# Patient Record
Sex: FEMALE | Race: WHITE | NOT HISPANIC OR LATINO | Employment: OTHER | URBAN - NONMETROPOLITAN AREA
[De-identification: names, ages, dates, MRNs, and addresses within clinical notes are randomized per-mention and may not be internally consistent; named-entity substitution may affect disease eponyms.]

---

## 2017-01-04 ENCOUNTER — MYC MEDICAL ADVICE (OUTPATIENT)
Dept: FAMILY MEDICINE | Facility: CLINIC | Age: 67
End: 2017-01-04

## 2017-01-04 NOTE — TELEPHONE ENCOUNTER
Per 12-20-16 OV-      Patient Instructions    1. Acute sinusitis with symptoms > 10 days  Will treat with - cefUROXime (CEFTIN) 250 MG tablet; Take 1 tablet (250 mg) by mouth 2 times daily  Dispense: 20 tablet; Refill: 0  You have tolerated this in the past    If not better after the antibiotic    Call and would get a CT scan of sinus and refer to ENT     2. Multiple allergies  I would recommend that you see an allergies for further testing on your multiple antibiotic allergies as this limits what can be used  - ALLERGY/ASTHMA ADULT REFERRAL           Forwarded to Constance for review/ reply.  EPHRAIM Chaidez RN

## 2017-01-11 DIAGNOSIS — E78.5 HYPERLIPIDEMIA LDL GOAL <100: ICD-10-CM

## 2017-01-11 DIAGNOSIS — E11.9 TYPE 2 DIABETES MELLITUS WITHOUT COMPLICATION, UNSPECIFIED LONG TERM INSULIN USE STATUS: ICD-10-CM

## 2017-01-11 DIAGNOSIS — K58.0 IRRITABLE BOWEL SYNDROME WITH DIARRHEA: Primary | ICD-10-CM

## 2017-01-11 RX ORDER — LISINOPRIL 5 MG/1
5 TABLET ORAL DAILY
Qty: 90 TABLET | Refills: 0 | Status: SHIPPED | OUTPATIENT
Start: 2017-01-11 | End: 2017-03-07

## 2017-01-11 RX ORDER — DICYCLOMINE HYDROCHLORIDE 10 MG/1
10 CAPSULE ORAL
Qty: 240 CAPSULE | Refills: 3 | Status: SHIPPED | OUTPATIENT
Start: 2017-01-11 | End: 2017-10-08

## 2017-01-11 RX ORDER — METFORMIN HCL 500 MG
500 TABLET, EXTENDED RELEASE 24 HR ORAL
Qty: 90 TABLET | Refills: 0 | Status: SHIPPED | OUTPATIENT
Start: 2017-01-11 | End: 2017-03-07

## 2017-01-11 RX ORDER — ATORVASTATIN CALCIUM 80 MG/1
80 TABLET, FILM COATED ORAL DAILY
Qty: 30 TABLET | Refills: 0 | Status: SHIPPED | OUTPATIENT
Start: 2017-01-11 | End: 2017-02-16

## 2017-01-11 NOTE — TELEPHONE ENCOUNTER
Needs fasting lipids.  Left message for patient to schedule lab only appointment before next refill.

## 2017-01-11 NOTE — TELEPHONE ENCOUNTER
Patient is going out of town and needs this today -     Glucophage         Last Written Prescription Date: 10/22/16  Last Fill Quantity: 90, # refills: 0  Last Office Visit with [facility], [facility] or [facility] prescribing provider:  12/20/16        BP Readings from Last 3 Encounters:   12/20/16 112/66   12/02/16 123/60   11/15/16 131/66     MICROL       38   12/10/2015  No results found for this basename: microalbumin  CREATININE   Date Value Ref Range Status   10/28/2016 0.84 0.52 - 1.04 mg/dL Final   ]  GFR ESTIMATE   Date Value Ref Range Status   10/28/2016 68 >60 mL/min/1.7m2 Final     Comment:     Non  GFR Calc   02/02/2016 67 >60 mL/min/1.7m2 Final     Comment:     Non  GFR Calc   12/31/2015 77 >60 mL/min/1.7m2 Final     Comment:     Non  GFR Calc     GFR ESTIMATE IF BLACK   Date Value Ref Range Status   10/28/2016 82 >60 mL/min/1.7m2 Final     Comment:      GFR Calc   02/02/2016 81 >60 mL/min/1.7m2 Final     Comment:      GFR Calc   12/31/2015 >90   GFR Calc   >60 mL/min/1.7m2 Final     CHOL      154   12/27/2013  HDL       42   12/27/2013  LDL       90   12/10/2015  LDL       88   12/27/2013  TRIG      120   12/27/2013  CHOLHDLRATIO      4.0   12/27/2013  AST       34   2/2/2016  ALT       52   2/2/2016  A1C      6.8   10/28/2016  A1C      7.0   8/12/2016  A1C      7.5   4/22/2016  A1C      7.0   12/10/2015  A1C      6.6   1/16/2015  POTASSIUM   Date Value Ref Range Status   10/28/2016 4.4 3.4 - 5.3 mmol/L Final     Lipitor     Last Written Prescription Date: 01/18/16  Last Fill Quantity: 90, # refills: 3  Last Office Visit with [facility], [facility] or [facility] prescribing provider: 12/20/16       CHOL      154   12/27/2013  HDL       42   12/27/2013  LDL       90   12/10/2015  LDL       88   12/27/2013  TRIG      120   12/27/2013  CHOLHDLRATIO      4.0   12/27/2013      Lisinopril      Last Written Prescription Date: 01/18/16  Last  Fill Quantity: 90, # refills: 3  Last Office Visit with [facility], [facility] or [facility] prescribing provider: 12/20/16       POTASSIUM   Date Value Ref Range Status   10/28/2016 4.4 3.4 - 5.3 mmol/L Final     CREATININE   Date Value Ref Range Status   10/28/2016 0.84 0.52 - 1.04 mg/dL Final     BP Readings from Last 3 Encounters:   12/20/16 112/66   12/02/16 123/60   11/15/16 131/66

## 2017-01-11 NOTE — TELEPHONE ENCOUNTER
Patient is going out of town tomorrow and needs this. She is out.     Stella [place]

## 2017-02-06 ENCOUNTER — MYC MEDICAL ADVICE (OUTPATIENT)
Dept: FAMILY MEDICINE | Facility: CLINIC | Age: 67
End: 2017-02-06

## 2017-02-07 ENCOUNTER — MYC MEDICAL ADVICE (OUTPATIENT)
Dept: EDUCATION SERVICES | Facility: CLINIC | Age: 67
End: 2017-02-07

## 2017-02-07 DIAGNOSIS — E11.9 TYPE 2 DIABETES MELLITUS WITHOUT COMPLICATION, WITHOUT LONG-TERM CURRENT USE OF INSULIN (H): Primary | ICD-10-CM

## 2017-02-08 ENCOUNTER — TELEPHONE (OUTPATIENT)
Dept: GASTROENTEROLOGY | Facility: OUTPATIENT CENTER | Age: 67
End: 2017-02-08

## 2017-02-13 ENCOUNTER — HOSPITAL ENCOUNTER (OUTPATIENT)
Dept: CT IMAGING | Facility: CLINIC | Age: 67
Discharge: HOME | End: 2017-02-13
Attending: OTOLARYNGOLOGY | Admitting: OTOLARYNGOLOGY
Payer: MEDICARE

## 2017-02-13 ENCOUNTER — OFFICE VISIT (OUTPATIENT)
Dept: OTOLARYNGOLOGY | Facility: CLINIC | Age: 67
End: 2017-02-13
Payer: COMMERCIAL

## 2017-02-13 VITALS
BODY MASS INDEX: 31.55 KG/M2 | WEIGHT: 167 LBS | DIASTOLIC BLOOD PRESSURE: 43 MMHG | TEMPERATURE: 98.2 F | SYSTOLIC BLOOD PRESSURE: 105 MMHG | HEART RATE: 51 BPM

## 2017-02-13 DIAGNOSIS — J32.0 CHRONIC MAXILLARY SINUSITIS: ICD-10-CM

## 2017-02-13 DIAGNOSIS — K21.9 LPRD (LARYNGOPHARYNGEAL REFLUX DISEASE): ICD-10-CM

## 2017-02-13 DIAGNOSIS — H92.02 OTALGIA, LEFT: ICD-10-CM

## 2017-02-13 DIAGNOSIS — E11.9 TYPE 2 DIABETES MELLITUS WITHOUT COMPLICATION, WITHOUT LONG-TERM CURRENT USE OF INSULIN (H): ICD-10-CM

## 2017-02-13 DIAGNOSIS — M31.6 TEMPORAL ARTERITIS (H): ICD-10-CM

## 2017-02-13 DIAGNOSIS — J32.0 CHRONIC MAXILLARY SINUSITIS: Primary | ICD-10-CM

## 2017-02-13 LAB
ERYTHROCYTE [SEDIMENTATION RATE] IN BLOOD BY WESTERGREN METHOD: 9 MM/H (ref 0–30)
HBA1C MFR BLD: 7 % (ref 4.3–6)

## 2017-02-13 PROCEDURE — 83036 HEMOGLOBIN GLYCOSYLATED A1C: CPT | Performed by: FAMILY MEDICINE

## 2017-02-13 PROCEDURE — 70486 CT MAXILLOFACIAL W/O DYE: CPT

## 2017-02-13 PROCEDURE — 31575 DIAGNOSTIC LARYNGOSCOPY: CPT | Performed by: OTOLARYNGOLOGY

## 2017-02-13 PROCEDURE — 36415 COLL VENOUS BLD VENIPUNCTURE: CPT | Performed by: FAMILY MEDICINE

## 2017-02-13 PROCEDURE — 85652 RBC SED RATE AUTOMATED: CPT | Performed by: FAMILY MEDICINE

## 2017-02-13 PROCEDURE — 99214 OFFICE O/P EST MOD 30 MIN: CPT | Mod: 25 | Performed by: OTOLARYNGOLOGY

## 2017-02-13 ASSESSMENT — PAIN SCALES - GENERAL: PAINLEVEL: NO PAIN (0)

## 2017-02-13 NOTE — NURSING NOTE
"Initial /43 (BP Location: Right arm, Patient Position: Chair, Cuff Size: Adult Regular)  Pulse 51  Temp 98.2  F (36.8  C) (Oral)  Wt 75.8 kg (167 lb)  BMI 31.55 kg/m2 Estimated body mass index is 31.55 kg/(m^2) as calculated from the following:    Height as of 11/11/16: 1.549 m (5' 1\").    Weight as of this encounter: 75.8 kg (167 lb). .    Nora Benoit LPN    "
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
## 2017-02-13 NOTE — MR AVS SNAPSHOT
After Visit Summary   2/13/2017    Ml Evans    MRN: [id number]           Patient Information     Date Of Birth          1950        Visit Information        Provider Department      2/13/2017 11:15 AM Yumiko Bhat MD [facility]        Today's Diagnoses     Chronic maxillary sinusitis    -  1    LPRD (laryngopharyngeal reflux disease)          Care Instructions    Per physician's instructions          Follow-ups after your visit        Follow-up notes from your care team     Return in about 4 weeks (around 3/13/2017).      Future tests that were ordered for you today     Open Future Orders        Priority Expected Expires Ordered    CT Maxillofacial w/o Contrast Routine  2/13/2018 2/13/2017            Who to contact     If you have questions or need follow up information about today's clinic visit or your schedule please contact [facility] directly at [phone].  Normal or non-critical lab and imaging results will be communicated to you by MyChart, letter or phone within 4 business days after the clinic has received the results. If you do not hear from us within 7 days, please contact the clinic through [facility] or phone. If you have a critical or abnormal lab result, we will notify you by phone as soon as possible.  Submit refill requests through [employer] or call your pharmacy and they will forward the refill request to us. Please allow 3 business days for your refill to be completed.          Additional Information About Your Visit        MyChart Information     [employer] gives you secure access to your electronic health record. If you see a primary care provider, you can also send messages to your care team and make appointments. If you have questions, please call your primary care clinic.  If you do not have a primary care provider, please call [phone] and they will assist you.        Care EveryWhere ID     This is your Care EveryWhere ID. This  could be used by other organizations to access your [place] medical records  [id number]        Your Vitals Were     Pulse Temperature BMI (Body Mass Index)             51 98.2  F (36.8  C) (Oral) 31.55 kg/m2          Blood Pressure from Last 3 Encounters:   02/13/17 105/43   12/20/16 112/66   12/02/16 123/60    Weight from Last 3 Encounters:   02/13/17 75.8 kg (167 lb)   12/20/16 76.7 kg (169 lb)   12/02/16 75.8 kg (167 lb)              We Performed the Following     LARYNGOSCOPY FLEX FIBEROPTIC, DIAGNOSTIC        Primary Care Provider Office Phone # Fax #    Ledy Benedict [phone]       [facility] [address] [place] [id number]        Thank you!     Thank you for choosing [facility]  for your care. Our goal is always to provide you with excellent care. Hearing back from our patients is one way we can continue to improve our services. Please take a few minutes to complete the written survey that you may receive in the mail after your visit with us. Thank you!             Your Updated Medication List - Protect others around you: Learn how to safely use, store and throw away your medicines at www.disposemymeds.org.          This list is accurate as of: 2/13/17  4:31 PM.  Always use your most recent med list.                   Brand Name Dispense Instructions for use    amphetamine-dextroamphetamine 20 MG per 24 hr capsule    ADDERALL XR     Take 20 mg by mouth daily       aspirin 81 MG tablet      1 TABLET DAILY       atorvastatin 80 MG tablet    LIPITOR    30 tablet    Take 1 tablet (80 mg) by mouth daily       blood glucose monitoring lancets     100 each    Use to test blood sugars 2 times daily or as directed.       blood glucose monitoring meter device kit     1 kit    Use to test blood sugars 2 times daily or as directed.       blood glucose monitoring test strip    FREESTYLE LITE    100 each    Use to test blood sugars 1 times daily or as  directed.       calcium carbonate 500 MG tablet    OS-SAMIA 500 mg [place]. Ca     Reported on 2/13/2017       clindamycin 1 % solution    CLEOCIN T    60 mL    Apply topically 2 times daily       dicyclomine 10 MG capsule    BENTYL    240 capsule    Take 1 capsule (10 mg) by mouth 4 times daily (before meals and nightly)       doxepin 75 MG capsule    SINEquan     Take 75 mg by mouth At Bedtime       fluticasone 50 MCG/ACT spray    FLONASE    16 g    Spray 1-2 sprays into both nostrils daily       folic acid 400 MCG tablet    FOLVITE     Take 400 mcg by mouth daily Reported on 2/13/2017       HAIR/SKIN/NAILS Tabs      1 TABLET DAILY on hold       LANsoprazole 30 MG CR capsule    PREVACID    90 capsule    Take 1 capsule (30 mg) by mouth daily Take 30-60 minutes before a meal.       LEXAPRO 20 MG tablet   Generic drug:  escitalopram     180 Tab    40 mg = two tablets by mouth daily per psychiatry       lisinopril 5 MG tablet    PRINIVIL/ZESTRIL    90 tablet    Take 1 tablet (5 mg) by mouth daily       metFORMIN 500 MG 24 hr tablet    GLUCOPHAGE-XR    90 tablet    Take 1 tablet (500 mg) by mouth daily (with dinner)       metoprolol 25 MG 24 hr tablet    TOPROL-XL    90 tablet    Take 1 tablet (25 mg) by mouth daily       nitroglycerin 0.4 MG sublingual tablet    NITROSTAT    25 tablet    Place 1 tablet (0.4 mg) under the tongue every 5 minutes as needed for chest pain       nystatin 846552 UNIT/GM Powd    MYCOSTATIN    30 g    Apply topically 3 times daily as needed       omega-3 fatty acids 1200 MG capsule      Take 1 capsule by mouth daily Reported on 2/13/2017       psyllium 0.52 G capsule      Take 4 capsules by mouth At Bedtime       sennosides 8.6 MG tablet    SENOKOT     Take 1 tablet by mouth twice a week       tretinoin 0.025 % cream    RETIN-A    45 g    Spread a pea size amount into affected area topically at bedtime.  Use sunscreen SPF>20.       vitamin B complex with vitamin C Tabs tablet    STRESS TAB      Reported on 2/13/2017       vitamin E 400 UNIT capsule      Take 400 Units by mouth daily Reported on 2/13/2017       XANAX PO      Take 0.5-1 mg by mouth 1/2 tab in am, 1 tab in pm, then 1 tab midday prn and 1/2 tab throughout the day if needed

## 2017-02-16 DIAGNOSIS — E78.5 HYPERLIPIDEMIA LDL GOAL <100: ICD-10-CM

## 2017-02-17 NOTE — TELEPHONE ENCOUNTER
atorvastatin (LIPITOR) 80 MG tablet     Last Written Prescription Date: 1/11/17  Last Fill Quantity: 30, # refills: 0  Last Office Visit with G, P or [facility] prescribing provider: 12/20/16       Lab Results   Component Value Date    CHOL 154 12/27/2013     Lab Results   Component Value Date    HDL 42 12/27/2013     Lab Results   Component Value Date    LDL 90 12/10/2015    LDL 88 12/27/2013     Lab Results   Component Value Date    TRIG 120 12/27/2013     Lab Results   Component Value Date    CHOLHDLRATIO 4.0 12/27/2013

## 2017-02-20 RX ORDER — ATORVASTATIN CALCIUM 80 MG/1
80 TABLET, FILM COATED ORAL DAILY
Qty: 30 TABLET | Refills: 0 | Status: SHIPPED | OUTPATIENT
Start: 2017-02-20 | End: 2017-03-16

## 2017-02-21 ENCOUNTER — TELEPHONE (OUTPATIENT)
Dept: FAMILY MEDICINE | Facility: CLINIC | Age: 67
End: 2017-02-21

## 2017-02-21 ENCOUNTER — MYC MEDICAL ADVICE (OUTPATIENT)
Dept: FAMILY MEDICINE | Facility: CLINIC | Age: 67
End: 2017-02-21

## 2017-02-21 NOTE — TELEPHONE ENCOUNTER
RN discussed with DANIELLA Nolasco,  who advises pt does not need abx for dental cleaning/ procedure based on current prophylaxis guidelines.   Pt informed/ advised as noted per Constance.  EPHRAIM Chaidez RN

## 2017-02-21 NOTE — TELEPHONE ENCOUNTER
Reason for Call:  Medication or medication refill:    Do you use a [facility]?  Name of the pharmacy and phone number for the current request:  [facility] - [phone]    Name of the medication requested: Clindamycin    Other request: She is a heart pt.  She has a dental appt tomorrow and needs a Rx for Clindamycin.  Please advise.    Can we leave a detailed message on this number? YES    Phone number patient can be reached at: Home number on file [phone] (home)    Best Time: any    Call taken on 2/21/2017 at 4:25 PM by aJnice Rhodes

## 2017-02-21 NOTE — TELEPHONE ENCOUNTER
Hx stents 2011.  Having routine dental cleaning tomorrow, appt 12 N.  [facility] dentist is recommending abx.  Pt requesting clindamycin due to multiple abx allergies.   Please advise.  EPHRAIM Chaidez RN

## 2017-03-02 DIAGNOSIS — E78.5 HYPERLIPIDEMIA LDL GOAL <100: Primary | ICD-10-CM

## 2017-03-02 DIAGNOSIS — E11.9 TYPE 2 DIABETES MELLITUS WITHOUT COMPLICATION, WITHOUT LONG-TERM CURRENT USE OF INSULIN (H): ICD-10-CM

## 2017-03-03 DIAGNOSIS — E11.9 TYPE 2 DIABETES MELLITUS WITHOUT COMPLICATION, WITHOUT LONG-TERM CURRENT USE OF INSULIN (H): ICD-10-CM

## 2017-03-03 DIAGNOSIS — E78.5 HYPERLIPIDEMIA LDL GOAL <100: ICD-10-CM

## 2017-03-03 LAB
ALBUMIN SERPL-MCNC: 3.9 G/DL (ref 3.4–5)
ALP SERPL-CCNC: 142 U/L (ref 40–150)
ALT SERPL W P-5'-P-CCNC: 51 U/L (ref 0–50)
ANION GAP SERPL CALCULATED.3IONS-SCNC: 10 MMOL/L (ref 3–14)
AST SERPL W P-5'-P-CCNC: 49 U/L (ref 0–45)
BILIRUB DIRECT SERPL-MCNC: <0.1 MG/DL (ref 0–0.2)
BILIRUB SERPL-MCNC: 0.4 MG/DL (ref 0.2–1.3)
BUN SERPL-MCNC: 15 MG/DL (ref 7–30)
CALCIUM SERPL-MCNC: 8.6 MG/DL (ref 8.5–10.1)
CHLORIDE SERPL-SCNC: 103 MMOL/L (ref 94–109)
CHOLEST SERPL-MCNC: 142 MG/DL
CO2 SERPL-SCNC: 25 MMOL/L (ref 20–32)
CREAT SERPL-MCNC: 0.79 MG/DL (ref 0.52–1.04)
ERYTHROCYTE [DISTWIDTH] IN BLOOD BY AUTOMATED COUNT: 13.5 % (ref 10–15)
GFR SERPL CREATININE-BSD FRML MDRD: 73 ML/MIN/1.7M2
GLUCOSE SERPL-MCNC: 152 MG/DL (ref 70–99)
HCT VFR BLD AUTO: 41.9 % (ref 35–47)
HDLC SERPL-MCNC: 50 MG/DL
HGB BLD-MCNC: 13.8 G/DL (ref 11.7–15.7)
LDLC SERPL CALC-MCNC: 58 MG/DL
MCH RBC QN AUTO: 29.9 PG (ref 26.5–33)
MCHC RBC AUTO-ENTMCNC: 32.9 G/DL (ref 31.5–36.5)
MCV RBC AUTO: 91 FL (ref 78–100)
NONHDLC SERPL-MCNC: 92 MG/DL
PLATELET # BLD AUTO: 215 10E9/L (ref 150–450)
POTASSIUM SERPL-SCNC: 4 MMOL/L (ref 3.4–5.3)
PROT SERPL-MCNC: 7.1 G/DL (ref 6.8–8.8)
RBC # BLD AUTO: 4.61 10E12/L (ref 3.8–5.2)
SODIUM SERPL-SCNC: 138 MMOL/L (ref 133–144)
TRIGL SERPL-MCNC: 168 MG/DL
TSH SERPL DL<=0.005 MIU/L-ACNC: 1.9 MU/L (ref 0.4–4)
WBC # BLD AUTO: 8.8 10E9/L (ref 4–11)

## 2017-03-03 PROCEDURE — 80061 LIPID PANEL: CPT | Performed by: FAMILY MEDICINE

## 2017-03-03 PROCEDURE — 80076 HEPATIC FUNCTION PANEL: CPT | Performed by: FAMILY MEDICINE

## 2017-03-03 PROCEDURE — 85027 COMPLETE CBC AUTOMATED: CPT | Performed by: FAMILY MEDICINE

## 2017-03-03 PROCEDURE — 84443 ASSAY THYROID STIM HORMONE: CPT | Performed by: FAMILY MEDICINE

## 2017-03-03 PROCEDURE — 36415 COLL VENOUS BLD VENIPUNCTURE: CPT | Performed by: FAMILY MEDICINE

## 2017-03-03 PROCEDURE — 80048 BASIC METABOLIC PNL TOTAL CA: CPT | Performed by: FAMILY MEDICINE

## 2017-03-07 ENCOUNTER — OFFICE VISIT (OUTPATIENT)
Dept: FAMILY MEDICINE | Facility: CLINIC | Age: 67
End: 2017-03-07
Payer: COMMERCIAL

## 2017-03-07 VITALS
DIASTOLIC BLOOD PRESSURE: 60 MMHG | WEIGHT: 167 LBS | BODY MASS INDEX: 31.53 KG/M2 | HEIGHT: 61 IN | TEMPERATURE: 98.7 F | HEART RATE: 52 BPM | SYSTOLIC BLOOD PRESSURE: 110 MMHG

## 2017-03-07 DIAGNOSIS — I10 BENIGN ESSENTIAL HYPERTENSION: ICD-10-CM

## 2017-03-07 DIAGNOSIS — K21.9 GASTROESOPHAGEAL REFLUX DISEASE, ESOPHAGITIS PRESENCE NOT SPECIFIED: ICD-10-CM

## 2017-03-07 DIAGNOSIS — J31.0 CHRONIC RHINITIS: ICD-10-CM

## 2017-03-07 DIAGNOSIS — K58.0 IRRITABLE BOWEL SYNDROME WITH DIARRHEA: ICD-10-CM

## 2017-03-07 DIAGNOSIS — E78.5 HYPERLIPIDEMIA LDL GOAL <100: ICD-10-CM

## 2017-03-07 DIAGNOSIS — Z00.01 ENCOUNTER FOR WELL ADULT EXAM WITH ABNORMAL FINDINGS: Primary | ICD-10-CM

## 2017-03-07 DIAGNOSIS — E11.9 TYPE 2 DIABETES MELLITUS WITHOUT COMPLICATION, WITHOUT LONG-TERM CURRENT USE OF INSULIN (H): ICD-10-CM

## 2017-03-07 DIAGNOSIS — F33.0 MAJOR DEPRESSIVE DISORDER, RECURRENT EPISODE, MILD (H): ICD-10-CM

## 2017-03-07 DIAGNOSIS — I25.10 CORONARY ARTERY DISEASE INVOLVING NATIVE HEART WITHOUT ANGINA PECTORIS, UNSPECIFIED VESSEL OR LESION TYPE: ICD-10-CM

## 2017-03-07 LAB
CREAT UR-MCNC: 277 MG/DL
MICROALBUMIN UR-MCNC: 32 MG/L
MICROALBUMIN/CREAT UR: 11.59 MG/G CR (ref 0–25)

## 2017-03-07 PROCEDURE — 99397 PER PM REEVAL EST PAT 65+ YR: CPT | Performed by: FAMILY MEDICINE

## 2017-03-07 PROCEDURE — 99207 C FOOT EXAM  NO CHARGE: CPT | Mod: 25 | Performed by: FAMILY MEDICINE

## 2017-03-07 PROCEDURE — 82043 UR ALBUMIN QUANTITATIVE: CPT | Performed by: FAMILY MEDICINE

## 2017-03-07 RX ORDER — METOPROLOL SUCCINATE 25 MG/1
25 TABLET, EXTENDED RELEASE ORAL DAILY
Qty: 90 TABLET | Refills: 3 | Status: SHIPPED | OUTPATIENT
Start: 2017-03-07 | End: 2018-04-04

## 2017-03-07 RX ORDER — LANSOPRAZOLE 30 MG/1
30 CAPSULE, DELAYED RELEASE ORAL DAILY
Qty: 90 CAPSULE | Refills: 3 | Status: SHIPPED | OUTPATIENT
Start: 2017-03-07 | End: 2018-03-25

## 2017-03-07 RX ORDER — LISINOPRIL 5 MG/1
5 TABLET ORAL DAILY
Qty: 90 TABLET | Refills: 3 | Status: SHIPPED | OUTPATIENT
Start: 2017-03-07 | End: 2018-03-25

## 2017-03-07 RX ORDER — NITROGLYCERIN 0.4 MG/1
0.4 TABLET SUBLINGUAL EVERY 5 MIN PRN
Qty: 25 TABLET | Refills: 6 | Status: SHIPPED | OUTPATIENT
Start: 2017-03-07 | End: 2019-01-23

## 2017-03-07 RX ORDER — METFORMIN HCL 500 MG
500 TABLET, EXTENDED RELEASE 24 HR ORAL
Qty: 90 TABLET | Refills: 3 | Status: SHIPPED | OUTPATIENT
Start: 2017-03-07 | End: 2018-03-25

## 2017-03-07 ASSESSMENT — ANXIETY QUESTIONNAIRES
2. NOT BEING ABLE TO STOP OR CONTROL WORRYING: SEVERAL DAYS
6. BECOMING EASILY ANNOYED OR IRRITABLE: MORE THAN HALF THE DAYS
1. FEELING NERVOUS, ANXIOUS, OR ON EDGE: MORE THAN HALF THE DAYS
3. WORRYING TOO MUCH ABOUT DIFFERENT THINGS: SEVERAL DAYS
7. FEELING AFRAID AS IF SOMETHING AWFUL MIGHT HAPPEN: MORE THAN HALF THE DAYS
5. BEING SO RESTLESS THAT IT IS HARD TO SIT STILL: SEVERAL DAYS
GAD7 TOTAL SCORE: 11

## 2017-03-07 ASSESSMENT — PATIENT HEALTH QUESTIONNAIRE - PHQ9: 5. POOR APPETITE OR OVEREATING: MORE THAN HALF THE DAYS

## 2017-03-07 NOTE — NURSING NOTE
"Chief Complaint   Patient presents with     Physical       Initial /60 (BP Location: Right arm, Patient Position: Chair, Cuff Size: Adult Large)  Pulse 52  Temp 98.7  F (37.1  C) (Tympanic)  Ht 5' 0.5\" (1.537 m)  Wt 167 lb (75.8 kg)  BMI 32.08 kg/m2 Estimated body mass index is 32.08 kg/(m^2) as calculated from the following:    Height as of this encounter: 5' 0.5\" (1.537 m).    Weight as of this encounter: 167 lb (75.8 kg).  Medication Reconciliation: complete      "

## 2017-03-07 NOTE — MR AVS SNAPSHOT
After Visit Summary   3/7/2017    Ml Evans    MRN: [id number]           Patient Information     Date Of Birth          1950        Visit Information        Provider Department      3/7/2017 2:20 PM Ledy Benedict MD [facility]        Today's Diagnoses     Major depressive disorder, recurrent episode, mild (H)    -  1    Type 2 diabetes mellitus without complication, without long-term current use of insulin (H)        Gastroesophageal reflux disease, esophagitis presence not specified        Hyperlipidemia LDL goal <100        Type 2 diabetes mellitus without complication, unspecified long term insulin use status (H)        Chronic rhinitis        Coronary artery disease involving native heart without angina pectoris, unspecified vessel or lesion type        Benign essential hypertension        Irritable bowel syndrome with diarrhea          Care Instructions      Preventive Health Recommendations  Female Ages 65 +    Yearly exam:     See your health care provider every year in order to  o Review health changes.   o Discuss preventive care.    o Review your medicines if your doctor has prescribed any.      You no longer need a yearly Pap test unless you've had an abnormal Pap test in the past 10 years. If you have vaginal symptoms, such as bleeding or discharge, be sure to talk with your provider about a Pap test.      Every 1 to 2 years, have a mammogram.  If you are over 69, talk with your health care provider about whether or not you want to continue having screening mammograms.      Every 10 years, have a colonoscopy. Or, have a yearly FIT test (stool test). These exams will check for colon cancer.       Have a cholesterol test every 5 years, or more often if your doctor advises it.       Have a diabetes test (fasting glucose) every three years. If you are at risk for diabetes, you should have this test more often.       At age 65, have a bone density scan  (DEXA) to check for osteoporosis (brittle bone disease).    Shots:    Get a flu shot each year.    Get a tetanus shot every 10 years.    Talk to your doctor about your pneumonia vaccines. There are now two you should receive - Pneumovax (PPSV 23) and Prevnar (PCV 13).    Talk to your doctor about the shingles vaccine.    Talk to your doctor about the hepatitis B vaccine.    Nutrition:     Eat at least 5 servings of fruits and vegetables each day.      Eat whole-grain bread, whole-wheat pasta and brown rice instead of white grains and rice.      Talk to your provider about Calcium and Vitamin D.     Lifestyle    Exercise at least 150 minutes a week (30 minutes a day, 5 days a week). This will help you control your weight and prevent disease.      Limit alcohol to one drink per day.      No smoking.       Wear sunscreen to prevent skin cancer.       See your dentist twice a year for an exam and cleaning.      See your eye doctor every 1 to 2 years to screen for conditions such as glaucoma, macular degeneration, cataracts, etc     Medications refilled     Set up allergy and nutrition     See me back in 1 month         Follow-ups after your visit        Additional Services     ALLERGY/ASTHMA ADULT REFERRAL       Your provider has referred you to: [facility]: [facility] [phone] [email or web address]    Please be aware that coverage of these services is subject to the terms and limitations of your health insurance plan.  Call member services at your health plan with any benefit or coverage questions.      Please bring the following with you to your appointment:    (1) Any X-Rays, CTs or MRIs which have been performed.  Contact the facility where they were done to arrange for  prior to your scheduled appointment.    (2) List of current medications  (3) This referral request   (4) Any documents/labs given to you for this referral            NUTRITION REFERRAL       Your  provider has referred you to: [facility]: [facility] [phone]   [email or web address]    Please be aware that coverage of these services is subject to the terms and limitations of your health insurance plan.  Call member services at your health plan with any benefit or coverage questions.      Please bring the following with you to your appointment:    (1) This referral request  (2) Any documents given to you regarding this referral  (3) Any specific questions you have about diet and/or food choices                  Future tests that were ordered for you today     Open Future Orders        Priority Expected Expires Ordered    ** Albumin Random Urine Quant FUTURE 1yr Routine 2/5/2018 3/7/2018 3/7/2017            Who to contact     If you have questions or need follow up information about today's clinic visit or your schedule please contact [facility] directly at [phone].  Normal or non-critical lab and imaging results will be communicated to you by MyChart, letter or phone within 4 business days after the clinic has received the results. If you do not hear from us within 7 days, please contact the clinic through [employer] or phone. If you have a critical or abnormal lab result, we will notify you by phone as soon as possible.  Submit refill requests through shoply or call your pharmacy and they will forward the refill request to us. Please allow 3 business days for your refill to be completed.          Additional Information About Your Visit        [employer] Information     shoply gives you secure access to your electronic health record. If you see a primary care provider, you can also send messages to your care team and make appointments. If you have questions, please call your primary care clinic.  If you do not have a primary care provider, please call [phone] and they will assist you.        Care EveryWhere ID     This is your Care EveryWhere ID.  "This could be used by other organizations to access your [facility] medical records  [id number]        Your Vitals Were     Pulse Temperature Height BMI (Body Mass Index)          52 98.7  F (37.1  C) (Tympanic) 5' 0.5\" (1.537 m) 32.08 kg/m2         Blood Pressure from Last 3 Encounters:   03/07/17 110/60   02/13/17 105/43   12/20/16 112/66    Weight from Last 3 Encounters:   03/07/17 167 lb (75.8 kg)   02/13/17 167 lb (75.8 kg)   12/20/16 169 lb (76.7 kg)              We Performed the Following     ALLERGY/ASTHMA ADULT REFERRAL     DEPRESSION ACTION PLAN (DAP)     FOOT EXAM     NUTRITION REFERRAL          Where to get your medicines      These medications were sent to [id number] [address]  [address] [address]    Hours:  Closed 10-16-08 business to [facility] Phone:  [phone]     LANsoprazole 30 MG CR capsule    lisinopril 5 MG tablet    metFORMIN 500 MG 24 hr tablet    metoprolol 25 MG 24 hr tablet    nitroglycerin 0.4 MG sublingual tablet          Primary Care Provider Office Phone # Fax #    Ledy Benedict [phone]       [facility] [address] [facility] [address]        Thank you!     Thank you for choosing [facility]  for your care. Our goal is always to provide you with excellent care. Hearing back from our patients is one way we can continue to improve our services. Please take a few minutes to complete the written survey that you may receive in the mail after your visit with us. Thank you!             Your Updated Medication List - Protect others around you: Learn how to safely use, store and throw away your medicines at www.disposemymeds.org.          This list is accurate as of: 3/7/17  3:25 PM.  Always use your most recent med list.                   Brand Name Dispense Instructions for use    amphetamine-dextroamphetamine 20 MG per 24 hr capsule    ADDERALL XR     Take 20 " mg by mouth daily       aspirin 81 MG tablet      1 TABLET DAILY       atorvastatin 80 MG tablet    LIPITOR    30 tablet    Take 1 tablet (80 mg) by mouth daily       blood glucose monitoring lancets     100 each    Use to test blood sugars 2 times daily or as directed.       blood glucose monitoring meter device kit     1 kit    Use to test blood sugars 2 times daily or as directed.       blood glucose monitoring test strip    FREESTYLE LITE    100 each    Use to test blood sugars 1 times daily or as directed.       clindamycin 1 % solution    CLEOCIN T    60 mL    Apply topically 2 times daily       dicyclomine 10 MG capsule    BENTYL    240 capsule    Take 1 capsule (10 mg) by mouth 4 times daily (before meals and nightly)       doxepin 75 MG capsule    SINEquan     Take 75 mg by mouth At Bedtime       LANsoprazole 30 MG CR capsule    PREVACID    90 capsule    Take 1 capsule (30 mg) by mouth daily Take 30-60 minutes before a meal.       LEXAPRO 20 MG tablet   Generic drug:  escitalopram     180 Tab    40 mg = two tablets by mouth daily per psychiatry       lisinopril 5 MG tablet    PRINIVIL/ZESTRIL    90 tablet    Take 1 tablet (5 mg) by mouth daily       metFORMIN 500 MG 24 hr tablet    GLUCOPHAGE-XR    90 tablet    Take 1 tablet (500 mg) by mouth daily (with dinner)       metoprolol 25 MG 24 hr tablet    TOPROL-XL    90 tablet    Take 1 tablet (25 mg) by mouth daily       nitroglycerin 0.4 MG sublingual tablet    NITROSTAT    25 tablet    Place 1 tablet (0.4 mg) under the tongue every 5 minutes as needed for chest pain       psyllium 0.52 G capsule      Take 4 capsules by mouth At Bedtime       sennosides 8.6 MG tablet    SENOKOT     Take 1 tablet by mouth twice a week       tretinoin 0.025 % cream    RETIN-A    45 g    Spread a pea size amount into affected area topically at bedtime.  Use sunscreen SPF>20.       XANAX PO      Take 0.5-1 mg by mouth 1/2 tab in am, 1 tab in pm, then 1 tab midday prn and 1/2 tab  throughout the day if needed

## 2017-03-07 NOTE — LETTER
My Depression Action Plan  Name: Ml Evans   Date of Birth 1950  Date: 3/7/2017    My doctor: Ledy Benedict   My clinic: [address] [id number] [phone]          GREEN    ZONE   Good Control    What it looks like:     Things are going generally well. You have normal up s and down s. You may even feel depressed from time to time, but bad moods usually last less than a day.   What you need to do:  1. Continue to care for yourself (see self care plan)  2. Check your depression survival kit and update it as needed  3. Follow your physician s recommendations including any medication.  4. Do not stop taking medication unless you consult with your physician first.           YELLOW         ZONE Getting Worse    What it looks like:     Depression is starting to interfere with your life.     It may be hard to get out of bed; you may be starting to isolate yourself from others.    Symptoms of depression are starting to last most all day and this has happened for several days.     You may have suicidal thoughts but they are not constant.   What you need to do:     1. Call your care team, your response to treatment will improve if you keep your care team informed of your progress. Yellow periods are signs an adjustment may need to be made.     2. Continue your self-care, even if you have to fake it!    3. Talk to someone in your support network    4. Open up your depression survival kit           RED    ZONE Medical Alert - Get Help    What it looks like:     Depression is seriously interfering with your life.     You may experience these or other symptoms: You can t get out of bed most days, can t work or engage in other necessary activities, you have trouble taking care of basic hygiene, or basic responsibilities, thoughts of suicide or death that will not go away, self-injurious behavior.     What you need to do:  1. Call your  care team and request a same-day appointment. If they are not available (weekends or after hours) call your local crisis line, emergency room or 911.      Electronically signed by: Kate English, March 7, 2017    Depression Self Care Plan / Survival Kit    Self-Care for Depression  Here s the deal. Your body and mind are really not as separate as most people think.  What you do and think affects how you feel and how you feel influences what you do and think. This means if you do things that people who feel good do, it will help you feel better.  Sometimes this is all it takes.  There is also a place for medication and therapy depending on how severe your depression is, so be sure to consult with your medical provider and/ or Behavioral Health Consultant if your symptoms are worsening or not improving.     In order to better manage my stress, I will:    Exercise  Get some form of exercise, every day. This will help reduce pain and release endorphins, the  feel good  chemicals in your brain. This is almost as good as taking antidepressants!  This is not the same as joining a gym and then never going! (they count on that by the way ) It can be as simple as just going for a walk or doing some gardening, anything that will get you moving.      Hygiene   Maintain good hygiene (Get out of bed in the morning, Make your bed, Brush your teeth, Take a shower, and Get dressed like you were going to work, even if you are unemployed).  If your clothes don't fit try to get ones that do.    Diet  I will strive to eat foods that are good for me, drink plenty of water, and avoid excessive sugar, caffeine, alcohol, and other mood-altering substances.  Some foods that are helpful in depression are: complex carbohydrates, B vitamins, flaxseed, fish or fish oil, fresh fruits and vegetables.    Psychotherapy  I agree to participate in Individual Therapy (if recommended).    Medication  If prescribed medications, I agree to take them.   Missing doses can result in serious side effects.  I understand that drinking alcohol, or other illicit drug use, may cause potential side effects.  I will not stop my medication abruptly without first discussing it with my provider.    Staying Connected With Others  I will stay in touch with my friends, family members, and my primary care provider/team.    Use your imagination  Be creative.  We all have a creative side; it doesn t matter if it s oil painting, sand castles, or mud pies! This will also kick up the endorphins.    Witness Beauty  (AKA stop and smell the roses) Take a look outside, even in mid-winter. Notice colors, textures. Watch the squirrels and birds.     Service to others  Be of service to others.  There is always someone else in need.  By helping others we can  get out of ourselves  and remember the really important things.  This also provides opportunities for practicing all the other parts of the program.    Humor  Laugh and be silly!  Adjust your TV habits for less news and crime-drama and more comedy.    Control your stress  Try breathing deep, massage therapy, biofeedback, and meditation. Find time to relax each day.     My support system    Clinic Contact:  Phone number:    Contact 1:  Phone number:    Contact 2:  Phone number:    [facility]/:  Phone number:    Therapist:  Phone number:    Local crisis center:    Phone number:    Other community support:  Phone number:
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
## 2017-03-08 ASSESSMENT — PATIENT HEALTH QUESTIONNAIRE - PHQ9: SUM OF ALL RESPONSES TO PHQ QUESTIONS 1-9: 15

## 2017-03-08 ASSESSMENT — ANXIETY QUESTIONNAIRES: GAD7 TOTAL SCORE: 11

## 2017-03-09 PROBLEM — J31.0 CHRONIC RHINITIS: Status: ACTIVE | Noted: 2017-03-09

## 2017-03-09 PROBLEM — I10 BENIGN ESSENTIAL HYPERTENSION: Status: ACTIVE | Noted: 2017-03-09

## 2017-03-14 ENCOUNTER — OFFICE VISIT (OUTPATIENT)
Dept: ALLERGY | Facility: CLINIC | Age: 67
End: 2017-03-14
Payer: COMMERCIAL

## 2017-03-14 VITALS
WEIGHT: 167.6 LBS | OXYGEN SATURATION: 95 % | HEART RATE: 54 BPM | HEIGHT: 61 IN | SYSTOLIC BLOOD PRESSURE: 107 MMHG | BODY MASS INDEX: 31.64 KG/M2 | DIASTOLIC BLOOD PRESSURE: 68 MMHG | TEMPERATURE: 99 F

## 2017-03-14 DIAGNOSIS — J31.0 CHRONIC RHINITIS: ICD-10-CM

## 2017-03-14 DIAGNOSIS — T50.905A MEDICATION REACTION, INITIAL ENCOUNTER: Primary | ICD-10-CM

## 2017-03-14 PROCEDURE — 99204 OFFICE O/P NEW MOD 45 MIN: CPT | Performed by: ALLERGY & IMMUNOLOGY

## 2017-03-14 ASSESSMENT — ENCOUNTER SYMPTOMS
ROS GI COMMENTS: HEARTBURN AND REFLUX
WHEEZING: 1
HEADACHES: 0
DIZZINESS: 1
ADENOPATHY: 0
FEVER: 0
ARTHRALGIAS: 0
SORE THROAT: 1
SHORTNESS OF BREATH: 1
EYE DISCHARGE: 0
EYE ITCHING: 0
RHINORRHEA: 1
COUGH: 0
EYE REDNESS: 0
NERVOUS/ANXIOUS: 1
APPETITE CHANGE: 0
DIARRHEA: 0
CHEST TIGHTNESS: 0
STRIDOR: 0
SINUS PRESSURE: 1
UNEXPECTED WEIGHT CHANGE: 0
NAUSEA: 0
MYALGIAS: 0
JOINT SWELLING: 0
VOMITING: 0
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
## 2017-03-14 NOTE — Clinical Note
Dear Dr. Benedict  The patient was seen in Allergy Clinic for consultation.  Please see consult note for more details. Thank you for the referral!!!

## 2017-03-14 NOTE — NURSING NOTE
"Chief Complaint   Patient presents with     Allergy Consult     ref-Ledy Benedict. allergy to antibiotics. questions on enviromental allergies        Initial /68 (BP Location: Left arm, Patient Position: Chair, Cuff Size: Adult Regular)  Pulse 54  Temp 99  F (37.2  C) (Tympanic)  Ht 5' 0.5\" (1.537 m)  Wt 167 lb 9.6 oz (76 kg)  SpO2 95%  BMI 32.19 kg/m2 Estimated body mass index is 32.19 kg/(m^2) as calculated from the following:    Height as of this encounter: 5' 0.5\" (1.537 m).    Weight as of this encounter: 167 lb 9.6 oz (76 kg).  Medication Reconciliation: complete    "

## 2017-03-14 NOTE — MR AVS SNAPSHOT
After Visit Summary   3/14/2017    Ml Evans    MRN: [id number]           Patient Information     Date Of Birth          1950        Visit Information        Provider Department      3/14/2017 1:20 PM Amador Whitaker MD [facility]        Today's Diagnoses     Medication reaction, initial encounter    -  1    Chronic rhinitis           Follow-ups after your visit        Follow-up notes from your care team     Return if symptoms worsen or fail to improve, for rhinitis follow up; pcn testing.      Your next 10 appointments already scheduled     Mar 28, 2017 11:00 AM CDT   Consult HOD with Genesis Avila RD   [facility] ([facility])    [address] [address] [phone]            Mar 31, 2017  3:20 PM CDT   SHORT with Ledy Benedict MD   [facility]    [address] [place] [address] [phone]              Who to contact     If you have questions or need follow up information about today's clinic visit or your schedule please contact [facility] directly at [phone].  Normal or non-critical lab and imaging results will be communicated to you by MyChart, letter or phone within 4 business days after the clinic has received the results. If you do not hear from us within 7 days, please contact the clinic through MyChart or phone. If you have a critical or abnormal lab result, we will notify you by phone as soon as possible.  Submit refill requests through [employer] or call your pharmacy and they will forward the refill request to us. Please allow 3 business days for your refill to be completed.          Additional Information About Your Visit        MyChart Information     [employer] gives you secure access to your electronic health record. If you see a primary care provider, you can also send messages to your care team and make  "appointments. If you have questions, please call your primary care clinic.  If you do not have a primary care provider, please call [phone] and they will assist you.        Care EveryWhere ID     This is your Care EveryWhere ID. This could be used by other organizations to access your [facility] medical records  [id number]        Your Vitals Were     Pulse Temperature Height Pulse Oximetry BMI (Body Mass Index)       54 99  F (37.2  C) (Tympanic) 5' 0.5\" (1.537 m) 95% 32.19 kg/m2        Blood Pressure from Last 3 Encounters:   03/14/17 107/68   03/07/17 110/60   02/13/17 105/43    Weight from Last 3 Encounters:   03/14/17 167 lb 9.6 oz (76 kg)   03/07/17 167 lb (75.8 kg)   02/13/17 167 lb (75.8 kg)              Today, you had the following     No orders found for display       Primary Care Provider Office Phone # Fax #    Ledy Benedict [phone]       [facility] [address] [place] [id number]        Thank you!     Thank you for choosing [facility]  for your care. Our goal is always to provide you with excellent care. Hearing back from our patients is one way we can continue to improve our services. Please take a few minutes to complete the written survey that you may receive in the mail after your visit with us. Thank you!             Your Updated Medication List - Protect others around you: Learn how to safely use, store and throw away your medicines at www.disposemymeds.org.          This list is accurate as of: 3/14/17 11:59 PM.  Always use your most recent med list.                   Brand Name Dispense Instructions for use    amphetamine-dextroamphetamine 20 MG per 24 hr capsule    ADDERALL XR     Take 20 mg by mouth daily       aspirin 81 MG tablet      1 TABLET DAILY       atorvastatin 80 MG tablet    LIPITOR    30 tablet    Take 1 tablet (80 mg) by mouth daily       blood glucose monitoring lancets     100 each    Use to test blood sugars 2 " times daily or as directed.       blood glucose monitoring meter device kit     1 kit    Use to test blood sugars 2 times daily or as directed.       blood glucose monitoring test strip    FREESTYLE LITE    100 each    Use to test blood sugars 1 times daily or as directed.       clindamycin 1 % solution    CLEOCIN T    60 mL    Apply topically 2 times daily       dicyclomine 10 MG capsule    BENTYL    240 capsule    Take 1 capsule (10 mg) by mouth 4 times daily (before meals and nightly)       doxepin 75 MG capsule    SINEquan     Take 75 mg by mouth At Bedtime       LANsoprazole 30 MG CR capsule    PREVACID    90 capsule    Take 1 capsule (30 mg) by mouth daily Take 30-60 minutes before a meal.       LEXAPRO 20 MG tablet   Generic drug:  escitalopram     180 Tab    40 mg = two tablets by mouth daily per psychiatry       lisinopril 5 MG tablet    PRINIVIL/ZESTRIL    90 tablet    Take 1 tablet (5 mg) by mouth daily       metFORMIN 500 MG 24 hr tablet    GLUCOPHAGE-XR    90 tablet    Take 1 tablet (500 mg) by mouth daily (with dinner)       metoprolol 25 MG 24 hr tablet    TOPROL-XL    90 tablet    Take 1 tablet (25 mg) by mouth daily       nitroglycerin 0.4 MG sublingual tablet    NITROSTAT    25 tablet    Place 1 tablet (0.4 mg) under the tongue every 5 minutes as needed for chest pain       psyllium 0.52 G capsule      Take 4 capsules by mouth At Bedtime       sennosides 8.6 MG tablet    SENOKOT     Take 1 tablet by mouth twice a week       tretinoin 0.025 % cream    RETIN-A    45 g    Spread a pea size amount into affected area topically at bedtime.  Use sunscreen SPF>20.       XANAX PO      Take 0.5-1 mg by mouth 1/2 tab in am, 1 tab in pm, then 1 tab midday prn and 1/2 tab throughout the day if needed

## 2017-03-16 DIAGNOSIS — E78.5 HYPERLIPIDEMIA LDL GOAL <100: ICD-10-CM

## 2017-03-16 RX ORDER — ATORVASTATIN CALCIUM 80 MG/1
80 TABLET, FILM COATED ORAL DAILY
Qty: 90 TABLET | Refills: 3 | Status: SHIPPED | OUTPATIENT
Start: 2017-03-16 | End: 2018-01-03

## 2017-03-16 NOTE — TELEPHONE ENCOUNTER
Atorvastatin 80 mg     Last Written Prescription Date: 2/20/17  Last Fill Quantity: 30, # refills: 0  Last Office Visit with G, P or [facility] prescribing provider: 3/7/17  Next 5 appointments (look out 90 days)     Mar 31, 2017  3:20 PM CDT   SHORT with Ledy Benedict MD   [facility]    [address] [place] [id number]   [phone]                   Lab Results   Component Value Date    CHOL 142 03/03/2017     Lab Results   Component Value Date    HDL 50 03/03/2017     Lab Results   Component Value Date    LDL 58 03/03/2017    LDL 90 12/10/2015     Lab Results   Component Value Date    TRIG 168 03/03/2017     Lab Results   Component Value Date    CHOLHDLRATIO 4.0 12/27/2013

## 2017-03-28 ENCOUNTER — MYC MEDICAL ADVICE (OUTPATIENT)
Dept: FAMILY MEDICINE | Facility: CLINIC | Age: 67
End: 2017-03-28

## 2017-04-21 ENCOUNTER — HOSPITAL ENCOUNTER (OUTPATIENT)
Dept: NUTRITION | Facility: CLINIC | Age: 67
Discharge: HOME | End: 2017-04-21
Attending: FAMILY MEDICINE | Admitting: FAMILY MEDICINE
Payer: MEDICARE

## 2017-04-21 VITALS — BODY MASS INDEX: 32.23 KG/M2 | WEIGHT: 167.8 LBS

## 2017-04-21 PROCEDURE — 97802 MEDICAL NUTRITION INDIV IN: CPT | Performed by: DIETITIAN, REGISTERED

## 2017-04-21 NOTE — PROGRESS NOTES
"[place] [facility]  Medical Nutrition Therapy    Visit Type: Initial Assessment    Ml Evans referred by Dr. Benedict for MNT related to Type 2 Diabetes.    Patient accompanied by , Roland.    Nutrition Assessment:  Anthropometrics  Wt Readings from Last 5 Encounters:   04/21/17 76.1 kg (167 lb 12.8 oz)   03/14/17 76 kg (167 lb 9.6 oz)   03/07/17 75.8 kg (167 lb)   02/13/17 75.8 kg (167 lb)   12/20/16 76.7 kg (169 lb)     Nutrition History  Patient has a hx of diabetes, heart disease, IBS, HTN, and GERD. She has questions how to form eating patterns that takes all of these things into account. Patient reports that she is trying to monitor her blood sugars. Her most recent Hgb A1c was 7.0 (Feb 2017), which increased since Oct 2016. Patient does not count carbohydrates, but \"watches sugar intake\". Patient shares that she does not monitor her salt intake as close as she used to, but she doesn't add any salt to her food. She takes medications for her GERD and IBS, which seems to help. She hasn't been experiencing any s/s re: to IBS. In the past, she reports that she'd have constipation associated with IBS. Patient also reports that she tries to eat probiotics regularly (see food recall). Her  does the food shopping and the food prep is shared. Both patient and her  are retired and don't really follow a typical schedule. Patient does skip meals at times. She states that sometimes she eats <1000 kcal/day.     Physical Activity  Limited. Patient is very sedentary. She reports that she'd like to be more active.      Nutrition Prescription  Dosing Weight: 53 kg  Energy:  1325 - 1590 kcals/day       Protein:  42 - 53 g/day          Fluid:  1 mL/kcal       Carbohydrate:   2 - 3 carb choices at meals   0 - 1 carb choices at snacks            Food Record  Breakfast: yogurt; coffee with cream to drink   Lunch: almonds, string cheese, oranges   Snack (occasional): raisin bread with butter "   Dinner: Lean Cuisine or cereal with milk, raspberries, and blueberries    Drinks diet cola and kefir throughout the day.       Nutrition Diagnosis:  Altered nutrition-related lab value (Hgb A1c) r/t hx of diabetes, inconsistent carbohydrate intake aeb Hgb A1c 7.0.      Nutrition Intervention:  -Discussed the importance of consistent carbohydrate intake throughout the day. Recommend that patient do not skip meals and try to eat at regular times during the day, as well as to include snacks.   -Educated patient on foods that contain carbohydrates. Encouraged her to make sure she is eating some at each meal/snack. Did not teach carb counting, as not appropriate today. Encouraged patient to follow the plate method of eating at meals. Provided a list of healthier food options in each food group.    -Encouraged patient to track her food intake and GI symptoms. Discussed that IBS s/s are different for everyone and that if patient tracks her foods she might be able to identify which foods she cannot tolerate.   -Encouraged patient to incorporate some activity into her daily regimen.     Nutrition Goals:  1. Eat at regular times throughout the day. Maintain a consistent carbohydrate intake.   2. Track foods and GI symptoms     Nutrition Follow Up / Monitoring:  Hgb A1c; Consistent carbohydrate intake; Meal/snack/beverage composition; Eating patterns; Readiness to change; Food- and nutrition-related knowledge; IBS symptoms; Physical activity     Nutrition Recommendations:  Patient to follow-up with RD in 6 - 8 weeks.  Patient has RD contact information to call/email if needed.    Genesis Avila RDN, LD  Clinical Dietitian  [phone]    Start time 1350  End time 1450

## 2017-04-25 ENCOUNTER — TELEPHONE (OUTPATIENT)
Dept: FAMILY MEDICINE | Facility: CLINIC | Age: 67
End: 2017-04-25

## 2017-04-25 DIAGNOSIS — E11.9 TYPE 2 DIABETES MELLITUS WITHOUT COMPLICATION, WITHOUT LONG-TERM CURRENT USE OF INSULIN (H): ICD-10-CM

## 2017-04-25 DIAGNOSIS — K58.0 IRRITABLE BOWEL SYNDROME WITH DIARRHEA: Primary | ICD-10-CM

## 2017-04-25 NOTE — TELEPHONE ENCOUNTER
----- Message from Genesis Avila RD sent at 4/21/2017  1:30 PM CDT -----  Regarding: Nutrition Referral  Hi Dr. Benedict,     Can you place a new nutrition referral for Ml that includes Type 2 Diabetes in addition to the IBS with diarrhea? We will be discussing both IBS and her Type 2 Diabetes. She'll need both diagnoses on the referral for insurance purposes. Please let me know if you have any questions.    Thanks,   Genesis Avila RDN, LD  Clinical Dietitian  [phone]

## 2017-04-28 ENCOUNTER — CARE COORDINATION (OUTPATIENT)
Dept: GASTROENTEROLOGY | Facility: CLINIC | Age: 67
End: 2017-04-28

## 2017-04-28 NOTE — PROGRESS NOTES
Contact patient regarding MyChart message. Left voicemail      Erin Freire RN Plumhoff, Laura, RN Hi Laura,   I'm sorry to respond so late.   After being pretty much symptom free for many months, the last 2 nights I've had bad intestinal pain, difficulty getting any movement going, then explosive diarrhea. Both nights I looked before flushing and found some bizarre looking stuff in there! (nothing that resembled anything I've eaten). No blood this time though. Fished some out both nights and put them in old prescription bottles filled with rubbing alcohol. I took some nola pictures of the stuff too that I could send to you.   Have never done THAT before - yuk! Hopefully it's nothing worrisome but yes, I think now I should go ahead with the colonoscopy. Can you possibly give me a call first, before I reschedule? We're here at home most of the time - [phone] (best one to call) and my CP is [phone]. Today would be great but whenever is most convenient for you.   Thanks so much Irina!   Ml loyd

## 2017-04-28 NOTE — PROGRESS NOTES
"Patient called to follow up on [employer] message. Reports she had a good night last night but the previous 2 nights she was up a couple times with \"explosive diarrhea\". Wondering what GI clinic would recommend.    States she has not had blood in stool for ~6 months, has started a exercise plan,and is working with a dietitian. Overall patient feels her symptoms have improved.    Discussed with patient that  recommends she move forward with colonoscopy as previously discussed. Provided patient with the number for Endoscopy scheduling.    Patient verbalized understanding of plan and will contact clinic with any additional questions.   "

## 2017-07-30 ENCOUNTER — OFFICE VISIT (OUTPATIENT)
Dept: URGENT CARE | Facility: URGENT CARE | Age: 67
End: 2017-07-30
Payer: COMMERCIAL

## 2017-07-30 VITALS
OXYGEN SATURATION: 97 % | DIASTOLIC BLOOD PRESSURE: 64 MMHG | RESPIRATION RATE: 20 BRPM | TEMPERATURE: 98.9 F | HEART RATE: 61 BPM | SYSTOLIC BLOOD PRESSURE: 98 MMHG

## 2017-07-30 DIAGNOSIS — L03.211 CELLULITIS OF FACE: Primary | ICD-10-CM

## 2017-07-30 PROCEDURE — 99213 OFFICE O/P EST LOW 20 MIN: CPT | Performed by: NURSE PRACTITIONER

## 2017-07-30 RX ORDER — CLINDAMYCIN HCL 150 MG
300 CAPSULE ORAL 3 TIMES DAILY
Qty: 42 CAPSULE | Refills: 0 | Status: SHIPPED | OUTPATIENT
Start: 2017-07-30 | End: 2017-09-05

## 2017-07-30 NOTE — MR AVS SNAPSHOT
After Visit Summary   7/30/2017    Ml Evans    MRN: [id number]           Patient Information     Date Of Birth          1950        Visit Information        Provider Department      7/30/2017 2:20 PM Maryana Urrutia APRN CNP [facility]        Today's Diagnoses     Cellulitis of face    -  1      Care Instructions    Use medications as directed    Culture was not sent today.  Warm heat to area. Elevate area.    Tylenol/Ibuprofen OTC as discussed for pain as long as no contraindications or allergies.     Return to clinic or follow up with PCP for recheck in 1 weeks.  To ER if any worsening symptoms at any time, you note the redness expanding outside the margins, red streaking, or fevers.     Patient voiced understanding of instructions given.     Facial Cellulitis  Cellulitis is an infection of the deep layers of skin. A break in the skin, such as a cut or scratch, can let bacteria under the skin. It may also occur from an infected oil gland (pimple) or hair follicle. If the bacteria get to deep layers of the skin, it can be serious. If not treated, cellulitis can get into the bloodstream and lymph nodes. The infection can then spread throughout the body. This causes serious illness.  Cellulitis causes the affected skin to become red, swollen, warm, and sore. The reddened areas have a visible border. You may have a fever, chills, and pain.  Cellulitis is treated with antibiotics taken for 7 to 10 days. Symptoms should get better 1 to 2 days after treatment is started. Make sure to take all the antibiotics for the full number of days until they are gone. Keep taking the medication even if your symptoms go away.  Home care  Follow these tips:    Take all of the antibiotic medicine exactly as directed until it is gone. Don t miss any doses, especially during the first 7 days. Don t stop taking it when your symptoms get better.    Use a cool compress (face  cloth soaked in cool water) on your face to help reduce swelling and pain.    You may use acetaminophen or ibuprofen to reduce pain. Don t use these if you have chronic liver or kidney disease, or ever had a stomach ulcer or gastrointestinal bleeding. Talk with your healthcare provider first.  Follow-up care  Follow up with your healthcare provider, or as advised. If your infection does not go away on the first antibiotic, your healthcare provider will prescribe a different one.  When to seek medical advice  Call your healthcare provider right away if any of these occur:    Fever higher of 100.4  F (38.0  C) or higher after 2 days on antibiotics    Red areas that spread    Swelling or pain that gets worse    Fluid leaking from the skin (pus)    An eyelid that swells shut or leaks fluid (pus)    Headache or neck pain that gets worse    Unusual drowsiness or confusion    Convulsions (seizure)    Change in eyesight     Date Last Reviewed: 9/1/2016 2000-2017 [employer]. [address] [place] [address]. All rights reserved. This information is not intended as a substitute for professional medical care. Always follow your healthcare professional's instructions.                Follow-ups after your visit        Who to contact     If you have questions or need follow up information about today's clinic visit or your schedule please contact [facility] directly at [phone].  Normal or non-critical lab and imaging results will be communicated to you by MyChart, letter or phone within 4 business days after the clinic has received the results. If you do not hear from us within 7 days, please contact the clinic through MyChart or phone. If you have a critical or abnormal lab result, we will notify you by phone as soon as possible.  Submit refill requests through [employer] or call your pharmacy and they will forward the refill request to us. Please allow 3 business  days for your refill to be completed.          Additional Information About Your Visit        MyChart Information     [employer] gives you secure access to your electronic health record. If you see a primary care provider, you can also send messages to your care team and make appointments. If you have questions, please call your primary care clinic.  If you do not have a primary care provider, please call [phone] and they will assist you.        Care EveryWhere ID     This is your Care EveryWhere ID. This could be used by other organizations to access your [facility] medical records  [id number]        Your Vitals Were     Pulse Temperature Respirations Pulse Oximetry          61 98.9  F (37.2  C) (Tympanic) 20 97%         Blood Pressure from Last 3 Encounters:   07/30/17 98/64   03/14/17 107/68   03/07/17 110/60    Weight from Last 3 Encounters:   04/21/17 167 lb 12.8 oz (76.1 kg)   03/14/17 167 lb 9.6 oz (76 kg)   03/07/17 167 lb (75.8 kg)              Today, you had the following     No orders found for display         Today's Medication Changes          These changes are accurate as of: 7/30/17  2:47 PM.  If you have any questions, ask your nurse or doctor.               Start taking these medicines.        Dose/Directions    clindamycin 150 MG capsule   Commonly known as:  CLEOCIN   Used for:  Cellulitis of face   Started by:  Maryana Urrutia APRN CNP        Dose:  300 mg   Take 2 capsules (300 mg) by mouth 3 times daily for 7 days   Quantity:  42 capsule   Refills:  0            Where to get your medicines      These medications were sent to [id number] - [facility] [address] [facility]  [address] [facility] [address]    Hours:  Closed 10-16-08 business to [facility] Phone:  [phone]     clindamycin 150 MG capsule                Primary Care Provider Office Phone # Fax #    Ledy Benedict -[phone]       [facility] [id number] [facility]  [facility] [address]        Equal Access to Services     [facility] ELLEN : Hadii andreina france mariaelena Sofarheen, waaxda luqadaha, qaybta kaalmada ajrrett, leeann hayes. So [facility] [phone].    ATENCIÓN: Si habla español, tiene a gregory disposición servicios gratuitos de asistencia lingüística. Heenaame al [phone].    We comply with applicable federal civil rights laws and [place] laws. We do not discriminate on the basis of race, color, national origin, age, disability sex, sexual orientation or gender identity.            Thank you!     Thank you for choosing [facility]  for your care. Our goal is always to provide you with excellent care. Hearing back from our patients is one way we can continue to improve our services. Please take a few minutes to complete the written survey that you may receive in the mail after your visit with us. Thank you!             Your Updated Medication List - Protect others around you: Learn how to safely use, store and throw away your medicines at www.disposemymeds.org.          This list is accurate as of: 7/30/17  2:47 PM.  Always use your most recent med list.                   Brand Name Dispense Instructions for use Diagnosis    amphetamine-dextroamphetamine 20 MG per 24 hr capsule    ADDERALL XR     Take 20 mg by mouth daily        aspirin 81 MG tablet      1 TABLET DAILY        atorvastatin 80 MG tablet    LIPITOR    90 tablet    Take 1 tablet (80 mg) by mouth daily    Hyperlipidemia LDL goal <100       blood glucose monitoring lancets     100 each    Use to test blood sugars 2 times daily or as directed.    Diabetes mellitus, type 2 (H)       blood glucose monitoring meter device kit     1 kit    Use to test blood sugars 2 times daily or as directed.    Diabetes mellitus, type 2 (H)       blood glucose monitoring test strip    FREESTYLE LITE    100 each    Use to test blood sugars 1 times daily or as directed.    Type 2 diabetes  mellitus without complication, without long-term current use of insulin (H)       clindamycin 1 % solution    CLEOCIN T    60 mL    Apply topically 2 times daily    Folliculitis       clindamycin 150 MG capsule    CLEOCIN    42 capsule    Take 2 capsules (300 mg) by mouth 3 times daily for 7 days    Cellulitis of face       dicyclomine 10 MG capsule    BENTYL    240 capsule    Take 1 capsule (10 mg) by mouth 4 times daily (before meals and nightly)    Irritable bowel syndrome with diarrhea       doxepin 75 MG capsule    SINEquan     Take 75 mg by mouth At Bedtime        LANsoprazole 30 MG CR capsule    PREVACID    90 capsule    Take 1 capsule (30 mg) by mouth daily Take 30-60 minutes before a meal.    Gastroesophageal reflux disease, esophagitis presence not specified       LEXAPRO 20 MG tablet   Generic drug:  escitalopram     180 Tab    40 mg = two tablets by mouth daily per psychiatry        lisinopril 5 MG tablet    PRINIVIL/ZESTRIL    90 tablet    Take 1 tablet (5 mg) by mouth daily    Type 2 diabetes mellitus without complication, without long-term current use of insulin (H), Benign essential hypertension       metFORMIN 500 MG 24 hr tablet    GLUCOPHAGE-XR    90 tablet    Take 1 tablet (500 mg) by mouth daily (with dinner)    Gastroesophageal reflux disease, esophagitis presence not specified       metoprolol 25 MG 24 hr tablet    TOPROL-XL    90 tablet    Take 1 tablet (25 mg) by mouth daily    Benign essential hypertension       nitroGLYcerin 0.4 MG sublingual tablet    NITROSTAT    25 tablet    Place 1 tablet (0.4 mg) under the tongue every 5 minutes as needed for chest pain    Coronary artery disease involving native heart without angina pectoris, unspecified vessel or lesion type       PROZAC PO      Take 20 mg by mouth        psyllium 0.52 G capsule      Take 4 capsules by mouth At Bedtime        sennosides 8.6 MG tablet    SENOKOT     Take 1 tablet by mouth twice a week        tretinoin 0.025 % cream     RETIN-A    45 g    Spread a pea size amount into affected area topically at bedtime.  Use sunscreen SPF>20.    Acne vulgaris       XANAX PO      Take 0.5-1 mg by mouth 1/2 tab in am, 1 tab in pm, then 1 tab midday prn and 1/2 tab throughout the day if needed

## 2017-07-30 NOTE — PATIENT INSTRUCTIONS
Use medications as directed    Culture was not sent today.  Warm heat to area. Elevate area.    Tylenol/Ibuprofen OTC as discussed for pain as long as no contraindications or allergies.     Return to clinic or follow up with PCP for recheck in 1 weeks.  To ER if any worsening symptoms at any time, you note the redness expanding outside the margins, red streaking, or fevers.     Patient voiced understanding of instructions given.     Facial Cellulitis  Cellulitis is an infection of the deep layers of skin. A break in the skin, such as a cut or scratch, can let bacteria under the skin. It may also occur from an infected oil gland (pimple) or hair follicle. If the bacteria get to deep layers of the skin, it can be serious. If not treated, cellulitis can get into the bloodstream and lymph nodes. The infection can then spread throughout the body. This causes serious illness.  Cellulitis causes the affected skin to become red, swollen, warm, and sore. The reddened areas have a visible border. You may have a fever, chills, and pain.  Cellulitis is treated with antibiotics taken for 7 to 10 days. Symptoms should get better 1 to 2 days after treatment is started. Make sure to take all the antibiotics for the full number of days until they are gone. Keep taking the medication even if your symptoms go away.  Home care  Follow these tips:    Take all of the antibiotic medicine exactly as directed until it is gone. Don t miss any doses, especially during the first 7 days. Don t stop taking it when your symptoms get better.    Use a cool compress (face cloth soaked in cool water) on your face to help reduce swelling and pain.    You may use acetaminophen or ibuprofen to reduce pain. Don t use these if you have chronic liver or kidney disease, or ever had a stomach ulcer or gastrointestinal bleeding. Talk with your healthcare provider first.  Follow-up care  Follow up with your healthcare provider, or as advised. If your infection  does not go away on the first antibiotic, your healthcare provider will prescribe a different one.  When to seek medical advice  Call your healthcare provider right away if any of these occur:    Fever higher of 100.4  F (38.0  C) or higher after 2 days on antibiotics    Red areas that spread    Swelling or pain that gets worse    Fluid leaking from the skin (pus)    An eyelid that swells shut or leaks fluid (pus)    Headache or neck pain that gets worse    Unusual drowsiness or confusion    Convulsions (seizure)    Change in eyesight     Date Last Reviewed: 9/1/2016 2000-2017 [employer]. [address] [place] [address]. All rights reserved. This information is not intended as a substitute for professional medical care. Always follow your healthcare professional's instructions.

## 2017-08-08 DIAGNOSIS — L70.0 ACNE VULGARIS: ICD-10-CM

## 2017-08-08 DIAGNOSIS — L73.9 FOLLICULITIS: ICD-10-CM

## 2017-08-08 NOTE — TELEPHONE ENCOUNTER
Clindamycin  Cream  Last Written Prescription Date: 6/22/2016  Last Fill Quantity: 60 ml,  # refills: 11   Last Office Visit with [facility] or [facility] prescribing provider: 3/7/2017    Tretinoin Cream      Last Written Prescription Date: 6/22/2016  Last Fill Quantity: 45 g,  # refills: 11   Last Office Visit with [facility] [place] or [facility] prescribing provider: 3/7/2017

## 2017-08-09 RX ORDER — TRETINOIN 0.25 MG/G
CREAM TOPICAL
Qty: 45 G | Refills: 1 | Status: SHIPPED | OUTPATIENT
Start: 2017-08-09 | End: 2019-02-20

## 2017-08-09 RX ORDER — CLINDAMYCIN PHOSPHATE 11.9 MG/ML
SOLUTION TOPICAL 2 TIMES DAILY
Qty: 60 ML | Refills: 1 | Status: SHIPPED | OUTPATIENT
Start: 2017-08-09 | End: 2018-07-30

## 2017-08-11 ENCOUNTER — TELEPHONE (OUTPATIENT)
Dept: FAMILY MEDICINE | Facility: CLINIC | Age: 67
End: 2017-08-11

## 2017-08-11 NOTE — TELEPHONE ENCOUNTER
PA paperwork for  Tretinbaldemar placed on [facility] desk to complete    Coco ROGERS

## 2017-09-05 ENCOUNTER — OFFICE VISIT (OUTPATIENT)
Dept: URGENT CARE | Facility: URGENT CARE | Age: 67
End: 2017-09-05
Payer: COMMERCIAL

## 2017-09-05 VITALS
WEIGHT: 168 LBS | BODY MASS INDEX: 32.27 KG/M2 | HEART RATE: 48 BPM | TEMPERATURE: 97.1 F | SYSTOLIC BLOOD PRESSURE: 93 MMHG | DIASTOLIC BLOOD PRESSURE: 61 MMHG

## 2017-09-05 DIAGNOSIS — L03.211 CELLULITIS OF FACE: ICD-10-CM

## 2017-09-05 PROCEDURE — 99213 OFFICE O/P EST LOW 20 MIN: CPT | Performed by: NURSE PRACTITIONER

## 2017-09-05 RX ORDER — CLINDAMYCIN HCL 150 MG
300 CAPSULE ORAL 3 TIMES DAILY
Qty: 42 CAPSULE | Refills: 0 | Status: SHIPPED | OUTPATIENT
Start: 2017-09-05 | End: 2017-09-18

## 2017-09-05 NOTE — MR AVS SNAPSHOT
After Visit Summary   9/5/2017    Ml Evans    MRN: [id number]           Patient Information     Date Of Birth          1950        Visit Information        Provider Department      9/5/2017 7:15 PM Cecilia Weiner APRN [facility]        Today's Diagnoses     Cellulitis of face          Care Instructions    Clindamycin sent to the pharmacy    Follow up if symptoms do not improve or worsen.    Facial Cellulitis  Cellulitis is an infection of the deep layers of skin. A break in the skin, such as a cut or scratch, can let bacteria under the skin. It may also occur from an infected oil gland (pimple) or hair follicle. If the bacteria get to deep layers of the skin, it can be serious. If not treated, cellulitis can get into the bloodstream and lymph nodes. The infection can then spread throughout the body. This causes serious illness.  Cellulitis causes the affected skin to become red, swollen, warm, and sore. The reddened areas have a visible border. You may have a fever, chills, and pain.  Cellulitis is treated with antibiotics taken for 7 to 10 days. Symptoms should get better 1 to 2 days after treatment is started. Make sure to take all the antibiotics for the full number of days until they are gone. Keep taking the medication even if your symptoms go away.  Home care  Follow these tips:    Take all of the antibiotic medicine exactly as directed until it is gone. Don t miss any doses, especially during the first 7 days. Don t stop taking it when your symptoms get better.    Use a cool compress (face cloth soaked in cool water) on your face to help reduce swelling and pain.    You may use acetaminophen or ibuprofen to reduce pain. Don t use these if you have chronic liver or kidney disease, or ever had a stomach ulcer or gastrointestinal bleeding. Talk with your healthcare provider first.  Follow-up care  Follow up with your healthcare provider,  or as advised. If your infection does not go away on the first antibiotic, your healthcare provider will prescribe a different one.  When to seek medical advice  Call your healthcare provider right away if any of these occur:    Fever higher of 100.4  F (38.0  C) or higher after 2 days on antibiotics    Red areas that spread    Swelling or pain that gets worse    Fluid leaking from the skin (pus)    An eyelid that swells shut or leaks fluid (pus)    Headache or neck pain that gets worse    Unusual drowsiness or confusion    Convulsions (seizure)    Change in eyesight     Date Last Reviewed: 9/1/2016 2000-2017 [employer]. [address] [place] [address]. All rights reserved. This information is not intended as a substitute for professional medical care. Always follow your healthcare professional's instructions.                Follow-ups after your visit        Who to contact     If you have questions or need follow up information about today's clinic visit or your schedule please contact [facility] directly at [phone].  Normal or non-critical lab and imaging results will be communicated to you by Collaajhart, letter or phone within 4 business days after the clinic has received the results. If you do not hear from us within 7 days, please contact the clinic through Collaajhart or phone. If you have a critical or abnormal lab result, we will notify you by phone as soon as possible.  Submit refill requests through Appsfire or call your pharmacy and they will forward the refill request to us. Please allow 3 business days for your refill to be completed.          Additional Information About Your Visit        Appsfire Information     [employer] gives you secure access to your electronic health record. If you see a primary care provider, you can also send messages to your care team and make appointments. If you have questions, please call your primary care clinic.  If  you do not have a primary care provider, please call [phone] and they will assist you.        Care EveryWhere ID     This is your Care EveryWhere ID. This could be used by other organizations to access your [facility] medical records  [id number]        Your Vitals Were     Pulse Temperature BMI (Body Mass Index)             48 97.1  F (36.2  C) (Tympanic) 32.27 kg/m2          Blood Pressure from Last 3 Encounters:   09/05/17 93/61   07/30/17 98/64   03/14/17 107/68    Weight from Last 3 Encounters:   09/05/17 168 lb (76.2 kg)   04/21/17 167 lb 12.8 oz (76.1 kg)   03/14/17 167 lb 9.6 oz (76 kg)              Today, you had the following     No orders found for display         Today's Medication Changes          These changes are accurate as of: 9/5/17  7:47 PM.  If you have any questions, ask your nurse or doctor.               Start taking these medicines.        Dose/Directions    clindamycin 150 MG capsule   Commonly known as:  CLEOCIN   Used for:  Cellulitis of face   Started by:  Cecilia Weiner APRN CNP        Dose:  300 mg   Take 2 capsules (300 mg) by mouth 3 times daily   Quantity:  42 capsule   Refills:  0            Where to get your medicines      These medications were sent to [facility][id number] [address] [address]    Hours:  Closed 10-16-08 business to [facility] Phone:  [phone]     clindamycin 150 MG capsule                Primary Care Provider Office Phone # Fax #    Ledy Benedict [phone] [address] [facility] [address]        Equal Access to Services     [facility]: Jeff Morgan, mralo meyer, leeann serra. So [facility] [phone].    ATENCIÓN: Si habla español, tiene a gregory disposición servicios gratuitos de asistencia lingüística. Llame al [phone].    We comply with applicable federal civil rights  laws and [place] laws. We do not discriminate on the basis of race, color, national origin, age, disability sex, sexual orientation or gender identity.            Thank you!     Thank you for choosing [facility]  for your care. Our goal is always to provide you with excellent care. Hearing back from our patients is one way we can continue to improve our services. Please take a few minutes to complete the written survey that you may receive in the mail after your visit with us. Thank you!             Your Updated Medication List - Protect others around you: Learn how to safely use, store and throw away your medicines at www.disposemymeds.org.          This list is accurate as of: 9/5/17  7:47 PM.  Always use your most recent med list.                   Brand Name Dispense Instructions for use Diagnosis    amphetamine-dextroamphetamine 20 MG per 24 hr capsule    ADDERALL XR     Take 20 mg by mouth daily        aspirin 81 MG tablet      1 TABLET DAILY        atorvastatin 80 MG tablet    LIPITOR    90 tablet    Take 1 tablet (80 mg) by mouth daily    Hyperlipidemia LDL goal <100       blood glucose monitoring lancets     100 each    Use to test blood sugars 2 times daily or as directed.    Diabetes mellitus, type 2 (H)       blood glucose monitoring meter device kit     1 kit    Use to test blood sugars 2 times daily or as directed.    Diabetes mellitus, type 2 (H)       blood glucose monitoring test strip    FREESTYLE LITE    100 each    Use to test blood sugars 1 times daily or as directed.    Type 2 diabetes mellitus without complication, without long-term current use of insulin (H)       clindamycin 1 % solution    CLEOCIN T    60 mL    Apply topically 2 times daily    Folliculitis       clindamycin 150 MG capsule    CLEOCIN    42 capsule    Take 2 capsules (300 mg) by mouth 3 times daily    Cellulitis of face       dicyclomine 10 MG capsule    BENTYL    240 capsule    Take 1  capsule (10 mg) by mouth 4 times daily (before meals and nightly)    Irritable bowel syndrome with diarrhea       doxepin 75 MG capsule    SINEquan     Take 75 mg by mouth At Bedtime        LANsoprazole 30 MG CR capsule    PREVACID    90 capsule    Take 1 capsule (30 mg) by mouth daily Take 30-60 minutes before a meal.    Gastroesophageal reflux disease, esophagitis presence not specified       LEXAPRO 20 MG tablet   Generic drug:  escitalopram     180 Tab    40 mg = two tablets by mouth daily per psychiatry        lisinopril 5 MG tablet    PRINIVIL/ZESTRIL    90 tablet    Take 1 tablet (5 mg) by mouth daily    Type 2 diabetes mellitus without complication, without long-term current use of insulin (H), Benign essential hypertension       metFORMIN 500 MG 24 hr tablet    GLUCOPHAGE-XR    90 tablet    Take 1 tablet (500 mg) by mouth daily (with dinner)    Gastroesophageal reflux disease, esophagitis presence not specified       metoprolol 25 MG 24 hr tablet    TOPROL-XL    90 tablet    Take 1 tablet (25 mg) by mouth daily    Benign essential hypertension       nitroGLYcerin 0.4 MG sublingual tablet    NITROSTAT    25 tablet    Place 1 tablet (0.4 mg) under the tongue every 5 minutes as needed for chest pain    Coronary artery disease involving native heart without angina pectoris, unspecified vessel or lesion type       PROZAC PO      Take 20 mg by mouth        psyllium 0.52 G capsule      Take 4 capsules by mouth At Bedtime        sennosides 8.6 MG tablet    SENOKOT     Take 1 tablet by mouth twice a week        tretinoin 0.025 % cream    RETIN-A    45 g    Spread a pea size amount into affected area topically at bedtime.  Use sunscreen SPF>20.    Acne vulgaris       XANAX PO      Take 0.5-1 mg by mouth 1/2 tab in am, 1 tab in pm, then 1 tab midday prn and 1/2 tab throughout the day if needed

## 2017-09-06 NOTE — PROGRESS NOTES
SUBJECTIVE:   Ml Evans is a 67 year old female who presents to clinic today for the following health issues:    Rash  Onset: a week ago     Description:   Location: face   Character: draining, red, prickly, stinging   Itching (Pruritis): no     Progression of Symptoms:  worsening    Accompanying Signs & Symptoms:  Fever: no   Body aches or joint pain: no   Sore throat symptoms: no   Recent cold symptoms: no     History:   Previous similar rash: YES-Seen 7/30 for cellulitis and treated and improved    Precipitating factors:   Exposure to similar rash: no   New exposures: pets- cat   Recent travel: YES  Very tired and headaches       Alleviating factors:  None    Therapies Tried and outcome: healing pads and healing spots. Warm wash cloth     Problem list and histories reviewed & adjusted, as indicated.  Additional history: as documented    Patient Active Problem List   Diagnosis     Attention deficit disorder     Nonspecific elevation of levels of transaminase or lactic acid dehydrogenase (LDH)     Mild major depression (H)     Esophageal reflux     Allergic state     Anal fissure     Menopausal syndrome (hot flashes)     HYPERLIPIDEMIA LDL GOAL <100     GERD (gastroesophageal reflux disease)     HPV (human papilloma virus) anogenital infection     CAD (coronary artery disease)     Health Care Home     Diverticulosis     Advanced directives, counseling/discussion     Colon polyp     ACS (acute coronary syndrome) (H)     Chronic rhinitis     Benign essential hypertension     Irritable bowel syndrome with diarrhea     Type 2 diabetes mellitus without complication, without long-term current use of insulin (H)     Past Surgical History:   Procedure Laterality Date     ABDOMEN SURGERY  1981,1991,1993     APPENDECTOMY  1993     BIOPSY  2003    nose, during surgery     COLONOSCOPY  2005     COLONOSCOPY N/A 5/14/2015    Procedure: COMBINED COLONOSCOPY, SINGLE OR MULTIPLE BIOPSY/POLYPECTOMY BY BIOPSY;  Surgeon:  Ponce Christine MD;  Location: [facility]     ECTOPIC PREGNANCY SURGERY       ENDOSCOPIC RELEASE CARPAL TUNNEL  2013    Procedure: ENDOSCOPIC RELEASE CARPAL TUNNEL;  Right endoscopic carpal tunnel release;  Surgeon: Jonah Dela Cruz MD;  Location: [facility]     ENT SURGERY      nose- suspected basal cell- was benign     ESOPHAGOSCOPY, GASTROSCOPY, DUODENOSCOPY (EGD), COMBINED  2014    Procedure: COMBINED ESOPHAGOSCOPY, GASTROSCOPY, DUODENOSCOPY (EGD), BIOPSY SINGLE OR MULTIPLE;  Surgeon: Anibal Sebsatian MD;  Location: [facility]     GENITOURINARY SURGERY  ,      HYSTERECTOMY, ELIECER      total hysterectomy. Unsure if ELIECER or vaginal     SURGICAL HISTORY OF -       appy     SURGICAL HISTORY OF -       T&A     VASCULAR SURGERY      angioplasty- 2 stents implanted       Social History   Substance Use Topics     Smoking status: Former Smoker     Packs/day: 1.00     Years: 30.00     Types: Cigarettes     Quit date: 3/15/2011     Smokeless tobacco: Never Used      Comment: occasional/daily     Alcohol use No     Family History   Problem Relation Age of Onset     CANCER Mother      uterine     Lipids Mother      Neurologic Disorder Mother      polymyalgia     Hypertension Mother      Other Cancer Mother      endometrial     Depression/Anxiety Mother      Other - See Comments Mother      Heart Arrythmia      Cardiovascular Father      CHF     Depression Father      C.A.D. Father      bypass x2 starting at age 55-  in his 70's     DIABETES Father      type 2     Coronary Artery Disease Father       from - age 75     Depression/Anxiety Father      CEREBROVASCULAR DISEASE Father      from angioplasty      C.A.D. Maternal Grandfather      Coronary Artery Disease Maternal Grandfather       from- age 61     C.A.D. Paternal Grandfather      DIABETES Brother      Depression Son      Psychotic Disorder Son      adhd     DIABETES Brother      type 1     Depression/Anxiety Brother       Depression/Anxiety Maternal Grandmother      Depression/Anxiety Son      Asthma Son      childhood asthma-out grown now as an adult     Coronary Artery Disease Brother      stent-MI     Melanoma No family hx of          Current Outpatient Prescriptions   Medication Sig Dispense Refill     clindamycin (CLEOCIN) 150 MG capsule Take 2 capsules (300 mg) by mouth 3 times daily 42 capsule 0     clindamycin (CLEOCIN T) 1 % solution Apply topically 2 times daily 60 mL 1     FLUoxetine HCl (PROZAC PO) Take 20 mg by mouth       atorvastatin (LIPITOR) 80 MG tablet Take 1 tablet (80 mg) by mouth daily 90 tablet 3     LANsoprazole (PREVACID) 30 MG CR capsule Take 1 capsule (30 mg) by mouth daily Take 30-60 minutes before a meal. 90 capsule 3     lisinopril (PRINIVIL/ZESTRIL) 5 MG tablet Take 1 tablet (5 mg) by mouth daily 90 tablet 3     metFORMIN (GLUCOPHAGE-XR) 500 MG 24 hr tablet Take 1 tablet (500 mg) by mouth daily (with dinner) 90 tablet 3     metoprolol (TOPROL-XL) 25 MG 24 hr tablet Take 1 tablet (25 mg) by mouth daily 90 tablet 3     dicyclomine (BENTYL) 10 MG capsule Take 1 capsule (10 mg) by mouth 4 times daily (before meals and nightly) 240 capsule 3     amphetamine-dextroamphetamine (ADDERALL XR) 20 MG per capsule Take 20 mg by mouth daily        ALPRAZolam (XANAX PO) Take 0.5-1 mg by mouth 1/2 tab in am, 1 tab in pm, then 1 tab midday prn and 1/2 tab throughout the day if needed       psyllium 0.52 G capsule Take 4 capsules by mouth At Bedtime       sennosides (SENOKOT) 8.6 MG tablet Take 1 tablet by mouth twice a week        DoxePIN (SINEQUAN) 75 MG capsule Take 75 mg by mouth At Bedtime        LEXAPRO 20 MG PO TABS 40 mg = two tablets by mouth daily per psychiatry 180 Tab 3     ASPIRIN 81 MG OR TABS 1 TABLET DAILY       tretinoin (RETIN-A) 0.025 % cream Spread a pea size amount into affected area topically at bedtime.  Use sunscreen SPF>20. (Patient not taking: Reported on 9/5/2017) 45 g 1     nitroglycerin  (NITROSTAT) 0.4 MG sublingual tablet Place 1 tablet (0.4 mg) under the tongue every 5 minutes as needed for chest pain (Patient not taking: Reported on 3/14/2017) 25 tablet 6     blood glucose monitoring (FREESTYLE LITE) test strip Use to test blood sugars 1 times daily or as directed. (Patient not taking: Reported on 9/5/2017) 100 each 3     Blood Glucose Monitoring Suppl (FREESTYLE FREEDOM LITE) W/DEVICE KIT Use to test blood sugars 2 times daily or as directed. (Patient not taking: Reported on 9/5/2017) 1 kit 0     FREESTYLE LANCETS MISC Use to test blood sugars 2 times daily or as directed. (Patient not taking: Reported on 9/5/2017) 100 each prn     Allergies   Allergen Reactions     Hydrocodone Anaphylaxis     Flexeril [Cyclobenzaprine] Rash     Cipro [Ciprofloxacin] GI Disturbance     Abd pain , proteinuria , microscopic hematuria  Possibly related to cipro     Codeine Camsylate Nausea     Penicillins Difficulty breathing     Amoxicillin Itching and Rash     Sulfa Drugs Itching and Rash     Tetracycline Itching and Rash     Labs reviewed in EPIC      Reviewed and updated as needed this visit by clinical staffTobacco  Allergies  Meds  Med Hx  Surg Hx  Fam Hx  Soc Hx      Reviewed and updated as needed this visit by Provider         ROS:  Constitutional, HEENT, cardiovascular, pulmonary, gi and gu systems are negative, except as otherwise noted.      OBJECTIVE:   BP 93/61 (BP Location: Right arm, Cuff Size: Adult Regular)  Pulse (!) 48  Temp 97.1  F (36.2  C) (Tympanic)  Wt 168 lb (76.2 kg)  BMI 32.27 kg/m2  Body mass index is 32.27 kg/(m^2).  GENERAL: healthy, alert and no distress  NECK: no adenopathy  SKIN: erythematous sores scattered around face, some with murguia crusts present  PSYCH: mentation appears normal, affect normal/bright    Diagnostic Test Results:  none     ASSESSMENT/PLAN:     1. Cellulitis of face  Clindamycin has helped in the past for symptoms.  Consider dermatology referral if  symptoms return again.  Symptomatic care and follow up discussed.  - clindamycin (CLEOCIN) 150 MG capsule; Take 2 capsules (300 mg) by mouth 3 times daily  Dispense: 42 capsule; Refill: 0    Home care instructions were reviewed with the patient. The risks, benefits and treatment options of prescribed medications or other treatments have been discussed with the patient. The patient verbalized their understanding and should call or follow up if no improvement or if they develop further problems.      Patient Instructions   Clindamycin sent to the pharmacy    Follow up if symptoms do not improve or worsen.    Facial Cellulitis  Cellulitis is an infection of the deep layers of skin. A break in the skin, such as a cut or scratch, can let bacteria under the skin. It may also occur from an infected oil gland (pimple) or hair follicle. If the bacteria get to deep layers of the skin, it can be serious. If not treated, cellulitis can get into the bloodstream and lymph nodes. The infection can then spread throughout the body. This causes serious illness.  Cellulitis causes the affected skin to become red, swollen, warm, and sore. The reddened areas have a visible border. You may have a fever, chills, and pain.  Cellulitis is treated with antibiotics taken for 7 to 10 days. Symptoms should get better 1 to 2 days after treatment is started. Make sure to take all the antibiotics for the full number of days until they are gone. Keep taking the medication even if your symptoms go away.  Home care  Follow these tips:    Take all of the antibiotic medicine exactly as directed until it is gone. Don t miss any doses, especially during the first 7 days. Don t stop taking it when your symptoms get better.    Use a cool compress (face cloth soaked in cool water) on your face to help reduce swelling and pain.    You may use acetaminophen or ibuprofen to reduce pain. Don t use these if you have chronic liver or kidney disease, or ever had a  stomach ulcer or gastrointestinal bleeding. Talk with your healthcare provider first.  Follow-up care  Follow up with your healthcare provider, or as advised. If your infection does not go away on the first antibiotic, your healthcare provider will prescribe a different one.  When to seek medical advice  Call your healthcare provider right away if any of these occur:    Fever higher of 100.4  F (38.0  C) or higher after 2 days on antibiotics    Red areas that spread    Swelling or pain that gets worse    Fluid leaking from the skin (pus)    An eyelid that swells shut or leaks fluid (pus)    Headache or neck pain that gets worse    Unusual drowsiness or confusion    Convulsions (seizure)    Change in eyesight     Date Last Reviewed: 9/1/2016 2000-2017 [employer]. [address] [place] [address], [place] [address]. All rights reserved. This information is not intended as a substitute for professional medical care. Always follow your healthcare professional's instructions.            [facility]

## 2017-09-06 NOTE — NURSING NOTE
"Chief Complaint   Patient presents with     Derm Problem       Initial BP 93/61 (BP Location: Right arm, Cuff Size: Adult Regular)  Pulse (!) 41  Temp 97.1  F (36.2  C) (Tympanic)  Wt 168 lb (76.2 kg)  BMI 32.27 kg/m2 Estimated body mass index is 32.27 kg/(m^2) as calculated from the following:    Height as of 3/14/17: 5' 0.5\" (1.537 m).    Weight as of this encounter: 168 lb (76.2 kg).  Medication Reconciliation: complete    Health Maintenance that is potentially due pending provider review:  NONE    n/a    Is there anyone who you would like to be able to receive your results? Not Applicable  If yes have patient fill out MIGUEL  Milton Pitts M.A.          "

## 2017-09-06 NOTE — PATIENT INSTRUCTIONS
Clindamycin sent to the pharmacy    Follow up if symptoms do not improve or worsen.    Facial Cellulitis  Cellulitis is an infection of the deep layers of skin. A break in the skin, such as a cut or scratch, can let bacteria under the skin. It may also occur from an infected oil gland (pimple) or hair follicle. If the bacteria get to deep layers of the skin, it can be serious. If not treated, cellulitis can get into the bloodstream and lymph nodes. The infection can then spread throughout the body. This causes serious illness.  Cellulitis causes the affected skin to become red, swollen, warm, and sore. The reddened areas have a visible border. You may have a fever, chills, and pain.  Cellulitis is treated with antibiotics taken for 7 to 10 days. Symptoms should get better 1 to 2 days after treatment is started. Make sure to take all the antibiotics for the full number of days until they are gone. Keep taking the medication even if your symptoms go away.  Home care  Follow these tips:    Take all of the antibiotic medicine exactly as directed until it is gone. Don t miss any doses, especially during the first 7 days. Don t stop taking it when your symptoms get better.    Use a cool compress (face cloth soaked in cool water) on your face to help reduce swelling and pain.    You may use acetaminophen or ibuprofen to reduce pain. Don t use these if you have chronic liver or kidney disease, or ever had a stomach ulcer or gastrointestinal bleeding. Talk with your healthcare provider first.  Follow-up care  Follow up with your healthcare provider, or as advised. If your infection does not go away on the first antibiotic, your healthcare provider will prescribe a different one.  When to seek medical advice  Call your healthcare provider right away if any of these occur:    Fever higher of 100.4  F (38.0  C) or higher after 2 days on antibiotics    Red areas that spread    Swelling or pain that gets worse    Fluid leaking  from the skin (pus)    An eyelid that swells shut or leaks fluid (pus)    Headache or neck pain that gets worse    Unusual drowsiness or confusion    Convulsions (seizure)    Change in eyesight     Date Last Reviewed: 9/1/2016 2000-2017 [employer]. [address] [place] [id number]. All rights reserved. This information is not intended as a substitute for professional medical care. Always follow your healthcare professional's instructions.

## 2017-09-11 ENCOUNTER — TELEPHONE (OUTPATIENT)
Dept: FAMILY MEDICINE | Facility: CLINIC | Age: 67
End: 2017-09-11

## 2017-09-11 NOTE — TELEPHONE ENCOUNTER
Reason for call:  Patient reporting a symptom    Symptom or request: Ml says she was seen 7/30 (Maryana Urrutia) for Cellulitis and was treated and it went away. She says she was seen again in  last week 9/5 (Sana Weiner) and the cellulitis was back and worse than before. She was given Clindamycin and says this is her last day of medication and it's not gone. She says she has it on her face, chest and also on her right leg (she says she has been scratching the leg). She has to go out of town from Wednesday to Friday.  She would use [facility] if medication is refilled.     Pt also thinks she needs to have her A1C checked. States she has been eating a lot of fruit this summer.       Phone Number patient can be reached at:  Home number on file [phone] (home)    Best Time:  anytime    Can we leave a detailed message on this number:  YES    Call taken on 9/11/2017 at 3:15 PM by Charis Le

## 2017-09-11 NOTE — TELEPHONE ENCOUNTER
If her symptoms are worsening then she should be seen before she leaves.  Thanks.    FIDEL Emanuel CNP

## 2017-09-18 ENCOUNTER — OFFICE VISIT (OUTPATIENT)
Dept: FAMILY MEDICINE | Facility: CLINIC | Age: 67
End: 2017-09-18
Payer: COMMERCIAL

## 2017-09-18 VITALS
SYSTOLIC BLOOD PRESSURE: 102 MMHG | BODY MASS INDEX: 32.54 KG/M2 | WEIGHT: 169.4 LBS | TEMPERATURE: 97.6 F | DIASTOLIC BLOOD PRESSURE: 60 MMHG | HEART RATE: 56 BPM

## 2017-09-18 DIAGNOSIS — E11.9 TYPE 2 DIABETES MELLITUS WITHOUT COMPLICATION, WITHOUT LONG-TERM CURRENT USE OF INSULIN (H): Primary | ICD-10-CM

## 2017-09-18 DIAGNOSIS — L73.9 FOLLICULITIS: ICD-10-CM

## 2017-09-18 DIAGNOSIS — F32.0 MILD MAJOR DEPRESSION (H): ICD-10-CM

## 2017-09-18 DIAGNOSIS — R53.83 FATIGUE, UNSPECIFIED TYPE: ICD-10-CM

## 2017-09-18 DIAGNOSIS — I10 BENIGN ESSENTIAL HYPERTENSION: ICD-10-CM

## 2017-09-18 LAB
ALBUMIN SERPL-MCNC: 3.7 G/DL (ref 3.4–5)
ALP SERPL-CCNC: 135 U/L (ref 40–150)
ALT SERPL W P-5'-P-CCNC: 32 U/L (ref 0–50)
ANION GAP SERPL CALCULATED.3IONS-SCNC: 7 MMOL/L (ref 3–14)
AST SERPL W P-5'-P-CCNC: 31 U/L (ref 0–45)
BASOPHILS # BLD AUTO: 0 10E9/L (ref 0–0.2)
BASOPHILS NFR BLD AUTO: 0.5 %
BILIRUB DIRECT SERPL-MCNC: <0.1 MG/DL (ref 0–0.2)
BILIRUB SERPL-MCNC: 0.3 MG/DL (ref 0.2–1.3)
BUN SERPL-MCNC: 18 MG/DL (ref 7–30)
CALCIUM SERPL-MCNC: 9 MG/DL (ref 8.5–10.1)
CHLORIDE SERPL-SCNC: 106 MMOL/L (ref 94–109)
CO2 SERPL-SCNC: 26 MMOL/L (ref 20–32)
CREAT SERPL-MCNC: 0.76 MG/DL (ref 0.52–1.04)
DIFFERENTIAL METHOD BLD: NORMAL
EOSINOPHIL # BLD AUTO: 0.3 10E9/L (ref 0–0.7)
EOSINOPHIL NFR BLD AUTO: 3.8 %
ERYTHROCYTE [DISTWIDTH] IN BLOOD BY AUTOMATED COUNT: 14.2 % (ref 10–15)
GFR SERPL CREATININE-BSD FRML MDRD: 76 ML/MIN/1.7M2
GLUCOSE SERPL-MCNC: 160 MG/DL (ref 70–99)
HBA1C MFR BLD: 6.6 % (ref 4.3–6)
HCT VFR BLD AUTO: 39 % (ref 35–47)
HGB BLD-MCNC: 12.6 G/DL (ref 11.7–15.7)
LYMPHOCYTES # BLD AUTO: 3.1 10E9/L (ref 0.8–5.3)
LYMPHOCYTES NFR BLD AUTO: 37.7 %
MCH RBC QN AUTO: 29 PG (ref 26.5–33)
MCHC RBC AUTO-ENTMCNC: 32.3 G/DL (ref 31.5–36.5)
MCV RBC AUTO: 90 FL (ref 78–100)
MONOCYTES # BLD AUTO: 1.1 10E9/L (ref 0–1.3)
MONOCYTES NFR BLD AUTO: 12.9 %
NEUTROPHILS # BLD AUTO: 3.8 10E9/L (ref 1.6–8.3)
NEUTROPHILS NFR BLD AUTO: 45.1 %
PLATELET # BLD AUTO: 208 10E9/L (ref 150–450)
POTASSIUM SERPL-SCNC: 4.1 MMOL/L (ref 3.4–5.3)
PROT SERPL-MCNC: 6.8 G/DL (ref 6.8–8.8)
RBC # BLD AUTO: 4.34 10E12/L (ref 3.8–5.2)
SODIUM SERPL-SCNC: 139 MMOL/L (ref 133–144)
TSH SERPL DL<=0.005 MIU/L-ACNC: 2.13 MU/L (ref 0.4–4)
WBC # BLD AUTO: 8.3 10E9/L (ref 4–11)

## 2017-09-18 PROCEDURE — 85025 COMPLETE CBC W/AUTO DIFF WBC: CPT | Performed by: FAMILY MEDICINE

## 2017-09-18 PROCEDURE — 80048 BASIC METABOLIC PNL TOTAL CA: CPT | Performed by: FAMILY MEDICINE

## 2017-09-18 PROCEDURE — 99214 OFFICE O/P EST MOD 30 MIN: CPT | Performed by: FAMILY MEDICINE

## 2017-09-18 PROCEDURE — 80076 HEPATIC FUNCTION PANEL: CPT | Performed by: FAMILY MEDICINE

## 2017-09-18 PROCEDURE — 84443 ASSAY THYROID STIM HORMONE: CPT | Performed by: FAMILY MEDICINE

## 2017-09-18 PROCEDURE — 36415 COLL VENOUS BLD VENIPUNCTURE: CPT | Performed by: FAMILY MEDICINE

## 2017-09-18 PROCEDURE — 83036 HEMOGLOBIN GLYCOSYLATED A1C: CPT | Performed by: FAMILY MEDICINE

## 2017-09-18 RX ORDER — CEPHALEXIN 500 MG/1
500 CAPSULE ORAL 2 TIMES DAILY
Qty: 60 CAPSULE | Refills: 0 | Status: SHIPPED | OUTPATIENT
Start: 2017-09-18 | End: 2018-03-09

## 2017-09-18 ASSESSMENT — ANXIETY QUESTIONNAIRES
6. BECOMING EASILY ANNOYED OR IRRITABLE: MORE THAN HALF THE DAYS
7. FEELING AFRAID AS IF SOMETHING AWFUL MIGHT HAPPEN: NOT AT ALL
2. NOT BEING ABLE TO STOP OR CONTROL WORRYING: SEVERAL DAYS
3. WORRYING TOO MUCH ABOUT DIFFERENT THINGS: SEVERAL DAYS
GAD7 TOTAL SCORE: 6
5. BEING SO RESTLESS THAT IT IS HARD TO SIT STILL: NOT AT ALL
1. FEELING NERVOUS, ANXIOUS, OR ON EDGE: SEVERAL DAYS

## 2017-09-18 ASSESSMENT — PATIENT HEALTH QUESTIONNAIRE - PHQ9
SUM OF ALL RESPONSES TO PHQ QUESTIONS 1-9: 11
5. POOR APPETITE OR OVEREATING: SEVERAL DAYS

## 2017-09-18 NOTE — MR AVS SNAPSHOT
After Visit Summary   9/18/2017    Ml Evans    MRN: [id number]           Patient Information     Date Of Birth          1950        Visit Information        Provider Department      9/18/2017 10:00 AM Ledy Benedict MD [facility]        Today's Diagnoses     Type 2 diabetes mellitus without complication, without long-term current use of insulin (H)    -  1    Benign essential hypertension        Mild major depression (H)        Fatigue, unspecified type        Folliculitis          Care Instructions    For Folliculitis   start keflex 500 mg twice daily for one month    antibacterial soap for body   cetaphil wash for face    cetaphil lotion for face   Avoid any other products on face or body     We will check labs today     Glucometer sent to the pharmacy, start checking blood sugars 2-3 times per day     Set up sleep study to further evaluate fatigue    See me back in one month           Follow-ups after your visit        Additional Services     SLEEP EVALUATION & MANAGEMENT REFERRAL - ADULT       Please be aware that coverage of these services is subject to the terms and limitations of your health insurance plan.  Call member services at your health plan with any benefit or coverage questions.      Please bring the following to your appointment:    >>   List of current medications   >>   This referral request   >>   Any documents/labs given to you for this referral    [facility] Lab  Ph [phone] (Age 2 and up)                  Future tests that were ordered for you today     Open Future Orders        Priority Expected Expires Ordered    SLEEP EVALUATION & MANAGEMENT REFERRAL - ADULT Routine  9/18/2018 9/18/2017            Who to contact     If you have questions or need follow up information about today's clinic visit or your schedule please contact [facility] directly at [phone].  Normal or non-critical lab  and imaging results will be communicated to you by [facility], letter or phone within 4 business days after the clinic has received the results. If you do not hear from us within 7 days, please contact the clinic through [facility] or phone. If you have a critical or abnormal lab result, we will notify you by phone as soon as possible.  Submit refill requests through [employer] or call your pharmacy and they will forward the refill request to us. Please allow 3 business days for your refill to be completed.          Additional Information About Your Visit        [facility] Information     [facility] [employer] gives you secure access to your electronic health record. If you see a primary care provider, you can also send messages to your care team and make appointments. If you have questions, please call your primary care clinic.  If you do not have a primary care provider, please call [phone] and they will assist you.        Care EveryWhere ID     This is your Care EveryWhere ID. This could be used by other organizations to access your [place] medical records  [id number]        Your Vitals Were     Pulse Temperature BMI (Body Mass Index)             56 97.6  F (36.4  C) (Tympanic) 32.54 kg/m2          Blood Pressure from Last 3 Encounters:   09/18/17 102/60   09/05/17 93/61   07/30/17 98/64    Weight from Last 3 Encounters:   09/18/17 169 lb 6.4 oz (76.8 kg)   09/05/17 168 lb (76.2 kg)   04/21/17 167 lb 12.8 oz (76.1 kg)              We Performed the Following     Basic metabolic panel     CBC with platelets differential     Hemoglobin A1c     Hepatic panel     TSH with free T4 reflex          Today's Medication Changes          These changes are accurate as of: 9/18/17 10:54 AM.  If you have any questions, ask your nurse or doctor.               Start taking these medicines.        Dose/Directions    cephALEXin 500 MG capsule   Commonly known as:  KEFLEX   Used for:  Folliculitis   Started by:  Ledy Benedict MD        Dose:   500 mg   Take 1 capsule (500 mg) by mouth 2 times daily   Quantity:  60 capsule   Refills:  0         These medicines have changed or have updated prescriptions.        Dose/Directions    * blood glucose monitoring meter device kit   This may have changed:  Another medication with the same name was added. Make sure you understand how and when to take each.   Used for:  Diabetes mellitus, type 2 (H)   Changed by:  Ledy Benedict MD        Use to test blood sugars 2 times daily or as directed.   Quantity:  1 kit   Refills:  0       * blood glucose monitoring meter device kit   Commonly known as:  no brand specified   This may have changed:  You were already taking a medication with the same name, and this prescription was added. Make sure you understand how and when to take each.   Used for:  Type 2 diabetes mellitus without complication, without long-term current use of insulin (H)   Changed by:  Ledy Benedict MD        Use to test blood sugar 3 times daily or as directed.   Quantity:  1 kit   Refills:  0       * Notice:  This list has 2 medication(s) that are the same as other medications prescribed for you. Read the directions carefully, and ask your doctor or other care provider to review them with you.         Where to get your medicines      These medications were sent to [id number] [address]  [address] [place] [id number]    Hours:  Closed 10-16-08 business to [facility] Phone:  [phone]     blood glucose monitoring meter device kit    cephALEXin 500 MG capsule                Primary Care Provider Office Phone # Fax #    Ledy Benedict [phone] [id number] [facility] [address]        Equal Access to Services     GLADYS HUGHES : Jeff ferrell Sofarheen, waaxda luqadaha, qaybta kaalleeann purvis. So [facility] [phone].    ATENCIÓN: emilia Rainey  a gregory disposición servicios gratuitos de asistencia lingüística. Goldy brown [phone].    We comply with applicable federal civil rights laws and [place] laws. We do not discriminate on the basis of race, color, national origin, age, disability sex, sexual orientation or gender identity.            Thank you!     Thank you for choosing [facility]  for your care. Our goal is always to provide you with excellent care. Hearing back from our patients is one way we can continue to improve our services. Please take a few minutes to complete the written survey that you may receive in the mail after your visit with us. Thank you!             Your Updated Medication List - Protect others around you: Learn how to safely use, store and throw away your medicines at www.disposemymeds.org.          This list is accurate as of: 9/18/17 10:54 AM.  Always use your most recent med list.                   Brand Name Dispense Instructions for use Diagnosis    amphetamine-dextroamphetamine 20 MG per 24 hr capsule    ADDERALL XR     Take 20 mg by mouth daily        aspirin 81 MG tablet      1 TABLET DAILY        atorvastatin 80 MG tablet    LIPITOR    90 tablet    Take 1 tablet (80 mg) by mouth daily    Hyperlipidemia LDL goal <100       blood glucose monitoring lancets     100 each    Use to test blood sugars 2 times daily or as directed.    Diabetes mellitus, type 2 (H)       * blood glucose monitoring meter device kit     1 kit    Use to test blood sugars 2 times daily or as directed.    Diabetes mellitus, type 2 (H)       * blood glucose monitoring meter device kit    no brand specified    1 kit    Use to test blood sugar 3 times daily or as directed.    Type 2 diabetes mellitus without complication, without long-term current use of insulin (H)       blood glucose monitoring test strip    FREESTYLE LITE    100 each    Use to test blood sugars 1 times daily or as directed.    Type 2 diabetes mellitus without  complication, without long-term current use of insulin (H)       cephALEXin 500 MG capsule    KEFLEX    60 capsule    Take 1 capsule (500 mg) by mouth 2 times daily    Folliculitis       clindamycin 1 % solution    CLEOCIN T    60 mL    Apply topically 2 times daily    Folliculitis       dicyclomine 10 MG capsule    BENTYL    240 capsule    Take 1 capsule (10 mg) by mouth 4 times daily (before meals and nightly)    Irritable bowel syndrome with diarrhea       doxepin 75 MG capsule    SINEquan     Take 75 mg by mouth At Bedtime        LANsoprazole 30 MG CR capsule    PREVACID    90 capsule    Take 1 capsule (30 mg) by mouth daily Take 30-60 minutes before a meal.    Gastroesophageal reflux disease, esophagitis presence not specified       LEXAPRO 20 MG tablet   Generic drug:  escitalopram     180 Tab    40 mg = two tablets by mouth daily per psychiatry        lisinopril 5 MG tablet    PRINIVIL/ZESTRIL    90 tablet    Take 1 tablet (5 mg) by mouth daily    Type 2 diabetes mellitus without complication, without long-term current use of insulin (H), Benign essential hypertension       metFORMIN 500 MG 24 hr tablet    GLUCOPHAGE-XR    90 tablet    Take 1 tablet (500 mg) by mouth daily (with dinner)    Gastroesophageal reflux disease, esophagitis presence not specified       metoprolol 25 MG 24 hr tablet    TOPROL-XL    90 tablet    Take 1 tablet (25 mg) by mouth daily    Benign essential hypertension       nitroGLYcerin 0.4 MG sublingual tablet    NITROSTAT    25 tablet    Place 1 tablet (0.4 mg) under the tongue every 5 minutes as needed for chest pain    Coronary artery disease involving native heart without angina pectoris, unspecified vessel or lesion type       PROZAC PO      Take 20 mg by mouth        psyllium 0.52 G capsule      Take 4 capsules by mouth At Bedtime        sennosides 8.6 MG tablet    SENOKOT     Take 1 tablet by mouth twice a week        tretinoin 0.025 % cream    RETIN-A    45 g    Spread a pea  size amount into affected area topically at bedtime.  Use sunscreen SPF>20.    Acne vulgaris       XANAX PO      Take 0.5-1 mg by mouth 1/2 tab in am, 1 tab in pm, then 1 tab midday prn and 1/2 tab throughout the day if needed        * Notice:  This list has 2 medication(s) that are the same as other medications prescribed for you. Read the directions carefully, and ask your doctor or other care provider to review them with you.

## 2017-09-18 NOTE — PATIENT INSTRUCTIONS
For Folliculitis   start keflex 500 mg twice daily for one month    antibacterial soap for body   cetaphil wash for face    cetaphil lotion for face   Avoid any other products on face or body     We will check labs today     Glucometer sent to the pharmacy, start checking blood sugars 2-3 times per day     Set up sleep study to further evaluate fatigue    See me back in one month

## 2017-09-18 NOTE — PROGRESS NOTES
SUBJECTIVE:   Ml Evans is a 67 year old female who presents to clinic today for the following health issues:      Diabetes Follow-up      Patient is checking blood sugars: not at all    Diabetic concerns: None     Symptoms of hypoglycemia (low blood sugar): shaky, dizzy, weak     Paresthesias (numbness or burning in feet) or sores: No     Date of last diabetic eye exam: overdue        Amount of exercise or physical activity: None    Problems taking medications regularly: Yes,  problems remembering to take    Medication side effects: dry mouth  Diet: regular (no restrictions)          ED/UC Followup:    Facility:  [facility]  Date of visit: 7/30 and 9/5/2017  Reason for visit: celluliis  -- rash folliculitis   Current Status: improved but still has some small papules                Hypertension Follow-up      Outpatient blood pressures are not being checked.    Low Salt Diet: no added salt  Patient presents for evaluation of hypertension.  She indicates that she is feeling well and denies any symptoms referable to her elevated blood pressure. Specifically denies chest pain, palpitations, dyspnea, orthopnea, PND or peripheral edema. Current medication regimen is as listed below. Patient denies any side effects of medication.    Depression Followup    Status since last visit: Stable to sl worse     See PHQ-9 for current symptoms.  Other associated symptoms: None    Complicating factors:   Significant life event:  No   Current substance abuse:  None  Anxiety or Panic symptoms:  No    PHQ-9  English  PHQ-9   Any Language      Fatigue  Tired all the time   She never has energy   Feels worn out   Sleep is poor  She is not apneic as far as she knows   Has not had sleep eval in the past     Problem list and histories reviewed & adjusted, as indicated.  Additional history: as documented    Labs reviewed in EPIC    Reviewed and updated as needed this visit by clinical staff     Reviewed and updated as  needed this visit by Provider         ROS:  Constitutional, HEENT, cardiovascular, pulmonary, gi and gu systems are negative, except as otherwise noted.      OBJECTIVE:                                                    /60 (BP Location: Right arm, Patient Position: Chair, Cuff Size: Adult Large)  Pulse 56  Temp 97.6  F (36.4  C) (Tympanic)  Wt 169 lb 6.4 oz (76.8 kg)  BMI 32.54 kg/m2  Body mass index is 32.54 kg/(m^2).  GENERAL APPEARANCE: healthy, alert and no distress  NECK: no adenopathy, no asymmetry, masses, or scars and thyroid normal to palpation  RESP: lungs clear to auscultation - no rales, rhonchi or wheezes  CV: regular rates and rhythm, normal S1 S2, no S3 or S4 and no murmur, click or rub  ABDOMEN: soft, nontender, without hepatosplenomegaly or masses and bowel sounds normal  SKIN: areas of excoriation all over small papules noted on ext   PSYCH: mentation appears normal and affect normal/bright         ASSESSMENT/PLAN:                                                    1. Type 2 diabetes mellitus without complication, without long-term current use of insulin (H)  Adjust meds as indicated by above labs.   - blood glucose monitoring (NO BRAND SPECIFIED) meter device kit; Use to test blood sugar 3 times daily or as directed.  Dispense: 1 kit; Refill: 0  - Hemoglobin A1c    2. Benign essential hypertension  Stable no change in treatment plan.   - Basic metabolic panel  - Hepatic panel    3. Mild major depression (H)  Per psychiatry     4. Fatigue, unspecified type  ?HANSA   - TSH with free T4 reflex  - CBC with platelets differential  - SLEEP EVALUATION & MANAGEMENT REFERRAL - ADULT; Future    5. Folliculitis  Resolving needs to stop scratching and picking discussed at length   - cephALEXin (KEFLEX) 500 MG capsule; Take 1 capsule (500 mg) by mouth 2 times daily  Dispense: 60 capsule; Refill: 0      Patient Instructions   For Folliculitis   start keflex 500 mg twice daily for one month     antibacterial soap for body   cetaphil wash for face    cetaphil lotion for face   Avoid any other products on face or body     We will check labs today     Glucometer sent to the pharmacy, start checking blood sugars 2-3 times per day     Set up sleep study to further evaluate fatigue    See me back in one month     Risks, benefits, side effects and rationale for treatment plan fully discussed with the patient and understanding expressed.     Ledy Benedict MD  [facility]

## 2017-09-18 NOTE — NURSING NOTE
"Chief Complaint   Patient presents with     Derm Problem     Diabetes       Initial /60 (BP Location: Right arm, Patient Position: Chair, Cuff Size: Adult Large)  Pulse 56  Temp 97.6  F (36.4  C) (Tympanic)  Wt 169 lb 6.4 oz (76.8 kg)  BMI 32.54 kg/m2 Estimated body mass index is 32.54 kg/(m^2) as calculated from the following:    Height as of 3/14/17: 5' 0.5\" (1.537 m).    Weight as of this encounter: 169 lb 6.4 oz (76.8 kg).  Medication Reconciliation: complete    Health Maintenance that is potentially due pending provider review:  PHQ9    Possibly completing today per provider review.    Is there anyone who you would like to be able to receive your results? Yes  If yes have patient fill out MIGUEL    "

## 2017-09-19 ASSESSMENT — ANXIETY QUESTIONNAIRES: GAD7 TOTAL SCORE: 6

## 2017-10-08 DIAGNOSIS — K58.0 IRRITABLE BOWEL SYNDROME WITH DIARRHEA: ICD-10-CM

## 2017-10-09 NOTE — TELEPHONE ENCOUNTER
dicyclomine (BENTYL) 10 MG capsule      Last Written Prescription Date: 1/11/17  Last Fill Quantity: 240,  # refills: 3   Last Office Visit with [facility], [facility] or [facility] prescribing provider: 9/18/17                                         Next 5 appointments (look out 90 days)     Oct 17, 2017  2:00 PM CDT   SHORT with Ledy Benedict MD   [facility]    [address] [place] [id number] [phone]

## 2017-10-10 RX ORDER — DICYCLOMINE HYDROCHLORIDE 10 MG/1
CAPSULE ORAL
Qty: 240 CAPSULE | Refills: 1 | Status: SHIPPED | OUTPATIENT
Start: 2017-10-10 | End: 2017-11-24

## 2017-10-12 ENCOUNTER — MEDICAL CORRESPONDENCE (OUTPATIENT)
Dept: HEALTH INFORMATION MANAGEMENT | Facility: CLINIC | Age: 67
End: 2017-10-12

## 2017-10-17 ENCOUNTER — OFFICE VISIT (OUTPATIENT)
Dept: FAMILY MEDICINE | Facility: CLINIC | Age: 67
End: 2017-10-17
Payer: COMMERCIAL

## 2017-10-17 VITALS
WEIGHT: 169.2 LBS | BODY MASS INDEX: 32.5 KG/M2 | DIASTOLIC BLOOD PRESSURE: 56 MMHG | TEMPERATURE: 98 F | HEART RATE: 52 BPM | SYSTOLIC BLOOD PRESSURE: 90 MMHG

## 2017-10-17 DIAGNOSIS — R53.83 FATIGUE, UNSPECIFIED TYPE: ICD-10-CM

## 2017-10-17 DIAGNOSIS — E11.9 TYPE 2 DIABETES MELLITUS WITHOUT COMPLICATION, WITHOUT LONG-TERM CURRENT USE OF INSULIN (H): Primary | ICD-10-CM

## 2017-10-17 PROCEDURE — 99214 OFFICE O/P EST MOD 30 MIN: CPT | Performed by: FAMILY MEDICINE

## 2017-10-17 NOTE — MR AVS SNAPSHOT
After Visit Summary   10/17/2017    Ml Evans    MRN: [id number]           Patient Information     Date Of Birth          1950        Visit Information        Provider Department      10/17/2017 2:00 PM Ledy Benedict MD [facility]        Today's Diagnoses     Type 2 diabetes mellitus without complication, without long-term current use of insulin (H)    -  1    Fatigue, unspecified type          Care Instructions    Pay attention to how you feel on the adderall     Set up sleep study for sure     Recheck in 3 months           Follow-ups after your visit        Who to contact     If you have questions or need follow up information about today's clinic visit or your schedule please contact [facility] directly at [phone].  Normal or non-critical lab and imaging results will be communicated to you by Slingjothart, letter or phone within 4 business days after the clinic has received the results. If you do not hear from us within 7 days, please contact the clinic through Slingjothart or phone. If you have a critical or abnormal lab result, we will notify you by phone as soon as possible.  Submit refill requests through Popbasic or call your pharmacy and they will forward the refill request to us. Please allow 3 business days for your refill to be completed.          Additional Information About Your Visit        MyChart Information     Popbasic gives you secure access to your electronic health record. If you see a primary care provider, you can also send messages to your care team and make appointments. If you have questions, please call your primary care clinic.  If you do not have a primary care provider, please call [phone] and they will assist you.        Care EveryWhere ID     This is your Care EveryWhere ID. This could be used by other organizations to access your [facility] medical records  [id number]        Your Vitals Were     Pulse  Temperature BMI (Body Mass Index)             52 98  F (36.7  C) (Tympanic) 32.5 kg/m2          Blood Pressure from Last 3 Encounters:   10/17/17 90/56   09/18/17 102/60   09/05/17 93/61    Weight from Last 3 Encounters:   10/17/17 169 lb 3.2 oz (76.7 kg)   09/18/17 169 lb 6.4 oz (76.8 kg)   09/05/17 168 lb (76.2 kg)              Today, you had the following     No orders found for display       Primary Care Provider Office Phone # Fax #    Ledy Benedict [phone] [address] [place] [address]        Equal Access to Services     ELI HUGHES : Jeff Morgan, wawilliamda betsy, qaybta kaalmada jarrett, leeann hayes. So [facility] [phone].    ATENCIÓN: Si habla español, tiene a gregory disposición servicios gratuitos de asistencia lingüística. Llame al [phone].    We comply with applicable federal civil rights laws and [place] laws. We do not discriminate on the basis of race, color, national origin, age, disability, sex, sexual orientation, or gender identity.            Thank you!     Thank you for choosing [facility]  for your care. Our goal is always to provide you with excellent care. Hearing back from our patients is one way we can continue to improve our services. Please take a few minutes to complete the written survey that you may receive in the mail after your visit with us. Thank you!             Your Updated Medication List - Protect others around you: Learn how to safely use, store and throw away your medicines at www.disposemymeds.org.          This list is accurate as of: 10/17/17  2:50 PM.  Always use your most recent med list.                   Brand Name Dispense Instructions for use Diagnosis    amphetamine-dextroamphetamine 20 MG per 24 hr capsule    ADDERALL XR     Take 20 mg by mouth daily        aspirin 81 MG tablet      1 TABLET DAILY        atorvastatin 80 MG tablet    LIPITOR    90 tablet     Take 1 tablet (80 mg) by mouth daily    Hyperlipidemia LDL goal <100       blood glucose monitoring lancets     100 each    Use to test blood sugars 2 times daily or as directed.    Diabetes mellitus, type 2 (H)       * blood glucose monitoring meter device kit     1 kit    Use to test blood sugars 2 times daily or as directed.    Diabetes mellitus, type 2 (H)       * blood glucose monitoring meter device kit    no brand specified    1 kit    Use to test blood sugar 3 times daily or as directed.    Type 2 diabetes mellitus without complication, without long-term current use of insulin (H)       blood glucose monitoring test strip    FREESTYLE LITE    100 each    Use to test blood sugars 1 times daily or as directed.    Type 2 diabetes mellitus without complication, without long-term current use of insulin (H)       cephALEXin 500 MG capsule    KEFLEX    60 capsule    Take 1 capsule (500 mg) by mouth 2 times daily    Folliculitis       clindamycin 1 % solution    CLEOCIN T    60 mL    Apply topically 2 times daily    Folliculitis       dicyclomine 10 MG capsule    BENTYL    240 capsule    TAKE 1 CAPSULE (10 MG) BY MOUTH 4 TIMES DAILY (BEFORE MEALS AND NIGHTLY)    Irritable bowel syndrome with diarrhea       doxepin 75 MG capsule    SINEquan     Take 75 mg by mouth At Bedtime        LANsoprazole 30 MG CR capsule    PREVACID    90 capsule    Take 1 capsule (30 mg) by mouth daily Take 30-60 minutes before a meal.    Gastroesophageal reflux disease, esophagitis presence not specified       LEXAPRO 20 MG tablet   Generic drug:  escitalopram     180 Tab    40 mg = two tablets by mouth daily per psychiatry        lisinopril 5 MG tablet    PRINIVIL/ZESTRIL    90 tablet    Take 1 tablet (5 mg) by mouth daily    Type 2 diabetes mellitus without complication, without long-term current use of insulin (H), Benign essential hypertension       metFORMIN 500 MG 24 hr tablet    GLUCOPHAGE-XR    90 tablet    Take 1 tablet (500 mg)  by mouth daily (with dinner)    Gastroesophageal reflux disease, esophagitis presence not specified       metoprolol 25 MG 24 hr tablet    TOPROL-XL    90 tablet    Take 1 tablet (25 mg) by mouth daily    Benign essential hypertension       nitroGLYcerin 0.4 MG sublingual tablet    NITROSTAT    25 tablet    Place 1 tablet (0.4 mg) under the tongue every 5 minutes as needed for chest pain    Coronary artery disease involving native heart without angina pectoris, unspecified vessel or lesion type       PROZAC PO      Take 20 mg by mouth        psyllium 0.52 G capsule      Take 4 capsules by mouth At Bedtime        sennosides 8.6 MG tablet    SENOKOT     Take 1 tablet by mouth twice a week        tretinoin 0.025 % cream    RETIN-A    45 g    Spread a pea size amount into affected area topically at bedtime.  Use sunscreen SPF>20.    Acne vulgaris       XANAX PO      Take 0.5-1 mg by mouth 1/2 tab in am, 1 tab in pm, then 1 tab midday prn and 1/2 tab throughout the day if needed        * Notice:  This list has 2 medication(s) that are the same as other medications prescribed for you. Read the directions carefully, and ask your doctor or other care provider to review them with you.

## 2017-10-17 NOTE — PROGRESS NOTES
SUBJECTIVE:   Ml Evans is a 67 year old female who presents to clinic today for the following health issues:      Diabetes Follow-up    Patient is checking blood sugars: once daily.  Results are as follows:       am - 101-125        Diabetic concerns: other - fatigue     Symptoms of hypoglycemia (low blood sugar): dizzy, sweating     Paresthesias (numbness or burning in feet) or sores: No   Date of last diabetic eye exam: overdue      Amount of exercise or physical activity: None    Problems taking medications regularly: Yes,  problems remembering to take    Medication side effects: fatigue  Diet: regular (no restrictions)    Fatigue    Intense fatigue   Intense stress lots of family stuff    Trauma with nephew death and brother ETOHism  Sleep is poor    Off and on adderall   Was suppose to set up sleep study did not                    Problem list and histories reviewed & adjusted, as indicated.  Additional history: as documented    Labs reviewed in EPIC    Reviewed and updated as needed this visit by clinical staffTobacco  Allergies  Meds  Problems  Med Hx  Surg Hx  Fam Hx  Soc Hx        Reviewed and updated as needed this visit by Provider  Allergies  Meds  Problems         ROS:  Constitutional, HEENT, cardiovascular, pulmonary, gi and gu systems are negative, except as otherwise noted.      OBJECTIVE:                                                    BP 90/56 (BP Location: Right arm, Patient Position: Chair, Cuff Size: Adult Large)  Pulse 52  Temp 98  F (36.7  C) (Tympanic)  Wt 169 lb 3.2 oz (76.7 kg)  BMI 32.5 kg/m2  Body mass index is 32.5 kg/(m^2).  GENERAL APPEARANCE: healthy, alert and no distress  NECK: no adenopathy, no asymmetry, masses, or scars and thyroid normal to palpation  RESP: lungs clear to auscultation - no rales, rhonchi or wheezes  CV: regular rates and rhythm, normal S1 S2, no S3 or S4 and no murmur, click or rub  PSYCH: mentation appears normal and affect  normal/bright         ASSESSMENT/PLAN:                                                    (E11.9) Type 2 diabetes mellitus without complication, without long-term current use of insulin (H)  (primary encounter diagnosis)  Comment: Stable no change in treatment plan.   Plan:     (R53.83) Fatigue, unspecified type  Comment: unclear etiol but likely stress and med   Plan: see pt instructions        25 minutes spent with this patient greater than 50% in face to face counseling regarding the above.     Patient Instructions   Pay attention to how you feel on the adderall     Set up sleep study for sure     Recheck in 3 months      Risks, benefits, side effects and rationale for treatment plan fully discussed with the patient and understanding expressed.         Ledy Benedict MD  [facility]

## 2017-10-17 NOTE — NURSING NOTE
"No chief complaint on file.      Initial BP 90/56 (BP Location: Right arm, Patient Position: Chair, Cuff Size: Adult Large)  Pulse 52  Temp 98  F (36.7  C) (Tympanic)  Wt 169 lb 3.2 oz (76.7 kg)  BMI 32.5 kg/m2 Estimated body mass index is 32.5 kg/(m^2) as calculated from the following:    Height as of 3/14/17: 5' 0.5\" (1.537 m).    Weight as of this encounter: 169 lb 3.2 oz (76.7 kg).  Medication Reconciliation: complete    Health Maintenance that is potentially due pending provider review:  NONE    n/a    Is there anyone who you would like to be able to receive your results? Yes  If yes have patient fill out MIGUEL      "

## 2017-10-17 NOTE — PATIENT INSTRUCTIONS
Pay attention to how you feel on the adderall     Set up sleep study for sure     Recheck in 3 months

## 2017-11-24 DIAGNOSIS — K58.0 IRRITABLE BOWEL SYNDROME WITH DIARRHEA: ICD-10-CM

## 2017-11-24 RX ORDER — DICYCLOMINE HYDROCHLORIDE 10 MG/1
CAPSULE ORAL
Qty: 360 CAPSULE | Refills: 0 | Status: SHIPPED | OUTPATIENT
Start: 2017-11-24 | End: 2018-05-21

## 2017-12-19 ENCOUNTER — TRANSFERRED RECORDS (OUTPATIENT)
Dept: HEALTH INFORMATION MANAGEMENT | Facility: CLINIC | Age: 67
End: 2017-12-19

## 2017-12-28 DIAGNOSIS — E11.9 TYPE 2 DIABETES MELLITUS WITHOUT COMPLICATION, WITHOUT LONG-TERM CURRENT USE OF INSULIN (H): Primary | ICD-10-CM

## 2018-01-03 DIAGNOSIS — E78.5 HYPERLIPIDEMIA LDL GOAL <100: ICD-10-CM

## 2018-01-03 RX ORDER — ATORVASTATIN CALCIUM 80 MG/1
TABLET, FILM COATED ORAL
Qty: 90 TABLET | Refills: 1 | Status: SHIPPED | OUTPATIENT
Start: 2018-01-03 | End: 2018-04-24

## 2018-02-26 ENCOUNTER — TELEPHONE (OUTPATIENT)
Dept: FAMILY MEDICINE | Facility: CLINIC | Age: 68
End: 2018-02-26

## 2018-02-26 NOTE — TELEPHONE ENCOUNTER
Prior Authorization Retail Medication Request  Medication/Dose: DICYCLOMINE  Diagnosis and ICD code:Irritable bowel syndrome with diarrhea [K58.0]    New/Renewal/Insurance Change PA: ?  Previously Tried and Failed Therapies: ?    Insurance ID (if provided): ?  Insurance Phone (if provided): ?      Any additional info from fax request: Key for Cover My Meds. Key: [id number]    If you received a fax notification from an outside Pharmacy:  Pharmacy Name:[facility]  Pharmacy #:[phone]  Pharmacy Fax:[phone]

## 2018-02-28 ENCOUNTER — TELEPHONE (OUTPATIENT)
Dept: FAMILY MEDICINE | Facility: CLINIC | Age: 68
End: 2018-02-28

## 2018-02-28 NOTE — TELEPHONE ENCOUNTER
Reason for Call:  Other call back    Detailed comments: dicyclomine 10mg and the risk factors in elderly     Phone Number Patient can be reached at: [phone]    Best Time: anytime    Can we leave a detailed message on this number? Not Applicable    Call taken on 2/28/2018 at 3:26 PM by Coco Tejeda

## 2018-03-01 NOTE — TELEPHONE ENCOUNTER
Patient is calling back. States she is having some of the side effects from this medication. She is disappointed as it worked so well but she is having shortness of breath and is extremely tired. She is wondering if there is something else she can get for her irritable bowel syndrome. Please advise.    [facility]

## 2018-03-01 NOTE — TELEPHONE ENCOUNTER
I would have her stop the med first to see if it is actually causing these symptoms as those are not common for that med. Then we can determine other options

## 2018-03-01 NOTE — TELEPHONE ENCOUNTER
PA Initiation    Medication: DICYCLOMINE 10MG  Insurance Company: [employer] - Phone [phone] Fax [phone]  Pharmacy Filling the Rx: [id number] [place] [id number] [place]  Filling Pharmacy Phone: [phone]  Filling Pharmacy Fax:    Start Date: 3/1/2018    Request has been manually faxed to insurance.

## 2018-03-06 ENCOUNTER — MYC MEDICAL ADVICE (OUTPATIENT)
Dept: FAMILY MEDICINE | Facility: CLINIC | Age: 68
End: 2018-03-06

## 2018-03-07 ENCOUNTER — TELEPHONE (OUTPATIENT)
Dept: FAMILY MEDICINE | Facility: CLINIC | Age: 68
End: 2018-03-07

## 2018-03-07 NOTE — TELEPHONE ENCOUNTER
Reason for call:  Patient reporting a symptom    Symptom or request: Ml says she has IBS and has been on dicyclomine (BENTYL) 10 MG capsule. She says she got a letter from her insurance company with warnings about the use of this medication in people who are over 65. She says she didn't realize that the side effects she was having were from this. States she had fatigue, disorientation and shortness of breath. She has not taken it now for 3 days. She wanted to see Dr Benedict this week but she is not available for a few weeks as she will be out of the office. She has also seen Constance Duarte for this so she did make apt to see her in [place] on Friday, 3/9 to talk about medications to treat her IBS. She wants to know in the meantime if it's OK for her to take Immodium.     Phone Number patient can be reached at:  Home number on file [phone] (home)    Best Time:  anytime    Can we leave a detailed message on this number:  YES    Call taken on 3/7/2018 at 12:59 PM by Charis Le

## 2018-03-09 ENCOUNTER — OFFICE VISIT (OUTPATIENT)
Dept: FAMILY MEDICINE | Facility: CLINIC | Age: 68
End: 2018-03-09
Payer: COMMERCIAL

## 2018-03-09 VITALS
DIASTOLIC BLOOD PRESSURE: 56 MMHG | HEIGHT: 61 IN | HEART RATE: 49 BPM | SYSTOLIC BLOOD PRESSURE: 124 MMHG | BODY MASS INDEX: 32.47 KG/M2 | WEIGHT: 172 LBS

## 2018-03-09 DIAGNOSIS — T50.905A ADVERSE EFFECT OF DRUG, INITIAL ENCOUNTER: ICD-10-CM

## 2018-03-09 DIAGNOSIS — K58.0 IRRITABLE BOWEL SYNDROME WITH DIARRHEA: Primary | ICD-10-CM

## 2018-03-09 PROCEDURE — 99214 OFFICE O/P EST MOD 30 MIN: CPT | Performed by: NURSE PRACTITIONER

## 2018-03-09 NOTE — NURSING NOTE
"Chief Complaint   Patient presents with     Gastrointestinal Problem     Discuss IBS medication       Initial /56 (BP Location: Right arm, Patient Position: Chair, Cuff Size: Adult Large)  Pulse (!) 49  Ht 5' 0.5\" (1.537 m)  Wt 172 lb (78 kg)  BMI 33.04 kg/m2 Estimated body mass index is 33.04 kg/(m^2) as calculated from the following:    Height as of this encounter: 5' 0.5\" (1.537 m).    Weight as of this encounter: 172 lb (78 kg).      Health Maintenance that is potentially due pending provider review:  NONE        Is there anyone who you would like to be able to receive your results? No  If yes have patient fill out MIGUEL      "

## 2018-03-09 NOTE — MR AVS SNAPSHOT
After Visit Summary   3/9/2018    Ml Evans    MRN: [id number]           Patient Information     Date Of Birth          1950        Visit Information        Provider Department      3/9/2018 1:20 PM Taniya Duarte NP [facility]        Today's Diagnoses     Irritable bowel syndrome with diarrhea    -  1    Adverse effect of drug, initial encounter          Care Instructions    Lets stop the Bentyl   In the next few weeks lets see if your symptoms continue to improve, 6 weeks or sooner if needed.     In the meantime treat your symptoms with imodium and fiber tablets as you have done in the past.     If you would like to see a GI specialist call the clinic and we will put in a referral for you.    [facility] NP is Jenna Ortega           Follow-ups after your visit        Follow-up notes from your care team     Return in about 6 weeks (around 4/20/2018).      Who to contact     If you have questions or need follow up information about today's clinic visit or your schedule please contact [facility] directly at [phone].  Normal or non-critical lab and imaging results will be communicated to you by CellAegis Deviceshart, letter or phone within 4 business days after the clinic has received the results. If you do not hear from us within 7 days, please contact the clinic through CellAegis Deviceshart or phone. If you have a critical or abnormal lab result, we will notify you by phone as soon as possible.  Submit refill requests through Phylogy or call your pharmacy and they will forward the refill request to us. Please allow 3 business days for your refill to be completed.          Additional Information About Your Visit        MyChart Information     Phylogy gives you secure access to your electronic health record. If you see a primary care provider, you can also send messages to your care team and make appointments. If you have questions, please call your primary care clinic.  If  "you do not have a primary care provider, please call [phone] and they will assist you.        Care EveryWhere ID     This is your Care EveryWhere ID. This could be used by other organizations to access your [place] medical records  [id number]        Your Vitals Were     Pulse Height BMI (Body Mass Index)             49 5' 0.5\" (1.537 m) 33.04 kg/m2          Blood Pressure from Last 3 Encounters:   03/09/18 124/56   10/17/17 90/56   09/18/17 102/60    Weight from Last 3 Encounters:   03/09/18 172 lb (78 kg)   10/17/17 169 lb 3.2 oz (76.7 kg)   09/18/17 169 lb 6.4 oz (76.8 kg)              Today, you had the following     No orders found for display         Today's Medication Changes          These changes are accurate as of 3/9/18  2:22 PM.  If you have any questions, ask your nurse or doctor.               Stop taking these medicines if you haven't already. Please contact your care team if you have questions.     cephALEXin 500 MG capsule   Commonly known as:  KEFLEX   Stopped by:  Taniya Duarte NP                    Primary Care Provider Office Phone # Fax #    Ledy Benedict [phone] [address] [place], [place] [address]        Equal Access to Services     ELI HUGHES : Hadii andreina france hadasho Soomaali, waaxda luqadaha, qaybta kaalmada adeegyada, leeann hayes. So [facility] [phone].    ATENCIÓN: Si habla español, tiene a gregory disposición servicios gratuitos de asistencia lingüística. Llame al [phone].    We comply with applicable federal civil rights laws and [place] laws. We do not discriminate on the basis of race, color, national origin, age, disability, sex, sexual orientation, or gender identity.            Thank you!     Thank you for choosing [facility]  for your care. Our goal is always to provide you with excellent care. Hearing back from our patients is one way we can continue to improve our services. Please take a " few minutes to complete the written survey that you may receive in the mail after your visit with us. Thank you!             Your Updated Medication List - Protect others around you: Learn how to safely use, store and throw away your medicines at www.disposemymeds.org.          This list is accurate as of 3/9/18  2:22 PM.  Always use your most recent med list.                   Brand Name Dispense Instructions for use Diagnosis    amphetamine-dextroamphetamine 20 MG per 24 hr capsule    ADDERALL XR     Take 20 mg by mouth daily        aspirin 81 MG tablet      1 TABLET DAILY        atorvastatin 80 MG tablet    LIPITOR    90 tablet    TAKE ONE TABLET BY MOUTH ONE TIME DAILY    Hyperlipidemia LDL goal <100       blood glucose monitoring lancets     100 each    Use to test blood sugars 2 times daily or as directed.    Diabetes mellitus, type 2 (H)       * blood glucose monitoring meter device kit     1 kit    Use to test blood sugars 2 times daily or as directed.    Diabetes mellitus, type 2 (H)       * blood glucose monitoring meter device kit    no brand specified    1 kit    Use to test blood sugar 3 times daily or as directed.    Type 2 diabetes mellitus without complication, without long-term current use of insulin (H)       blood glucose monitoring test strip    FREESTYLE LITE    100 each    Use to test blood sugars 1 times daily or as directed.    Type 2 diabetes mellitus without complication, without long-term current use of insulin (H)       clindamycin 1 % solution    CLEOCIN T    60 mL    Apply topically 2 times daily    Folliculitis       dicyclomine 10 MG capsule    BENTYL    360 capsule    TAKE 1 CAPSULE (10 MG) BY MOUTH 4 TIMES DAILY (BEFORE MEALS AND NIGHTLY)    Irritable bowel syndrome with diarrhea       doxepin 75 MG capsule    SINEquan     Take 75 mg by mouth At Bedtime        LANsoprazole 30 MG CR capsule    PREVACID    90 capsule    Take 1 capsule (30 mg) by mouth daily Take 30-60 minutes before a  meal.    Gastroesophageal reflux disease, esophagitis presence not specified       LEXAPRO 20 MG tablet   Generic drug:  escitalopram     180 Tab    40 mg = two tablets by mouth daily per psychiatry        lisinopril 5 MG tablet    PRINIVIL/ZESTRIL    90 tablet    Take 1 tablet (5 mg) by mouth daily    Type 2 diabetes mellitus without complication, without long-term current use of insulin (H), Benign essential hypertension       metFORMIN 500 MG 24 hr tablet    GLUCOPHAGE-XR    90 tablet    Take 1 tablet (500 mg) by mouth daily (with dinner)    Gastroesophageal reflux disease, esophagitis presence not specified       metoprolol succinate 25 MG 24 hr tablet    TOPROL-XL    90 tablet    Take 1 tablet (25 mg) by mouth daily    Benign essential hypertension       nitroGLYcerin 0.4 MG sublingual tablet    NITROSTAT    25 tablet    Place 1 tablet (0.4 mg) under the tongue every 5 minutes as needed for chest pain    Coronary artery disease involving native heart without angina pectoris, unspecified vessel or lesion type       PROZAC PO      Take 20 mg by mouth        psyllium 0.52 G capsule      Take 4 capsules by mouth At Bedtime        sennosides 8.6 MG tablet    SENOKOT     Take 1 tablet by mouth twice a week        tretinoin 0.025 % cream    RETIN-A    45 g    Spread a pea size amount into affected area topically at bedtime.  Use sunscreen SPF>20.    Acne vulgaris       XANAX PO      Take 0.5-1 mg by mouth 1/2 tab in am, 1 tab in pm, then 1 tab midday prn and 1/2 tab throughout the day if needed        * Notice:  This list has 2 medication(s) that are the same as other medications prescribed for you. Read the directions carefully, and ask your doctor or other care provider to review them with you.

## 2018-03-09 NOTE — PROGRESS NOTES
"  SUBJECTIVE:   Ml Evans is a 67 year old female who presents to clinic today for the following health issues:      * Discuss Bentyl-  See telephone encounter dated 3/7/18.  Has some information she printed out.     Started Bentyl in June 2016  Side effects have been fatigue and shortness of breath, has had progressively more instances of SOB, any exertion at all will make her SOB. Thinks this has been since starting Bentyl. Had stress echo that was \"false positive\" Was also very forgetful, and was missing her medications and was having memory issues.     Is currently not taking dicyclomine, stopped about a week ago, and has noticed an improvement in her symptoms with less SOB episodes. She thinks her mentation is much clearer.    IBS symptoms since stopping the medication had one instance of diarrhea   While on medication she had very regular bowel movements, and thinks medication was really helping her IBS, but the side effects are too much to tolerate so \"not worth to there to take\".     Is eating bland diet of potatoes and rice and bread. Is also eating probiotic yogurt.     Has not tried any medications for her IBS symptoms. In past has tried imodium and fiber caps. Does not wish to start a new medication at this time, but is open to seeing GI specialist in the future if symptoms do not improve.  Has seen GI in the past x1 but was already on her bentyl at that time and felt it was not a productive appointment.     Problem list and histories reviewed & adjusted, as indicated.  Additional history: as documented    Patient Active Problem List   Diagnosis     Attention deficit disorder     Nonspecific elevation of levels of transaminase or lactic acid dehydrogenase (LDH)     Mild major depression (H)     Esophageal reflux     Allergic state     Anal fissure     Menopausal syndrome (hot flashes)     HYPERLIPIDEMIA LDL GOAL <100     GERD (gastroesophageal reflux disease)     HPV (human papilloma virus) " anogenital infection     CAD (coronary artery disease)     Health Care Home     Diverticulosis     Advanced directives, counseling/discussion     Colon polyp     ACS (acute coronary syndrome) (H)     Chronic rhinitis     Benign essential hypertension     Irritable bowel syndrome with diarrhea     Type 2 diabetes mellitus without complication, without long-term current use of insulin (H)     Past Surgical History:   Procedure Laterality Date     ABDOMEN SURGERY  1981,1991,1993     APPENDECTOMY  1993     BIOPSY  2003    nose, during surgery     COLONOSCOPY  2005     COLONOSCOPY N/A 5/14/2015    Procedure: COMBINED COLONOSCOPY, SINGLE OR MULTIPLE BIOPSY/POLYPECTOMY BY BIOPSY;  Surgeon: Ponce Christine MD;  Location: [facility]     ECTOPIC PREGNANCY SURGERY  1981     ENDOSCOPIC RELEASE CARPAL TUNNEL  4/16/2013    Procedure: ENDOSCOPIC RELEASE CARPAL TUNNEL;  Right endoscopic carpal tunnel release;  Surgeon: Jonah Dela Cruz MD;  Location: [facility]     ENT SURGERY  2003    nose- suspected basal cell- was benign     ESOPHAGOSCOPY, GASTROSCOPY, DUODENOSCOPY (EGD), COMBINED  8/18/2014    Procedure: COMBINED ESOPHAGOSCOPY, GASTROSCOPY, DUODENOSCOPY (EGD), BIOPSY SINGLE OR MULTIPLE;  Surgeon: Anibal Sebastian MD;  Location: [facility]     GENITOURINARY SURGERY  1981, 1991     HYSTERECTOMY, ELIECER      total hysterectomy. Unsure if ELIECER or vaginal     SURGICAL HISTORY OF -       appy     SURGICAL HISTORY OF -       T&A     VASCULAR SURGERY  2011    angioplasty- 2 stents implanted       Social History   Substance Use Topics     Smoking status: Former Smoker     Packs/day: 1.00     Years: 30.00     Types: Cigarettes     Quit date: 3/15/2011     Smokeless tobacco: Never Used      Comment: occasional/daily     Alcohol use No     Family History   Problem Relation Age of Onset     CANCER Mother      uterine     Lipids Mother      Neurologic Disorder Mother      polymyalgia     Hypertension Mother      Other Cancer Mother       endometrial     Depression/Anxiety Mother      Other - See Comments Mother      Heart Arrythmia      Cardiovascular Father      CHF     Depression Father      C.A.D. Father      bypass x2 starting at age 55-  in his 70's     DIABETES Father      type 2     Coronary Artery Disease Father       from - age 75     Depression/Anxiety Father      CEREBROVASCULAR DISEASE Father      from angioplasty      C.A.D. Maternal Grandfather      Coronary Artery Disease Maternal Grandfather       from- age 61     C.A.D. Paternal Grandfather      DIABETES Brother      Depression Son      Psychotic Disorder Son      adhd     DIABETES Brother      type 1     Depression/Anxiety Brother      Depression/Anxiety Maternal Grandmother      Depression/Anxiety Son      Asthma Son      childhood asthma-out grown now as an adult     Coronary Artery Disease Brother      stent-MI     Melanoma No family hx of          Current Outpatient Prescriptions   Medication Sig Dispense Refill     atorvastatin (LIPITOR) 80 MG tablet TAKE ONE TABLET BY MOUTH ONE TIME DAILY 90 tablet 1     blood glucose monitoring (NO BRAND SPECIFIED) meter device kit Use to test blood sugar 3 times daily or as directed. 1 kit 0     clindamycin (CLEOCIN T) 1 % solution Apply topically 2 times daily 60 mL 1     tretinoin (RETIN-A) 0.025 % cream Spread a pea size amount into affected area topically at bedtime.  Use sunscreen SPF>20. 45 g 1     FLUoxetine HCl (PROZAC PO) Take 20 mg by mouth       LANsoprazole (PREVACID) 30 MG CR capsule Take 1 capsule (30 mg) by mouth daily Take 30-60 minutes before a meal. 90 capsule 3     lisinopril (PRINIVIL/ZESTRIL) 5 MG tablet Take 1 tablet (5 mg) by mouth daily 90 tablet 3     metFORMIN (GLUCOPHAGE-XR) 500 MG 24 hr tablet Take 1 tablet (500 mg) by mouth daily (with dinner) 90 tablet 3     metoprolol (TOPROL-XL) 25 MG 24 hr tablet Take 1 tablet (25 mg) by mouth daily 90 tablet 3     nitroglycerin (NITROSTAT) 0.4 MG sublingual  tablet Place 1 tablet (0.4 mg) under the tongue every 5 minutes as needed for chest pain 25 tablet 6     blood glucose monitoring (FREESTYLE LITE) test strip Use to test blood sugars 1 times daily or as directed. 100 each 3     amphetamine-dextroamphetamine (ADDERALL XR) 20 MG per capsule Take 20 mg by mouth daily        ALPRAZolam (XANAX PO) Take 0.5-1 mg by mouth 1/2 tab in am, 1 tab in pm, then 1 tab midday prn and 1/2 tab throughout the day if needed       psyllium 0.52 G capsule Take 4 capsules by mouth At Bedtime       sennosides (SENOKOT) 8.6 MG tablet Take 1 tablet by mouth twice a week        DoxePIN (SINEQUAN) 75 MG capsule Take 75 mg by mouth At Bedtime        Blood Glucose Monitoring Suppl (FREESTYLE FREEDOM LITE) W/DEVICE KIT Use to test blood sugars 2 times daily or as directed. 1 kit 0     FREESTYLE LANCETS MISC Use to test blood sugars 2 times daily or as directed. 100 each prn     LEXAPRO 20 MG PO TABS 40 mg = two tablets by mouth daily per psychiatry 180 Tab 3     ASPIRIN 81 MG OR TABS 1 TABLET DAILY       dicyclomine (BENTYL) 10 MG capsule TAKE 1 CAPSULE (10 MG) BY MOUTH 4 TIMES DAILY (BEFORE MEALS AND NIGHTLY) (Patient not taking: Reported on 3/9/2018) 360 capsule 0     Allergies   Allergen Reactions     Hydrocodone Anaphylaxis     Flexeril [Cyclobenzaprine] Rash     Cipro [Ciprofloxacin] GI Disturbance     Abd pain , proteinuria , microscopic hematuria  Possibly related to cipro     Codeine Camsylate Nausea     Penicillins Difficulty breathing     Amoxicillin Itching and Rash     Sulfa Drugs Itching and Rash     Tetracycline Itching and Rash       Reviewed and updated as needed this visit by clinical staff  Tobacco  Allergies  Meds  Med Hx  Surg Hx  Fam Hx  Soc Hx      Reviewed and updated as needed this visit by Provider         ROS:  Constitutional, HEENT, cardiovascular, pulmonary, gi and gu systems are negative, except as otherwise noted.    OBJECTIVE:     /56 (BP Location: Right  "arm, Patient Position: Chair, Cuff Size: Adult Large)  Pulse (!) 49  Ht 5' 0.5\" (1.537 m)  Wt 172 lb (78 kg)  BMI 33.04 kg/m2  Body mass index is 33.04 kg/(m^2).  GENERAL: healthy, alert and no distress  RESP: lungs clear to auscultation - no rales, rhonchi or wheezes  CV: regular rate and rhythm, normal S1 S2, no S3 or S4, no murmur, click or rub, no peripheral edema and peripheral pulses strong  ABDOMEN: soft, nontender, no hepatosplenomegaly, no masses and bowel sounds normal, abdomen distended  MS: no gross musculoskeletal defects noted, no edema    Diagnostic Test Results:  none     ASSESSMENT/PLAN:     1. Irritable bowel syndrome with diarrhea  2. Adverse effect of drug, initial encounter  Stop Bentyl and see if side effects continue to dissipate   Symptomatic care as she has been doing with immodium prn for multiple loose stools, high fiber diet, and bland diet  Will make GI f/u if symptoms of IBS not improving  Plans to F/U in 6 weeks or sooner if side effects not continuing to improve       See Patient Instructions  Patient Instructions   Lets stop the Bentyl   In the next few weeks lets see if your symptoms continue to improve, 6 weeks or sooner if needed.     In the meantime treat your symptoms with imodium and fiber tablets as you have done in the past.     If you would like to see a GI specialist call the clinic and we will put in a referral for you.    [facility] NP is Jenna Duarte NP  [facility]    "

## 2018-03-09 NOTE — PATIENT INSTRUCTIONS
Lets stop the Bentyl   In the next few weeks lets see if your symptoms continue to improve, 6 weeks or sooner if needed.     In the meantime treat your symptoms with imodium and fiber tablets as you have done in the past.     If you would like to see a GI specialist call the clinic and we will put in a referral for you.    [facility] NP is Jenna Ortega

## 2018-03-25 DIAGNOSIS — I10 BENIGN ESSENTIAL HYPERTENSION: ICD-10-CM

## 2018-03-25 DIAGNOSIS — E11.9 TYPE 2 DIABETES MELLITUS WITHOUT COMPLICATION, WITHOUT LONG-TERM CURRENT USE OF INSULIN (H): ICD-10-CM

## 2018-03-25 DIAGNOSIS — K21.9 GASTROESOPHAGEAL REFLUX DISEASE, ESOPHAGITIS PRESENCE NOT SPECIFIED: ICD-10-CM

## 2018-03-26 RX ORDER — METFORMIN HCL 500 MG
TABLET, EXTENDED RELEASE 24 HR ORAL
Qty: 90 TABLET | Refills: 0 | Status: SHIPPED | OUTPATIENT
Start: 2018-03-26 | End: 2018-04-24

## 2018-03-26 RX ORDER — LANSOPRAZOLE 30 MG/1
CAPSULE, DELAYED RELEASE ORAL
Qty: 90 CAPSULE | Refills: 0 | Status: SHIPPED | OUTPATIENT
Start: 2018-03-26 | End: 2018-04-24

## 2018-03-26 RX ORDER — LISINOPRIL 5 MG/1
TABLET ORAL
Qty: 90 TABLET | Refills: 0 | Status: SHIPPED | OUTPATIENT
Start: 2018-03-26 | End: 2018-04-24

## 2018-03-26 NOTE — TELEPHONE ENCOUNTER
"Requested Prescriptions   Pending Prescriptions Disp Refills     metFORMIN (GLUCOPHAGE-XR) 500 MG 24 hr tablet [Pharmacy Med Name: METFORMIN  MG TAB SUNP] 90 tablet 2     Sig: TAKE 1 TABLET BY MOUTH DAILY WITH DINNER    Biguanide Agents Failed    3/25/2018 10:41 AM       Failed - Patient has documented LDL within the past 12 mos.    Recent Labs   Lab Test  03/03/17   1058   LDL  58            Failed - Patient has had a Microalbumin in the past 12 mos.    Recent Labs   Lab Test  03/07/17   1532   MICROL  32   UMALCR  11.59            Failed - Patient has documented A1c within the specified period of time.    Recent Labs   Lab Test  09/18/17   1100   A1C  6.6*            Passed - Blood pressure less than 140/90 in past 6 months    BP Readings from Last 3 Encounters:   03/09/18 124/56   10/17/17 90/56   09/18/17 102/60                Passed - Patient is age 10 or older       Passed - Patient's CR is NOT>1.4 OR Patient's EGFR is NOT<45 within past 12 mos.    Recent Labs   Lab Test  09/18/17   1100   GFRESTIMATED  76   GFRESTBLACK  >90       Recent Labs   Lab Test  09/18/17   1100   CR  0.76            Passed - Patient does NOT have a diagnosis of CHF.       Passed - Patient is not pregnant       Passed - Patient has not had a positive pregnancy test within the past 12 mos.        Passed - Recent (6 mo) or future (30 days) visit within the authorizing provider's specialty    Patient had office visit in the last 6 months or has a visit in the next 30 days with authorizing provider or within the authorizing provider's specialty.  See \"Patient Info\" tab in inbasket, or \"Choose Columns\" in Meds & Orders section of the refill encounter.      Last Written Prescription Date:  3/7/17  Last Fill Quantity: 90,  # refills: 3   Last office visit: 3/9/2018 with prescribing provider:     Future Office Visit:              lisinopril (PRINIVIL/ZESTRIL) 5 MG tablet [Pharmacy Med Name: LISINOPRIL 5 MG     TAB SOLC] 90 tablet 2     " "Sig: TAKE ONE TABLET BY MOUTH ONE TIME DAILY    ACE Inhibitors (Including Combos) Protocol Passed    3/25/2018 10:41 AM       Passed - Blood pressure under 140/90 in past 12 months    BP Readings from Last 3 Encounters:   03/09/18 124/56   10/17/17 90/56   09/18/17 102/60                Passed - Recent (12 mo) or future (30 days) visit within the authorizing provider's specialty    Patient had office visit in the last 12 months or has a visit in the next 30 days with authorizing provider or within the authorizing provider's specialty.  See \"Patient Info\" tab in inbasket, or \"Choose Columns\" in Meds & Orders section of the refill encounter.      Last Written Prescription Date:  3/7/17  Last Fill Quantity: 90,  # refills: 3   Last office visit: 3/9/2018 with prescribing provider:     Future Office Visit:             Passed - Patient is age 18 or older       Passed - No active pregnancy on record       Passed - Normal serum creatinine on file in past 12 months    Recent Labs   Lab Test  09/18/17   1100   CR  0.76            Passed - Normal serum potassium on file in past 12 months    Recent Labs   Lab Test  09/18/17   1100   POTASSIUM  4.1            Passed - No positive pregnancy test in past 12 months        LANsoprazole (PREVACID) 30 MG CR capsule [Pharmacy Med Name: LANSOPRAZOLE D 30MG CAP ] 90 capsule 2     Sig: TAKE 1 CAPSULE BY MOUTH 30- 60 MINUTES BEFORE A MEAL DAILY    PPI Protocol Passed    3/25/2018 10:41 AM       Passed - Not on Clopidogrel (unless Pantoprazole ordered)       Passed - No diagnosis of osteoporosis on record       Passed - Recent (12 mo) or future (30 days) visit within the authorizing provider's specialty    Patient had office visit in the last 12 months or has a visit in the next 30 days with authorizing provider or within the authorizing provider's specialty.  See \"Patient Info\" tab in inUboolyet, or \"Choose Columns\" in Meds & Orders section of the refill encounter.      Last Written " Prescription Date:  3/7/17  Last Fill Quantity: 90,  # refills: 3   Last office visit: 3/9/2018 with prescribing provider:     Future Office Visit:             Passed - Patient is age 18 or older       Passed - No active pregnacy on record       Passed - No positive pregnancy test in past 12 months

## 2018-04-04 DIAGNOSIS — I10 BENIGN ESSENTIAL HYPERTENSION: ICD-10-CM

## 2018-04-04 RX ORDER — METOPROLOL SUCCINATE 25 MG/1
TABLET, EXTENDED RELEASE ORAL
Qty: 90 TABLET | Refills: 2 | Status: SHIPPED | OUTPATIENT
Start: 2018-04-04 | End: 2018-08-24

## 2018-04-04 NOTE — TELEPHONE ENCOUNTER
"Requested Prescriptions   Pending Prescriptions Disp Refills     metoprolol succinate (TOPROL-XL) 25 MG 24 hr tablet [Pharmacy Med Name: METOPROL SUC E 25MG TAB ] 90 tablet 2     Sig: TAKE ONE TABLET BY MOUTH ONE TIME DAILY    Beta-Blockers Protocol Passed    4/4/2018  8:53 AM       Passed - Blood pressure under 140/90 in past 12 months    BP Readings from Last 3 Encounters:   03/09/18 124/56   10/17/17 90/56   09/18/17 102/60                Passed - Patient is age 6 or older       Passed - Recent (12 mo) or future (30 days) visit within the authorizing provider's specialty    Patient had office visit in the last 12 months or has a visit in the next 30 days with authorizing provider or within the authorizing provider's specialty.  See \"Patient Info\" tab in inbasket, or \"Choose Columns\" in Meds & Orders section of the refill encounter.            Last Written Prescription Date:  3/7/17  Last Fill Quantity: 90,  # refills: 3   Last office visit: 3/9/2018 with prescribing provider:     Future Office Visit:      "

## 2018-04-10 ENCOUNTER — MYC MEDICAL ADVICE (OUTPATIENT)
Dept: FAMILY MEDICINE | Facility: CLINIC | Age: 68
End: 2018-04-10

## 2018-04-24 DIAGNOSIS — K21.9 GASTROESOPHAGEAL REFLUX DISEASE, ESOPHAGITIS PRESENCE NOT SPECIFIED: ICD-10-CM

## 2018-04-24 DIAGNOSIS — E11.9 TYPE 2 DIABETES MELLITUS WITHOUT COMPLICATION, WITHOUT LONG-TERM CURRENT USE OF INSULIN (H): ICD-10-CM

## 2018-04-24 DIAGNOSIS — E78.5 HYPERLIPIDEMIA LDL GOAL <100: ICD-10-CM

## 2018-04-24 DIAGNOSIS — I10 BENIGN ESSENTIAL HYPERTENSION: ICD-10-CM

## 2018-04-24 RX ORDER — METFORMIN HCL 500 MG
TABLET, EXTENDED RELEASE 24 HR ORAL
Qty: 30 TABLET | Refills: 0 | Status: SHIPPED | OUTPATIENT
Start: 2018-04-24 | End: 2018-08-01

## 2018-04-24 RX ORDER — ATORVASTATIN CALCIUM 80 MG/1
TABLET, FILM COATED ORAL
Qty: 30 TABLET | Refills: 0 | Status: SHIPPED | OUTPATIENT
Start: 2018-04-24 | End: 2018-07-03

## 2018-04-24 RX ORDER — LANSOPRAZOLE 30 MG/1
CAPSULE, DELAYED RELEASE ORAL
Qty: 30 CAPSULE | Refills: 0 | Status: SHIPPED | OUTPATIENT
Start: 2018-04-24 | End: 2018-08-01

## 2018-04-24 RX ORDER — LISINOPRIL 5 MG/1
TABLET ORAL
Qty: 30 TABLET | Refills: 0 | Status: SHIPPED | OUTPATIENT
Start: 2018-04-24 | End: 2018-08-01

## 2018-04-24 NOTE — TELEPHONE ENCOUNTER
"Requested Prescriptions   Pending Prescriptions Disp Refills     LANsoprazole (PREVACID) 30 MG CR capsule [Pharmacy Med Name: LANSOPRAZOLE D 30MG CAP TEVA] 90 capsule 0     Sig: TAKE 1 CAPSULE BY MOUTH 30- 60 MINUTES BEFORE A MEAL DAILY    PPI Protocol Passed    4/24/2018  1:40 PM       Passed - Not on Clopidogrel (unless Pantoprazole ordered)       Passed - No diagnosis of osteoporosis on record       Passed - Recent (12 mo) or future (30 days) visit within the authorizing provider's specialty    Patient had office visit in the last 12 months or has a visit in the next 30 days with authorizing provider or within the authorizing provider's specialty.  See \"Patient Info\" tab in inbasket, or \"Choose Columns\" in Meds & Orders section of the refill encounter.           Passed - Patient is age 18 or older       Passed - No active pregnacy on record       Passed - No positive pregnancy test in past 12 months        metFORMIN (GLUCOPHAGE-XR) 500 MG 24 hr tablet [Pharmacy Med Name: METFORMIN  MG TAB SUNP] 90 tablet 0     Sig: TAKE 1 TABLET BY MOUTH DAILY WITH DINNER    Biguanide Agents Failed    4/24/2018  1:40 PM       Failed - Patient has documented LDL within the past 12 mos.    Recent Labs   Lab Test  03/03/17   1058   LDL  58            Failed - Patient has had a Microalbumin in the past 12 mos.    Recent Labs   Lab Test  03/07/17   1532   MICROL  32   UMALCR  11.59            Failed - Patient has documented A1c within the specified period of time.    Recent Labs   Lab Test  09/18/17   1100   A1C  6.6*            Passed - Blood pressure less than 140/90 in past 6 months    BP Readings from Last 3 Encounters:   03/09/18 124/56   10/17/17 90/56   09/18/17 102/60                Passed - Patient is age 10 or older       Passed - Patient's CR is NOT>1.4 OR Patient's EGFR is NOT<45 within past 12 mos.    Recent Labs   Lab Test  09/18/17   1100   GFRESTIMATED  76   GFRESTBLACK  >90       Recent Labs   Lab Test  " "09/18/17   1100   CR  0.76            Passed - Patient does NOT have a diagnosis of CHF.       Passed - Patient is not pregnant       Passed - Patient has not had a positive pregnancy test within the past 12 mos.        Passed - Recent (6 mo) or future (30 days) visit within the authorizing provider's specialty    Patient had office visit in the last 6 months or has a visit in the next 30 days with authorizing provider or within the authorizing provider's specialty.  See \"Patient Info\" tab in inbasket, or \"Choose Columns\" in Meds & Orders section of the refill encounter.            lisinopril (PRINIVIL/ZESTRIL) 5 MG tablet [Pharmacy Med Name: LISINOPRIL 5 MG     TAB SOLC] 90 tablet 0     Sig: TAKE ONE TABLET BY MOUTH ONE TIME DAILY    ACE Inhibitors (Including Combos) Protocol Passed    4/24/2018  1:40 PM       Passed - Blood pressure under 140/90 in past 12 months    BP Readings from Last 3 Encounters:   03/09/18 124/56   10/17/17 90/56   09/18/17 102/60                Passed - Recent (12 mo) or future (30 days) visit within the authorizing provider's specialty    Patient had office visit in the last 12 months or has a visit in the next 30 days with authorizing provider or within the authorizing provider's specialty.  See \"Patient Info\" tab in inbasket, or \"Choose Columns\" in Meds & Orders section of the refill encounter.           Passed - Patient is age 18 or older       Passed - No active pregnancy on record       Passed - Normal serum creatinine on file in past 12 months    Recent Labs   Lab Test  09/18/17   1100   CR  0.76            Passed - Normal serum potassium on file in past 12 months    Recent Labs   Lab Test  09/18/17   1100   POTASSIUM  4.1            Passed - No positive pregnancy test in past 12 months        atorvastatin (LIPITOR) 80 MG tablet [Pharmacy Med Name: ATORVASTATIN 80 MG  TAB APOT] 90 tablet 0     Sig: TAKE ONE TABLET BY MOUTH DAILY    Statins Protocol Failed    4/24/2018  1:40 PM       " "Failed - LDL on file in past 12 months    Recent Labs   Lab Test  03/03/17   1058   LDL  58            Passed - No abnormal creatine kinase in past 12 months    No lab results found.            Passed - Recent (12 mo) or future (30 days) visit within the authorizing provider's specialty    Patient had office visit in the last 12 months or has a visit in the next 30 days with authorizing provider or within the authorizing provider's specialty.  See \"Patient Info\" tab in inbasket, or \"Choose Columns\" in Meds & Orders section of the refill encounter.           Passed - Patient is age 18 or older       Passed - No active pregnancy on record       Passed - No positive pregnancy test in past 12 months        Last Written Prescription Date:  3/26/2018  Last Fill Quantity: 90,  # refills: 0   Last office visit: 3/9/2018 with prescribing provider:  randy   Future Office Visit:    Requested Prescriptions   Pending Prescriptions Disp Refills     LANsoprazole (PREVACID) 30 MG CR capsule [Pharmacy Med Name: LANSOPRAZOLE D 30MG CAP TEVA] 90 capsule 0     Sig: TAKE 1 CAPSULE BY MOUTH 30- 60 MINUTES BEFORE A MEAL DAILY    PPI Protocol Passed    4/24/2018  1:40 PM       Passed - Not on Clopidogrel (unless Pantoprazole ordered)       Passed - No diagnosis of osteoporosis on record       Passed - Recent (12 mo) or future (30 days) visit within the authorizing provider's specialty    Patient had office visit in the last 12 months or has a visit in the next 30 days with authorizing provider or within the authorizing provider's specialty.  See \"Patient Info\" tab in inbasket, or \"Choose Columns\" in Meds & Orders section of the refill encounter.           Passed - Patient is age 18 or older       Passed - No active pregnacy on record       Passed - No positive pregnancy test in past 12 months        metFORMIN (GLUCOPHAGE-XR) 500 MG 24 hr tablet [Pharmacy Med Name: METFORMIN  MG TAB SUNP] 90 tablet 0     Sig: TAKE 1 TABLET BY MOUTH " "DAILY WITH DINNER    Biguanide Agents Failed    4/24/2018  1:40 PM       Failed - Patient has documented LDL within the past 12 mos.    Recent Labs   Lab Test  03/03/17   1058   LDL  58            Failed - Patient has had a Microalbumin in the past 12 mos.    Recent Labs   Lab Test  03/07/17   1532   MICROL  32   UMALCR  11.59            Failed - Patient has documented A1c within the specified period of time.    Recent Labs   Lab Test  09/18/17   1100   A1C  6.6*            Passed - Blood pressure less than 140/90 in past 6 months    BP Readings from Last 3 Encounters:   03/09/18 124/56   10/17/17 90/56   09/18/17 102/60                Passed - Patient is age 10 or older       Passed - Patient's CR is NOT>1.4 OR Patient's EGFR is NOT<45 within past 12 mos.    Recent Labs   Lab Test  09/18/17   1100   GFRESTIMATED  76   GFRESTBLACK  >90       Recent Labs   Lab Test  09/18/17   1100   CR  0.76            Passed - Patient does NOT have a diagnosis of CHF.       Passed - Patient is not pregnant       Passed - Patient has not had a positive pregnancy test within the past 12 mos.        Passed - Recent (6 mo) or future (30 days) visit within the authorizing provider's specialty    Patient had office visit in the last 6 months or has a visit in the next 30 days with authorizing provider or within the authorizing provider's specialty.  See \"Patient Info\" tab in inbasket, or \"Choose Columns\" in Meds & Orders section of the refill encounter.            lisinopril (PRINIVIL/ZESTRIL) 5 MG tablet [Pharmacy Med Name: LISINOPRIL 5 MG     TAB [facility]] 90 tablet 0     Sig: TAKE ONE TABLET BY MOUTH ONE TIME DAILY    ACE Inhibitors (Including Combos) Protocol Passed    4/24/2018  1:40 PM       Passed - Blood pressure under 140/90 in past 12 months    BP Readings from Last 3 Encounters:   03/09/18 124/56   10/17/17 90/56   09/18/17 102/60                Passed - Recent (12 mo) or future (30 days) visit within the authorizing provider's " "specialty    Patient had office visit in the last 12 months or has a visit in the next 30 days with authorizing provider or within the authorizing provider's specialty.  See \"Patient Info\" tab in inbasket, or \"Choose Columns\" in Meds & Orders section of the refill encounter.           Passed - Patient is age 18 or older       Passed - No active pregnancy on record       Passed - Normal serum creatinine on file in past 12 months    Recent Labs   Lab Test  09/18/17   1100   CR  0.76            Passed - Normal serum potassium on file in past 12 months    Recent Labs   Lab Test  09/18/17   1100   POTASSIUM  4.1            Passed - No positive pregnancy test in past 12 months        atorvastatin (LIPITOR) 80 MG tablet [Pharmacy Med Name: ATORVASTATIN 80 MG  TAB APOT] 90 tablet 0     Sig: TAKE ONE TABLET BY MOUTH DAILY    Statins Protocol Failed    4/24/2018  1:40 PM       Failed - LDL on file in past 12 months    Recent Labs   Lab Test  03/03/17   1058   LDL  58            Passed - No abnormal creatine kinase in past 12 months    No lab results found.            Passed - Recent (12 mo) or future (30 days) visit within the authorizing provider's specialty    Patient had office visit in the last 12 months or has a visit in the next 30 days with authorizing provider or within the authorizing provider's specialty.  See \"Patient Info\" tab in inbasket, or \"Choose Columns\" in Meds & Orders section of the refill encounter.           Passed - Patient is age 18 or older       Passed - No active pregnancy on record       Passed - No positive pregnancy test in past 12 months        Last Written Prescription Date:  3/26/2018  Last Fill Quantity: 90  # refills: 0  Last office visit: 3/9/2018 with prescribing provider:  Randy   Future Office Visit:    Last Written Prescription Date:  3/26/2018  Last Fill Quantity: 90,  # refills: 0   Last office visit: 3/9/2018 with prescribing provider:  randy   Future Office Visit:    Last " Written Prescription Date:  1/3/2018  Last Fill Quantity: 90,  # refills: 1   Last office visit: 3/9/2018 with prescribing provider:  Gerald   Future Office Visit:

## 2018-05-08 ENCOUNTER — TELEPHONE (OUTPATIENT)
Dept: FAMILY MEDICINE | Facility: CLINIC | Age: 68
End: 2018-05-08

## 2018-05-11 ENCOUNTER — TELEPHONE (OUTPATIENT)
Dept: FAMILY MEDICINE | Facility: CLINIC | Age: 68
End: 2018-05-11

## 2018-05-11 DIAGNOSIS — Z00.00 HEALTH CARE MAINTENANCE: Primary | ICD-10-CM

## 2018-05-11 DIAGNOSIS — R53.83 FATIGUE, UNSPECIFIED TYPE: ICD-10-CM

## 2018-05-15 DIAGNOSIS — Z00.00 HEALTH CARE MAINTENANCE: ICD-10-CM

## 2018-05-15 DIAGNOSIS — R53.83 FATIGUE, UNSPECIFIED TYPE: ICD-10-CM

## 2018-05-15 LAB
CHOLEST SERPL-MCNC: 146 MG/DL
CREAT UR-MCNC: 353 MG/DL
HBA1C MFR BLD: 7.1 % (ref 0–5.6)
HDLC SERPL-MCNC: 48 MG/DL
LDLC SERPL CALC-MCNC: 64 MG/DL
MICROALBUMIN UR-MCNC: 52 MG/L
MICROALBUMIN/CREAT UR: 14.84 MG/G CR (ref 0–25)
NONHDLC SERPL-MCNC: 98 MG/DL
TRIGL SERPL-MCNC: 168 MG/DL

## 2018-05-15 PROCEDURE — 82043 UR ALBUMIN QUANTITATIVE: CPT | Performed by: FAMILY MEDICINE

## 2018-05-15 PROCEDURE — 80061 LIPID PANEL: CPT | Performed by: FAMILY MEDICINE

## 2018-05-15 PROCEDURE — 86618 LYME DISEASE ANTIBODY: CPT | Performed by: FAMILY MEDICINE

## 2018-05-15 PROCEDURE — 83036 HEMOGLOBIN GLYCOSYLATED A1C: CPT | Performed by: FAMILY MEDICINE

## 2018-05-15 PROCEDURE — 36415 COLL VENOUS BLD VENIPUNCTURE: CPT | Performed by: FAMILY MEDICINE

## 2018-05-16 LAB — B BURGDOR IGG+IGM SER QL: 0.14 (ref 0–0.89)

## 2018-05-21 ENCOUNTER — OFFICE VISIT (OUTPATIENT)
Dept: FAMILY MEDICINE | Facility: CLINIC | Age: 68
End: 2018-05-21
Payer: COMMERCIAL

## 2018-05-21 VITALS
HEIGHT: 61 IN | WEIGHT: 170 LBS | BODY MASS INDEX: 32.1 KG/M2 | DIASTOLIC BLOOD PRESSURE: 76 MMHG | HEART RATE: 56 BPM | RESPIRATION RATE: 18 BRPM | SYSTOLIC BLOOD PRESSURE: 132 MMHG | TEMPERATURE: 98.6 F

## 2018-05-21 DIAGNOSIS — E11.9 TYPE 2 DIABETES MELLITUS WITHOUT COMPLICATION, WITHOUT LONG-TERM CURRENT USE OF INSULIN (H): Primary | ICD-10-CM

## 2018-05-21 DIAGNOSIS — F32.0 MILD MAJOR DEPRESSION (H): ICD-10-CM

## 2018-05-21 DIAGNOSIS — K58.0 IRRITABLE BOWEL SYNDROME WITH DIARRHEA: ICD-10-CM

## 2018-05-21 DIAGNOSIS — I10 BENIGN ESSENTIAL HYPERTENSION: ICD-10-CM

## 2018-05-21 PROCEDURE — 99214 OFFICE O/P EST MOD 30 MIN: CPT | Performed by: FAMILY MEDICINE

## 2018-05-21 ASSESSMENT — ANXIETY QUESTIONNAIRES
5. BEING SO RESTLESS THAT IT IS HARD TO SIT STILL: NOT AT ALL
7. FEELING AFRAID AS IF SOMETHING AWFUL MIGHT HAPPEN: NOT AT ALL
7. FEELING AFRAID AS IF SOMETHING AWFUL MIGHT HAPPEN: NOT AT ALL
GAD7 TOTAL SCORE: 5
1. FEELING NERVOUS, ANXIOUS, OR ON EDGE: MORE THAN HALF THE DAYS
6. BECOMING EASILY ANNOYED OR IRRITABLE: MORE THAN HALF THE DAYS
GAD7 TOTAL SCORE: 5
3. WORRYING TOO MUCH ABOUT DIFFERENT THINGS: NOT AT ALL
4. TROUBLE RELAXING: SEVERAL DAYS
2. NOT BEING ABLE TO STOP OR CONTROL WORRYING: NOT AT ALL
GAD7 TOTAL SCORE: 5

## 2018-05-21 ASSESSMENT — PATIENT HEALTH QUESTIONNAIRE - PHQ9
10. IF YOU CHECKED OFF ANY PROBLEMS, HOW DIFFICULT HAVE THESE PROBLEMS MADE IT FOR YOU TO DO YOUR WORK, TAKE CARE OF THINGS AT HOME, OR GET ALONG WITH OTHER PEOPLE: SOMEWHAT DIFFICULT
SUM OF ALL RESPONSES TO PHQ QUESTIONS 1-9: 6
10. IF YOU CHECKED OFF ANY PROBLEMS, HOW DIFFICULT HAVE THESE PROBLEMS MADE IT FOR YOU TO DO YOUR WORK, TAKE CARE OF THINGS AT HOME, OR GET ALONG WITH OTHER PEOPLE: SOMEWHAT DIFFICULT

## 2018-05-21 NOTE — PATIENT INSTRUCTIONS
[facility] ~ [phone]  One Day weekly- Alternating Days    [place] ~ [phone]  Every other Monday or Wednesday   & one Saturday morning a month    [place] ~ [phone]  Every Other Monday morning    [place] ~ [phone]  Every other Monday afternoon    [place] ~ [phone]  Every Monday morning  Every Tuesday afternoon  Wed, Thurs, Friday morning & afternoon          Get back into therapy     Continue close work with your Psychiatrist     Continue to work on Diet and Exercise

## 2018-05-21 NOTE — LETTER
My Depression Action Plan  Name: Ml Evans   Date of Birth 1950  Date: 5/21/2018    My doctor: Ledy Benedict   My clinic: [address] [id number] [phone]          GREEN    ZONE   Good Control    What it looks like:     Things are going generally well. You have normal up s and down s. You may even feel depressed from time to time, but bad moods usually last less than a day.   What you need to do:  1. Continue to care for yourself (see self care plan)  2. Check your depression survival kit and update it as needed  3. Follow your physician s recommendations including any medication.  4. Do not stop taking medication unless you consult with your physician first.           YELLOW         ZONE Getting Worse    What it looks like:     Depression is starting to interfere with your life.     It may be hard to get out of bed; you may be starting to isolate yourself from others.    Symptoms of depression are starting to last most all day and this has happened for several days.     You may have suicidal thoughts but they are not constant.   What you need to do:     1. Call your care team, your response to treatment will improve if you keep your care team informed of your progress. Yellow periods are signs an adjustment may need to be made.     2. Continue your self-care, even if you have to fake it!    3. Talk to someone in your support network    4. Open up your depression survival kit           RED    ZONE Medical Alert - Get Help    What it looks like:     Depression is seriously interfering with your life.     You may experience these or other symptoms: You can t get out of bed most days, can t work or engage in other necessary activities, you have trouble taking care of basic hygiene, or basic responsibilities, thoughts of suicide or death that will not go away, self-injurious behavior.     What you need to do:  1. Call your care  team and request a same-day appointment. If they are not available (weekends or after hours) call your local crisis line, emergency room or 911.            Depression Self Care Plan / Survival Kit    Self-Care for Depression  Here s the deal. Your body and mind are really not as separate as most people think.  What you do and think affects how you feel and how you feel influences what you do and think. This means if you do things that people who feel good do, it will help you feel better.  Sometimes this is all it takes.  There is also a place for medication and therapy depending on how severe your depression is, so be sure to consult with your medical provider and/ or Behavioral Health Consultant if your symptoms are worsening or not improving.     In order to better manage my stress, I will:    Exercise  Get some form of exercise, every day. This will help reduce pain and release endorphins, the  feel good  chemicals in your brain. This is almost as good as taking antidepressants!  This is not the same as joining a gym and then never going! (they count on that by the way ) It can be as simple as just going for a walk or doing some gardening, anything that will get you moving.      Hygiene   Maintain good hygiene (Get out of bed in the morning, Make your bed, Brush your teeth, Take a shower, and Get dressed like you were going to work, even if you are unemployed).  If your clothes don't fit try to get ones that do.    Diet  I will strive to eat foods that are good for me, drink plenty of water, and avoid excessive sugar, caffeine, alcohol, and other mood-altering substances.  Some foods that are helpful in depression are: complex carbohydrates, B vitamins, flaxseed, fish or fish oil, fresh fruits and vegetables.    Psychotherapy  I agree to participate in Individual Therapy (if recommended).    Medication  If prescribed medications, I agree to take them.  Missing doses can result in serious side effects.  I  understand that drinking alcohol, or other illicit drug use, may cause potential side effects.  I will not stop my medication abruptly without first discussing it with my provider.    Staying Connected With Others  I will stay in touch with my friends, family members, and my primary care provider/team.    Use your imagination  Be creative.  We all have a creative side; it doesn t matter if it s oil painting, sand castles, or mud pies! This will also kick up the endorphins.    Witness Beauty  (AKA stop and smell the roses) Take a look outside, even in mid-winter. Notice colors, textures. Watch the squirrels and birds.     Service to others  Be of service to others.  There is always someone else in need.  By helping others we can  get out of ourselves  and remember the really important things.  This also provides opportunities for practicing all the other parts of the program.    Humor  Laugh and be silly!  Adjust your TV habits for less news and crime-drama and more comedy.    Control your stress  Try breathing deep, massage therapy, biofeedback, and meditation. Find time to relax each day.     My support system    Clinic Contact:  Phone number:    Contact 1:  Phone number:    Contact 2:  Phone number:    Buddhist/:  Phone number:    Therapist:  Phone number:    Local crisis center:    Phone number:    Other community support:  Phone number:

## 2018-05-21 NOTE — MR AVS SNAPSHOT
After Visit Summary   5/21/2018    Ml Evans    MRN: [id number]           Patient Information     Date Of Birth          1950        Visit Information        Provider Department      5/21/2018 1:40 PM Ledy Benedict MD [facility]        Today's Diagnoses     Type 2 diabetes mellitus without complication, without long-term current use of insulin (H)    -  1    Mild major depression (H)        Benign essential hypertension        Irritable bowel syndrome with diarrhea          Care Instructions        [facility] Schedule    [facility] ~ [phone]  One Day weekly- Alternating Days    [place] ~ [phone]  Every other Monday or Wednesday   & one Saturday morning a month    [place] ~ [phone]  Every Other Monday morning    [place] ~ [phone]  Every other Monday afternoon    [place] ~ [phone]  Every Monday morning  Every Tuesday afternoon  Wed, Thurs, Friday morning & afternoon          Get back into therapy     Continue close work with your Psychiatrist     Continue to work on Diet and Exercise           Follow-ups after your visit        Who to contact     If you have questions or need follow up information about today's clinic visit or your schedule please contact [facility] directly at [phone].  Normal or non-critical lab and imaging results will be communicated to you by MyChart, letter or phone within 4 business days after the clinic has received the results. If you do not hear from us within 7 days, please contact the clinic through [employer] or phone. If you have a critical or abnormal lab result, we will notify you by phone as soon as possible.  Submit refill requests through ePrep or call your pharmacy and they will forward the refill request to us. Please allow 3 business days for your refill to be completed.          Additional Information About Your Visit        MyChart Information     [employer]  "gives you secure access to your electronic health record. If you see a primary care provider, you can also send messages to your care team and make appointments. If you have questions, please call your primary care clinic.  If you do not have a primary care provider, please call [phone] and they will assist you.        Care EveryWhere ID     This is your Care EveryWhere ID. This could be used by other organizations to access your [facility] medical records  [id number]        Your Vitals Were     Pulse Temperature Respirations Height BMI (Body Mass Index)       56 98.6  F (37  C) (Tympanic) 18 5' 0.5\" (1.537 m) 32.65 kg/m2        Blood Pressure from Last 3 Encounters:   05/21/18 132/76   03/09/18 124/56   10/17/17 90/56    Weight from Last 3 Encounters:   05/21/18 170 lb (77.1 kg)   03/09/18 172 lb (78 kg)   10/17/17 169 lb 3.2 oz (76.7 kg)              We Performed the Following     DEPRESSION ACTION PLAN (DAP)        Primary Care Provider Office Phone # Fax #    Ledy Benedict [phone] [address] [place], [place] [address]        Equal Access to Services     ELI HUGHES : Hadii aad ku hadasho Soomaali, waaxda luqadaha, qaybta kaalmada adeegyada, leeann hayes. So [facility] [phone].    ATENCIÓN: Si habla español, tiene a gregory disposición servicios gratuitos de asistencia lingüística. Llame al [phone].    We comply with applicable federal civil rights laws and [place] laws. We do not discriminate on the basis of race, color, national origin, age, disability, sex, sexual orientation, or gender identity.            Thank you!     Thank you for choosing [facility]  for your care. Our goal is always to provide you with excellent care. Hearing back from our patients is one way we can continue to improve our services. Please take a few minutes to complete the written survey that you may receive in the mail after your visit with us. " Thank you!             Your Updated Medication List - Protect others around you: Learn how to safely use, store and throw away your medicines at www.disposemymeds.org.          This list is accurate as of 5/21/18  2:15 PM.  Always use your most recent med list.                   Brand Name Dispense Instructions for use Diagnosis    amphetamine-dextroamphetamine 20 MG per 24 hr capsule    ADDERALL XR     Take 20 mg by mouth daily        aspirin 81 MG tablet      1 TABLET DAILY        atorvastatin 80 MG tablet    LIPITOR    30 tablet    TAKE ONE TABLET BY MOUTH DAILY    Hyperlipidemia LDL goal <100       blood glucose monitoring lancets     100 each    Use to test blood sugars 2 times daily or as directed.    Diabetes mellitus, type 2 (H)       * blood glucose monitoring meter device kit     1 kit    Use to test blood sugars 2 times daily or as directed.    Diabetes mellitus, type 2 (H)       * blood glucose monitoring meter device kit    no brand specified    1 kit    Use to test blood sugar 3 times daily or as directed.    Type 2 diabetes mellitus without complication, without long-term current use of insulin (H)       blood glucose monitoring test strip    FREESTYLE LITE    100 each    Use to test blood sugars 1 times daily or as directed.    Type 2 diabetes mellitus without complication, without long-term current use of insulin (H)       clindamycin 1 % solution    CLEOCIN T    60 mL    Apply topically 2 times daily    Folliculitis       doxepin 75 MG capsule    SINEquan     Take 75 mg by mouth At Bedtime        LANsoprazole 30 MG CR capsule    PREVACID    30 capsule    TAKE 1 CAPSULE BY MOUTH 30- 60 MINUTES BEFORE A MEAL DAILY    Gastroesophageal reflux disease, esophagitis presence not specified       LEXAPRO 20 MG tablet   Generic drug:  escitalopram     180 Tab    40 mg = two tablets by mouth daily per psychiatry        lisinopril 5 MG tablet    PRINIVIL/ZESTRIL    30 tablet    TAKE ONE TABLET BY MOUTH ONE  TIME DAILY    Type 2 diabetes mellitus without complication, without long-term current use of insulin (H), Benign essential hypertension       metFORMIN 500 MG 24 hr tablet    GLUCOPHAGE-XR    30 tablet    TAKE 1 TABLET BY MOUTH DAILY WITH DINNER    Gastroesophageal reflux disease, esophagitis presence not specified       metoprolol succinate 25 MG 24 hr tablet    TOPROL-XL    90 tablet    TAKE ONE TABLET BY MOUTH ONE TIME DAILY    Benign essential hypertension       nitroGLYcerin 0.4 MG sublingual tablet    NITROSTAT    25 tablet    Place 1 tablet (0.4 mg) under the tongue every 5 minutes as needed for chest pain    Coronary artery disease involving native heart without angina pectoris, unspecified vessel or lesion type       psyllium 0.52 g capsule      Take 4 capsules by mouth At Bedtime        sennosides 8.6 MG tablet    SENOKOT     Take 1 tablet by mouth twice a week        tretinoin 0.025 % cream    RETIN-A    45 g    Spread a pea size amount into affected area topically at bedtime.  Use sunscreen SPF>20.    Acne vulgaris       XANAX PO      Take 0.5-1 mg by mouth 1/2 tab in am, 1 tab in pm, then 1 tab midday prn and 1/2 tab throughout the day if needed        * Notice:  This list has 2 medication(s) that are the same as other medications prescribed for you. Read the directions carefully, and ask your doctor or other care provider to review them with you.

## 2018-05-21 NOTE — PROGRESS NOTES
SUBJECTIVE:   Ml Evans is a 68 year old female who presents to clinic today for the following health issues:    Diabetes Follow-up      Patient is checking blood sugars: 2-3 times a week    Diabetic concerns: None     Symptoms of hypoglycemia (low blood sugar): none     Paresthesias (numbness or burning in feet) or sores: No     Date of last diabetic eye exam: 12/19/17    BP Readings from Last 2 Encounters:   05/21/18 132/76   03/09/18 124/56     Hemoglobin A1C (%)   Date Value   05/15/2018 7.1 (H)   09/18/2017 6.6 (H)     LDL Cholesterol Calculated (mg/dL)   Date Value   05/15/2018 64   03/03/2017 58       Amount of exercise or physical activity: 3 days a week    Problems taking medications regularly: No    Medication side effects: none    Diet: regular (no restrictions)        Hypertension Follow-up      Outpatient blood pressures are not being checked.    Low Salt Diet: no added salt    Depression Followup    Status since last visit: Stable     See PHQ-9 for current symptoms.  Other associated symptoms: None    Complicating factors:   Significant life event:  No   Current substance abuse:  None  Anxiety or Panic symptoms:  No    PHQ-9 9/18/2017 5/21/2018 5/21/2018   Total Score 11 6 6   Q9: Suicide Ideation Not at all Not at all Not at all     In the past two weeks have you had thoughts of suicide or self-harm?  No.    Do you have concerns about your personal safety or the safety of others?   No  PHQ-9  English  PHQ-9   Any Language  Suicide Assessment Five-step Evaluation and Treatment (SAFE-T)    Problem list and histories reviewed & adjusted, as indicated.  Additional history: as documented    Labs reviewed in EPIC    Reviewed and updated as needed this visit by clinical staff  Tobacco  Allergies  Meds  Problems  Med Hx  Surg Hx  Fam Hx  Soc Hx        Reviewed and updated as needed this visit by Provider  Allergies  Meds  Problems         ROS:  Constitutional, HEENT, cardiovascular,  "pulmonary, gi and gu systems are negative, except as otherwise noted.    OBJECTIVE:                                                    /76 (BP Location: Right arm, Cuff Size: Adult Regular)  Pulse 56  Temp 98.6  F (37  C) (Tympanic)  Resp 18  Ht 5' 0.5\" (1.537 m)  Wt 170 lb (77.1 kg)  BMI 32.65 kg/m2  Body mass index is 32.65 kg/(m^2).  GENERAL APPEARANCE: healthy, alert and no distress  RESP: lungs clear to auscultation - no rales, rhonchi or wheezes  CV: regular rates and rhythm, normal S1 S2, no S3 or S4 and no murmur, click or rub  ABDOMEN: soft, nontender, without hepatosplenomegaly or masses and bowel sounds normal  PSYCH: mentation appears normal and affect normal/bright         ASSESSMENT/PLAN:                                                    1. Type 2 diabetes mellitus without complication, without long-term current use of insulin (H)  Stable no change in treatment plan.     2. Mild major depression (H)  Working with psychiatrist and therapist and this is improving     3. Benign essential hypertension  Stable no change in treatment plan.     4. Irritable bowel syndrome with diarrhea  Stable no change in treatment plan.       Patient Instructions       [facility] ~ [phone]  One Day weekly- Alternating Days    [place] ~ [phone]  Every other Monday or Wednesday   & one Saturday morning a month    [place] ~ [phone]  Every Other Monday morning    [place] ~ [phone]  Every other Monday afternoon    [place] ~ [phone]  Every Monday morning  Every Tuesday afternoon  Wed, Thurs, Friday morning & afternoon          Get back into therapy     Continue close work with your Psychiatrist     Continue to work on Diet and Exercise       Ledy Benedict MD  [facility]  "

## 2018-05-22 ASSESSMENT — PATIENT HEALTH QUESTIONNAIRE - PHQ9
SUM OF ALL RESPONSES TO PHQ QUESTIONS 1-9: 6
SUM OF ALL RESPONSES TO PHQ QUESTIONS 1-9: 6

## 2018-05-22 ASSESSMENT — ANXIETY QUESTIONNAIRES: GAD7 TOTAL SCORE: 5

## 2018-07-03 DIAGNOSIS — E78.5 HYPERLIPIDEMIA LDL GOAL <100: ICD-10-CM

## 2018-07-03 RX ORDER — ATORVASTATIN CALCIUM 80 MG/1
TABLET, FILM COATED ORAL
Qty: 90 TABLET | Refills: 3 | Status: SHIPPED | OUTPATIENT
Start: 2018-07-03 | End: 2019-05-07

## 2018-07-03 NOTE — TELEPHONE ENCOUNTER
"Requested Prescriptions   Pending Prescriptions Disp Refills     atorvastatin (LIPITOR) 80 MG tablet [Pharmacy Med Name: ATORVASTATIN 80 MG  TAB APOT] 90 tablet 0     Sig: TAKE ONE TABLET BY MOUTH DAILY    Statins Protocol Passed    7/3/2018  8:47 AM       Passed - LDL on file in past 12 months    Recent Labs   Lab Test  05/15/18   1108   LDL  64            Passed - No abnormal creatine kinase in past 12 months    No lab results found.            Passed - Recent (12 mo) or future (30 days) visit within the authorizing provider's specialty    Patient had office visit in the last 12 months or has a visit in the next 30 days with authorizing provider or within the authorizing provider's specialty.  See \"Patient Info\" tab in inbasket, or \"Choose Columns\" in Meds & Orders section of the refill encounter.           Passed - Patient is age 18 or older       Passed - No active pregnancy on record       Passed - No positive pregnancy test in past 12 months        Last Written Prescription Date:  4/24/18  Last Fill Quantity: 30,  # refills: 0   Last office visit: 5/21/2018 with prescribing provider:     Future Office Visit:      "

## 2018-07-11 ENCOUNTER — TELEPHONE (OUTPATIENT)
Dept: FAMILY MEDICINE | Facility: CLINIC | Age: 68
End: 2018-07-11

## 2018-07-11 DIAGNOSIS — K21.9 GASTROESOPHAGEAL REFLUX DISEASE, ESOPHAGITIS PRESENCE NOT SPECIFIED: Primary | ICD-10-CM

## 2018-07-11 NOTE — TELEPHONE ENCOUNTER
Pt called. Pt states she is having constant burning in chest, states it is worse when she lays down or bends over. Pt states this is not heart related, feels like when she has had reflux issues in the past. States she is currently taking 30 mg CR and is wondering if she can increase it? Advised she can take tums in the mean time. Pt states she thinks symptoms are worse recently due to stress levels. Skye Sanches RN

## 2018-07-11 NOTE — TELEPHONE ENCOUNTER
Recommend trying 150 mg at hs as needed PRN.n please send in prescription to patient pharmacy of choice.     If not improving or chest pain or other concerns should be seen.    Maryana Urrutia CNP

## 2018-07-11 NOTE — TELEPHONE ENCOUNTER
Reason for Call:  Other     Detailed comments: Patient is wanting to increase the Medication listed below - please call pt  LANsoprazole (PREVACID) 30 MG CR capsule 30 capsule 0 4/24/2018  No   Sig: TAKE 1 CAPSULE BY MOUTH 30- 60 MINUTES BEFORE A MEAL          Phone Number Patient can be reached at: Home number on file [phone] (home)    Best Time:     Can we leave a detailed message on this number? YES    Call taken on 7/11/2018 at 1:21 PM by Beth Quezada

## 2018-07-30 DIAGNOSIS — L73.9 FOLLICULITIS: ICD-10-CM

## 2018-07-30 RX ORDER — CLINDAMYCIN PHOSPHATE 11.9 MG/ML
SOLUTION TOPICAL 2 TIMES DAILY
Qty: 60 ML | Refills: 1 | Status: SHIPPED | OUTPATIENT
Start: 2018-07-30 | End: 2022-03-29

## 2018-07-30 NOTE — TELEPHONE ENCOUNTER
"Requested Prescriptions   Pending Prescriptions Disp Refills     clindamycin (CLEOCIN T) 1 % solution 60 mL 1     Sig: Apply topically 2 times daily    Topical Acne Medications Protocol Passed    7/30/2018  9:00 AM       Passed - Patient is 12 years of age or older       Passed - Recent (12 mo) or future (30 days) visit within the authorizing provider's specialty    Patient had office visit in the last 12 months or has a visit in the next 30 days with authorizing provider or within the authorizing provider's specialty.  See \"Patient Info\" tab in inbasket, or \"Choose Columns\" in Meds & Orders section of the refill encounter.            Last Written Prescription Date:  8/9/17  Last Fill Quantity: 60 ml,  # refills: 1   Last office visit: 5/21/2018 with prescribing provider:  Marichuy   Future Office Visit:   Next 5 appointments (look out 90 days)     Aug 20, 2018  8:00 AM CDT   SHORT with Ledy Benedict MD   [facility]    [address] [place] [id number] [phone]                   "

## 2018-08-01 ENCOUNTER — OFFICE VISIT (OUTPATIENT)
Dept: URGENT CARE | Facility: URGENT CARE | Age: 68
End: 2018-08-01
Payer: COMMERCIAL

## 2018-08-01 VITALS
DIASTOLIC BLOOD PRESSURE: 58 MMHG | SYSTOLIC BLOOD PRESSURE: 110 MMHG | OXYGEN SATURATION: 97 % | WEIGHT: 171 LBS | HEART RATE: 51 BPM | BODY MASS INDEX: 32.85 KG/M2 | RESPIRATION RATE: 18 BRPM | TEMPERATURE: 98.4 F

## 2018-08-01 DIAGNOSIS — H69.92 DYSFUNCTION OF LEFT EUSTACHIAN TUBE: Primary | ICD-10-CM

## 2018-08-01 PROCEDURE — 99213 OFFICE O/P EST LOW 20 MIN: CPT | Performed by: NURSE PRACTITIONER

## 2018-08-01 NOTE — MR AVS SNAPSHOT
After Visit Summary   8/1/2018    Ml Evans    MRN: [id number]           Patient Information     Date Of Birth          1950        Visit Information        Provider Department      8/1/2018 6:40 PM Melisa Pitt APRN [facility]        Today's Diagnoses     Dysfunction of left eustachian tube    -  1      Care Instructions    Increase rest and fluids. Tylenol and/or Ibuprofen for comfort. Cool mist vaporizer. If your symptoms worsen or do not resolve follow up with your primary care provider in 1 week and sooner if needed.      Mucinex 600 mg 12 hour formula for ear, head and chest congestion.  It can also thin post nasal drip which can cause a cough and sore throat.    Indications for emergent return to emergency department discussed with patient, who verbalized good understanding and agreement.  Patient understands the limitations of today's evaluation.           Earache, No Infection (Adult)  Earaches can happen without an infection. This occurs when air and fluid build up behind the eardrum causing a feeling of fullness and discomfort and reduced hearing. This is called otitis media with effusion (OME) or serous otitis media. It means there is fluid in the middle ear. It is not the same as acute otitis media, which is typically from infection.  OME can happen when you have a cold if congestion blocks the passage that drains the middle ear. This passage is called the eustachian tube. OME may also occur with nasal allergies or after a bacterial middle ear infection.    The pain or discomfort may come and go. You may hear clicking or popping sounds when you chew or swallow. You may feel that your balance is off. Or you may hear ringing in the ear.  It often takes from several weeks up to 3 months for the fluid to clear on its own. Oral pain relievers and ear drops help if there is pain. Decongestants and antihistamines sometimes help.  Antibiotics don't help since there is no infection. Your doctor may prescribe a nasal spray to help reduce swelling in the nose and eustachian tube. This can allow the ear to drain.  If your OME doesn't improve after 3 months, surgery may be used to drain the fluid and insert a small tube in the eardrum to allow continued drainage.  Because the middle ear fluid can become infected, it is important to watch for signs of an ear infection which may develop later. These signs include increased ear pain, fever, or drainage from the ear.  Home care  The following guidelines will help you care for yourself at home:    You may use over-the-counter medicine as directed to control pain, unless another medicine was prescribed. If you have chronic liver or kidney disease or ever had a stomach ulcer or GI bleeding, talk with your doctor before using these medicines. Aspirin should never be used in anyone under 18 years of age who is ill with a fever. It may cause severe liver damage.    You may use over-the-counter decongestants such as phenylephrine or pseudoephedrine. But they are not always helpful. Don't use nasal spray decongestants more than 3 days. Longer use can make congestion worse. Prescription nasal sprays from your doctor don't typically have those restrictions.    Antihistamines may help if you are also having allergy symptoms.    You may use medicines such as guaifenesin to thin mucus and promote drainage.  Follow-up care  Follow up with your healthcare provider or as advised if you are not feeling better after 3 days.  When to seek medical advice  Call your healthcare provider right away if any of the following occur:    Your ear pain gets worse or does not start to improve     Fever of 100.4 F (38 C) or higher, or as directed by your healthcare provider    Fluid or blood draining from the ear    Headache or sinus pain    Stiff neck    Unusual drowsiness or confusion  Date Last Reviewed: 10/1/2016    0650-0179  [employer]. [address] [place] [address]. All rights reserved. This information is not intended as a substitute for professional medical care. Always follow your healthcare professional's instructions.                Follow-ups after your visit        Follow-up notes from your care team     See patient instructions section of the AVS Return if symptoms worsen or fail to improve, for Follow up with your primary care provider.      Your next 10 appointments already scheduled     Aug 20, 2018  8:00 AM CDT   SHORT with Ledy Benedict MD   [facility]    [address] [place] [id number]   [phone]              Who to contact     If you have questions or need follow up information about today's clinic visit or your schedule please contact [facility] directly at [phone].  Normal or non-critical lab and imaging results will be communicated to you by MyChart, letter or phone within 4 business days after the clinic has received the results. If you do not hear from us within 7 days, please contact the clinic through [email or web address] or phone. If you have a critical or abnormal lab result, we will notify you by phone as soon as possible.  Submit refill requests through [employer] or call your pharmacy and they will forward the refill request to us. Please allow 3 business days for your refill to be completed.          Additional Information About Your Visit        MyChart Information     [employer] gives you secure access to your electronic health record. If you see a primary care provider, you can also send messages to your care team and make appointments. If you have questions, please call your primary care clinic.  If you do not have a primary care provider, please call [phone] and they will assist you.        Care EveryWhere ID     This is your Care EveryWhere ID. This could be used by other  organizations to access your [place] medical records  [id number]        Your Vitals Were     Pulse Temperature Respirations Pulse Oximetry BMI (Body Mass Index)       51 98.4  F (36.9  C) (Tympanic) 18 97% 32.85 kg/m2        Blood Pressure from Last 3 Encounters:   08/01/18 110/58   05/21/18 132/76   03/09/18 124/56    Weight from Last 3 Encounters:   08/01/18 171 lb (77.6 kg)   05/21/18 170 lb (77.1 kg)   03/09/18 172 lb (78 kg)              Today, you had the following     No orders found for display         Today's Medication Changes          These changes are accurate as of 8/1/18  8:00 PM.  If you have any questions, ask your nurse or doctor.               These medicines have changed or have updated prescriptions.        Dose/Directions    metFORMIN 500 MG 24 hr tablet   Commonly known as:  GLUCOPHAGE-XR   This may have changed:  See the new instructions.   Used for:  Gastroesophageal reflux disease, esophagitis presence not specified   Changed by:  Ledy Benedict MD        Dose:  500 mg   Take 1 tablet (500 mg) by mouth daily (with dinner)   Quantity:  90 tablet   Refills:  0            Where to get your medicines      These medications were sent to [facility] #[facility] [address] [address]    Hours:  Closed 10-16-08 business to [facility] Phone:  [phone]     LANsoprazole 30 MG CR capsule    lisinopril 5 MG tablet    metFORMIN 500 MG 24 hr tablet                Primary Care Provider Office Phone # Fax #    Ledy Benedict [phone] [address] [facility] [address]        Equal Access to Services     ELI HUGHES AH: Hadagapito Morgan, waaxda luqadaha, qaybta kaalmaleeann gutierrez. So [facility] [phone].    ATENCIÓN: Si habla español, tiene a gregory disposición servicios gratuitos de asistencia lingüística. Llame al [phone].    We comply with  applicable federal civil rights laws and [place] laws. We do not discriminate on the basis of race, color, national origin, age, disability, sex, sexual orientation, or gender identity.            Thank you!     Thank you for choosing [facility]  for your care. Our goal is always to provide you with excellent care. Hearing back from our patients is one way we can continue to improve our services. Please take a few minutes to complete the written survey that you may receive in the mail after your visit with us. Thank you!             Your Updated Medication List - Protect others around you: Learn how to safely use, store and throw away your medicines at www.disposemymeds.org.          This list is accurate as of 8/1/18  8:00 PM.  Always use your most recent med list.                   Brand Name Dispense Instructions for use Diagnosis    amphetamine-dextroamphetamine 20 MG per 24 hr capsule    ADDERALL XR     Take 20 mg by mouth daily        aspirin 81 MG tablet      1 TABLET DAILY        atorvastatin 80 MG tablet    LIPITOR    90 tablet    TAKE ONE TABLET BY MOUTH DAILY    Hyperlipidemia LDL goal <100       blood glucose monitoring lancets     100 each    Use to test blood sugars 2 times daily or as directed.    Diabetes mellitus, type 2 (H)       * blood glucose monitoring meter device kit     1 kit    Use to test blood sugars 2 times daily or as directed.    Diabetes mellitus, type 2 (H)       * blood glucose monitoring meter device kit    no brand specified    1 kit    Use to test blood sugar 3 times daily or as directed.    Type 2 diabetes mellitus without complication, without long-term current use of insulin (H)       blood glucose monitoring test strip    FREESTYLE LITE    100 each    Use to test blood sugars 1 times daily or as directed.    Type 2 diabetes mellitus without complication, without long-term current use of insulin (H)       clindamycin 1 % solution    CLEOCIN T     60 mL    Apply topically 2 times daily    Folliculitis       doxepin 75 MG capsule    SINEquan     Take 75 mg by mouth At Bedtime        LANsoprazole 30 MG CR capsule    PREVACID    90 capsule    TAKE 1 CAPSULE BY MOUTH 30- 60 MINUTES BEFORE A MEAL DAILY    Gastroesophageal reflux disease, esophagitis presence not specified       LEXAPRO 20 MG tablet   Generic drug:  escitalopram     180 Tab    40 mg = two tablets by mouth daily per psychiatry        lisinopril 5 MG tablet    PRINIVIL/ZESTRIL    90 tablet    TAKE ONE TABLET BY MOUTH ONE TIME DAILY    Type 2 diabetes mellitus without complication, without long-term current use of insulin (H), Benign essential hypertension       metFORMIN 500 MG 24 hr tablet    GLUCOPHAGE-XR    90 tablet    Take 1 tablet (500 mg) by mouth daily (with dinner)    Gastroesophageal reflux disease, esophagitis presence not specified       metoprolol succinate 25 MG 24 hr tablet    TOPROL-XL    90 tablet    TAKE ONE TABLET BY MOUTH ONE TIME DAILY    Benign essential hypertension       nitroGLYcerin 0.4 MG sublingual tablet    NITROSTAT    25 tablet    Place 1 tablet (0.4 mg) under the tongue every 5 minutes as needed for chest pain    Coronary artery disease involving native heart without angina pectoris, unspecified vessel or lesion type       psyllium 0.52 g capsule      Take 4 capsules by mouth At Bedtime        ranitidine 150 MG tablet    ZANTAC    30 tablet    Take 1 tablet (150 mg) by mouth At Bedtime    Gastroesophageal reflux disease, esophagitis presence not specified       sennosides 8.6 MG tablet    SENOKOT     Take 1 tablet by mouth twice a week        tretinoin 0.025 % cream    RETIN-A    45 g    Spread a pea size amount into affected area topically at bedtime.  Use sunscreen SPF>20.    Acne vulgaris       XANAX PO      Take 0.5-1 mg by mouth 1/2 tab in am, 1 tab in pm, then 1 tab midday prn and 1/2 tab throughout the day if needed        * Notice:  This list has 2  medication(s) that are the same as other medications prescribed for you. Read the directions carefully, and ask your doctor or other care provider to review them with you.

## 2018-08-02 NOTE — PATIENT INSTRUCTIONS
Increase rest and fluids. Tylenol and/or Ibuprofen for comfort. Cool mist vaporizer. If your symptoms worsen or do not resolve follow up with your primary care provider in 1 week and sooner if needed.      Mucinex 600 mg 12 hour formula for ear, head and chest congestion.  It can also thin post nasal drip which can cause a cough and sore throat.    Indications for emergent return to emergency department discussed with patient, who verbalized good understanding and agreement.  Patient understands the limitations of today's evaluation.           Earache, No Infection (Adult)  Earaches can happen without an infection. This occurs when air and fluid build up behind the eardrum causing a feeling of fullness and discomfort and reduced hearing. This is called otitis media with effusion (OME) or serous otitis media. It means there is fluid in the middle ear. It is not the same as acute otitis media, which is typically from infection.  OME can happen when you have a cold if congestion blocks the passage that drains the middle ear. This passage is called the eustachian tube. OME may also occur with nasal allergies or after a bacterial middle ear infection.    The pain or discomfort may come and go. You may hear clicking or popping sounds when you chew or swallow. You may feel that your balance is off. Or you may hear ringing in the ear.  It often takes from several weeks up to 3 months for the fluid to clear on its own. Oral pain relievers and ear drops help if there is pain. Decongestants and antihistamines sometimes help. Antibiotics don't help since there is no infection. Your doctor may prescribe a nasal spray to help reduce swelling in the nose and eustachian tube. This can allow the ear to drain.  If your OME doesn't improve after 3 months, surgery may be used to drain the fluid and insert a small tube in the eardrum to allow continued drainage.  Because the middle ear fluid can become infected, it is important to watch  for signs of an ear infection which may develop later. These signs include increased ear pain, fever, or drainage from the ear.  Home care  The following guidelines will help you care for yourself at home:    You may use over-the-counter medicine as directed to control pain, unless another medicine was prescribed. If you have chronic liver or kidney disease or ever had a stomach ulcer or GI bleeding, talk with your doctor before using these medicines. Aspirin should never be used in anyone under 18 years of age who is ill with a fever. It may cause severe liver damage.    You may use over-the-counter decongestants such as phenylephrine or pseudoephedrine. But they are not always helpful. Don't use nasal spray decongestants more than 3 days. Longer use can make congestion worse. Prescription nasal sprays from your doctor don't typically have those restrictions.    Antihistamines may help if you are also having allergy symptoms.    You may use medicines such as guaifenesin to thin mucus and promote drainage.  Follow-up care  Follow up with your healthcare provider or as advised if you are not feeling better after 3 days.  When to seek medical advice  Call your healthcare provider right away if any of the following occur:    Your ear pain gets worse or does not start to improve     Fever of 100.4 F (38 C) or higher, or as directed by your healthcare provider    Fluid or blood draining from the ear    Headache or sinus pain    Stiff neck    Unusual drowsiness or confusion  Date Last Reviewed: 10/1/2016    3556-3258 [employer] [address] [place] [address], [place] [address]. All rights reserved. This information is not intended as a substitute for professional medical care. Always follow your healthcare professional's instructions.

## 2018-08-02 NOTE — PROGRESS NOTES
SUBJECTIVE:   Ml Evans is a 68 year old female who presents to clinic today for the following health issues:  Chief Complaint   Patient presents with     Otalgia     left ear, headaches, 1 week, dizziness spells- spinning, sinus pressure, congestion                  Problem list and histories reviewed & adjusted, as indicated.  Additional history: as documented    Patient Active Problem List   Diagnosis     Attention deficit disorder     Nonspecific elevation of levels of transaminase or lactic acid dehydrogenase (LDH)     Mild major depression (H)     Esophageal reflux     Allergic state     Anal fissure     Menopausal syndrome (hot flashes)     HYPERLIPIDEMIA LDL GOAL <100     GERD (gastroesophageal reflux disease)     HPV (human papilloma virus) anogenital infection     CAD (coronary artery disease)     Health Care Home     Diverticulosis     Advanced directives, counseling/discussion     Colon polyp     ACS (acute coronary syndrome) (H)     Chronic rhinitis     Benign essential hypertension     Irritable bowel syndrome with diarrhea     Type 2 diabetes mellitus without complication, without long-term current use of insulin (H)     Past Surgical History:   Procedure Laterality Date     ABDOMEN SURGERY  1981,1991,1993     APPENDECTOMY  1993     BIOPSY  2003    nose, during surgery     COLONOSCOPY  2005     COLONOSCOPY N/A 5/14/2015    Procedure: COMBINED COLONOSCOPY, SINGLE OR MULTIPLE BIOPSY/POLYPECTOMY BY BIOPSY;  Surgeon: Ponce Christine MD;  Location: [facility]     ECTOPIC PREGNANCY SURGERY  1981     ENDOSCOPIC RELEASE CARPAL TUNNEL  4/16/2013    Procedure: ENDOSCOPIC RELEASE CARPAL TUNNEL;  Right endoscopic carpal tunnel release;  Surgeon: Jonah Dela Cruz MD;  Location: [facility]     ENT SURGERY  2003    nose- suspected basal cell- was benign     ESOPHAGOSCOPY, GASTROSCOPY, DUODENOSCOPY (EGD), COMBINED  8/18/2014    Procedure: COMBINED ESOPHAGOSCOPY, GASTROSCOPY, DUODENOSCOPY (EGD), BIOPSY  SINGLE OR MULTIPLE;  Surgeon: Anibal Sebastian MD;  Location: [facility]     GENITOURINARY SURGERY  ,      HYSTERECTOMY, ELIECER      total hysterectomy. Unsure if ELIECER or vaginal     SURGICAL HISTORY OF -       appy     SURGICAL HISTORY OF -       T&A     VASCULAR SURGERY      angioplasty- 2 stents implanted       Social History   Substance Use Topics     Smoking status: Former Smoker     Packs/day: 1.00     Years: 30.00     Types: Cigarettes     Quit date: 3/15/2011     Smokeless tobacco: Never Used      Comment: occasional/daily     Alcohol use No     Family History   Problem Relation Age of Onset     Cancer Mother      uterine     Lipids Mother      Neurologic Disorder Mother      polymyalgia     Hypertension Mother      Other Cancer Mother      endometrial     Depression/Anxiety Mother      Other - See Comments Mother      Heart Arrythmia      Cardiovascular Father      CHF     Depression Father      C.A.D. Father      bypass x2 starting at age 55-  in his 70's     Diabetes Father      type 2     Coronary Artery Disease Father       from - age 75     Depression/Anxiety Father      Cerebrovascular Disease Father      from angioplasty      C.A.D. Maternal Grandfather      Coronary Artery Disease Maternal Grandfather       from- age 61     C.A.D. Paternal Grandfather      Diabetes Brother      Depression Son      Psychotic Disorder Son      adhd     Diabetes Brother      type 1     Depression/Anxiety Brother      Depression/Anxiety Maternal Grandmother      Depression/Anxiety Son      Asthma Son      childhood asthma-out grown now as an adult     Coronary Artery Disease Brother      stent-MI     Melanoma No family hx of          Current Outpatient Prescriptions   Medication Sig Dispense Refill     ALPRAZolam (XANAX PO) Take 0.5-1 mg by mouth 1/2 tab in am, 1 tab in pm, then 1 tab midday prn and 1/2 tab throughout the day if needed       amphetamine-dextroamphetamine (ADDERALL XR) 20 MG per  capsule Take 20 mg by mouth daily        ASPIRIN 81 MG OR TABS 1 TABLET DAILY       atorvastatin (LIPITOR) 80 MG tablet TAKE ONE TABLET BY MOUTH DAILY 90 tablet 3     clindamycin (CLEOCIN T) 1 % solution Apply topically 2 times daily 60 mL 1     DoxePIN (SINEQUAN) 75 MG capsule Take 75 mg by mouth At Bedtime        LANsoprazole (PREVACID) 30 MG CR capsule TAKE 1 CAPSULE BY MOUTH 30- 60 MINUTES BEFORE A MEAL DAILY 90 capsule 0     LEXAPRO 20 MG PO TABS 40 mg = two tablets by mouth daily per psychiatry 180 Tab 3     lisinopril (PRINIVIL/ZESTRIL) 5 MG tablet TAKE ONE TABLET BY MOUTH ONE TIME DAILY 90 tablet 0     metFORMIN (GLUCOPHAGE-XR) 500 MG 24 hr tablet Take 1 tablet (500 mg) by mouth daily (with dinner) 90 tablet 0     metoprolol succinate (TOPROL-XL) 25 MG 24 hr tablet TAKE ONE TABLET BY MOUTH ONE TIME DAILY 90 tablet 2     psyllium 0.52 G capsule Take 4 capsules by mouth At Bedtime       sennosides (SENOKOT) 8.6 MG tablet Take 1 tablet by mouth twice a week        tretinoin (RETIN-A) 0.025 % cream Spread a pea size amount into affected area topically at bedtime.  Use sunscreen SPF>20. 45 g 1     blood glucose monitoring (FREESTYLE LITE) test strip Use to test blood sugars 1 times daily or as directed. (Patient not taking: Reported on 8/1/2018) 100 each 3     blood glucose monitoring (NO BRAND SPECIFIED) meter device kit Use to test blood sugar 3 times daily or as directed. (Patient not taking: Reported on 8/1/2018) 1 kit 0     Blood Glucose Monitoring Suppl (FREESTYLE FREEDOM LITE) W/DEVICE KIT Use to test blood sugars 2 times daily or as directed. (Patient not taking: Reported on 8/1/2018) 1 kit 0     FREESTYLE LANCETS MISC Use to test blood sugars 2 times daily or as directed. (Patient not taking: Reported on 8/1/2018) 100 each prn     nitroglycerin (NITROSTAT) 0.4 MG sublingual tablet Place 1 tablet (0.4 mg) under the tongue every 5 minutes as needed for chest pain (Patient not taking: Reported on 8/1/2018)  25 tablet 6     ranitidine (ZANTAC) 150 MG tablet Take 1 tablet (150 mg) by mouth At Bedtime (Patient not taking: Reported on 8/1/2018) 30 tablet 1     [DISCONTINUED] lisinopril (PRINIVIL/ZESTRIL) 5 MG tablet TAKE ONE TABLET BY MOUTH ONE TIME DAILY 30 tablet 0     [DISCONTINUED] metFORMIN (GLUCOPHAGE-XR) 500 MG 24 hr tablet TAKE 1 TABLET BY MOUTH DAILY WITH DINNER 30 tablet 0     Allergies   Allergen Reactions     Hydrocodone Anaphylaxis     Flexeril [Cyclobenzaprine] Rash     Cipro [Ciprofloxacin] GI Disturbance     Abd pain , proteinuria , microscopic hematuria  Possibly related to cipro     Codeine Camsylate Nausea     Penicillins Difficulty breathing     Amoxicillin Itching and Rash     Sulfa Drugs Itching and Rash     Tetracycline Itching and Rash     Labs reviewed in EPIC    Reviewed and updated as needed this visit by clinical staff  Tobacco  Allergies  Meds  Problems  Med Hx  Surg Hx  Fam Hx  Soc Hx        Reviewed and updated as needed this visit by Provider  Allergies  Meds  Problems         ROS:  Constitutional, HEENT, cardiovascular, pulmonary, GI, , musculoskeletal, neuro, skin, endocrine and psych systems are negative, except as otherwise noted.    OBJECTIVE:     /58  Pulse 51  Temp 98.4  F (36.9  C) (Tympanic)  Resp 18  Wt 171 lb (77.6 kg)  SpO2 97%  BMI 32.85 kg/m2  Body mass index is 32.85 kg/(m^2).   GENERAL: healthy, alert and no distress, nontoxic in appearance  EYES: Eyes grossly normal to inspection, PERRL and conjunctivae and sclerae normal  HENT: ear canals and TM's normal, nose and mouth without ulcers or lesions  NECK: no adenopathy, supple with full ROM  RESP: lungs clear to auscultation - no rales, rhonchi or wheezes  CV: regular rate and rhythm, normal S1 S2, no S3 or S4, no murmur, click or rub, no peripheral edema   ABDOMEN: soft, nontender, no hepatosplenomegaly, no masses and bowel sounds normal  MS: no gross musculoskeletal defects noted, no edema  No  rash    Diagnostic Test Results:  No results found for this or any previous visit (from the past 24 hour(s)).    ASSESSMENT/PLAN:     Problem List Items Addressed This Visit     None      Visit Diagnoses     Dysfunction of left eustachian tube    -  Primary               Patient Instructions   Increase rest and fluids. Tylenol and/or Ibuprofen for comfort. Cool mist vaporizer. If your symptoms worsen or do not resolve follow up with your primary care provider in 1 week and sooner if needed.      Mucinex 600 mg 12 hour formula for ear, head and chest congestion.  It can also thin post nasal drip which can cause a cough and sore throat.    Indications for emergent return to emergency department discussed with patient, who verbalized good understanding and agreement.  Patient understands the limitations of today's evaluation.           Earache, No Infection (Adult)  Earaches can happen without an infection. This occurs when air and fluid build up behind the eardrum causing a feeling of fullness and discomfort and reduced hearing. This is called otitis media with effusion (OME) or serous otitis media. It means there is fluid in the middle ear. It is not the same as acute otitis media, which is typically from infection.  OME can happen when you have a cold if congestion blocks the passage that drains the middle ear. This passage is called the eustachian tube. OME may also occur with nasal allergies or after a bacterial middle ear infection.    The pain or discomfort may come and go. You may hear clicking or popping sounds when you chew or swallow. You may feel that your balance is off. Or you may hear ringing in the ear.  It often takes from several weeks up to 3 months for the fluid to clear on its own. Oral pain relievers and ear drops help if there is pain. Decongestants and antihistamines sometimes help. Antibiotics don't help since there is no infection. Your doctor may prescribe a nasal spray to help reduce  swelling in the nose and eustachian tube. This can allow the ear to drain.  If your OME doesn't improve after 3 months, surgery may be used to drain the fluid and insert a small tube in the eardrum to allow continued drainage.  Because the middle ear fluid can become infected, it is important to watch for signs of an ear infection which may develop later. These signs include increased ear pain, fever, or drainage from the ear.  Home care  The following guidelines will help you care for yourself at home:    You may use over-the-counter medicine as directed to control pain, unless another medicine was prescribed. If you have chronic liver or kidney disease or ever had a stomach ulcer or GI bleeding, talk with your doctor before using these medicines. Aspirin should never be used in anyone under 18 years of age who is ill with a fever. It may cause severe liver damage.    You may use over-the-counter decongestants such as phenylephrine or pseudoephedrine. But they are not always helpful. Don't use nasal spray decongestants more than 3 days. Longer use can make congestion worse. Prescription nasal sprays from your doctor don't typically have those restrictions.    Antihistamines may help if you are also having allergy symptoms.    You may use medicines such as guaifenesin to thin mucus and promote drainage.  Follow-up care  Follow up with your healthcare provider or as advised if you are not feeling better after 3 days.  When to seek medical advice  Call your healthcare provider right away if any of the following occur:    Your ear pain gets worse or does not start to improve     Fever of 100.4 F (38 C) or higher, or as directed by your healthcare provider    Fluid or blood draining from the ear    Headache or sinus pain    Stiff neck    Unusual drowsiness or confusion  Date Last Reviewed: 10/1/2016    7131-3350 [employer]. [address] [place] [address], [place] [address]. All rights reserved. This information  is not intended as a substitute for professional medical care. Always follow your healthcare professional's instructions.            FIDEL Salgado [facility]

## 2018-08-20 ENCOUNTER — OFFICE VISIT (OUTPATIENT)
Dept: FAMILY MEDICINE | Facility: CLINIC | Age: 68
End: 2018-08-20
Payer: COMMERCIAL

## 2018-08-20 VITALS
TEMPERATURE: 98.8 F | DIASTOLIC BLOOD PRESSURE: 64 MMHG | OXYGEN SATURATION: 100 % | WEIGHT: 169.2 LBS | BODY MASS INDEX: 31.95 KG/M2 | HEART RATE: 50 BPM | HEIGHT: 61 IN | SYSTOLIC BLOOD PRESSURE: 106 MMHG

## 2018-08-20 DIAGNOSIS — E11.9 TYPE 2 DIABETES MELLITUS WITHOUT COMPLICATION, WITHOUT LONG-TERM CURRENT USE OF INSULIN (H): Primary | ICD-10-CM

## 2018-08-20 DIAGNOSIS — K21.9 GASTROESOPHAGEAL REFLUX DISEASE, ESOPHAGITIS PRESENCE NOT SPECIFIED: ICD-10-CM

## 2018-08-20 DIAGNOSIS — F32.0 MILD MAJOR DEPRESSION (H): ICD-10-CM

## 2018-08-20 LAB
ANION GAP SERPL CALCULATED.3IONS-SCNC: 8 MMOL/L (ref 3–14)
BUN SERPL-MCNC: 18 MG/DL (ref 7–30)
CALCIUM SERPL-MCNC: 9.3 MG/DL (ref 8.5–10.1)
CHLORIDE SERPL-SCNC: 107 MMOL/L (ref 94–109)
CO2 SERPL-SCNC: 24 MMOL/L (ref 20–32)
CREAT SERPL-MCNC: 0.71 MG/DL (ref 0.52–1.04)
GFR SERPL CREATININE-BSD FRML MDRD: 82 ML/MIN/1.7M2
GLUCOSE SERPL-MCNC: 140 MG/DL (ref 70–99)
HBA1C MFR BLD: 7.3 % (ref 0–5.6)
POTASSIUM SERPL-SCNC: 4 MMOL/L (ref 3.4–5.3)
SODIUM SERPL-SCNC: 139 MMOL/L (ref 133–144)

## 2018-08-20 PROCEDURE — 80048 BASIC METABOLIC PNL TOTAL CA: CPT | Performed by: FAMILY MEDICINE

## 2018-08-20 PROCEDURE — 83036 HEMOGLOBIN GLYCOSYLATED A1C: CPT | Performed by: FAMILY MEDICINE

## 2018-08-20 PROCEDURE — 99207 C FOOT EXAM  NO CHARGE: CPT | Performed by: FAMILY MEDICINE

## 2018-08-20 PROCEDURE — 99214 OFFICE O/P EST MOD 30 MIN: CPT | Performed by: FAMILY MEDICINE

## 2018-08-20 PROCEDURE — 36415 COLL VENOUS BLD VENIPUNCTURE: CPT | Performed by: FAMILY MEDICINE

## 2018-08-20 ASSESSMENT — ANXIETY QUESTIONNAIRES
1. FEELING NERVOUS, ANXIOUS, OR ON EDGE: NEARLY EVERY DAY
GAD7 TOTAL SCORE: 16
3. WORRYING TOO MUCH ABOUT DIFFERENT THINGS: MORE THAN HALF THE DAYS
2. NOT BEING ABLE TO STOP OR CONTROL WORRYING: MORE THAN HALF THE DAYS
7. FEELING AFRAID AS IF SOMETHING AWFUL MIGHT HAPPEN: NEARLY EVERY DAY
6. BECOMING EASILY ANNOYED OR IRRITABLE: NEARLY EVERY DAY
5. BEING SO RESTLESS THAT IT IS HARD TO SIT STILL: NOT AT ALL

## 2018-08-20 ASSESSMENT — PATIENT HEALTH QUESTIONNAIRE - PHQ9: 5. POOR APPETITE OR OVEREATING: NEARLY EVERY DAY

## 2018-08-20 NOTE — MR AVS SNAPSHOT
After Visit Summary   8/20/2018    Ml Evans    MRN: [id number]           Patient Information     Date Of Birth          1950        Visit Information        Provider Department      8/20/2018 8:00 AM Ledy Benedcit MD [facility]        Today's Diagnoses     Type 2 diabetes mellitus without complication, without long-term current use of insulin (H)    -  1    Gastroesophageal reflux disease, esophagitis presence not specified        Mild major depression (H)          Care Instructions    Continue with therapy and EMDR     Consider setting a goal of getting to the gym once per week   Exercise more at home     Lab work today     Work on diet at home     Recheck with me in 3 months           Follow-ups after your visit        Your next 10 appointments already scheduled     Aug 22, 2018  1:00 PM CDT   MA SCREENING DIGITAL BILATERAL with [facility]   [facility]    [address] [place] [id number] [phone]           Do not use any powder, lotion or deodorant under your arms or on your breast. If you do, we will ask you to remove it before your exam.  Wear comfortable, two-piece clothing.  If you have any allergies, tell your care team.  Bring any previous mammograms from other facilities or have them mailed to the breast center.              Who to contact     If you have questions or need follow up information about today's clinic visit or your schedule please contact [facility] directly at [phone].  Normal or non-critical lab and imaging results will be communicated to you by MyChart, letter or phone within 4 business days after the clinic has received the results. If you do not hear from us within 7 days, please contact the clinic through MyChart or phone. If you have a critical or abnormal lab result, we will notify you by phone as soon as possible.  Submit refill requests  "through [employer] or call your pharmacy and they will forward the refill request to us. Please allow 3 business days for your refill to be completed.          Additional Information About Your Visit        [employer] Information     [employer] gives you secure access to your electronic health record. If you see a primary care provider, you can also send messages to your care team and make appointments. If you have questions, please call your primary care clinic.  If you do not have a primary care provider, please call [phone] and they will assist you.        Care EveryWhere ID     This is your Care EveryWhere ID. This could be used by other organizations to access your [place] medical records  [id number]        Your Vitals Were     Pulse Temperature Height Pulse Oximetry BMI (Body Mass Index)       50 98.8  F (37.1  C) (Tympanic) 5' 0.5\" (1.537 m) 100% 32.5 kg/m2        Blood Pressure from Last 3 Encounters:   08/20/18 106/64   08/01/18 110/58   05/21/18 132/76    Weight from Last 3 Encounters:   08/20/18 169 lb 3.2 oz (76.7 kg)   08/01/18 171 lb (77.6 kg)   05/21/18 170 lb (77.1 kg)              We Performed the Following     Basic metabolic panel     FOOT EXAM  NO CHARGE [67009.114]     Hemoglobin A1c          Today's Medication Changes          These changes are accurate as of 8/20/18  8:43 AM.  If you have any questions, ask your nurse or doctor.               These medicines have changed or have updated prescriptions.        Dose/Directions    ranitidine 150 MG tablet   Commonly known as:  ZANTAC   This may have changed:    - when to take this  - reasons to take this   Used for:  Gastroesophageal reflux disease, esophagitis presence not specified   Changed by:  Ledy Benedict MD        Dose:  150 mg   Take 1 tablet (150 mg) by mouth nightly as needed for heartburn   Quantity:  30 tablet   Refills:  1                Primary Care Provider Office Phone # Fax #    Ledy Benedict [phone] " [phone]       [address] [place] [address]        Equal Access to Services     SHIRAGLADYS ELLEN : Hadii aad ku hadalekseybrionna Sofarheen, wawilliamda lukailynadaha, qaybta alexbarakda nahunkeaganmarquez, leeann rey jayashreezenon grafmita lynnelliottray hayes. So [facility] [phone].    ATENCIÓN: Si habla español, tiene a gregory disposición servicios gratuitos de asistencia lingüística. Heenaame al [phone].    We comply with applicable federal civil rights laws and [place] laws. We do not discriminate on the basis of race, color, national origin, age, disability, sex, sexual orientation, or gender identity.            Thank you!     Thank you for choosing [facility]  for your care. Our goal is always to provide you with excellent care. Hearing back from our patients is one way we can continue to improve our services. Please take a few minutes to complete the written survey that you may receive in the mail after your visit with us. Thank you!             Your Updated Medication List - Protect others around you: Learn how to safely use, store and throw away your medicines at www.disposemymeds.org.          This list is accurate as of 8/20/18  8:43 AM.  Always use your most recent med list.                   Brand Name Dispense Instructions for use Diagnosis    amphetamine-dextroamphetamine 20 MG per 24 hr capsule    ADDERALL XR     Take 20 mg by mouth daily        aspirin 81 MG tablet      1 TABLET DAILY        atorvastatin 80 MG tablet    LIPITOR    90 tablet    TAKE ONE TABLET BY MOUTH DAILY    Hyperlipidemia LDL goal <100       blood glucose monitoring lancets     100 each    Use to test blood sugars 2 times daily or as directed.    Diabetes mellitus, type 2 (H)       * blood glucose monitoring meter device kit     1 kit    Use to test blood sugars 2 times daily or as directed.    Diabetes mellitus, type 2 (H)       * blood glucose monitoring meter device kit    no brand specified    1 kit    Use to test blood sugar 3 times daily or  as directed.    Type 2 diabetes mellitus without complication, without long-term current use of insulin (H)       blood glucose monitoring test strip    FREESTYLE LITE    100 each    Use to test blood sugars 1 times daily or as directed.    Type 2 diabetes mellitus without complication, without long-term current use of insulin (H)       clindamycin 1 % solution    CLEOCIN T    60 mL    Apply topically 2 times daily    Folliculitis       doxepin 75 MG capsule    SINEquan     Take 75 mg by mouth At Bedtime        LANsoprazole 30 MG CR capsule    PREVACID    90 capsule    TAKE 1 CAPSULE BY MOUTH 30- 60 MINUTES BEFORE A MEAL DAILY    Gastroesophageal reflux disease, esophagitis presence not specified       LEXAPRO 20 MG tablet   Generic drug:  escitalopram     180 Tab    40 mg = two tablets by mouth daily per psychiatry        lisinopril 5 MG tablet    PRINIVIL/ZESTRIL    90 tablet    TAKE ONE TABLET BY MOUTH ONE TIME DAILY    Type 2 diabetes mellitus without complication, without long-term current use of insulin (H), Benign essential hypertension       metFORMIN 500 MG 24 hr tablet    GLUCOPHAGE-XR    90 tablet    Take 1 tablet (500 mg) by mouth daily (with dinner)    Gastroesophageal reflux disease, esophagitis presence not specified       metoprolol succinate 25 MG 24 hr tablet    TOPROL-XL    90 tablet    TAKE ONE TABLET BY MOUTH ONE TIME DAILY    Benign essential hypertension       nitroGLYcerin 0.4 MG sublingual tablet    NITROSTAT    25 tablet    Place 1 tablet (0.4 mg) under the tongue every 5 minutes as needed for chest pain    Coronary artery disease involving native heart without angina pectoris, unspecified vessel or lesion type       psyllium 0.52 g capsule      Take 4 capsules by mouth At Bedtime        ranitidine 150 MG tablet    ZANTAC    30 tablet    Take 1 tablet (150 mg) by mouth nightly as needed for heartburn    Gastroesophageal reflux disease, esophagitis presence not specified       sennosides 8.6  MG tablet    SENOKOT     Take 1 tablet by mouth twice a week        tretinoin 0.025 % cream    RETIN-A    45 g    Spread a pea size amount into affected area topically at bedtime.  Use sunscreen SPF>20.    Acne vulgaris       XANAX PO      Take 0.5-1 mg by mouth 1/2 tab in am, 1 tab in pm, then 1 tab midday prn and 1/2 tab throughout the day if needed        * Notice:  This list has 2 medication(s) that are the same as other medications prescribed for you. Read the directions carefully, and ask your doctor or other care provider to review them with you.

## 2018-08-20 NOTE — PROGRESS NOTES
SUBJECTIVE:   Ml Evans is a 68 year old female who presents to clinic today for the following health issues:      Diabetes Follow-up      Patient is checking blood sugars: 1 times a week. 100-140    Diabetic concerns: None     Symptoms of hypoglycemia (low blood sugar): dizzy     Paresthesias (numbness or burning in feet) or sores: No     Date of last diabetic eye exam: UP TO DATE    BP Readings from Last 2 Encounters:   08/20/18 106/64   08/01/18 110/58     Hemoglobin A1C (%)   Date Value   05/15/2018 7.1 (H)   09/18/2017 6.6 (H)     LDL Cholesterol Calculated (mg/dL)   Date Value   05/15/2018 64   03/03/2017 58       Diabetes Management Resources  Depression Followup    Status since last visit: Worsened due to PTSD    See PHQ-9 for current symptoms.  Other associated symptoms: None    Complicating factors:   Significant life event:  No   Current substance abuse:  None  Anxiety or Panic symptoms:  Yes-      PHQ-9 9/18/2017 5/21/2018 5/21/2018   Total Score 11 6 6   Q9: Suicide Ideation Not at all Not at all Not at all     In the past two weeks have you had thoughts of suicide or self-harm?  No.    Do you have concerns about your personal safety or the safety of others?   No  PHQ-9  English  PHQ-9   Any Language  Suicide Assessment Five-step Evaluation and Treatment (SAFE-T)    Amount of exercise or physical activity: 2-3 days/week for an average of less than 15 minutes    Problems taking medications regularly: No    Medication side effects: none    Diet: regular (no restrictions)        GERD is flaring some     Problem list and histories reviewed & adjusted, as indicated.  Additional history: as documented    Labs reviewed in EPIC    Reviewed and updated as needed this visit by clinical staff  Tobacco  Allergies  Meds  Problems  Med Hx  Surg Hx  Fam Hx  Soc Hx        Reviewed and updated as needed this visit by Provider  Allergies  Meds  Problems         ROS:  Constitutional, HEENT,  "cardiovascular, pulmonary, gi and gu systems are negative, except as otherwise noted.    OBJECTIVE:                                                    /64 (BP Location: Right arm, Patient Position: Chair, Cuff Size: Adult Large)  Pulse 50  Temp 98.8  F (37.1  C) (Tympanic)  Ht 5' 0.5\" (1.537 m)  Wt 169 lb 3.2 oz (76.7 kg)  SpO2 100%  BMI 32.5 kg/m2  Body mass index is 32.5 kg/(m^2).  GENERAL APPEARANCE: healthy, alert and no distress  RESP: lungs clear to auscultation - no rales, rhonchi or wheezes  CV: regular rates and rhythm, normal S1 S2, no S3 or S4 and no murmur, click or rub  ABDOMEN: soft, nontender, without hepatosplenomegaly or masses and bowel sounds normal  PSYCH: mentation appears normal and affect normal/bright         ASSESSMENT/PLAN:                                                    1. Type 2 diabetes mellitus without complication, without long-term current use of insulin (H)  Adjust meds as indicated by above labs.   - FOOT EXAM  NO CHARGE [95021.114]  - Hemoglobin A1c  - Basic metabolic panel    2. Gastroesophageal reflux disease, esophagitis presence not specified  Continue use of this as needed   - ranitidine (ZANTAC) 150 MG tablet; Take 1 tablet (150 mg) by mouth nightly as needed for heartburn  Dispense: 30 tablet; Refill: 1    3. Mild major depression (H)  Improving       Patient Instructions   Continue with therapy and EMDR     Consider setting a goal of getting to the gym once per week   Exercise more at home     Lab work today     Work on diet at home     Recheck with me in 3 months     Risks, benefits, side effects and rationale for treatment plan fully discussed with the patient and understanding expressed.   Ledy Benedict MD  [facility]  "

## 2018-08-20 NOTE — NURSING NOTE
"Chief Complaint   Patient presents with     Diabetes     Depression       Initial /64 (BP Location: Right arm, Patient Position: Chair, Cuff Size: Adult Large)  Pulse 50  Temp 98.8  F (37.1  C) (Tympanic)  Ht 5' 0.5\" (1.537 m)  Wt 169 lb 3.2 oz (76.7 kg)  SpO2 100%  BMI 32.5 kg/m2 Estimated body mass index is 32.5 kg/(m^2) as calculated from the following:    Height as of this encounter: 5' 0.5\" (1.537 m).    Weight as of this encounter: 169 lb 3.2 oz (76.7 kg).      Health Maintenance that is potentially due pending provider review:  Mammogram    Pt is already scheduled for mammogram.    Is there anyone who you would like to be able to receive your results? No  If yes have patient fill out MIGUEL    "

## 2018-08-20 NOTE — PATIENT INSTRUCTIONS
Continue with therapy and EMDR     Consider setting a goal of getting to the gym once per week   Exercise more at home     Lab work today     Work on diet at home     Recheck with me in 3 months

## 2018-08-21 ASSESSMENT — ANXIETY QUESTIONNAIRES: GAD7 TOTAL SCORE: 16

## 2018-08-21 ASSESSMENT — PATIENT HEALTH QUESTIONNAIRE - PHQ9: SUM OF ALL RESPONSES TO PHQ QUESTIONS 1-9: 16

## 2018-08-22 ENCOUNTER — RADIANT APPOINTMENT (OUTPATIENT)
Dept: MAMMOGRAPHY | Facility: CLINIC | Age: 68
End: 2018-08-22
Attending: FAMILY MEDICINE
Payer: COMMERCIAL

## 2018-08-22 DIAGNOSIS — Z12.31 VISIT FOR SCREENING MAMMOGRAM: ICD-10-CM

## 2018-08-22 PROCEDURE — 77067 SCR MAMMO BI INCL CAD: CPT | Mod: TC

## 2018-08-24 DIAGNOSIS — I10 BENIGN ESSENTIAL HYPERTENSION: ICD-10-CM

## 2018-08-24 DIAGNOSIS — E11.9 TYPE 2 DIABETES MELLITUS WITHOUT COMPLICATION, WITHOUT LONG-TERM CURRENT USE OF INSULIN (H): ICD-10-CM

## 2018-08-24 DIAGNOSIS — K21.9 GASTROESOPHAGEAL REFLUX DISEASE, ESOPHAGITIS PRESENCE NOT SPECIFIED: ICD-10-CM

## 2018-08-26 NOTE — TELEPHONE ENCOUNTER
"Requested Prescriptions   Pending Prescriptions Disp Refills     lisinopril (PRINIVIL/ZESTRIL) 5 MG tablet [Pharmacy Med Name: LISINOPRIL 5 MG     TAB SOLC] 90 tablet 0     Sig: TAKE ONE TABLET BY MOUTH ONE TIME DAILY    ACE Inhibitors (Including Combos) Protocol Passed    8/24/2018  6:55 PM       Passed - Blood pressure under 140/90 in past 12 months    BP Readings from Last 3 Encounters:   08/20/18 106/64   08/01/18 110/58   05/21/18 132/76                Passed - Recent (12 mo) or future (30 days) visit within the authorizing provider's specialty    Patient had office visit in the last 12 months or has a visit in the next 30 days with authorizing provider or within the authorizing provider's specialty.  See \"Patient Info\" tab in inbasket, or \"Choose Columns\" in Meds & Orders section of the refill encounter.           Passed - Patient is age 18 or older       Passed - No active pregnancy on record       Passed - Normal serum creatinine on file in past 12 months    Recent Labs   Lab Test  08/20/18   0855   CR  0.71            Passed - Normal serum potassium on file in past 12 months    Recent Labs   Lab Test  08/20/18   0855   POTASSIUM  4.0            Passed - No positive pregnancy test in past 12 months        metoprolol succinate (TOPROL-XL) 25 MG 24 hr tablet [Pharmacy Med Name: METOPROL SUC E 25MG TAB ] 60 tablet 1     Sig: TAKE ONE TABLET BY MOUTH DAILY    Beta-Blockers Protocol Passed    8/24/2018  6:55 PM       Passed - Blood pressure under 140/90 in past 12 months    BP Readings from Last 3 Encounters:   08/20/18 106/64   08/01/18 110/58   05/21/18 132/76                Passed - Patient is age 6 or older       Passed - Recent (12 mo) or future (30 days) visit within the authorizing provider's specialty    Patient had office visit in the last 12 months or has a visit in the next 30 days with authorizing provider or within the authorizing provider's specialty.  See \"Patient Info\" tab in inbasket, or " "\"Choose Columns\" in Meds & Orders section of the refill encounter.            metFORMIN (GLUCOPHAGE-XR) 500 MG 24 hr tablet [Pharmacy Med Name: METFORMIN  MG TAB SUNP] 90 tablet 0     Sig: TAKE 1 TABLET BY MOUTH DAILY WITH DINNER    Biguanide Agents Passed    8/24/2018  6:55 PM       Passed - Blood pressure less than 140/90 in past 6 months    BP Readings from Last 3 Encounters:   08/20/18 106/64   08/01/18 110/58   05/21/18 132/76                Passed - Patient has documented LDL within the past 12 mos.    Recent Labs   Lab Test  05/15/18   1108   LDL  64            Passed - Patient has had a Microalbumin in the past 15 mos.    Recent Labs   Lab Test  05/15/18   1108   MICROL  52   UMALCR  14.84            Passed - Patient is age 10 or older       Passed - Patient has documented A1c within the specified period of time.    If HgbA1C is 8 or greater, it needs to be on file within the past 3 months.  If less than 8, must be on file within the past 6 months.     Recent Labs   Lab Test  08/20/18   0855   A1C  7.3*            Passed - Patient's CR is NOT>1.4 OR Patient's EGFR is NOT<45 within past 12 mos.    Recent Labs   Lab Test  08/20/18   0855   GFRESTIMATED  82   GFRESTBLACK  >90       Recent Labs   Lab Test  08/20/18   0855   CR  0.71            Passed - Patient does NOT have a diagnosis of CHF.       Passed - Patient is not pregnant       Passed - Patient has not had a positive pregnancy test within the past 12 mos.        Passed - Recent (6 mo) or future (30 days) visit within the authorizing provider's specialty    Patient had office visit in the last 6 months or has a visit in the next 30 days with authorizing provider or within the authorizing provider's specialty.  See \"Patient Info\" tab in inbasket, or \"Choose Columns\" in Meds & Orders section of the refill encounter.              "

## 2018-08-27 RX ORDER — LISINOPRIL 5 MG/1
TABLET ORAL
Qty: 90 TABLET | Refills: 1 | Status: SHIPPED | OUTPATIENT
Start: 2018-08-27 | End: 2019-05-07

## 2018-08-27 RX ORDER — METFORMIN HCL 500 MG
TABLET, EXTENDED RELEASE 24 HR ORAL
Qty: 90 TABLET | Refills: 1 | Status: SHIPPED | OUTPATIENT
Start: 2018-08-27 | End: 2018-10-31

## 2018-08-27 RX ORDER — METOPROLOL SUCCINATE 25 MG/1
25 TABLET, EXTENDED RELEASE ORAL DAILY
Qty: 90 TABLET | Refills: 2 | Status: SHIPPED | OUTPATIENT
Start: 2018-08-27 | End: 2019-05-07

## 2018-08-27 RX ORDER — METOPROLOL SUCCINATE 25 MG/1
25 TABLET, EXTENDED RELEASE ORAL DAILY
Qty: 90 TABLET | Refills: 1 | Status: SHIPPED | OUTPATIENT
Start: 2018-08-27 | End: 2018-11-19

## 2018-08-27 RX ORDER — METOPROLOL SUCCINATE 25 MG/1
TABLET, EXTENDED RELEASE ORAL
Qty: 90 TABLET | Refills: 1 | Status: SHIPPED | OUTPATIENT
Start: 2018-08-27 | End: 2018-08-27

## 2018-10-17 ENCOUNTER — TELEPHONE (OUTPATIENT)
Dept: FAMILY MEDICINE | Facility: CLINIC | Age: 68
End: 2018-10-17

## 2018-10-17 DIAGNOSIS — E11.9 TYPE 2 DIABETES MELLITUS WITHOUT COMPLICATION, WITHOUT LONG-TERM CURRENT USE OF INSULIN (H): ICD-10-CM

## 2018-10-17 NOTE — TELEPHONE ENCOUNTER
Pt called. States she has been very stressed lately. Has been checking her blood sugars and fasting is 130's and now today after fasting for 4 hours today is has been 196. Pt states she has been taking Metformin 500 mg daily. She wants to know if her meds should be adjusted? Should she keep checking more and then update you? Also states machine is old. New Scripts sent to pharmacy. F/u appointment made in November. Skye Sanches RN

## 2018-10-18 NOTE — TELEPHONE ENCOUNTER
Please call pt and have her check her BS twice daily for the next week and call with results   Fasting and two hours following a meal  We may need to adjust meds but need more info first

## 2018-10-18 NOTE — TELEPHONE ENCOUNTER
Pt called back and read back message with good understanding.    Jil Osborne  [facility]

## 2018-10-21 DIAGNOSIS — K21.9 GASTROESOPHAGEAL REFLUX DISEASE, ESOPHAGITIS PRESENCE NOT SPECIFIED: ICD-10-CM

## 2018-10-21 NOTE — TELEPHONE ENCOUNTER
"Requested Prescriptions   Pending Prescriptions Disp Refills     LANsoprazole (PREVACID) 30 MG CR capsule  Last Written Prescription Date:  8/1/18  Last Fill Quantity: 90,  # refills: 0   Last office visit: 8/20/2018 with prescribing provider:  SUNNI Benedict   Future Office Visit:   Next 5 appointments (look out 90 days)     Nov 19, 2018  1:00 PM CST   Office Visit with Ledy Benedict MD   [facility] ([facility]    [address] [place] [id number] [phone]                  90 capsule 0     Sig: TAKE 1 CAPSULE BY MOUTH 30 TO 60 MINUTES BEFORE A MEAL ONCE DAILY.    PPI Protocol Passed    10/21/2018  9:49 AM       Passed - Not on Clopidogrel (unless Pantoprazole ordered)       Passed - No diagnosis of osteoporosis on record       Passed - Recent (12 mo) or future (30 days) visit within the authorizing provider's specialty    Patient had office visit in the last 12 months or has a visit in the next 30 days with authorizing provider or within the authorizing provider's specialty.  See \"Patient Info\" tab in inbasket, or \"Choose Columns\" in Meds & Orders section of the refill encounter.             Passed - Patient is age 18 or older       Passed - No active pregnacy on record       Passed - No positive pregnancy test in past 12 months          "

## 2018-10-22 RX ORDER — LANSOPRAZOLE 30 MG/1
CAPSULE, DELAYED RELEASE ORAL
Qty: 90 CAPSULE | Refills: 0 | Status: SHIPPED | OUTPATIENT
Start: 2018-10-22 | End: 2019-01-14

## 2018-10-29 ENCOUNTER — TELEPHONE (OUTPATIENT)
Dept: FAMILY MEDICINE | Facility: CLINIC | Age: 68
End: 2018-10-29

## 2018-10-29 NOTE — TELEPHONE ENCOUNTER
Reason for call:  Patient reporting a symptom    Symptom or request: Pt says she has been working with Dr Benedict about her blood sugars. Dr Benedict had her increase her Metformin to 1000 mg at suppertime last Thursday, 10/25.  She is concerned because her blood sugars are still high.   She says on Friday her fasting BS was 129, sometime after breakfast it was 130 and at bedtime 127.  She says on Saturday her fasting was 150, 140 about 2 1/2 hrs after breakfast and 136 before supper. She says she had a [employer] sandwich for supper and after supper it was 216.  Sunday fasting was 120 and after breakfast 242.  Today her fasting was 156 and before lunch was 146.     Phone Number patient can be reached at:  Home number on file [phone] (home)  Or cell phone OK too    Best Time:  anytime    Can we leave a detailed message on this number:  YES    Call taken on 10/29/2018 at 3:54 PM by Charis Le

## 2018-10-30 NOTE — TELEPHONE ENCOUNTER
Pt returned call, will be on her mobile or home for another 1 -2 hours.    Jil Osborne  [facility]

## 2018-10-30 NOTE — TELEPHONE ENCOUNTER
Please call pt and let her know that aside from a few numbers these are good. I would have her try and split the dose of the metformin and take one at supper and one in the am and continue to check BS and and let me know In one week

## 2018-10-31 ENCOUNTER — TELEPHONE (OUTPATIENT)
Dept: FAMILY MEDICINE | Facility: CLINIC | Age: 68
End: 2018-10-31

## 2018-10-31 DIAGNOSIS — K21.9 GASTROESOPHAGEAL REFLUX DISEASE, ESOPHAGITIS PRESENCE NOT SPECIFIED: ICD-10-CM

## 2018-10-31 RX ORDER — METFORMIN HCL 500 MG
1000 TABLET, EXTENDED RELEASE 24 HR ORAL
Qty: 180 TABLET | Refills: 0 | Status: SHIPPED | OUTPATIENT
Start: 2018-10-31 | End: 2018-11-19

## 2018-10-31 NOTE — TELEPHONE ENCOUNTER
Reason for Call:  Other     Detailed comments: Patient needs a med change per last visit - patient has been taking two of the below medication - She was told to use up what she had - Please send a new RX to the pharmacy listed .    metFORMIN (GLUCOPHAGE-XR) 500 MG 24 hr tablet 90 tablet 1 8/27/2018  No   Sig: TAKE 1 TABLET BY MOUTH DAILY WITH DINNER   Class: E-Prescribe         Phone Number Patient can be reached at: Cell number on file:    Telephone Information:   Mobile [phone]       Best Time:     Can we leave a detailed message on this number? YES    Call taken on 10/31/2018 at 2:59 PM by Beth Quezada

## 2018-11-19 ENCOUNTER — OFFICE VISIT (OUTPATIENT)
Dept: FAMILY MEDICINE | Facility: CLINIC | Age: 68
End: 2018-11-19
Payer: COMMERCIAL

## 2018-11-19 VITALS
WEIGHT: 167.8 LBS | DIASTOLIC BLOOD PRESSURE: 60 MMHG | BODY MASS INDEX: 32.23 KG/M2 | SYSTOLIC BLOOD PRESSURE: 90 MMHG | HEART RATE: 62 BPM | OXYGEN SATURATION: 98 % | TEMPERATURE: 98.7 F

## 2018-11-19 DIAGNOSIS — I10 BENIGN ESSENTIAL HYPERTENSION: ICD-10-CM

## 2018-11-19 DIAGNOSIS — E11.9 TYPE 2 DIABETES MELLITUS WITHOUT COMPLICATION, WITHOUT LONG-TERM CURRENT USE OF INSULIN (H): Primary | ICD-10-CM

## 2018-11-19 DIAGNOSIS — K58.0 IRRITABLE BOWEL SYNDROME WITH DIARRHEA: ICD-10-CM

## 2018-11-19 DIAGNOSIS — K21.9 GASTROESOPHAGEAL REFLUX DISEASE, ESOPHAGITIS PRESENCE NOT SPECIFIED: ICD-10-CM

## 2018-11-19 PROCEDURE — 99214 OFFICE O/P EST MOD 30 MIN: CPT | Performed by: FAMILY MEDICINE

## 2018-11-19 RX ORDER — METFORMIN HCL 500 MG
500 TABLET, EXTENDED RELEASE 24 HR ORAL 2 TIMES DAILY WITH MEALS
Qty: 180 TABLET | Refills: 0 | COMMUNITY
Start: 2018-11-19 | End: 2019-01-14

## 2018-11-19 NOTE — PROGRESS NOTES
SUBJECTIVE:   Ml Eavns is a 68 year old female who presents to clinic today for the following health issues:      Diabetes Follow-up      Patient is checking blood sugars: not at all, glucometer broke       Am 119-152    After meal  127-165    Diabetic concerns: None     Symptoms of hypoglycemia (low blood sugar): dizziness, aggitation     Paresthesias (numbness or burning in feet) or sores: No     Date of last diabetic eye exam: up to date    BP Readings from Last 2 Encounters:   11/19/18 90/60   08/20/18 106/64     Hemoglobin A1C (%)   Date Value   08/20/2018 7.3 (H)   05/15/2018 7.1 (H)     LDL Cholesterol Calculated (mg/dL)   Date Value   05/15/2018 64   03/03/2017 58       Diabetes Management Resources    Amount of exercise or physical activity: None    Problems taking medications regularly: No    Medication side effects: none    Diet: regular (no restrictions)        Hypertension Follow-up      Outpatient blood pressures are not being checked.    Low Salt Diet: no added salt      GERD   Stable on meds    IBS   Up and down seems to be controlling sxs with diet   Having some more lose stools though      Problem list and histories reviewed & adjusted, as indicated.  Additional history: as documented    Labs reviewed in EPIC    Reviewed and updated as needed this visit by clinical staff  Tobacco  Allergies  Meds  Problems  Med Hx  Surg Hx  Fam Hx  Soc Hx        Reviewed and updated as needed this visit by Provider  Allergies  Meds  Problems         ROS:  Constitutional, HEENT, cardiovascular, pulmonary, gi and gu systems are negative, except as otherwise noted.    OBJECTIVE:                                                    BP 90/60 (BP Location: Right arm, Patient Position: Chair, Cuff Size: Adult Large)  Pulse 62  Temp 98.7  F (37.1  C) (Tympanic)  Wt 167 lb 12.8 oz (76.1 kg)  SpO2 98%  BMI 32.23 kg/m2  Body mass index is 32.23 kg/(m^2).  GENERAL APPEARANCE: healthy, alert and no  distress  RESP: lungs clear to auscultation - no rales, rhonchi or wheezes  CV: regular rates and rhythm, normal S1 S2, no S3 or S4 and no murmur, click or rub  ABDOMEN: soft, nontender, without hepatosplenomegaly or masses and bowel sounds normal  PSYCH: mentation appears normal and affect normal/bright         ASSESSMENT/PLAN:                                                    1. Type 2 diabetes mellitus without complication, without long-term current use of insulin (H)  Stable no change in treatment plan for now would like some help with diet   - DIABETES EDUCATOR REFERRAL  - Hemoglobin A1c; Future  - metFORMIN (GLUCOPHAGE-XR) 500 MG 24 hr tablet; Take 1 tablet (500 mg) by mouth 2 times daily (with meals)  Dispense: 180 tablet; Refill: 0    2. Gastroesophageal reflux disease, esophagitis presence not specified  Stable no change in treatment plan.       3. Irritable bowel syndrome with diarrhea  Continue to work on diet     4. Benign essential hypertension  Stable no change in treatment plan. May be having some low BP at home and will monitor   - order for DME; Equipment being ordered: BP cuff for home  Dispense: 1 Device; Refill: 0      Patient Instructions   No med changes for diabetes for now     Recheck in 2 months       If systolic BP is less than 90 consistently let me know and would adjust meds        Risks, benefits, side effects and rationale for treatment plan fully discussed with the patient and understanding expressed.   Ledy Benedict MD  [facility]

## 2018-11-19 NOTE — PATIENT INSTRUCTIONS
No med changes for diabetes for now     Recheck in 2 months       If systolic BP is less than 90 consistently let me know and would adjust meds

## 2018-11-19 NOTE — NURSING NOTE
"Chief Complaint   Patient presents with     Diabetes       Initial BP 90/60 (BP Location: Right arm, Patient Position: Chair, Cuff Size: Adult Large)  Pulse 62  Temp 98.7  F (37.1  C) (Tympanic)  Wt 167 lb 12.8 oz (76.1 kg)  SpO2 98%  BMI 32.23 kg/m2 Estimated body mass index is 32.23 kg/(m^2) as calculated from the following:    Height as of 8/20/18: 5' 0.5\" (1.537 m).    Weight as of this encounter: 167 lb 12.8 oz (76.1 kg).    Patient presents to the clinic using No DME    Health Maintenance that is potentially due pending provider review:  NONE    n/a    Is there anyone who you would like to be able to receive your results? No  If yes have patient fill out MIGUEL    "

## 2018-11-19 NOTE — MR AVS SNAPSHOT
After Visit Summary   11/19/2018    Ml Evans    MRN: [id number]           Patient Information     Date Of Birth          1950        Visit Information        Provider Department      11/19/2018 1:00 PM Ledy Benedict MD [facility]        Today's Diagnoses     Type 2 diabetes mellitus without complication, without long-term current use of insulin (H)    -  1    Gastroesophageal reflux disease, esophagitis presence not specified        Irritable bowel syndrome with diarrhea        Benign essential hypertension          Care Instructions    No med changes for diabetes for now     Recheck in 2 months       If systolic BP is less than 90 consistently let me know and would adjust meds              Follow-ups after your visit        Additional Services     DIABETES EDUCATOR REFERRAL       DIABETES SELF MANAGEMENT TRAINING (DSMT)      Your provider has referred you to Diabetes Education: FM[facility]: Diabetes Education - All [facility] [phone]   [email or web address]     If an urgent visit is needed or A1C is above 12, Care Team to call the Diabetes  Education Team at [phone] or send an In Basket message to the Diabetes Education Pool (P DIAB ED-PATIENT CARE).    A  will call you to make your appointment. If it has been more than 3 business days since your referral was placed, please call the above phone number to schedule.    Type of training and number of hours: Previous Diagnosis: Follow-up DSMT - 2 hours.    Diabetes Type: Type 2 - On Oral Medication   Medicare covers: 10 hours of initial DSMT in 12 month period from the time of first visit, plus 2 hours of follow-up DSMT annually, and additional hours as requested for insulin training.         Diabetes Co-Morbidities: none               A1C Goal:  <7.0       A1C is: Lab Results       Component                Value               Date                        A1C                      7.3                 08/20/2018              MEDICAL NUTRITION THERAPY (MNT) for Diabetes    Medical Nutrition Therapy with a Registered Dietitian can be provided in coordination with Diabetes Self-Management Training to assist in achieving optimal diabetes management.    MNT Type and Hours: Do not initiate MNT at this time.                       Medicare will cover: 3 hours initial MNT in 12 month period after first visit, plus 2 hours of follow-up MNT annually        Diabetes Education Topics: Knowledge: Healthy Eating and Monitoring Blood Sugar    Special Educational Needs Requiring Individual DSMT:     Please be aware that coverage of these services is subject to the terms and limitations of your health insurance plan.  Call member services at your health plan to determine Diabetes Self-Management Training (Codes  and ) and Medical Nutrition Therapy (Codes 87146 and 29906) benefits and ask which blood glucose monitor brands are covered by your plan.  Please bring the following with you to your appointment:    (1)  List of current medications   (2)  List of Blood Glucose Monitor brands that are covered by your insurance plan  (3)  Blood Glucose Monitor and log book  (4)   Food records for the 3 days prior to your visit    The Certified Diabetes Educator may make diabetes medication adjustments per the CDE Protocol and Collaborative Practice Agreement.                  Follow-up notes from your care team     Return in about 3 months (around 2/19/2019) for Diabetic Visit.      Who to contact     If you have questions or need follow up information about today's clinic visit or your schedule please contact [facility] directly at [phone].  Normal or non-critical lab and imaging results will be communicated to you by MyChart, letter or phone within 4 business days after the clinic has received the results. If you do not hear from us within 7 days, please  contact the clinic through [employer] or phone. If you have a critical or abnormal lab result, we will notify you by phone as soon as possible.  Submit refill requests through [employer] or call your pharmacy and they will forward the refill request to us. Please allow 3 business days for your refill to be completed.          Additional Information About Your Visit        [employer] Information     [employer] gives you secure access to your electronic health record. If you see a primary care provider, you can also send messages to your care team and make appointments. If you have questions, please call your primary care clinic.  If you do not have a primary care provider, please call [phone] and they will assist you.        Care EveryWhere ID     This is your Care EveryWhere ID. This could be used by other organizations to access your [facility] medical records  [id number]        Your Vitals Were     Pulse Temperature Pulse Oximetry BMI (Body Mass Index)          62 98.7  F (37.1  C) (Tympanic) 98% 32.23 kg/m2         Blood Pressure from Last 3 Encounters:   11/19/18 90/60   08/20/18 106/64   08/01/18 110/58    Weight from Last 3 Encounters:   11/19/18 167 lb 12.8 oz (76.1 kg)   08/20/18 169 lb 3.2 oz (76.7 kg)   08/01/18 171 lb (77.6 kg)              We Performed the Following     DIABETES EDUCATOR REFERRAL     Hemoglobin A1c          Today's Medication Changes          These changes are accurate as of 11/19/18  1:41 PM.  If you have any questions, ask your nurse or doctor.               Start taking these medicines.        Dose/Directions    order for DME   Used for:  Benign essential hypertension   Started by:  Ledy Benedict MD        Equipment being ordered: BP cuff for home   Quantity:  1 Device   Refills:  0         These medicines have changed or have updated prescriptions.        Dose/Directions    metFORMIN 500 MG 24 hr tablet   Commonly known as:  GLUCOPHAGE-XR   This may have changed:    - how much to  take  - when to take this   Used for:  Gastroesophageal reflux disease, esophagitis presence not specified   Changed by:  Ledy Benedict MD        Dose:  500 mg   Take 1 tablet (500 mg) by mouth 2 times daily (with meals)   Quantity:  180 tablet   Refills:  0            Where to get your medicines      Some of these will need a paper prescription and others can be bought over the counter.  Ask your nurse if you have questions.     Bring a paper prescription for each of these medications     order for DME                Primary Care Provider Office Phone # Fax #    Ledy Benedict [phone] [address] [address]        Equal Access to Services     [facility]: Hadii andreina france hadasho Soomaali, waaxda luqadaha, qaybta kaalmada adeegyada, leeann hyman . So [facility] [phone].    ATENCIÓN: Si habla español, tiene a gregory disposición servicios gratuitos de asistencia lingüística. Llame al [phone].    We comply with applicable federal civil rights laws and [place] laws. We do not discriminate on the basis of race, color, national origin, age, disability, sex, sexual orientation, or gender identity.            Thank you!     Thank you for choosing [facility]  for your care. Our goal is always to provide you with excellent care. Hearing back from our patients is one way we can continue to improve our services. Please take a few minutes to complete the written survey that you may receive in the mail after your visit with us. Thank you!             Your Updated Medication List - Protect others around you: Learn how to safely use, store and throw away your medicines at www.disposemymeds.org.          This list is accurate as of 11/19/18  1:41 PM.  Always use your most recent med list.                   Brand Name Dispense Instructions for use Diagnosis    amphetamine-dextroamphetamine 20 MG per 24 hr capsule    ADDERALL XR      Take 20 mg by mouth daily        aspirin 81 MG tablet      1 TABLET DAILY        atorvastatin 80 MG tablet    LIPITOR    90 tablet    TAKE ONE TABLET BY MOUTH DAILY    Hyperlipidemia LDL goal <100       blood glucose lancets standard    no brand specified    100 each    Use to test blood sugar 3 times daily or as directed.    Type 2 diabetes mellitus without complication, without long-term current use of insulin (H)       blood glucose monitoring meter device kit    no brand specified    1 kit    Use to test blood sugar 3 times daily or as directed.    Type 2 diabetes mellitus without complication, without long-term current use of insulin (H)       blood glucose monitoring test strip    no brand specified    100 strip    Use to test blood sugars 3 times daily or as directed    Type 2 diabetes mellitus without complication, without long-term current use of insulin (H)       clindamycin 1 % solution    CLEOCIN T    60 mL    Apply topically 2 times daily    Folliculitis       doxepin 75 MG capsule    SINEquan     Take 75 mg by mouth At Bedtime        LANsoprazole 30 MG CR capsule    PREVACID    90 capsule    TAKE 1 CAPSULE BY MOUTH 30 TO 60 MINUTES BEFORE A MEAL ONCE DAILY.    Gastroesophageal reflux disease, esophagitis presence not specified       LEXAPRO 20 MG tablet   Generic drug:  escitalopram     180 Tab    40 mg = two tablets by mouth daily per psychiatry        lisinopril 5 MG tablet    PRINIVIL/ZESTRIL    90 tablet    TAKE ONE TABLET BY MOUTH ONE TIME DAILY    Type 2 diabetes mellitus without complication, without long-term current use of insulin (H), Benign essential hypertension       metFORMIN 500 MG 24 hr tablet    GLUCOPHAGE-XR    180 tablet    Take 1 tablet (500 mg) by mouth 2 times daily (with meals)    Gastroesophageal reflux disease, esophagitis presence not specified       metoprolol succinate 25 MG 24 hr tablet    TOPROL-XL    90 tablet    Take 1 tablet (25 mg) by mouth daily    Benign essential  hypertension       nitroGLYcerin 0.4 MG sublingual tablet    NITROSTAT    25 tablet    Place 1 tablet (0.4 mg) under the tongue every 5 minutes as needed for chest pain    Coronary artery disease involving native heart without angina pectoris, unspecified vessel or lesion type       order for DME     1 Device    Equipment being ordered: BP cuff for home    Benign essential hypertension       psyllium 0.52 g capsule      Take 4 capsules by mouth At Bedtime        ranitidine 150 MG tablet    ZANTAC    30 tablet    Take 1 tablet (150 mg) by mouth nightly as needed for heartburn    Gastroesophageal reflux disease, esophagitis presence not specified       sennosides 8.6 MG tablet    SENOKOT     Take 1 tablet by mouth twice a week        tretinoin 0.025 % cream    RETIN-A    45 g    Spread a pea size amount into affected area topically at bedtime.  Use sunscreen SPF>20.    Acne vulgaris       XANAX PO      Take 0.5-1 mg by mouth 1/2 tab in am, 1 tab in pm, then 1 tab midday prn and 1/2 tab throughout the day if needed

## 2018-11-19 NOTE — LETTER
[facility] [address]  [address] [place] [id number]  [phone]        January 29, 2019    Ml Evans  [address] [facility] [address]              Dear Ml Evans    This is to remind you that your Hemoglobin A1c is due.    You may call our office at [phone] to schedule an appointment.    Please disregard this notice if you have already had your labs drawn or made an appointment.        Sincerely,        Ledy Benedict MD/ sherron

## 2018-12-06 ENCOUNTER — OFFICE VISIT (OUTPATIENT)
Dept: URGENT CARE | Facility: URGENT CARE | Age: 68
End: 2018-12-06
Payer: COMMERCIAL

## 2018-12-06 ENCOUNTER — TELEPHONE (OUTPATIENT)
Dept: FAMILY MEDICINE | Facility: CLINIC | Age: 68
End: 2018-12-06

## 2018-12-06 VITALS
SYSTOLIC BLOOD PRESSURE: 114 MMHG | WEIGHT: 164.8 LBS | BODY MASS INDEX: 31.66 KG/M2 | HEART RATE: 56 BPM | DIASTOLIC BLOOD PRESSURE: 60 MMHG | RESPIRATION RATE: 16 BRPM | OXYGEN SATURATION: 94 % | TEMPERATURE: 98 F

## 2018-12-06 DIAGNOSIS — M54.2 NECK PAIN: Primary | ICD-10-CM

## 2018-12-06 PROCEDURE — 99214 OFFICE O/P EST MOD 30 MIN: CPT | Performed by: PHYSICIAN ASSISTANT

## 2018-12-06 RX ORDER — BACLOFEN 10 MG/1
10 TABLET ORAL 3 TIMES DAILY
Qty: 30 TABLET | Refills: 1 | Status: SHIPPED | OUTPATIENT
Start: 2018-12-06 | End: 2019-01-14

## 2018-12-06 RX ORDER — NAPROXEN 500 MG/1
500 TABLET ORAL 2 TIMES DAILY WITH MEALS
Qty: 60 TABLET | Refills: 1 | Status: SHIPPED | OUTPATIENT
Start: 2018-12-06 | End: 2019-01-14

## 2018-12-06 ASSESSMENT — ENCOUNTER SYMPTOMS
MYALGIAS: 0
CHEST TIGHTNESS: 0
ARTHRALGIAS: 0
CHILLS: 0
PALPITATIONS: 0
WHEEZING: 0
VOMITING: 0
SINUS PRESSURE: 0
SORE THROAT: 0
SHORTNESS OF BREATH: 0
EYE REDNESS: 0
NAUSEA: 0
RHINORRHEA: 0
COUGH: 0
UNEXPECTED WEIGHT CHANGE: 0
FEVER: 0
FATIGUE: 0
BACK PAIN: 0
EYE PAIN: 0
DIARRHEA: 0
TROUBLE SWALLOWING: 0
ABDOMINAL PAIN: 0

## 2018-12-06 NOTE — TELEPHONE ENCOUNTER
Pt called, states it started as pain in shoulder, not running up neck, across collar bone and down back. Unable to push herself up from bed or chair. She will come into urgent care. Skye Sanches RN

## 2018-12-06 NOTE — MR AVS SNAPSHOT
After Visit Summary   12/6/2018    Ml Evans    MRN: [id number]           Patient Information     Date Of Birth          1950        Visit Information        Provider Department      12/6/2018 5:35 PM Aurora Alonzo PA-C [facility]        Today's Diagnoses     Neck pain    -  1       Follow-ups after your visit        Additional Services     PHYSICAL THERAPY REFERRAL       If you have not heard from the scheduling office within 2 business days, please call [phone] for all locations, with the exception of [place], please call [phone] and [facility], please call [phone].    Please be aware that coverage of these services is subject to the terms and limitations of your health insurance plan.  Call member services at your health plan with any benefit or coverage questions.                  Future tests that were ordered for you today     Open Future Orders        Priority Expected Expires Ordered    PHYSICAL THERAPY REFERRAL Routine  12/6/2019 12/6/2018            Who to contact     If you have questions or need follow up information about today's clinic visit or your schedule please contact [facility] directly at [phone].  Normal or non-critical lab and imaging results will be communicated to you by MyChart, letter or phone within 4 business days after the clinic has received the results. If you do not hear from us within 7 days, please contact the clinic through [employer] or phone. If you have a critical or abnormal lab result, we will notify you by phone as soon as possible.  Submit refill requests through [employer] or call your pharmacy and they will forward the refill request to us. Please allow 3 business days for your refill to be completed.          Additional Information About Your Visit        MyChart Information     [employer] gives you secure access to your electronic health record. If you see a  primary care provider, you can also send messages to your care team and make appointments. If you have questions, please call your primary care clinic.  If you do not have a primary care provider, please call [phone] and they will assist you.        Care EveryWhere ID     This is your Care EveryWhere ID. This could be used by other organizations to access your [facility] medical records  [id number]        Your Vitals Were     Pulse Temperature Respirations Pulse Oximetry BMI (Body Mass Index)       56 98  F (36.7  C) (Tympanic) 16 94% 31.66 kg/m2        Blood Pressure from Last 3 Encounters:   12/06/18 114/60   11/19/18 90/60   08/20/18 106/64    Weight from Last 3 Encounters:   12/06/18 164 lb 12.8 oz (74.8 kg)   11/19/18 167 lb 12.8 oz (76.1 kg)   08/20/18 169 lb 3.2 oz (76.7 kg)                 Today's Medication Changes          These changes are accurate as of 12/6/18  6:53 PM.  If you have any questions, ask your nurse or doctor.               Start taking these medicines.        Dose/Directions    baclofen 10 MG tablet   Commonly known as:  LIORESAL   Used for:  Neck pain   Started by:  Aurora Alonzo PA-C        Dose:  10 mg   Take 1 tablet (10 mg) by mouth 3 times daily   Quantity:  30 tablet   Refills:  1       naproxen 500 MG tablet   Commonly known as:  NAPROSYN   Used for:  Neck pain   Started by:  Aurora Alonzo PA-C        Dose:  500 mg   Take 1 tablet (500 mg) by mouth 2 times daily (with meals)   Quantity:  60 tablet   Refills:  1            Where to get your medicines      These medications were sent to [id number] [address] [facility]  [address] [facility] [address]    Hours:  Closed 10-16-08 business to [facility] Phone:  [phone]     baclofen 10 MG tablet    naproxen 500 MG tablet                Primary Care Provider Office Phone # Fax #    Ledy Benedict [phone] [address] [facility] [address]         Equal Access to Services     [facility] : Hadii andreina france shawnbrionna Dashali, wawilliamda luqadaha, qaybta kabarakleeann gutierrez. So [facility] [phone].    ATENCIÓN: Si habla español, tiene a gregory disposición servicios gratuitos de asistencia lingüística. Heenaame al [phone].    We comply with applicable federal civil rights laws and [place] laws. We do not discriminate on the basis of race, color, national origin, age, disability, sex, sexual orientation, or gender identity.            Thank you!     Thank you for choosing [facility]  for your care. Our goal is always to provide you with excellent care. Hearing back from our patients is one way we can continue to improve our services. Please take a few minutes to complete the written survey that you may receive in the mail after your visit with us. Thank you!             Your Updated Medication List - Protect others around you: Learn how to safely use, store and throw away your medicines at www.disposemymeds.org.          This list is accurate as of 12/6/18  6:53 PM.  Always use your most recent med list.                   Brand Name Dispense Instructions for use Diagnosis    amphetamine-dextroamphetamine 20 MG 24 hr capsule    ADDERALL XR     Take 20 mg by mouth daily        aspirin 81 MG tablet    ASA     1 TABLET DAILY        atorvastatin 80 MG tablet    LIPITOR    90 tablet    TAKE ONE TABLET BY MOUTH DAILY    Hyperlipidemia LDL goal <100       baclofen 10 MG tablet    LIORESAL    30 tablet    Take 1 tablet (10 mg) by mouth 3 times daily    Neck pain       blood glucose lancets standard    NO BRAND SPECIFIED    100 each    Use to test blood sugar 3 times daily or as directed.    Type 2 diabetes mellitus without complication, without long-term current use of insulin (H)       blood glucose monitoring meter device kit    NO BRAND SPECIFIED    1 kit    Use to test blood sugar 3 times daily or as  directed.    Type 2 diabetes mellitus without complication, without long-term current use of insulin (H)       blood glucose monitoring test strip    NO BRAND SPECIFIED    100 strip    Use to test blood sugars 3 times daily or as directed    Type 2 diabetes mellitus without complication, without long-term current use of insulin (H)       clindamycin 1 % external solution    CLEOCIN T    60 mL    Apply topically 2 times daily    Folliculitis       doxepin 75 MG capsule    SINEquan     Take 75 mg by mouth At Bedtime        LANsoprazole 30 MG DR capsule    PREVACID    90 capsule    TAKE 1 CAPSULE BY MOUTH 30 TO 60 MINUTES BEFORE A MEAL ONCE DAILY.    Gastroesophageal reflux disease, esophagitis presence not specified       LEXAPRO 20 MG tablet   Generic drug:  escitalopram     180 Tab    40 mg = two tablets by mouth daily per psychiatry        lisinopril 5 MG tablet    PRINIVIL/ZESTRIL    90 tablet    TAKE ONE TABLET BY MOUTH ONE TIME DAILY    Type 2 diabetes mellitus without complication, without long-term current use of insulin (H), Benign essential hypertension       metFORMIN 500 MG 24 hr tablet    GLUCOPHAGE-XR    180 tablet    Take 1 tablet (500 mg) by mouth 2 times daily (with meals)    Gastroesophageal reflux disease, esophagitis presence not specified       metoprolol succinate ER 25 MG 24 hr tablet    TOPROL-XL    90 tablet    Take 1 tablet (25 mg) by mouth daily    Benign essential hypertension       naproxen 500 MG tablet    NAPROSYN    60 tablet    Take 1 tablet (500 mg) by mouth 2 times daily (with meals)    Neck pain       nitroGLYcerin 0.4 MG sublingual tablet    NITROSTAT    25 tablet    Place 1 tablet (0.4 mg) under the tongue every 5 minutes as needed for chest pain    Coronary artery disease involving native heart without angina pectoris, unspecified vessel or lesion type       order for DME     1 Device    Equipment being ordered: BP cuff for home    Benign essential hypertension       psyllium  0.52 g capsule    METAMUCIL/KONSYL     Take 4 capsules by mouth At Bedtime        ranitidine 150 MG tablet    ZANTAC    30 tablet    Take 1 tablet (150 mg) by mouth nightly as needed for heartburn    Gastroesophageal reflux disease, esophagitis presence not specified       sennosides 8.6 MG tablet    SENOKOT     Take 1 tablet by mouth twice a week        tretinoin 0.025 % external cream    RETIN-A    45 g    Spread a pea size amount into affected area topically at bedtime.  Use sunscreen SPF>20.    Acne vulgaris       XANAX PO      Take 0.5-1 mg by mouth 1/2 tab in am, 1 tab in pm, then 1 tab midday prn and 1/2 tab throughout the day if needed

## 2018-12-06 NOTE — TELEPHONE ENCOUNTER
Reason for call:  Symptom   Symptom or request: pain in shoulder and neck for 2 weeks.  Gets better with no use but can't push herself up with that her left arm. She has a lump between collarbone and neck.     Duration (how long have symptoms been present): 2 weeks  Have you been treated for this before? No    Additional comments: please advise    Phone number to reach patient:  Home number on file [phone] (home)    Best Time:  any    Can we leave a detailed message on this number?  YES

## 2018-12-07 NOTE — PROGRESS NOTES
SUBJECTIVE:   Ml Evans is a 68 year old female presenting with a chief complaint of   Chief Complaint   Patient presents with     Musculoskeletal Problem     10 days- 2 weeks, left shoulder pain that radiates to the neck and collar bone on left side, no known injury, swelling, pushing self up with left arm aggravates the shoulder        She is an established patient of [facility].    MS Injury/Pain    Onset of symptoms was 10 day(s) ago.  Location: left neck and shoulder  Context:       The injury happened while at home      Mechanism: no known injury      Patient experienced delayed pain  Course of symptoms is same.    Severity moderate  Current and Associated symptoms: Pain  Denies  Bruising, Warmth and Redness  Aggravating Factors: movement, repetitive motion and flexion/extension  Therapies to improve symptoms include: ice  This is the first time this type of problem has occurred for this patient.       Review of Systems   Constitutional: Negative for chills, fatigue, fever and unexpected weight change.   HENT: Negative for congestion, ear pain, postnasal drip, rhinorrhea, sinus pressure, sore throat and trouble swallowing.    Eyes: Negative for pain, redness and visual disturbance.   Respiratory: Negative for cough, chest tightness, shortness of breath and wheezing.    Cardiovascular: Negative for chest pain and palpitations.   Gastrointestinal: Negative for abdominal pain, diarrhea, nausea and vomiting.   Musculoskeletal: Negative for arthralgias, back pain and myalgias.        Left neck and shoulder pain   Skin: Negative for rash.       Past Medical History:   Diagnosis Date     Attention deficit disorder without mention of hyperactivity      Benign essential hypertension 3/9/2017     Coronary artery disease march 15,2011    Two stents placed, angioplasty     Diabetes mellitus (H)     A1C 5.7-6.4, controlled with diet     Dysthymic disorder      Glycogenosis (H)      History of blood transfusion 1981     euptured ectopic pg     Malignant neoplasm (H)     squamous cell carcinoma many years ago     Other and unspecified hyperlipidemia      Squamous cell carcinoma      Family History   Problem Relation Age of Onset     Cancer Mother         uterine     Lipids Mother      Neurologic Disorder Mother         polymyalgia     Hypertension Mother      Other Cancer Mother         endometrial     Depression/Anxiety Mother      Other - See Comments Mother         Heart Arrythmia      Cardiovascular Father         CHF     Depression Father      C.A.D. Father         bypass x2 starting at age 55-  in his 70's     Diabetes Father         type 2     Coronary Artery Disease Father          from - age 75     Depression/Anxiety Father      Cerebrovascular Disease Father         from angioplasty      C.A.D. Maternal Grandfather      Coronary Artery Disease Maternal Grandfather          from- age 61     C.A.D. Paternal Grandfather      Diabetes Brother      Depression Son      Psychotic Disorder Son         adhd     Diabetes Brother         type 1     Depression/Anxiety Brother      Depression/Anxiety Maternal Grandmother      Depression/Anxiety Son      Asthma Son         childhood asthma-out grown now as an adult     Coronary Artery Disease Brother         stent-MI     Melanoma No family hx of      Current Outpatient Medications   Medication Sig Dispense Refill     ALPRAZolam (XANAX PO) Take 0.5-1 mg by mouth 1/2 tab in am, 1 tab in pm, then 1 tab midday prn and 1/2 tab throughout the day if needed       amphetamine-dextroamphetamine (ADDERALL XR) 20 MG per capsule Take 20 mg by mouth daily        ASPIRIN 81 MG OR TABS 1 TABLET DAILY       atorvastatin (LIPITOR) 80 MG tablet TAKE ONE TABLET BY MOUTH DAILY 90 tablet 3     baclofen (LIORESAL) 10 MG tablet Take 1 tablet (10 mg) by mouth 3 times daily 30 tablet 1     blood glucose (NO BRAND SPECIFIED) lancets standard Use to test blood sugar 3 times daily or as  directed. 100 each 11     blood glucose monitoring (NO BRAND SPECIFIED) meter device kit Use to test blood sugar 3 times daily or as directed. 1 kit 0     blood glucose monitoring (NO BRAND SPECIFIED) test strip Use to test blood sugars 3 times daily or as directed 100 strip 1     clindamycin (CLEOCIN T) 1 % solution Apply topically 2 times daily 60 mL 1     DoxePIN (SINEQUAN) 75 MG capsule Take 75 mg by mouth At Bedtime        LANsoprazole (PREVACID) 30 MG CR capsule TAKE 1 CAPSULE BY MOUTH 30 TO 60 MINUTES BEFORE A MEAL ONCE DAILY. 90 capsule 0     LEXAPRO 20 MG PO TABS 40 mg = two tablets by mouth daily per psychiatry 180 Tab 3     lisinopril (PRINIVIL/ZESTRIL) 5 MG tablet TAKE ONE TABLET BY MOUTH ONE TIME DAILY 90 tablet 1     metFORMIN (GLUCOPHAGE-XR) 500 MG 24 hr tablet Take 1 tablet (500 mg) by mouth 2 times daily (with meals) 180 tablet 0     metoprolol succinate (TOPROL-XL) 25 MG 24 hr tablet Take 1 tablet (25 mg) by mouth daily 90 tablet 2     naproxen (NAPROSYN) 500 MG tablet Take 1 tablet (500 mg) by mouth 2 times daily (with meals) 60 tablet 1     nitroglycerin (NITROSTAT) 0.4 MG sublingual tablet Place 1 tablet (0.4 mg) under the tongue every 5 minutes as needed for chest pain 25 tablet 6     order for DME Equipment being ordered: BP cuff for home 1 Device 0     psyllium 0.52 G capsule Take 4 capsules by mouth At Bedtime       ranitidine (ZANTAC) 150 MG tablet Take 1 tablet (150 mg) by mouth nightly as needed for heartburn 30 tablet 1     sennosides (SENOKOT) 8.6 MG tablet Take 1 tablet by mouth twice a week        tretinoin (RETIN-A) 0.025 % cream Spread a pea size amount into affected area topically at bedtime.  Use sunscreen SPF>20. 45 g 1     Social History     Tobacco Use     Smoking status: Former Smoker     Packs/day: 1.00     Years: 30.00     Pack years: 30.00     Types: Cigarettes     Last attempt to quit: 3/15/2011     Years since quittin.7     Smokeless tobacco: Never Used     Tobacco  comment: occasional/daily   Substance Use Topics     Alcohol use: No     Alcohol/week: 0.0 oz       OBJECTIVE  /60   Pulse 56   Temp 98  F (36.7  C) (Tympanic)   Resp 16   Wt 74.8 kg (164 lb 12.8 oz)   SpO2 94%   BMI 31.66 kg/m      Physical Exam   Constitutional: She appears well-developed and well-nourished.   HENT:   Head: Normocephalic.   Right Ear: Tympanic membrane and ear canal normal.   Left Ear: Tympanic membrane and ear canal normal.   Mouth/Throat: Oropharynx is clear and moist.   Eyes: Conjunctivae are normal. Pupils are equal, round, and reactive to light.   Neck: Normal range of motion. Muscular tenderness (left side of neck and left shoulder blade area) present. No spinous process tenderness present. No edema, no erythema and normal range of motion present.   Cardiovascular: Normal rate and normal heart sounds.    Pulmonary/Chest: Effort normal and breath sounds normal.   Neurological: She has normal strength. No cranial nerve deficit or sensory deficit.   Skin: Skin is warm and dry. No rash noted.   Psychiatric: She has a normal mood and affect. Her behavior is normal.       Labs:  No results found for this or any previous visit (from the past 24 hour(s)).    X-Ray was not done.    ASSESSMENT:      ICD-10-CM    1. Neck pain M54.2 naproxen (NAPROSYN) 500 MG tablet     baclofen (LIORESAL) 10 MG tablet     PHYSICAL THERAPY REFERRAL        Medical Decision Making:    Differential Diagnosis:  MS Injury Pain: sprain, tendonitis, muscle strain and contusion    Serious Comorbid Conditions:  Adult:  None    PLAN:    MS Injury/Pain  ice, rest, stretching, Tylenol and Rx: naproxen two times daily x 5-10 days and cyclobenzaprine as needed. Would recommend physical therapy if symptoms worsen or do not improve.     Followup:    If not improving or if condition worsens, follow up with your Primary Care Provider    There are no Patient Instructions on file for this visit.

## 2018-12-10 ENCOUNTER — TELEPHONE (OUTPATIENT)
Dept: FAMILY MEDICINE | Facility: CLINIC | Age: 68
End: 2018-12-10

## 2018-12-10 NOTE — TELEPHONE ENCOUNTER
Reason for call:  Patient reporting a symptom    Symptom or request: Left shoulder up into Neck and back. Pt said Between the left shoulder & Neck is Knotted.   Pt went to - Pt needs to schedule rehab. Pt is wondering why does not want to schedule before going.  Pt did not take the muscle relaxer as it makes her woozy. Please Advise    Have you been treated for this before? Yes    Phone Number patient can be reached at:  Home number on file [phone] (home)    Best Time:  Any Time      Can we leave a detailed message on this number:  YES    Call taken on 12/10/2018 at 3:05 PM by Candy Stauffer

## 2018-12-14 ENCOUNTER — HOSPITAL ENCOUNTER (OUTPATIENT)
Dept: PHYSICAL THERAPY | Facility: CLINIC | Age: 68
Setting detail: THERAPIES SERIES
End: 2018-12-14
Attending: FAMILY MEDICINE
Payer: MEDICARE

## 2018-12-14 DIAGNOSIS — M54.2 NECK PAIN: ICD-10-CM

## 2018-12-14 PROCEDURE — G8982 BODY POS GOAL STATUS: HCPCS | Mod: GP,CI

## 2018-12-14 PROCEDURE — G8981 BODY POS CURRENT STATUS: HCPCS | Mod: GP,CJ

## 2018-12-14 PROCEDURE — 97140 MANUAL THERAPY 1/> REGIONS: CPT | Mod: GP

## 2018-12-14 PROCEDURE — 97161 PT EVAL LOW COMPLEX 20 MIN: CPT | Mod: GP

## 2018-12-14 PROCEDURE — G8983 BODY POS D/C STATUS: HCPCS | Mod: GP,CI

## 2018-12-14 PROCEDURE — 97535 SELF CARE MNGMENT TRAINING: CPT | Mod: GP

## 2018-12-14 PROCEDURE — 97110 THERAPEUTIC EXERCISES: CPT | Mod: GP

## 2018-12-14 NOTE — PROGRESS NOTES
[facility]          OUTPATIENT PHYSICAL THERAPY ORTHOPEDIC EVALUATION  PLAN OF TREATMENT FOR OUTPATIENT REHABILITATION  (COMPLETE FOR INITIAL CLAIMS ONLY)  Patient's Last Name, First Name, M.I.  YOB: 1950  Ml Evans    Provider s Name:  [facility]   Medical Record No.  [id number]   Start of Care Date:  12/14/18   Onset Date:  11/30/18   Type:     _X__PT   ___OT   ___SLP Medical Diagnosis:  Neck pain     PT Diagnosis:  L neck pain. Signs and symptoms suggest a muscle strain.   Visits from SOC:  1      _________________________________________________________________________________  Plan of Treatment/Functional Goals:  ROM, strengthening, stretching, manual therapy, joint mobilization           Goals  Goal Identifier: 1  Goal Description: Patient will be able to tolerate driving/riding in a car with < 3/10 pain.  Target Date: 01/04/19    Goal Identifier: 2  Goal Description: Patient will be able to lift 3# with L arm with < 3/10 pain.  Target Date: 01/18/19    Goal Identifier: 3  Goal Description: Patient will be able to demonstrate good posture 75% of the time without cueing.  Target Date: 01/25/19    Goal Identifier: 4  Goal Description: Patient will be independent in home exercises to improve mobility and posture/  Target Date: 01/25/19                                                Therapy Frequency:  1 time/week  Predicted Duration of Therapy Intervention:  6 weeks    SHARONDA SWARTZ PT                 I CERTIFY THE NEED FOR THESE SERVICES FURNISHED UNDER        THIS PLAN OF TREATMENT AND WHILE UNDER MY CARE     (Physician co-signature of this document indicates review and certification of the therapy plan).                       Certification Date From:  12/14/18   Certification Date To:  03/13/19    Referring Provider:  Aurora Alonzo PA-C    Initial Assessment        See Epic Evaluation Start of Care Date: 12/14/18

## 2018-12-14 NOTE — PROGRESS NOTES
12/14/18 1300   General Information   Type of Visit Initial OP Ortho PT Evaluation   Start of Care Date 12/14/18   Referring Physician Aurora Alonzo PA-C   Patient/Family Goals Statement get rid of pain   Orders Evaluate and Treat   Date of Order 12/06/18   Insurance Type Medicare;[employer]   Medical Diagnosis Neck pain   Surgical/Medical history reviewed Yes  (DM II, depression, heart problems, anxiety, smoking)   Body Part(s)   Body Part(s) Cervical Spine   Presentation and Etiology   Pertinent history of current problem (include personal factors and/or comorbidities that impact the POC) Pt started noticing pain in the L UT and then into her neck and upper back about 2 weeks ago. It also started radiating to her collarbone. It has waxed and waned for 2 weeks. It may have been from reaching to get something off the floor and almost falling off the couch and having to push herself up with her L arm. She did not feel immediate pain.   Impairments A. Pain;C. Swelling;D. Decreased ROM;E. Decreased flexibility;F. Decreased strength and endurance;I. Impaired skin integrity;N. Headaches;Q. Dizziness   Functional Limitations perform activities of daily living;perform desired leisure / sports activities   Symptom Location L UT, L neck   How/Where did it occur From insidious onset   Onset date of current episode/exacerbation 11/30/18   Chronicity New   Pain rating (0-10 point scale) Best (/10);Worst (/10)  (currently 1/10)   Best (/10) 1   Worst (/10) 8  (sitting 2 hours at [employer], and driving )   Pain quality C. Aching;E. Shooting;F. Stabbing   Frequency of pain/symptoms B. Intermittent   Pain/symptoms are: The same all the time   Pain/symptoms exacerbated by M. Other   Pain exacerbation comment sitting in an upright postion for a period of time, bad when car is bouncing around   Pain/symptoms eased by C. Rest   Progression of symptoms since onset: Improved   Prior Level of Function   Functional Level Prior  "Comment independent   Current Level of Function   Current Community Support Family/friend caregiver   Patient role/employment history F. Retired   [facility]   Current equipment-Gait/Locomotion None   Fall Risk Screen   Fall screen completed by PT   Have you fallen 2 or more times in the past year? No   Have you fallen and had an injury in the past year? No   Is patient a fall risk? No   Functional Scales   Functional Scales Other   Other Scales  NDI 54%   Cervical Spine   Posture fwd head and rounded shlds   Cervical Flexion ROM 62* L post neck \"tight\"   Cervical Extension ROM 50*   Cervical Right Side Bending ROM 20* ++L neck   Cervical Left Side Bending ROM 32*   Cervical Right Rotation ROM 82*   Cervical Left Rotation ROM 56* + L neck   Shoulder AROM Screen WFL   Shoulder Shrug (C2-C4) Strength 5/5   Shoulder Abd (C5) Strength 5/5   Shoulder Add (C7) Strength 5/5 + L neck   Shoulder ER (C5, C6) Strength 5/5   Shoulder IR (C5, C6) Strength 5/5   Elbow Flexion (C5, C6) Strength 5/5   Elbow Extension (C7) Strength 5/5   Wrist Extension (C6) Strength 5/5   Wrist Flexion (C7) Strength 5/5   Thumb Abd (C8) Strength 5/5   5th Finger Add (T1) Strength 5/5   Upper Trapezius Flexibility tight L>R   Alar Ligament Test -   Transverse Ligament Test -   Spurling Test -   Cervical Distraction Test -   Segmental Mobility-Cervical slightly hypomobile; painful with PA glide to C5   Palpation tender L UT, SCM distally   Planned Therapy Interventions   Planned Therapy Interventions ROM;strengthening;stretching;manual therapy;joint mobilization   Clinical Impression   Criteria for Skilled Therapeutic Interventions Met yes, treatment indicated   PT Diagnosis L neck pain. Signs and symptoms suggest a muscle strain.   Influenced by the following impairments pain   Functional limitations due to impairments driving/riding in car, sitting prolonged   Clinical Presentation Stable/Uncomplicated   Clinical Presentation " Rationale clinical judgement   Clinical Decision Making (Complexity) Low complexity   Therapy Frequency 1 time/week   Predicted Duration of Therapy Intervention (days/wks) 6 weeks   Risk & Benefits of therapy have been explained Yes   Patient, Family & other staff in agreement with plan of care Yes   Education Assessment   Preferred Learning Style Listening;Demonstration;Pictures/video   Barriers to Learning No barriers   Ortho Goal 1   Goal Description Patient will be able to tolerate driving/riding in a car with < 3/10 pain.   Target Date 01/04/19   Goal Identifier 1   Ortho Goal 2   Goal Description Patient will be able to lift 3# with L arm with < 3/10 pain.   Target Date 01/18/19   Goal Identifier 2   Ortho Goal 3   Goal Description Patient will be able to demonstrate good posture 75% of the time without cueing.   Target Date 01/25/19   Goal Identifier 3   Ortho Goal 4   Goal Description Patient will be independent in home exercises to improve mobility and posture/   Target Date 01/25/19   Goal Identifier 4   Total Evaluation Time   PT Eval, Low Complexity Minutes (59292) 30   Therapy Certification   Certification date from 12/14/18   Certification date to 03/13/19   Medical Diagnosis Neck pain     Allison English PT

## 2019-01-07 ENCOUNTER — TELEPHONE (OUTPATIENT)
Dept: CARDIOLOGY | Facility: CLINIC | Age: 69
End: 2019-01-07

## 2019-01-07 NOTE — TELEPHONE ENCOUNTER
[facility] Call Center    Phone Message    May a detailed message be left on voicemail: yes    Reason for Call: Symptoms or Concerns     If patient has red-flag symptoms, warm transfer to triage line    Current symptom or concern: Pressure and discomfort in chest    Symptoms have been present for:  1 week(s)    Has patient previously been seen for this? Yes     By : Dr. Dela Cruz    Date: 06/26/2015, Becka Rodas 01/13/2016    Are there any new or worsening symptoms? Yes: Patient looking for advice on what to do for her current chest/hurt issues. She states she has been having some pressure and discomfort, and sometimes with pressure she is getting shortness of breath. Pt denied any red flag symptoms. Please give Ml a call back. She says don't call too early, if its is before 11am call [phone]. Otherwise please call the home phone.      Action Taken: Message routed to:  [facility] Cardiology

## 2019-01-08 ENCOUNTER — TELEPHONE (OUTPATIENT)
Dept: CARDIOLOGY | Facility: CLINIC | Age: 69
End: 2019-01-08

## 2019-01-08 NOTE — TELEPHONE ENCOUNTER
I spoke with Ml to encourage her to seek medical attention at her local ED @ the [facility] now. She stated that she is not experienceing chest pain or pressure at this time and does not feel that she needs to head in to the hospital.    I did explained that it has been 3.5 yrs since we have seen her in clinic and that her cardiac health could have change since last visit and that she does need to be seen by a provider to evaluate her current acute chest pain/pressure issues. I encouraged her at minimum she needs to establish care with a cardiologist with an office visit. I explained that the [facility] has a cardiology clinic who is part of the [facility] team..this clinic would be close to her home if she was not wanting to drive down to the [place].    Ml repeated that she was not wanting to head into ED today and asked many times if it was ok to wait a couple of days to call in to get an appointment. I explained that she either needs to be seen today in the ED if the chest pain/pressure is currently happening or she needs to call the clinic today to set up an appointment to be seen by a cardiologist.    Ml stated understanding of the directions.

## 2019-01-08 NOTE — TELEPHONE ENCOUNTER
Returned patients call. History of STEMI s/p ROSEMARY x2 to the RCA. She states in the last 2 weeks, she's had 3 or 4 episodes of chest pressure. First time was more of a sharp pain. Last 3 episodes have been a pressure similar to her heart attack but not as severe. Last episode night before last. Rates pressure at 3/10. These episodes have lasted 5 minutes or less. Episodes have resolved on their own. She did take a nitroglycerin during one episode. Pressure has been in center of chest.   Also complains of progressive SOB over last year. Yesterday she went to her therapist and went up one flight of stairs and was very out of breath, to the point her therapist was worried and told her she needs to call her doctor.     She also has PTSD and states she has been under a lot of stress lately. States these have almost felt like a panic attack but xanax didn't help.   Last episode of chest pressure took a blood pressure which was 102/52 with pulse 62. States that pulse is high for her and she normally runs in the 40s. Does endorse ongoing lightheadedness when she gets up too quick.   We reviewed that if this happens again she should take nitroglycerin. If chest pressure does not resolve she should call 911.     Will review with Dr Dela Cruz's nurse and call patient back with further plan.

## 2019-01-08 NOTE — TELEPHONE ENCOUNTER
[facility] Call Center    Phone Message    May a detailed message be left on voicemail: yes    Reason for Call: Other: requested to speak to Taniya Agustin, please call back patient if necessary      Action Taken: Message routed to:  [facility] CARDIO

## 2019-01-10 ENCOUNTER — NURSE TRIAGE (OUTPATIENT)
Dept: NURSING | Facility: CLINIC | Age: 69
End: 2019-01-10

## 2019-01-10 ENCOUNTER — TELEPHONE (OUTPATIENT)
Dept: FAMILY MEDICINE | Facility: CLINIC | Age: 69
End: 2019-01-10

## 2019-01-10 NOTE — TELEPHONE ENCOUNTER
Pt called. States she has been having chest pressure on and off over the last 10 days. States is not currently having symptoms. Pt also states she is having diarrhea for the last 2 weeks and pain on left side.  Advised she needs to be evaluated. If having any chest pressure pain she needs to be evaluated in the emergency room immediately. Advised to go to closest emergency room or call 911. Also advised to call cardiology and set up an appointment asap to be evaluated as she has not been seen since 2015. F/u appointment for diarrhea made with Dr Benedict on Monday. Skye Sanches RN

## 2019-01-10 NOTE — TELEPHONE ENCOUNTER
LM to [facility]. Needs appointment with Cardiology and Dr Benedict. Looks like she had a cardiology appointment and then it was cancelled. For chest pressure/SOB/cardiac symptoms she needs to be evaluated in the emergency room asap.

## 2019-01-10 NOTE — TELEPHONE ENCOUNTER
Ml requesting f/u phone call be placed to her please.   She has been trying to My Chart message Dr. Benedict but has not been able to do so since the change.   Does have OV with Dr. Benedict on Feb 1.  Ml spoke with My Chart support, who could not answer her questions or assist her with this.      Plans to make OV with Dr. Benedict for sometime in February (when next available).  Ml is concerned about where she will be taken if she has a cardiac event in the interim period from now until she sees  .  This is her biggest concern.  She is not having any cardiac symptoms, and is calling the [facility] to make f/u routine cards visit.    Ml requests a call from her care team please.    Thanks  Allison Madera RN  FNA

## 2019-01-10 NOTE — TELEPHONE ENCOUNTER
Ml's biggest concern, is how will CORTEZ Whiting know she wants to be seen at the [facility] where her cards team is, if she has another cardiac event.  Currently denies symptoms.  Msg sent to PCP / care team per patient request to receive f/u phone call.          Additional Information    General information question, no triage required and triager able to answer question    Protocols used: INFORMATION ONLY CALL-ADULT-

## 2019-01-11 DIAGNOSIS — K21.9 GASTROESOPHAGEAL REFLUX DISEASE, ESOPHAGITIS PRESENCE NOT SPECIFIED: ICD-10-CM

## 2019-01-13 NOTE — TELEPHONE ENCOUNTER
"Requested Prescriptions   Pending Prescriptions Disp Refills     LANsoprazole (PREVACID) 30 MG DR capsule  Last Written Prescription Date:  10/22/18  Last Fill Quantity: 90,  # refills: 0   Last office visit: 11/19/2018 with prescribing provider:  SUNNI Benedict    90 capsule 0    PPI Protocol Passed - 1/11/2019  5:20 PM       Passed - Not on Clopidogrel (unless Pantoprazole ordered)       Passed - No diagnosis of osteoporosis on record       Passed - Recent (12 mo) or future (30 days) visit within the authorizing provider's specialty    Patient had office visit in the last 12 months or has a visit in the next 30 days with authorizing provider or within the authorizing provider's specialty.  See \"Patient Info\" tab in inbasket, or \"Choose Columns\" in Meds & Orders section of the refill encounter.             Passed - Medication is active on med list       Passed - Patient is age 18 or older       Passed - No active pregnacy on record       Passed - No positive pregnancy test in past 12 months        metFORMIN (GLUCOPHAGE-XR) 500 MG 24 hr tablet  Last Written Prescription Date:  11/19/18  Last Fill Quantity: 180,  # refills: 0   Last office visit: 11/19/2018 with prescribing provider:  SUNNI Benedict   Future Office Visit:   Next 5 appointments (look out 90 days)    Jan 14, 2019  9:20 AM CST  [facility] Visit with Ledy Benedict MD  [facility] [address] [place] [id number] [phone]          180 tablet 0     Sig: Take 1 tablet (500 mg) by mouth 2 times daily (with meals)    Biguanide Agents Passed - 1/11/2019  5:20 PM       Passed - Blood pressure less than 140/90 in past 6 months    BP Readings from Last 3 Encounters:   12/06/18 114/60   11/19/18 90/60   08/20/18 106/64                Passed - Patient has documented LDL within the past 12 mos.    Recent Labs   Lab Test 05/15/18  1108   LDL 64            Passed - Patient has had a Microalbumin in " "the past 15 mos.    Recent Labs   Lab Test 05/15/18  1108   MICROL 52   UMALCR 14.84            Passed - Patient is age 10 or older       Passed - Patient has documented A1c within the specified period of time.    If HgbA1C is 8 or greater, it needs to be on file within the past 3 months.  If less than 8, must be on file within the past 6 months.     Recent Labs   Lab Test 08/20/18  0855   A1C 7.3*            Passed - Patient's CR is NOT>1.4 OR Patient's EGFR is NOT<45 within past 12 mos.    Recent Labs   Lab Test 08/20/18  0855   GFRESTIMATED 82   GFRESTBLACK >90       Recent Labs   Lab Test 08/20/18  0855   CR 0.71            Passed - Patient does NOT have a diagnosis of CHF.       Passed - Medication is active on med list       Passed - Patient is not pregnant       Passed - Patient has not had a positive pregnancy test within the past 12 mos.        Passed - Recent (6 mo) or future (30 days) visit within the authorizing provider's specialty    Patient had office visit in the last 6 months or has a visit in the next 30 days with authorizing provider or within the authorizing provider's specialty.  See \"Patient Info\" tab in inbasket, or \"Choose Columns\" in Meds & Orders section of the refill encounter.              "

## 2019-01-14 ENCOUNTER — MYC MEDICAL ADVICE (OUTPATIENT)
Dept: FAMILY MEDICINE | Facility: CLINIC | Age: 69
End: 2019-01-14

## 2019-01-14 ENCOUNTER — OFFICE VISIT (OUTPATIENT)
Dept: FAMILY MEDICINE | Facility: CLINIC | Age: 69
End: 2019-01-14
Payer: COMMERCIAL

## 2019-01-14 VITALS
HEART RATE: 69 BPM | WEIGHT: 162.4 LBS | TEMPERATURE: 97.9 F | DIASTOLIC BLOOD PRESSURE: 60 MMHG | BODY MASS INDEX: 30.66 KG/M2 | SYSTOLIC BLOOD PRESSURE: 94 MMHG | OXYGEN SATURATION: 99 % | HEIGHT: 61 IN

## 2019-01-14 DIAGNOSIS — R19.7 DIARRHEA, UNSPECIFIED TYPE: Primary | ICD-10-CM

## 2019-01-14 LAB
ALBUMIN SERPL-MCNC: 3.9 G/DL (ref 3.4–5)
ALBUMIN UR-MCNC: 30 MG/DL
ALP SERPL-CCNC: 120 U/L (ref 40–150)
ALT SERPL W P-5'-P-CCNC: 31 U/L (ref 0–50)
ANION GAP SERPL CALCULATED.3IONS-SCNC: 8 MMOL/L (ref 3–14)
APPEARANCE UR: CLEAR
AST SERPL W P-5'-P-CCNC: 29 U/L (ref 0–45)
BACTERIA #/AREA URNS HPF: ABNORMAL /HPF
BASOPHILS # BLD AUTO: 0 10E9/L (ref 0–0.2)
BASOPHILS NFR BLD AUTO: 0.5 %
BILIRUB DIRECT SERPL-MCNC: 0.1 MG/DL (ref 0–0.2)
BILIRUB SERPL-MCNC: 0.4 MG/DL (ref 0.2–1.3)
BILIRUB UR QL STRIP: ABNORMAL
BUN SERPL-MCNC: 10 MG/DL (ref 7–30)
CALCIUM SERPL-MCNC: 9.1 MG/DL (ref 8.5–10.1)
CHLORIDE SERPL-SCNC: 102 MMOL/L (ref 94–109)
CO2 SERPL-SCNC: 27 MMOL/L (ref 20–32)
COLOR UR AUTO: YELLOW
CREAT SERPL-MCNC: 0.8 MG/DL (ref 0.52–1.04)
DIFFERENTIAL METHOD BLD: ABNORMAL
EOSINOPHIL # BLD AUTO: 0.3 10E9/L (ref 0–0.7)
EOSINOPHIL NFR BLD AUTO: 3.7 %
ERYTHROCYTE [DISTWIDTH] IN BLOOD BY AUTOMATED COUNT: 17.5 % (ref 10–15)
GFR SERPL CREATININE-BSD FRML MDRD: 76 ML/MIN/{1.73_M2}
GLUCOSE SERPL-MCNC: 164 MG/DL (ref 70–99)
GLUCOSE UR STRIP-MCNC: NEGATIVE MG/DL
HCT VFR BLD AUTO: 36.7 % (ref 35–47)
HGB BLD-MCNC: 11.4 G/DL (ref 11.7–15.7)
HGB UR QL STRIP: NEGATIVE
HYALINE CASTS #/AREA URNS LPF: ABNORMAL /LPF
KETONES UR STRIP-MCNC: ABNORMAL MG/DL
LEUKOCYTE ESTERASE UR QL STRIP: NEGATIVE
LYMPHOCYTES # BLD AUTO: 2.7 10E9/L (ref 0.8–5.3)
LYMPHOCYTES NFR BLD AUTO: 32.6 %
MCH RBC QN AUTO: 25.5 PG (ref 26.5–33)
MCHC RBC AUTO-ENTMCNC: 31.1 G/DL (ref 31.5–36.5)
MCV RBC AUTO: 82 FL (ref 78–100)
MONOCYTES # BLD AUTO: 1 10E9/L (ref 0–1.3)
MONOCYTES NFR BLD AUTO: 12.3 %
MUCOUS THREADS #/AREA URNS LPF: PRESENT /LPF
NEUTROPHILS # BLD AUTO: 4.2 10E9/L (ref 1.6–8.3)
NEUTROPHILS NFR BLD AUTO: 50.9 %
NITRATE UR QL: NEGATIVE
NON-SQ EPI CELLS #/AREA URNS LPF: ABNORMAL /LPF
PH UR STRIP: 5 PH (ref 5–7)
PLATELET # BLD AUTO: 270 10E9/L (ref 150–450)
POTASSIUM SERPL-SCNC: 3.5 MMOL/L (ref 3.4–5.3)
PROT SERPL-MCNC: 7 G/DL (ref 6.8–8.8)
RBC # BLD AUTO: 4.47 10E12/L (ref 3.8–5.2)
RBC #/AREA URNS AUTO: ABNORMAL /HPF
SODIUM SERPL-SCNC: 137 MMOL/L (ref 133–144)
SOURCE: ABNORMAL
SP GR UR STRIP: >1.03 (ref 1–1.03)
UROBILINOGEN UR STRIP-ACNC: 0.2 EU/DL (ref 0.2–1)
WBC # BLD AUTO: 8.3 10E9/L (ref 4–11)
WBC #/AREA URNS AUTO: ABNORMAL /HPF

## 2019-01-14 PROCEDURE — 80076 HEPATIC FUNCTION PANEL: CPT | Performed by: FAMILY MEDICINE

## 2019-01-14 PROCEDURE — 36415 COLL VENOUS BLD VENIPUNCTURE: CPT | Performed by: FAMILY MEDICINE

## 2019-01-14 PROCEDURE — 99214 OFFICE O/P EST MOD 30 MIN: CPT | Performed by: FAMILY MEDICINE

## 2019-01-14 PROCEDURE — 85025 COMPLETE CBC W/AUTO DIFF WBC: CPT | Performed by: FAMILY MEDICINE

## 2019-01-14 PROCEDURE — 81001 URINALYSIS AUTO W/SCOPE: CPT | Performed by: FAMILY MEDICINE

## 2019-01-14 PROCEDURE — 80048 BASIC METABOLIC PNL TOTAL CA: CPT | Performed by: FAMILY MEDICINE

## 2019-01-14 RX ORDER — METFORMIN HCL 500 MG
500 TABLET, EXTENDED RELEASE 24 HR ORAL 2 TIMES DAILY WITH MEALS
Qty: 180 TABLET | Refills: 0 | Status: SHIPPED | OUTPATIENT
Start: 2019-01-14 | End: 2019-06-17 | Stop reason: SINTOL

## 2019-01-14 RX ORDER — LANSOPRAZOLE 30 MG/1
CAPSULE, DELAYED RELEASE ORAL
Qty: 90 CAPSULE | Refills: 0 | Status: SHIPPED | OUTPATIENT
Start: 2019-01-14 | End: 2019-05-06

## 2019-01-14 ASSESSMENT — MIFFLIN-ST. JEOR: SCORE: 1201.08

## 2019-01-14 NOTE — PROGRESS NOTES
"  SUBJECTIVE:   Ml Evans is a 68 year old female who presents to clinic today for the following health issues:      Diarrhea      Duration: 3 weeks    Description:       Consistency of stool: watery and explosive       Blood in stool: no        Number of loose stools past 24 hours: 2    Intensity:  moderate    Accompanying signs and symptoms:       Fever: no        Nausea/vomitting: YES- nausea       Abdominal pain: YES       Weight loss: YES    History (recent antibiotics or travel/ill contacts/med changes/testing done): none    Precipitating or alleviating factors: None    Therapies tried and outcome: Imodium AD            Problem list and histories reviewed & adjusted, as indicated.  Additional history: as documented    Labs reviewed in EPIC    Reviewed and updated as needed this visit by clinical staff  Tobacco  Allergies  Meds  Problems  Med Hx  Surg Hx  Fam Hx  Soc Hx        Reviewed and updated as needed this visit by Provider  Tobacco  Allergies  Meds  Problems  Med Hx  Surg Hx  Fam Hx         ROS:  Constitutional, HEENT, cardiovascular, pulmonary, gi and gu systems are negative, except as otherwise noted.    OBJECTIVE:                                                    BP 94/60 (BP Location: Right arm, Patient Position: Chair, Cuff Size: Adult Large)   Pulse 69   Temp 97.9  F (36.6  C) (Tympanic)   Ht 1.537 m (5' 0.5\")   Wt 73.7 kg (162 lb 6.4 oz)   SpO2 99%   BMI 31.19 kg/m    Body mass index is 31.19 kg/m .  GENERAL APPEARANCE: healthy, alert and no distress  NECK: no adenopathy, no asymmetry, masses, or scars and thyroid normal to palpation  RESP: lungs clear to auscultation - no rales, rhonchi or wheezes  CV: regular rates and rhythm, normal S1 S2, no S3 or S4 and no murmur, click or rub  ABDOMEN: soft, nontender, without hepatosplenomegaly or masses and bowel sounds normal  MS: extremities normal- no gross deformities noted  PSYCH: mentation appears normal and affect " normal/bright         ASSESSMENT/PLAN:                                                    1. Diarrhea, unspecified type  Unclear etiol may be IBS vs infectious   - Hepatic panel  - Basic metabolic panel  - CBC with platelets differential  - Clostridium difficile toxin B PCR; Future  - Enteric Bacteria and Virus Panel by SERGIO Stool; Future  - Urine Microscopic          Patient Instructions   Labs today     Stool cultures today     Follow up based on the above labs    Risks, benefits, side effects and rationale for treatment plan fully discussed with the patient and understanding expressed.   Ledy Benedict MD  [facility]

## 2019-01-14 NOTE — NURSING NOTE
"Chief Complaint   Patient presents with     Diarrhea       Initial BP 94/60 (BP Location: Right arm, Patient Position: Chair, Cuff Size: Adult Large)   Pulse 69   Temp 97.9  F (36.6  C) (Tympanic)   Ht 1.537 m (5' 0.5\")   Wt 73.7 kg (162 lb 6.4 oz)   SpO2 99%   BMI 31.19 kg/m   Estimated body mass index is 31.19 kg/m  as calculated from the following:    Height as of this encounter: 1.537 m (5' 0.5\").    Weight as of this encounter: 73.7 kg (162 lb 6.4 oz).    Patient presents to the clinic using No DME    Health Maintenance that is potentially due pending provider review:  NONE    n/a    Is there anyone who you would like to be able to receive your results? No  If yes have patient fill out MIGUEL    "

## 2019-01-15 PROCEDURE — 87493 C DIFF AMPLIFIED PROBE: CPT | Mod: 59 | Performed by: FAMILY MEDICINE

## 2019-01-15 PROCEDURE — 87506 IADNA-DNA/RNA PROBE TQ 6-11: CPT | Performed by: FAMILY MEDICINE

## 2019-01-16 ENCOUNTER — ALLIED HEALTH/NURSE VISIT (OUTPATIENT)
Dept: EDUCATION SERVICES | Facility: CLINIC | Age: 69
End: 2019-01-16
Payer: COMMERCIAL

## 2019-01-16 VITALS — WEIGHT: 162 LBS | BODY MASS INDEX: 31.12 KG/M2

## 2019-01-16 DIAGNOSIS — R19.7 DIARRHEA, UNSPECIFIED TYPE: ICD-10-CM

## 2019-01-16 DIAGNOSIS — E11.9 TYPE 2 DIABETES MELLITUS WITHOUT COMPLICATION, WITHOUT LONG-TERM CURRENT USE OF INSULIN (H): Primary | ICD-10-CM

## 2019-01-16 LAB
C COLI+JEJUNI+LARI FUSA STL QL NAA+PROBE: NOT DETECTED
C DIFF TOX B STL QL: NEGATIVE
EC STX1 GENE STL QL NAA+PROBE: NOT DETECTED
EC STX2 GENE STL QL NAA+PROBE: NOT DETECTED
ENTERIC PATHOGEN COMMENT: NORMAL
NOROV GI+II ORF1-ORF2 JNC STL QL NAA+PR: NOT DETECTED
RVA NSP5 STL QL NAA+PROBE: NOT DETECTED
SALMONELLA SP RPOD STL QL NAA+PROBE: NOT DETECTED
SHIGELLA SP+EIEC IPAH STL QL NAA+PROBE: NOT DETECTED
SPECIMEN SOURCE: NORMAL
V CHOL+PARA RFBL+TRKH+TNAA STL QL NAA+PR: NOT DETECTED
Y ENTERO RECN STL QL NAA+PROBE: NOT DETECTED

## 2019-01-16 PROCEDURE — G0108 DIAB MANAGE TRN  PER INDIV: HCPCS | Performed by: DIETITIAN, REGISTERED

## 2019-01-16 NOTE — PATIENT INSTRUCTIONS
1.  Look for some clear liquid supplements like ensure clear.  Try to find ones that are 30 grams of carb or under per serving.    2.  When you are diarrhea free come back in and we will work on regular meal plan.

## 2019-01-16 NOTE — PROGRESS NOTES
"Diabetes Self-Management Education & Support    Diabetes Education Self Management & Training    SUBJECTIVE/OBJECTIVE:  Presents for: Individual review  Accompanied by: Self  Diabetes education in the past 24mo: No  Focus of Visit: Taking Medication, Healthy Coping, Healthy Eating, Problem Solving  Diabetes type: Type 2  How confident are you filling out medical forms by yourself:: Extremely  Transportation concerns: No  Other concerns:: None  Cultural Influences/Ethnic Background:  American    Diabetes Symptoms & Complications          Patient Problem List and Family Medical History reviewed for relevant medical history, current medical status, and diabetes risk factors.    Vitals:  Wt 73.5 kg (162 lb)   BMI 31.12 kg/m    Estimated body mass index is 31.12 kg/m  as calculated from the following:    Height as of 1/14/19: 1.537 m (5' 0.5\").    Weight as of this encounter: 73.5 kg (162 lb).   Last 3 BP:   BP Readings from Last 3 Encounters:   01/14/19 94/60   12/06/18 114/60   11/19/18 90/60       History   Smoking Status     Former Smoker     Packs/day: 1.00     Years: 30.00     Types: Cigarettes     Quit date: 3/15/2011   Smokeless Tobacco     Never Used     Comment: occasional/daily       Labs:  Lab Results   Component Value Date    A1C 7.3 08/20/2018     Lab Results   Component Value Date     01/14/2019     Lab Results   Component Value Date    LDL 64 05/15/2018     HDL Cholesterol   Date Value Ref Range Status   05/15/2018 48 (L) >49 mg/dL Final   ]  GFR Estimate   Date Value Ref Range Status   01/14/2019 76 >60 mL/min/[1.73_m2] Final     Comment:     Non  GFR Calc  Starting 12/18/2018, serum creatinine based estimated GFR (eGFR) will be   calculated using the Chronic Kidney Disease Epidemiology Collaboration   (CKD-EPI) equation.       GFR Estimate If Black   Date Value Ref Range Status   01/14/2019 88 >60 mL/min/[1.73_m2] Final     Comment:      GFR Calc  Starting " 12/18/2018, serum creatinine based estimated GFR (eGFR) will be   calculated using the Chronic Kidney Disease Epidemiology Collaboration   (CKD-EPI) equation.       Lab Results   Component Value Date    CR 0.80 01/14/2019     No results found for: MICROALBUMIN    Healthy Eating  Healthy Eating Assessed Today: No  Cultural/Mormonism diet restrictions?: No  Breakfast: activia  Lunch: crackers, string cheese and 7 up  Dinner: baked potato with low fat sour cream or white rice, soda crackers  Snacks: same as above choices  Other: has had issues with chronic explosive diarrhea in the last three weeks  Beverages: Water, Soda    Being Active       Monitoring  Monitoring Assessed Today: Yes  Did patient bring glucose meter to appointment? : Yes  Blood Glucose Meter: Accu-check   Breakfast pp Lunch pp Supper  pp HS   29-Dec   132       30-Dec   119       31-Dec   127       5-Jan     128     7-Jan 102         10-Dylan    124      12-Jan     129     15-Dylan     100  164    #DIV/0! 126 124 119 #DIV/0! 164           Taking Medications  Diabetes Medication(s)     Biguanides Sig    metFORMIN (GLUCOPHAGE-XR) 500 MG 24 hr tablet Take 1 tablet (500 mg) by mouth 2 times daily (with meals)          Current Treatments: Diet, Oral Agent (monotherapy)    Problem Solving     Struggling with diarrhea and heart issues right now            Reducing Risks   not assessed    Healthy Coping     Patient Activation Measure Survey Score:  JOLANTA Score (Last Two) 7/19/2010 4/5/2013   JOLANTA Raw Score 45 37   Activation Score 73.1 49.9   JOLANTA Level 4 2       ASSESSMENT:  Not able to eat regularly right now.  She has dropped 10 lbs in the last year.  Still working on the diarrhea issue.  Was increased to 1000 mg of metformin in October.  Is signed up at wellness center but not doing due to diarrhea issues, not feeling well.  Hope to get back as she feels betters and is cleared by cardiology.    Has tried noodles with butter and that did not sit well.  Keeping foods very basic and bland.    May need to try cutting back on metformin and see if that is related to the diarrhea.    Patient's most recent   Lab Results   Component Value Date    A1C 7.3 08/20/2018    is not meeting goal of <7.0    INTERVENTION:   Diabetes knowledge and skills assessment:     Patient is knowledgeable in diabetes management concepts related to: Healthy Eating, Being Active, Monitoring and Taking Medication    Patient needs further education on the following diabetes management concepts: Healthy Eating, Being Active, Monitoring, Taking Medication, Problem Solving, Reducing Risks and Healthy Coping    Based on learning assessment above, most appropriate setting for further diabetes education would be: Individual setting.    Education provided today on:  AADE Self-Care Behaviors:  Healthy Eating: will continue to diet as tolerated with diarrhea issues  Being Active: she will get back to gym once feeling better  Monitoring: individual blood glucose targets  Taking Medication: discussed increased dose of metformin    Opportunities for ongoing education and support in diabetes-self management were discussed.    Pt verbalized understanding of concepts discussed and recommendations provided today.       Education Materials Provided:  No new materials provided today    PLAN:  See Patient Instructions for co-developed, patient-stated behavior change goals.  AVS printed and provided to patient today. See Follow-Up section for recommended follow-up.      Time Spent: 30 minutes  Encounter Type: Individual    Any diabetes medication dose changes were made via the CDE Protocol and Collaborative Practice Agreement with the patient's primary care provider. A copy of this encounter was shared with the provider.

## 2019-01-17 ASSESSMENT — PATIENT HEALTH QUESTIONNAIRE - PHQ9
SUM OF ALL RESPONSES TO PHQ QUESTIONS 1-9: 8
SUM OF ALL RESPONSES TO PHQ QUESTIONS 1-9: 8
10. IF YOU CHECKED OFF ANY PROBLEMS, HOW DIFFICULT HAVE THESE PROBLEMS MADE IT FOR YOU TO DO YOUR WORK, TAKE CARE OF THINGS AT HOME, OR GET ALONG WITH OTHER PEOPLE: VERY DIFFICULT

## 2019-01-18 ASSESSMENT — PATIENT HEALTH QUESTIONNAIRE - PHQ9: SUM OF ALL RESPONSES TO PHQ QUESTIONS 1-9: 8

## 2019-01-22 ENCOUNTER — DOCUMENTATION ONLY (OUTPATIENT)
Dept: CARE COORDINATION | Facility: CLINIC | Age: 69
End: 2019-01-22

## 2019-01-22 ENCOUNTER — OFFICE VISIT (OUTPATIENT)
Dept: FAMILY MEDICINE | Facility: CLINIC | Age: 69
End: 2019-01-22
Payer: COMMERCIAL

## 2019-01-22 VITALS
HEART RATE: 52 BPM | WEIGHT: 162 LBS | TEMPERATURE: 98.5 F | OXYGEN SATURATION: 97 % | SYSTOLIC BLOOD PRESSURE: 92 MMHG | DIASTOLIC BLOOD PRESSURE: 58 MMHG | BODY MASS INDEX: 31.12 KG/M2

## 2019-01-22 DIAGNOSIS — R19.7 DIARRHEA, UNSPECIFIED TYPE: Primary | ICD-10-CM

## 2019-01-22 PROCEDURE — 99214 OFFICE O/P EST MOD 30 MIN: CPT | Performed by: FAMILY MEDICINE

## 2019-01-22 NOTE — NURSING NOTE
"Chief Complaint   Patient presents with     Diarrhea       Initial BP 92/58 (BP Location: Right arm, Patient Position: Chair, Cuff Size: Adult Large)   Pulse 52   Temp 98.5  F (36.9  C) (Tympanic)   Wt 73.5 kg (162 lb)   SpO2 97%   BMI 31.12 kg/m   Estimated body mass index is 31.12 kg/m  as calculated from the following:    Height as of 1/14/19: 1.537 m (5' 0.5\").    Weight as of this encounter: 73.5 kg (162 lb).    Patient presents to the clinic using No DME    Health Maintenance that is potentially due pending provider review:  NONE    n/a    Is there anyone who you would like to be able to receive your results? Yes  If yes have patient fill out MIGUEL    "

## 2019-01-22 NOTE — PROGRESS NOTES
SUBJECTIVE:   Ml Evans is a 68 year old female who presents to clinic today for the following health issues:      Diarrhea      Duration:  4 weeks    Description:       Consistency of stool: watery and explosive       Blood in stool: no        Number of loose stools past 24 hours: 2    Intensity:  moderate    Accompanying signs and symptoms:       Fever: no        Nausea/vomitting: YES- nausea       Abdominal pain: YES       Weight loss: YES    History (recent antibiotics or travel/ill contacts/med changes/testing done): none    Precipitating or alleviating factors: None    Therapies tried and outcome: Imodium AD    It is possible this is related to a recent change in metformin dose          Problem list and histories reviewed & adjusted, as indicated.  Additional history: as documented    Labs reviewed in EPIC    Reviewed and updated as needed this visit by clinical staff  Tobacco  Allergies  Meds  Problems  Med Hx  Surg Hx  Fam Hx  Soc Hx        Reviewed and updated as needed this visit by Provider  Tobacco  Allergies  Meds  Problems  Med Hx  Surg Hx  Fam Hx         ROS:  Constitutional, HEENT, cardiovascular, pulmonary, gi and gu systems are negative, except as otherwise noted.    OBJECTIVE:                                                    BP 92/58 (BP Location: Right arm, Patient Position: Chair, Cuff Size: Adult Large)   Pulse 52   Temp 98.5  F (36.9  C) (Tympanic)   Wt 73.5 kg (162 lb)   SpO2 97%   BMI 31.12 kg/m     Body mass index is 31.12 kg/m .  GENERAL APPEARANCE: healthy, alert and no distress  RESP: lungs clear to auscultation - no rales, rhonchi or wheezes  CV: regular rates and rhythm, normal S1 S2, no S3 or S4 and no murmur, click or rub  ABDOMEN: soft, nontender, without hepatosplenomegaly or masses and bowel sounds normal  MS: extremities normal- no gross deformities noted  PSYCH: mentation appears normal and affect normal/bright         ASSESSMENT/PLAN:                                                     1. Diarrhea, unspecified type  ?metformin vs diverticulosis vs IBS vs IBD   - GASTROENTEROLOGY ADULT REF CONSULT ONLY  - CT Abdomen Pelvis w Contrast; Future      Patient Instructions   Up GI consultation    Set up CT scan     Cut metformin dose in half and see if this helps     See me back in one month     Risks, benefits, side effects and rationale for treatment plan fully discussed with the patient and understanding expressed.   Ledy Benedict MD  [facility]

## 2019-01-23 ENCOUNTER — OFFICE VISIT (OUTPATIENT)
Dept: CARDIOLOGY | Facility: CLINIC | Age: 69
End: 2019-01-23
Attending: INTERNAL MEDICINE
Payer: COMMERCIAL

## 2019-01-23 VITALS
HEIGHT: 61 IN | HEART RATE: 57 BPM | DIASTOLIC BLOOD PRESSURE: 65 MMHG | BODY MASS INDEX: 30.36 KG/M2 | SYSTOLIC BLOOD PRESSURE: 96 MMHG | WEIGHT: 160.8 LBS | OXYGEN SATURATION: 97 %

## 2019-01-23 DIAGNOSIS — I25.10 CORONARY ARTERY DISEASE INVOLVING NATIVE HEART WITHOUT ANGINA PECTORIS, UNSPECIFIED VESSEL OR LESION TYPE: ICD-10-CM

## 2019-01-23 DIAGNOSIS — I25.2 HISTORY OF ST ELEVATION MYOCARDIAL INFARCTION (STEMI): ICD-10-CM

## 2019-01-23 DIAGNOSIS — I25.119 CORONARY ARTERY DISEASE INVOLVING NATIVE CORONARY ARTERY OF NATIVE HEART WITH ANGINA PECTORIS (H): Primary | ICD-10-CM

## 2019-01-23 DIAGNOSIS — Z95.5 STATUS POST INSERTION OF DRUG-ELUTING STENT INTO RIGHT CORONARY ARTERY FOR CORONARY ARTERY DISEASE: ICD-10-CM

## 2019-01-23 DIAGNOSIS — R06.09 DOE (DYSPNEA ON EXERTION): ICD-10-CM

## 2019-01-23 DIAGNOSIS — R07.89 ATYPICAL CHEST PAIN: ICD-10-CM

## 2019-01-23 PROCEDURE — 93005 ELECTROCARDIOGRAM TRACING: CPT | Mod: ZF

## 2019-01-23 PROCEDURE — 99203 OFFICE O/P NEW LOW 30 MIN: CPT | Mod: ZP | Performed by: INTERNAL MEDICINE

## 2019-01-23 PROCEDURE — G0463 HOSPITAL OUTPT CLINIC VISIT: HCPCS

## 2019-01-23 RX ORDER — NITROGLYCERIN 0.4 MG/1
0.4 TABLET SUBLINGUAL EVERY 5 MIN PRN
Qty: 25 TABLET | Refills: 6 | Status: SHIPPED | OUTPATIENT
Start: 2019-01-23 | End: 2021-03-19

## 2019-01-23 RX ORDER — ACETAMINOPHEN 325 MG/1
325-650 TABLET ORAL EVERY 6 HOURS PRN
COMMUNITY
End: 2019-09-18

## 2019-01-23 ASSESSMENT — PAIN SCALES - GENERAL: PAINLEVEL: NO PAIN (0)

## 2019-01-23 ASSESSMENT — MIFFLIN-ST. JEOR: SCORE: 1201.76

## 2019-01-23 NOTE — PATIENT INSTRUCTIONS
You were seen today in the Cardiovascular Clinic at the [facility].     Cardiology Providers you saw during your visit: Dr. Matthews     Diagnosis:   Encounter Diagnoses   Name Primary?     Coronary artery disease involving native coronary artery of native heart with angina pectoris (H) Yes     History of ST elevation myocardial infarction (STEMI)      Status post insertion of drug-eluting stent into right coronary artery for coronary artery disease      BERMUDEZ (dyspnea on exertion)      Atypical chest pain             Orders:   Orders Placed This Encounter   Procedures     NM Lexiscan stress test       Current Medication List  Current Outpatient Medications   Medication Sig Dispense Refill     acetaminophen (TYLENOL) 325 MG tablet Take 325-650 mg by mouth every 6 hours as needed for mild pain Per pt takes as needed for headaches       ALPRAZolam (XANAX PO) Take 0.5-1 mg by mouth 1/2 tab in am, 1 tab in pm, then 1 tab midday prn and 1/2 tab throughout the day if needed       amphetamine-dextroamphetamine (ADDERALL XR) 20 MG per capsule Take 20 mg by mouth daily        ASPIRIN 81 MG OR TABS 1 TABLET DAILY       atorvastatin (LIPITOR) 80 MG tablet TAKE ONE TABLET BY MOUTH DAILY 90 tablet 3     blood glucose (NO BRAND SPECIFIED) lancets standard Use to test blood sugar 3 times daily or as directed. 100 each 11     blood glucose monitoring (NO BRAND SPECIFIED) meter device kit Use to test blood sugar 3 times daily or as directed. 1 kit 0     blood glucose monitoring (NO BRAND SPECIFIED) test strip Use to test blood sugars 3 times daily or as directed 100 strip 1     clindamycin (CLEOCIN T) 1 % solution Apply topically 2 times daily 60 mL 1     DoxePIN (SINEQUAN) 75 MG capsule Take 75 mg by mouth At Bedtime        LANsoprazole (PREVACID) 30 MG DR capsule TAKE 1 CAPSULE BY MOUTH 30 TO 60 MINUTES BEFORE A MEAL ONCE DAILY. 90 capsule 0     LEXAPRO 20 MG PO TABS 40 mg = two tablets by mouth daily per psychiatry  "180 Tab 3     lisinopril (PRINIVIL/ZESTRIL) 5 MG tablet TAKE ONE TABLET BY MOUTH ONE TIME DAILY 90 tablet 1     metFORMIN (GLUCOPHAGE-XR) 500 MG 24 hr tablet Take 1 tablet (500 mg) by mouth 2 times daily (with meals) (Patient taking differently: Take 500 mg by mouth 2 times daily (with meals) Per pt is going to be taking these 1 pill per day) 180 tablet 0     metoprolol succinate (TOPROL-XL) 25 MG 24 hr tablet Take 1 tablet (25 mg) by mouth daily 90 tablet 2     nitroglycerin (NITROSTAT) 0.4 MG sublingual tablet Place 1 tablet (0.4 mg) under the tongue every 5 minutes as needed for chest pain 25 tablet 6     order for DME Equipment being ordered: BP cuff for home 1 Device 0     ranitidine (ZANTAC) 150 MG tablet Take 1 tablet (150 mg) by mouth nightly as needed for heartburn 30 tablet 1     tretinoin (RETIN-A) 0.025 % cream Spread a pea size amount into affected area topically at bedtime.  Use sunscreen SPF>20. 45 g 1     psyllium 0.52 G capsule Take 4 capsules by mouth At Bedtime       sennosides (SENOKOT) 8.6 MG tablet Take 1 tablet by mouth twice a week            Medications Discontinued:  There are no discontinued medications.      Recommendations:   1. Nuclear stress testing, if normal consider echocardiogram  2. No changes in medication    Follow-up: 3 months            Please feel free to call me with any questions or concerns.       Duane Pulido LPN      Questions: [phone].   First press #1 for the [employer] and then press #3 for \"Medical Questions\" to reach us Cardiology Nurses.     [phone].   First press #1 for the [employer] and then press #1      On Call Cardiologist for after hours or on weekends: [phone]   option #4 and ask to speak to the on-call Cardiologist.          If you need a medication refill please contact your pharmacy.  Please allow 3 business days for your refill to be " completed.  ________________________________________________________________________________________________________________________________      NM Stress Test: Report to the [facility] Waiting Room in the  [facility]  A.  Do not eat 3 hours prior to the test, you may drink water or juice.  B.  No caffeine or decaf, alcohol, or smoking 12 hours prior to the test  C. Take your usual morning medications with a sip of water. See below for additional      Information.  D.  Allow 3-4 hours for the procedure.      If you have further questions, please utilize [employer] to contact us.     If your question concerns the above instructions, contact:  Duane Pulido LPN  Nurse Care Coordinator- Heart Care  [phone]         What happens during this test?  1. We will place an IV (small needle) in the vein of  your arm or hand.  2. We will inject a liquid through the IV. The liquid  contains radioactivity. This helps your heart show  up better on the pictures we will take.  3. About 30 to 60 minutes later, you will lie down on  a table. A special camera will rotate around your  chest taking pictures of your heart. This lasts less  than 30 minutes.  4. To prepare for the stress test, we will check your  blood pressure. We will also attach small pads  to your chest. The pads are hooked to an EKG  (electrocardiogram) machine. The machine shows  how your heart is working during the test.  5. You will begin the stress test.    If you can exercise, you will walk on a treadmill  for 5 to 15 minutes. You will start at a slow  speed. We will slowly increase the speed and  level of incline.    If you cannot exercise, we will give you  medicine to mimic the effects of exercise.  The medicine goes through the IV slowly.  6. During the stress test, we will again inject liquid  through your IV. (See Step 2.)  7. After the stress test, you will wait 30 to 60 minutes.  We will then take more pictures of your heart.    What are the risks and  benefits of this test?  This test will tell us more about your heart. It helps  your doctor decide what kind of treatment you may  need (if any).  The test carries some risks. Common risks include:    Changes in blood pressure    Headache    Nausea (feeling sick to your stomach)    Flushing or sweating    Feeling dizzy or fainting    Fast or slow heartbeat  If you receive medicine to mimic exercise, you may  also have:    Chest pain or shortness of breath. (If this happens  at home, call 911.)    Irregular heartbeat    Pain in the head, neck, jaw or back    Tingling or numbness in the arms    Feeling light-headed  The risk for heart attack, stroke or death is very low.  Please tell your care team if you have any symptoms.  Before and during the test, your care team will check  your heart, pulse and blood pressure often.  If you have concerns about the radiation used during  this test, please talk to your doctor.    When will I know my results?  Your family doctor (or the doctor who ordered the  test) will give you your results within a few days. If  you have not heard from us within one week, call  your doctor.  You or your insurer may receive two bills: one from  the hospital and one from the doctor who reads your  exam.    If you are deaf or hard of hearing, please let us know. We provide many free services  Including sign language interpreters, oral interpreters, TTYs, telephone amplifiers, note takers and written materials.  Page 1 of 1    [facility], [address] [address]

## 2019-01-23 NOTE — PROGRESS NOTES
Addendum after visit : patient states that her mother was diagnosed with atrial fibrillation around age 90.
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
## 2019-01-23 NOTE — NURSING NOTE
Chief Complaint   Patient presents with     New Patient      new patient establishing care     Vitals were taken and medications were reconciled.    Jen Easton CMA    1:49 PM

## 2019-01-25 ENCOUNTER — HOSPITAL ENCOUNTER (OUTPATIENT)
Dept: CT IMAGING | Facility: CLINIC | Age: 69
Discharge: HOME | End: 2019-01-25
Attending: FAMILY MEDICINE | Admitting: FAMILY MEDICINE
Payer: COMMERCIAL

## 2019-01-25 DIAGNOSIS — R19.7 DIARRHEA, UNSPECIFIED TYPE: ICD-10-CM

## 2019-01-25 PROCEDURE — 74177 CT ABD & PELVIS W/CONTRAST: CPT

## 2019-01-25 PROCEDURE — 25000125 ZZHC RX 250: Performed by: RADIOLOGY

## 2019-01-25 PROCEDURE — 25000128 H RX IP 250 OP 636: Performed by: RADIOLOGY

## 2019-01-25 RX ORDER — IOPAMIDOL 755 MG/ML
78 INJECTION, SOLUTION INTRAVASCULAR ONCE
Status: COMPLETED | OUTPATIENT
Start: 2019-01-25 | End: 2019-01-25

## 2019-01-25 RX ADMIN — SODIUM CHLORIDE 59 ML: 9 INJECTION, SOLUTION INTRAVENOUS at 13:00

## 2019-01-25 RX ADMIN — IOPAMIDOL 78 ML: 755 INJECTION, SOLUTION INTRAVENOUS at 12:59

## 2019-02-01 ENCOUNTER — TELEPHONE (OUTPATIENT)
Dept: GASTROENTEROLOGY | Facility: CLINIC | Age: 69
End: 2019-02-01

## 2019-02-01 NOTE — TELEPHONE ENCOUNTER
[facility] Call Center    Phone Message    May a detailed message be left on voicemail: yes    Reason for Call: Other: Pt need Dr. Reina  to give  a call to discuss what's going on.  She have some questions that needs to be answered. Please reach out at [phone] [place] or [phone] Cell Phone     Action Taken: Message routed to:  [facility] ([facility] GI     Patient's cell-phone called in response to her message. Instructed her to call our clinic with questions or to schedule an appointment.     Talya Reina PA-C  Division of Gastroenterology, Hepatology & Nutrition  [facility]

## 2019-02-19 DIAGNOSIS — L70.0 ACNE VULGARIS: ICD-10-CM

## 2019-02-20 RX ORDER — TRETINOIN 0.25 MG/G
CREAM TOPICAL
Qty: 45 G | Refills: 1 | Status: SHIPPED | OUTPATIENT
Start: 2019-02-20 | End: 2020-01-28

## 2019-02-28 ENCOUNTER — MEDICAL CORRESPONDENCE (OUTPATIENT)
Dept: HEALTH INFORMATION MANAGEMENT | Facility: CLINIC | Age: 69
End: 2019-02-28

## 2019-03-04 ASSESSMENT — ENCOUNTER SYMPTOMS
WEAKNESS: 1
HOARSE VOICE: 0
NAIL CHANGES: 1
EXERCISE INTOLERANCE: 1
TASTE DISTURBANCE: 0
SPEECH CHANGE: 0
BACK PAIN: 0
DIFFICULTY URINATING: 0
POOR WOUND HEALING: 0
VOMITING: 0
SYNCOPE: 0
INCREASED ENERGY: 1
SLEEP DISTURBANCES DUE TO BREATHING: 0
DOUBLE VISION: 0
JAUNDICE: 0
RECTAL PAIN: 1
NECK PAIN: 0
HYPERTENSION: 0
SKIN CHANGES: 1
MUSCLE WEAKNESS: 1
JOINT SWELLING: 0
POLYDIPSIA: 1
DECREASED CONCENTRATION: 1
EYE IRRITATION: 0
ALTERED TEMPERATURE REGULATION: 1
HEARTBURN: 1
EYE REDNESS: 0
DIZZINESS: 0
EYE PAIN: 1
TROUBLE SWALLOWING: 0
ARTHRALGIAS: 1
DYSURIA: 1
MEMORY LOSS: 1
WEIGHT LOSS: 1
NECK MASS: 0
PARALYSIS: 0
DECREASED APPETITE: 1
EYE WATERING: 0
DISTURBANCES IN COORDINATION: 0
WEIGHT GAIN: 0
SORE THROAT: 0
CHILLS: 0
MUSCLE CRAMPS: 0
HEADACHES: 1
FLANK PAIN: 0
ABDOMINAL PAIN: 1
SMELL DISTURBANCE: 0
BLOOD IN STOOL: 0
PANIC: 1
NUMBNESS: 0
FEVER: 0
LEG PAIN: 0
MYALGIAS: 0
POLYPHAGIA: 0
FATIGUE: 1
NERVOUS/ANXIOUS: 1
PALPITATIONS: 0
TINGLING: 0
DEPRESSION: 1
DIARRHEA: 1
NIGHT SWEATS: 0
HALLUCINATIONS: 0
SINUS CONGESTION: 0
SEIZURES: 0
HEMATURIA: 0
NAUSEA: 1
TREMORS: 0
ORTHOPNEA: 0
STIFFNESS: 1
BLOATING: 1
SINUS PAIN: 0
BOWEL INCONTINENCE: 0
LIGHT-HEADEDNESS: 1
CONSTIPATION: 1
INSOMNIA: 0
LOSS OF CONSCIOUSNESS: 0

## 2019-03-05 ENCOUNTER — OFFICE VISIT (OUTPATIENT)
Dept: GASTROENTEROLOGY | Facility: CLINIC | Age: 69
End: 2019-03-05
Payer: COMMERCIAL

## 2019-03-05 ENCOUNTER — ANCILLARY PROCEDURE (OUTPATIENT)
Dept: GENERAL RADIOLOGY | Facility: CLINIC | Age: 69
End: 2019-03-05
Attending: PHYSICIAN ASSISTANT
Payer: COMMERCIAL

## 2019-03-05 VITALS
DIASTOLIC BLOOD PRESSURE: 69 MMHG | SYSTOLIC BLOOD PRESSURE: 95 MMHG | RESPIRATION RATE: 16 BRPM | OXYGEN SATURATION: 93 % | TEMPERATURE: 97.9 F | BODY MASS INDEX: 31.02 KG/M2 | HEIGHT: 60 IN | HEART RATE: 74 BPM | WEIGHT: 158 LBS

## 2019-03-05 DIAGNOSIS — R19.7 DIARRHEA, UNSPECIFIED TYPE: ICD-10-CM

## 2019-03-05 DIAGNOSIS — R19.5 CHANGE IN CONSISTENCY OF STOOL: Primary | ICD-10-CM

## 2019-03-05 ASSESSMENT — PAIN SCALES - GENERAL: PAINLEVEL: NO PAIN (0)

## 2019-03-05 ASSESSMENT — MIFFLIN-ST. JEOR: SCORE: 1167.56

## 2019-03-05 NOTE — PROGRESS NOTES
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 No  Difficulty emptying bladder: No  Back pain: No  Muscle aches: No  Neck pain: No  Swollen joints: No  Joint pain: Yes  Bone pain: No  Muscle cramps: No  Muscle weakness: Yes  Joint stiffness: Yes  Bone fracture: No  Trouble with coordination: No  Dizziness or trouble with balance: No  Fainting or black-out spells: No  Memory loss: Yes  Headache: Yes  Seizures: No  Speech problems: No  Tingling: No  Tremor: No  Weakness: Yes  Difficulty walking: No  Paralysis: No  Numbness: No  Nervous or Anxious: Yes  Depression: Yes  Trouble sleeping: No  Trouble thinking or concentrating: Yes  Mood changes: No  Panic attacks: Yes

## 2019-03-05 NOTE — PATIENT INSTRUCTIONS
It was a pleasure taking care of you today.  I've included a brief summary of our discussion and care plan from today's visit below.  Please review this information with your primary care provider.  ______________________________________________________________________    My recommendations are summarized as follows:    -- stool studies for giardia, ova and parasites, and inflammation (fecal calprotectin)    -- abdominal x-ray to assess for stool burden.    -- would recommend soluble fiber supplementation.  Soluble fiber supplementation on a daily basis can help regulate the consistency of your stools. Metamucil, citrucel or benefiber are all examples. You can start with 1 tablespoon per day and if tolerated, may increase up to 2-3 tablespoons per day. You may experience some bloating with initiation of fiber supplementation that will improve over the first month.  A good fiber trial to evaluate the effect is 3-6 months. Drink some extra water with this     -- consider miralax     -- switch timing of Prevacid 30 mg to 30-60 minutes before eating (take this on an empty stomach)   -- continue to take zantac 150 mg as needed   GERD Lifestyle Modifications  -- Avoid foods high in fat or high fructose corn syrup  -- do not eat within three hours of laying down or going to bed  -- raise the head of your bed 6-8 inches  -- avoid alcohol  -- aim for BMI of less than <25 and lose extra weight as needed      Return to GI Clinic in 2 months to review your progress.    ______________________________________________________________________    Who do I call with any questions after my visit?  Please be in touch if there are any further questions that arise following today's visit.  There are multiple ways to contact your gastroenterology care team.        During business hours, you may reach a Gastroenterology nurse at [phone]      To schedule or reschedule an appointment, please call [phone].       You can always send  a secure message through [email or web address].  [email or web address] messages are answered by your nurse or doctor typically within 24 hours.  Please allow extra time on weekends and holidays.        For urgent/emergent questions after business hours, you may reach the on-call GI Fellow by contacting the [facility]  at [phone].     How will I get the results of any tests ordered?    You will receive all of your results.  If you have signed up for [employer], any tests ordered at your visit will be available to you after your physician reviews them.  Typically this takes 1-2 weeks.  If there are urgent results that require a change in your care plan, your physician or nurse will call you to discuss the next steps.      What is [email or web address]?  [email or web address] is a secure way for you to access all of your healthcare records from the [facility].  It is a web based computer program, so you can sign on to it from any location.  It also allows you to send secure messages to your care team.  I recommend signing up for [email or web address] access if you have not already done so and are comfortable with using a computer.      How to I schedule a follow-up visit?  If you did not schedule a follow-up visit today, please call [phone] to schedule a follow-up office visit.        Sincerely,    Talya Reina PA-C  Division of Gastroenterology, Hepatology & Nutrition  [facility]
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
## 2019-03-05 NOTE — NURSING NOTE
"Chief Complaint   Patient presents with     Gastrointestinal Problem     Diarrhea and constipation       Vitals:    03/05/19 1213   BP: 95/69   BP Location: Left arm   Patient Position: Sitting   Cuff Size: Adult Regular   Pulse: 74   Resp: 16   Temp: 97.9  F (36.6  C)   TempSrc: Oral   SpO2: 93%   Weight: 71.7 kg (158 lb)   Height: 1.515 m (4' 11.65\")       Body mass index is 31.22 kg/m .      Taniya Ramirez LPN                          "

## 2019-03-06 ENCOUNTER — TELEPHONE (OUTPATIENT)
Dept: GASTROENTEROLOGY | Facility: CLINIC | Age: 69
End: 2019-03-06

## 2019-03-06 NOTE — TELEPHONE ENCOUNTER
Patient called in response to results from recent abdominal x-ray showing mild stool burden.  We discussed that her x-ray did not show any concerning findings, did not show severe constipation.  She reports she has not had a bowel movement in the past couple of days and will try to take some MiraLAX to help with constipation.  She will also start fiber supplementation as discussed in clinic.  We will communicate results of infectious workup pending for submission.    Talya Reina PA-C  Division of Gastroenterology, Hepatology & Nutrition  [facility]

## 2019-03-07 DIAGNOSIS — R19.7 DIARRHEA, UNSPECIFIED TYPE: ICD-10-CM

## 2019-03-07 PROCEDURE — 87329 GIARDIA AG IA: CPT | Performed by: PHYSICIAN ASSISTANT

## 2019-03-07 PROCEDURE — 87177 OVA AND PARASITES SMEARS: CPT | Performed by: PHYSICIAN ASSISTANT

## 2019-03-07 PROCEDURE — 87209 SMEAR COMPLEX STAIN: CPT | Performed by: PHYSICIAN ASSISTANT

## 2019-03-07 PROCEDURE — 83993 ASSAY FOR CALPROTECTIN FECAL: CPT | Performed by: PHYSICIAN ASSISTANT

## 2019-03-08 ENCOUNTER — TELEPHONE (OUTPATIENT)
Dept: FAMILY MEDICINE | Facility: CLINIC | Age: 69
End: 2019-03-08

## 2019-03-08 LAB
G LAMBLIA AG STL QL IA: NORMAL
O+P STL MICRO: NORMAL
O+P STL MICRO: NORMAL
SPECIMEN SOURCE: NORMAL
SPECIMEN SOURCE: NORMAL

## 2019-03-08 NOTE — TELEPHONE ENCOUNTER
Patient was told to return for a hemoglobin A1c, reminder letter was sent to patient on 01/29/2019, patient has still not scheduled an appointment.

## 2019-03-08 NOTE — TELEPHONE ENCOUNTER
Pt has been dealing with GI Issues. Pt does not want to have her A1C checked at this time. Would like to deal with her other issues at this time first.  Pt wants to ask Dr. Benedict if this is ok to wait. Pt   Lost 14 lbs  When Pt does have this done she would like to have her Cholesterol addressed also.  Please Advise.  [place] Sec

## 2019-03-12 LAB — CALPROTECTIN STL-MCNT: 38.5 MG/KG (ref 0–49.9)

## 2019-03-16 ENCOUNTER — HOSPITAL ENCOUNTER (EMERGENCY)
Facility: CLINIC | Age: 69
Discharge: HOME | End: 2019-03-16
Attending: NURSE PRACTITIONER | Admitting: NURSE PRACTITIONER
Payer: COMMERCIAL

## 2019-03-16 ENCOUNTER — NURSE TRIAGE (OUTPATIENT)
Dept: NURSING | Facility: CLINIC | Age: 69
End: 2019-03-16

## 2019-03-16 ENCOUNTER — APPOINTMENT (OUTPATIENT)
Dept: GENERAL RADIOLOGY | Facility: CLINIC | Age: 69
End: 2019-03-16
Attending: NURSE PRACTITIONER
Payer: COMMERCIAL

## 2019-03-16 VITALS
OXYGEN SATURATION: 98 % | SYSTOLIC BLOOD PRESSURE: 113 MMHG | DIASTOLIC BLOOD PRESSURE: 58 MMHG | RESPIRATION RATE: 18 BRPM | TEMPERATURE: 98.4 F

## 2019-03-16 DIAGNOSIS — J06.9 VIRAL URI WITH COUGH: ICD-10-CM

## 2019-03-16 LAB
FLUAV AG UPPER RESP QL IA.RAPID: NEGATIVE
FLUBV AG UPPER RESP QL IA.RAPID: NEGATIVE
INTERNAL QC OK POCT: YES

## 2019-03-16 PROCEDURE — G0463 HOSPITAL OUTPT CLINIC VISIT: HCPCS | Mod: 25

## 2019-03-16 PROCEDURE — 87804 INFLUENZA ASSAY W/OPTIC: CPT | Performed by: NURSE PRACTITIONER

## 2019-03-16 PROCEDURE — 71046 X-RAY EXAM CHEST 2 VIEWS: CPT

## 2019-03-16 PROCEDURE — 99213 OFFICE O/P EST LOW 20 MIN: CPT | Mod: Z6 | Performed by: NURSE PRACTITIONER

## 2019-03-16 ASSESSMENT — ENCOUNTER SYMPTOMS
SORE THROAT: 1
VOMITING: 0
NAUSEA: 0
RHINORRHEA: 1
HEADACHES: 0
CHILLS: 1
SHORTNESS OF BREATH: 0
COUGH: 1
FATIGUE: 1
FEVER: 0

## 2019-03-16 NOTE — TELEPHONE ENCOUNTER
Patient has a severe productive cough with dark green mucus for 2 days.     Patient states she had a sore throat initially, started to get head congestion, and now has been having a lot of green to dark green mucus she has been coughing up. She was struggling to catch her breath this morning due to the severity of the cough. History of heart attack in 2011. Denies severe difficulty breathing currently where she is struggling for each breath, denies bluish tone to lips, tongue or face. Patient states she is very pale and has been having chest pain, concerned about pneumonia or influenza.     Protocol and care advice reviewed.  Patient's  will bring her to [facility] ER to be seen.   Caller states understanding of the recommended disposition.   Advised to call back if further questions or concerns.    Taniya Brooks RN  [place] Nurse Advisor    Reason for Disposition    Chest pain  (Exception: MILD central chest pain, present only when coughing)    Additional Information    Negative: Severe difficulty breathing (e.g., struggling for each breath, speaks in single words)    Negative: [1] Difficulty breathing AND [2] exposure to flames, smoke, or fumes    Negative: Bluish lips, tongue, or face now    Negative: [1] Stridor AND [2] difficulty breathing    Negative: Sounds like a life-threatening emergency to the triager    Protocols used: COUGH - ACUTE PRODUCTIVE-ADULT-

## 2019-03-16 NOTE — ED AVS SNAPSHOT
[facility] Emergency Department  [address] [address]  Phone:  [phone]  Fax:  [phone]                                    Ml Evans   MRN: [id number]    Department:  [facility] Emergency Department   Date of Visit:  3/16/2019           After Visit Summary Signature Page    I have received my discharge instructions, and my questions have been answered. I have discussed any challenges I see with this plan with the nurse or doctor.    ..........................................................................................................................................  Patient/Patient Representative Signature      ..........................................................................................................................................  Patient Representative Print Name and Relationship to Patient    ..................................................               ................................................  Date                                   Time    ..........................................................................................................................................  Reviewed by Signature/Title    ...................................................              ..............................................  Date                                               Time          22EPIC Rev 08/18

## 2019-03-17 NOTE — ED PROVIDER NOTES
History     Chief Complaint   Patient presents with     Cough     uri, deep cough, body aches     HPI  Ml Evans is a 68 year old female with history of MI, Type II DM, and GERD who presents to urgent care for evaluation of cough, myalgia, and chills. No objective fever. Symptoms started 2-3 days ago. No chest pain. Feels hard to take a deep breath at times. Exposed to spouse who had similar symptoms earlier this week and is feeling better.     Allergies:  Allergies   Allergen Reactions     Hydrocodone Anaphylaxis     Flexeril [Cyclobenzaprine] Rash     Cipro [Ciprofloxacin] Other (See Comments)     Abd pain , proteinuria , microscopic hematuria  Possibly related to cipro     Codeine Camsylate Nausea     Penicillins Difficulty breathing     Amoxicillin Itching and Rash     Sulfa Drugs Itching and Rash     Tetracycline Itching and Rash       Problem List:    Patient Active Problem List    Diagnosis Date Noted     History of ST elevation myocardial infarction (STEMI) 01/23/2019     Priority: Medium     Status post insertion of drug-eluting stent into right coronary artery for coronary artery disease 01/23/2019     Priority: Medium     Chronic rhinitis 03/09/2017     Priority: Medium     Benign essential hypertension 03/09/2017     Priority: Medium     Irritable bowel syndrome with diarrhea 03/09/2017     Priority: Medium     Type 2 diabetes mellitus without complication, without long-term current use of insulin (H) 03/09/2017     Priority: Medium     ACS (acute coronary syndrome) (H) 12/30/2015     Priority: Medium     Colon polyp 05/21/2015     Priority: Medium     Tubular adenoma polyp       Advanced directives, counseling/discussion 01/14/2013     Priority: Medium     Patient states has Advance Directive and will bring in a copy to clinic. 1/14/2013          Diverticulosis 06/14/2012     Priority: Medium     Health Care Home 04/19/2011     Priority: Medium     High priority patient - 4/19/11    DX V65.8  REPLACED WITH 96270 HEALTH CARE HOME (04/08/2013)       CAD (coronary artery disease) 03/21/2011     Priority: Medium     Two right coronary stents 2011         HPV (human papilloma virus) anogenital infection 02/22/2011     Priority: Medium     Perianal condyloma  Biopsy done 2011  FINAL DIAGNOSIS:  Skin, perianal, lesion, excision:  - High grade squamous intraepithelial lesion (moderate to severe  dysplasia) (see comment)    COMMENT:  The lesion is arising on top of condyloma. The base of the polypoid  lesion appears free of dysplasia in the planes examined. Follow up is  recommended as clinically deemed appropriate.       GERD (gastroesophageal reflux disease) 01/20/2011     Priority: Medium     HYPERLIPIDEMIA LDL GOAL <100 10/31/2010     Priority: Medium     Menopausal syndrome (hot flashes) 05/18/2010     Priority: Medium     Wants to take premarin  Understands breast cancer risk       Anal fissure 01/03/2007     Priority: Medium     Mild major depression (H) 09/28/2006     Priority: Medium     Esophageal reflux 09/28/2006     Priority: Medium     Allergic state 09/28/2006     Priority: Medium     seasonal  Problem list name updated by automated process. Provider to review       Attention deficit disorder 05/17/2005     Priority: Medium     Treated by psychiatry  Problem list name updated by automated process. Provider to review       Nonspecific elevation of levels of transaminase or lactic acid dehydrogenase (LDH) 05/17/2005     Priority: Medium     fatty liver          Past Medical History:    Past Medical History:   Diagnosis Date     Attention deficit disorder without mention of hyperactivity      Benign essential hypertension 3/9/2017     Coronary artery disease march 15,2011     Diabetes mellitus (H)      Dysthymic disorder      Glycogenosis (H)      History of blood transfusion 1981     Malignant neoplasm (H)      Other and unspecified hyperlipidemia      Squamous cell carcinoma        Past Surgical  History:    Past Surgical History:   Procedure Laterality Date     ABDOMEN SURGERY  ,,     APPENDECTOMY       BIOPSY      nose, during surgery     COLONOSCOPY       COLONOSCOPY N/A 2015    Procedure: COMBINED COLONOSCOPY, SINGLE OR MULTIPLE BIOPSY/POLYPECTOMY BY BIOPSY;  Surgeon: Ponce Christine MD;  Location: [facility]     ECTOPIC PREGNANCY SURGERY       ENDOSCOPIC RELEASE CARPAL TUNNEL  2013    Procedure: ENDOSCOPIC RELEASE CARPAL TUNNEL;  Right endoscopic carpal tunnel release;  Surgeon: Jonah Dela Cruz MD;  Location: [facility]     ENT SURGERY      nose- suspected basal cell- was benign     ESOPHAGOSCOPY, GASTROSCOPY, DUODENOSCOPY (EGD), COMBINED  2014    Procedure: COMBINED ESOPHAGOSCOPY, GASTROSCOPY, DUODENOSCOPY (EGD), BIOPSY SINGLE OR MULTIPLE;  Surgeon: Anibal Sebastian MD;  Location: [facility]     GENITOURINARY SURGERY  ,      HYSTERECTOMY, ELIECER      total hysterectomy. Unsure if ELIECER or vaginal     SURGICAL HISTORY OF -       appy     SURGICAL HISTORY OF -       T&A     VASCULAR SURGERY      angioplasty- 2 stents implanted       Family History:    Family History   Problem Relation Age of Onset     Cancer Mother         uterine     Lipids Mother      Neurologic Disorder Mother         polymyalgia     Hypertension Mother      Other Cancer Mother         endometrial     Depression/Anxiety Mother      Other - See Comments Mother         Heart Arrythmia      Cardiovascular Father         CHF     Depression Father      C.A.D. Father         bypass x2 starting at age 55-  in his 70's     Diabetes Father         type 2     Coronary Artery Disease Father          from - age 75     Depression/Anxiety Father      Cerebrovascular Disease Father         from angioplasty      C.A.D. Maternal Grandfather      Coronary Artery Disease Maternal Grandfather          from- age 61     C.A.D. Paternal Grandfather      Diabetes Brother      Depression  Son      Psychotic Disorder Son         adhd     Diabetes Brother         type 1     Depression/Anxiety Brother      Depression/Anxiety Maternal Grandmother      Depression/Anxiety Son      Asthma Son         childhood asthma-out grown now as an adult     Coronary Artery Disease Brother         stent-MI     Melanoma No family hx of        Social History:  Marital Status:   [2]  Social History     Tobacco Use     Smoking status: Former Smoker     Packs/day: 1.00     Years: 30.00     Pack years: 30.00     Types: Cigarettes     Start date: 1968     Last attempt to quit: 3/15/2011     Years since quittin.0     Smokeless tobacco: Never Used     Tobacco comment: occasional/daily   Substance Use Topics     Alcohol use: No     Alcohol/week: 0.0 oz     Drug use: No        Medications:      acetaminophen (TYLENOL) 325 MG tablet   ALPRAZolam (XANAX PO)   amphetamine-dextroamphetamine (ADDERALL XR) 20 MG per capsule   ASPIRIN 81 MG OR TABS   atorvastatin (LIPITOR) 80 MG tablet   blood glucose (NO BRAND SPECIFIED) lancets standard   blood glucose monitoring (NO BRAND SPECIFIED) meter device kit   blood glucose monitoring (NO BRAND SPECIFIED) test strip   clindamycin (CLEOCIN T) 1 % solution   DoxePIN (SINEQUAN) 75 MG capsule   LANsoprazole (PREVACID) 30 MG DR capsule   LEXAPRO 20 MG PO TABS   lisinopril (PRINIVIL/ZESTRIL) 5 MG tablet   metFORMIN (GLUCOPHAGE-XR) 500 MG 24 hr tablet   metoprolol succinate (TOPROL-XL) 25 MG 24 hr tablet   nitroGLYcerin (NITROSTAT) 0.4 MG sublingual tablet   order for DME   psyllium 0.52 G capsule   ranitidine (ZANTAC) 150 MG tablet   tretinoin (RETIN-A) 0.025 % external cream         Review of Systems   Constitutional: Positive for chills and fatigue. Negative for fever.   HENT: Positive for congestion, postnasal drip, rhinorrhea and sore throat.    Respiratory: Positive for cough. Negative for shortness of breath.    Cardiovascular: Negative for chest pain.   Gastrointestinal:  Negative for nausea and vomiting.   Neurological: Negative for headaches.       Physical Exam   BP: 113/58  Heart Rate: 61  Temp: 98.4  F (36.9  C)  Resp: 18  SpO2: 98 %      Physical Exam    GENERAL APPEARANCE: healthy, alert and no acute distress  EYES: conjunctiva clear  HENT: ear canals and TM's normal.  rhinorrhea present.  Posterior oropharynx erythema without exudate.  Uvula is midline.  No unilateral peritonsillar swelling. No trismus  NECK: supple, nontender, no lymphadenopathy  RESP: lungs clear to auscultation - no rales, rhonchi or wheezes  CV: regular rates and rhythm, normal S1 S2, no murmur noted        ED Course        Procedures             Results for orders placed or performed during the hospital encounter of 03/16/19 (from the past 24 hour(s))   XR Chest 2 Views    Narrative    CHEST TWO VIEWS      3/16/2019 7:45 PM     HISTORY: Cough.    COMPARISON: Chest x-rays dated 12/29/2015.    FINDINGS:  The lungs are clear. No pleural effusions or pneumothorax.  Heart size and pulmonary vascularity are within normal limits. No  acute fracture.      Impression    IMPRESSION: No evidence of acute cardiopulmonary disease is seen.      Influenza A/B antigen POCT   Result Value Ref Range    Influenza A Negative neg    Influenza B Negative neg    Internal QC OK Yes        Medications - No data to display    Assessments & Plan (with Medical Decision Making)   History and exam consistent with viral URI.  Influenza negative. Chest xray is normal. Recommend symptomatic treatment and worrisome reasons to recheck discussed.   I have reviewed the nursing notes.    I have reviewed the findings, diagnosis, plan and need for follow up with the patient.         Medication List      There are no discharge medications for this visit.         Final diagnoses:   Viral URI with cough       3/16/2019   [facility] EMERGENCY DEPARTMENT     Vandana Jacques APRN CNP  03/16/19 2010

## 2019-03-22 ENCOUNTER — TELEPHONE (OUTPATIENT)
Dept: GASTROENTEROLOGY | Facility: CLINIC | Age: 69
End: 2019-03-22

## 2019-03-22 NOTE — TELEPHONE ENCOUNTER
Pt and her  had a virus for the last 2 weeks and have been out of touch.  Pt would like Talya Reina to call her back and touch bases with her. Please have Ms. Nuno follow up with pt.  Thank you!

## 2019-03-25 ENCOUNTER — NURSE TRIAGE (OUTPATIENT)
Dept: NURSING | Facility: CLINIC | Age: 69
End: 2019-03-25

## 2019-03-25 NOTE — TELEPHONE ENCOUNTER
Patient reports she was at the Ed on 3/16/19 for URI.  patient reports having a productive cough since 3/13/19 and says it's getting better but very slowly.  Patient says she is coughing and sneezing and has needed to sleep sitting up.  Patient reports no fever.  Patient reports she is pushing fluids.  Reviewed guideline and care advice with caller to be seen 24 hours.  Caller verbalizes understanding.        Reason for Disposition    [1] Continuous (nonstop) coughing interferes with work or school AND [2] no improvement using cough treatment per Care Advice    Additional Information    Negative: Severe difficulty breathing (e.g., struggling for each breath, speaks in single words)    Negative: Bluish lips, tongue, or face now    Negative: [1] Difficulty breathing AND [2] exposure to flames, smoke, or fumes    Negative: [1] Stridor AND [2] difficulty breathing    Negative: Sounds like a life-threatening emergency to the triager    Negative: [1] Previous asthma attacks AND [2] this feels like asthma attack    Negative: Dry (non-productive) cough (i.e., no sputum or minimal clear sputum)    Negative: Chest pain  (Exception: MILD central chest pain, present only when coughing)    Negative: Difficulty breathing    Negative: Patient sounds very sick or weak to the triager    Negative: [1] Coughed up blood AND [2] > 1 tablespoon (15 ml) (Exception: blood-tinged sputum)    Negative: Fever > 103 F (39.4 C)    Negative: [1] Fever > 101 F (38.3 C) AND [2] age > 60    Negative: [1] Fever > 101 F (38.3 C) AND [2] bedridden (e.g., nursing home patient, CVA, chronic illness, recovering from surgery)    Negative: [1] Fever > 100.5 F (38.1 C) AND [2] diabetes mellitus or weak immune system (e.g., HIV positive, cancer chemo, splenectomy, organ transplant, chronic steroids)    Negative: Wheezing is present    Negative: SEVERE coughing spells (e.g., whooping sound after coughing, vomiting after coughing)    Protocols used: COUGH -  ACUTE PRODUCTIVE-ADULT-AH

## 2019-03-26 ENCOUNTER — PATIENT OUTREACH (OUTPATIENT)
Dept: GASTROENTEROLOGY | Facility: CLINIC | Age: 69
End: 2019-03-26

## 2019-03-26 NOTE — TELEPHONE ENCOUNTER
Called patient and left a message with the patient stating that Ms. Reina is out of town for the week, but if she feels that she needs to discuss her concerns prior to her returning she should call me directly.  I lef my contact number for patient to return call with any questions she may have

## 2019-03-26 NOTE — PROGRESS NOTES
Patient states that she continues a [place] diet, with at least one Ensure clear a day.  She is still experiencing abdominal pain and bloating.     She is loosing weight, feels she has now lost 16 pounds.      Still symptomatic  stooling only once every three days, and these stools are usually diarrhea.  Patient reports that she has been taking the medications for acid reflux but not using fiber or the miralax.    Patient will start a fiber supplement and was informed that it can take up to three months to decide if the fiber will work for the patient.  Patient will also trial miralax for at least two weeks.  She will update the clinic after the two weeks

## 2019-03-29 ENCOUNTER — TELEPHONE (OUTPATIENT)
Dept: FAMILY MEDICINE | Facility: CLINIC | Age: 69
End: 2019-03-29

## 2019-04-16 ENCOUNTER — TELEPHONE (OUTPATIENT)
Dept: FAMILY MEDICINE | Facility: CLINIC | Age: 69
End: 2019-04-16

## 2019-04-16 DIAGNOSIS — E11.9 TYPE 2 DIABETES MELLITUS WITHOUT COMPLICATION, WITHOUT LONG-TERM CURRENT USE OF INSULIN (H): ICD-10-CM

## 2019-04-16 DIAGNOSIS — E78.5 HYPERLIPIDEMIA LDL GOAL <100: Primary | ICD-10-CM

## 2019-04-29 ENCOUNTER — NURSE TRIAGE (OUTPATIENT)
Dept: NURSING | Facility: CLINIC | Age: 69
End: 2019-04-29

## 2019-04-29 NOTE — TELEPHONE ENCOUNTER
Caller states she is having diarrhea stools for about a week now. Caller is complaining of lower left groin area pain. Caller rates pain that comes and goes 7/10. Caller states she feels lightheaded. Decreased urinary output noted. Caller states the stool is like ribbons when it comes out to her and she noticed a big white chunk and spidery looking substance in her stool. Triage guidelines recommend to see provider within 4 hours. Caller states she will wake  to take her to the ED.     Reason for Disposition    [1] Constant abdominal pain AND [2] present > 2 hours    Additional Information    Negative: Shock suspected (e.g., cold/pale/clammy skin, too weak to stand, low BP, rapid pulse)    Negative: Difficult to awaken or acting confused  (e.g., disoriented, slurred speech)    Negative: Sounds like a life-threatening emergency to the triager    Negative: Vomiting also present and worse than the diarrhea    Negative: [1] Blood in stool AND [2] without diarrhea    Negative: [1] SEVERE abdominal pain (e.g., excruciating) AND [2] present > 1 hour    Negative: [1] SEVERE abdominal pain AND [2] age > 60    Negative: [1] Blood in the stool AND [2] moderate or large amount of blood    Negative: Black or tarry bowel movements  (Exception: chronic-unchanged  black-grey bowel movements AND is taking iron pills or Pepto-bismol)    Negative: [1] Drinking very little AND [2] dehydration suspected (e.g., no urine > 12 hours, very dry mouth, very lightheaded)    Negative: Patient sounds very sick or weak to the triager    Negative: [1] SEVERE diarrhea (e.g., 7 or more times / day more than normal) AND [2]  age > 60 years    Negative: [1] Fever > 103 F (39.4 C) AND [2] not able to get the fever down using Fever Care Advice    Protocols used: DIARRHEA-ADULT-

## 2019-05-06 DIAGNOSIS — E11.9 TYPE 2 DIABETES MELLITUS WITHOUT COMPLICATION, WITHOUT LONG-TERM CURRENT USE OF INSULIN (H): ICD-10-CM

## 2019-05-06 DIAGNOSIS — K21.9 GASTROESOPHAGEAL REFLUX DISEASE, ESOPHAGITIS PRESENCE NOT SPECIFIED: ICD-10-CM

## 2019-05-06 DIAGNOSIS — E78.5 HYPERLIPIDEMIA LDL GOAL <100: ICD-10-CM

## 2019-05-06 LAB
CHOLEST SERPL-MCNC: 125 MG/DL
CREAT UR-MCNC: 195 MG/DL
HBA1C MFR BLD: 6.9 % (ref 0–5.6)
HDLC SERPL-MCNC: 36 MG/DL
LDLC SERPL CALC-MCNC: 55 MG/DL
MICROALBUMIN UR-MCNC: 52 MG/L
MICROALBUMIN/CREAT UR: 26.87 MG/G CR (ref 0–25)
NONHDLC SERPL-MCNC: 89 MG/DL
TRIGL SERPL-MCNC: 171 MG/DL

## 2019-05-06 PROCEDURE — 80061 LIPID PANEL: CPT | Performed by: FAMILY MEDICINE

## 2019-05-06 PROCEDURE — 82043 UR ALBUMIN QUANTITATIVE: CPT | Performed by: FAMILY MEDICINE

## 2019-05-06 PROCEDURE — 83036 HEMOGLOBIN GLYCOSYLATED A1C: CPT | Performed by: FAMILY MEDICINE

## 2019-05-06 PROCEDURE — 36415 COLL VENOUS BLD VENIPUNCTURE: CPT | Performed by: FAMILY MEDICINE

## 2019-05-06 RX ORDER — LANSOPRAZOLE 30 MG/1
CAPSULE, DELAYED RELEASE ORAL
Qty: 90 CAPSULE | Refills: 1 | Status: SHIPPED | OUTPATIENT
Start: 2019-05-06 | End: 2019-10-30

## 2019-05-06 NOTE — TELEPHONE ENCOUNTER
"Requested Prescriptions   Pending Prescriptions Disp Refills     LANsoprazole (PREVACID) 30 MG DR capsule [Pharmacy Med Name: LANSOPRAZOLE 30MG CPDR] 90 capsule 0     Sig: TAKE 1 CAPSULE BY MOUTH DAILY, 30 TO 60 MINUTES BEFORE A MEAL.       PPI Protocol Passed - 5/6/2019  1:52 AM        Passed - Not on Clopidogrel (unless Pantoprazole ordered)        Passed - No diagnosis of osteoporosis on record        Passed - Recent (12 mo) or future (30 days) visit within the authorizing provider's specialty     Patient had office visit in the last 12 months or has a visit in the next 30 days with authorizing provider or within the authorizing provider's specialty.  See \"Patient Info\" tab in inbasket, or \"Choose Columns\" in Meds & Orders section of the refill encounter.              Passed - Medication is active on med list        Passed - Patient is age 18 or older        Passed - No active pregnacy on record        Passed - No positive pregnancy test in past 12 months      LANsoprazole (PREVACID) 30 MG DR capsule  Last Written Prescription Date:  01/14/2019  Last Fill Quantity: 90 capsule,  # refills: 0   Last office visit: 1/22/2019 with prescribing provider:  CARLY Benedict   Future Office Visit:   Next 5 appointments (look out 90 days)    May 07, 2019  2:40 PM CDT  SHORT with Ledy Benedict MD  [facility] ([facility]) [address] [place] [id number] [phone]         Anamika Hurst RT (R) (M)      "

## 2019-05-07 ENCOUNTER — OFFICE VISIT (OUTPATIENT)
Dept: FAMILY MEDICINE | Facility: CLINIC | Age: 69
End: 2019-05-07
Payer: COMMERCIAL

## 2019-05-07 VITALS
OXYGEN SATURATION: 95 % | HEART RATE: 68 BPM | TEMPERATURE: 98.8 F | RESPIRATION RATE: 15 BRPM | DIASTOLIC BLOOD PRESSURE: 54 MMHG | WEIGHT: 154.4 LBS | SYSTOLIC BLOOD PRESSURE: 90 MMHG | BODY MASS INDEX: 30.51 KG/M2

## 2019-05-07 DIAGNOSIS — R19.7 DIARRHEA, UNSPECIFIED TYPE: Primary | ICD-10-CM

## 2019-05-07 DIAGNOSIS — F32.0 MILD MAJOR DEPRESSION (H): ICD-10-CM

## 2019-05-07 DIAGNOSIS — E78.5 HYPERLIPIDEMIA LDL GOAL <100: ICD-10-CM

## 2019-05-07 DIAGNOSIS — I10 BENIGN ESSENTIAL HYPERTENSION: ICD-10-CM

## 2019-05-07 DIAGNOSIS — E11.9 TYPE 2 DIABETES MELLITUS WITHOUT COMPLICATION, WITHOUT LONG-TERM CURRENT USE OF INSULIN (H): ICD-10-CM

## 2019-05-07 PROCEDURE — 99214 OFFICE O/P EST MOD 30 MIN: CPT | Performed by: FAMILY MEDICINE

## 2019-05-07 RX ORDER — METOPROLOL SUCCINATE 25 MG/1
25 TABLET, EXTENDED RELEASE ORAL DAILY
Qty: 90 TABLET | Refills: 3 | Status: SHIPPED | OUTPATIENT
Start: 2019-05-07 | End: 2020-04-15

## 2019-05-07 RX ORDER — LISINOPRIL 5 MG/1
5 TABLET ORAL DAILY
Qty: 90 TABLET | Refills: 3 | Status: SHIPPED | OUTPATIENT
Start: 2019-05-07 | End: 2019-10-29 | Stop reason: DRUGHIGH

## 2019-05-07 RX ORDER — ATORVASTATIN CALCIUM 80 MG/1
80 TABLET, FILM COATED ORAL DAILY
Qty: 90 TABLET | Refills: 3 | Status: SHIPPED | OUTPATIENT
Start: 2019-05-07 | End: 2020-04-15

## 2019-05-07 NOTE — NURSING NOTE
"Chief Complaint   Patient presents with     Diabetes       Initial BP 90/54 (BP Location: Right arm, Patient Position: Chair, Cuff Size: Adult Large)   Pulse 68   Temp 98.8  F (37.1  C) (Tympanic)   Resp 15   Wt 70 kg (154 lb 6.4 oz)   SpO2 95%   BMI 30.51 kg/m   Estimated body mass index is 30.51 kg/m  as calculated from the following:    Height as of 3/5/19: 1.515 m (4' 11.65\").    Weight as of this encounter: 70 kg (154 lb 6.4 oz).    Patient presents to the clinic using No DME    Health Maintenance that is potentially due pending provider review:  NONE    n/a    Is there anyone who you would like to be able to receive your results? Yes  If yes have patient fill out MIGUEL    "

## 2019-05-07 NOTE — PROGRESS NOTES
SUBJECTIVE:   Ml Evnas is a 69 year old female who presents to clinic today for the following   health issues:      Diabetes Follow-up      Patient is checking blood sugars: 4 times a week       Diabetic concerns: other - weight loss     Symptoms of hypoglycemia (low blood sugar): none     Paresthesias (numbness or burning in feet) or sores: No     Date of last diabetic eye exam: overdue    BP Readings from Last 2 Encounters:   05/07/19 90/54   03/16/19 113/58     Hemoglobin A1C (%)   Date Value   05/06/2019 6.9 (H)   08/20/2018 7.3 (H)     LDL Cholesterol Calculated (mg/dL)   Date Value   05/06/2019 55   05/15/2018 64       Diabetes Management Resources    Amount of exercise or physical activity: None    Problems taking medications regularly: No    Medication side effects: none    Diet: regular (no restrictions)        Diarrhea      Duration: before Christmas    Description:     Consistency of stool: watery and explosive       Blood in stool: no        Number of loose stools past 24 hours: 1    Intensity:  moderate    Accompanying signs and symptoms:       Fever: no        Nausea/vomitting: YES- nausea       Abdominal pain: YES, left sided       Weight loss: YES    History (recent antibiotics or travel/ill contacts/med changes/testing done): none    Precipitating or alleviating factors: None    Therapies tried and outcome: Imodium AD      Hyperlipidemia Follow-Up      Rate your low fat/cholesterol diet?: good    Taking statin?  Yes, no muscle aches from statin    Other lipid medications/supplements?:  none    Hypertension Follow-up      Outpatient blood pressures are not being checked.    Low Salt Diet: no added salt    Depression Followup    Status since last visit: Stable     See PHQ-9 for current symptoms.  Other associated symptoms: None    Complicating factors:   Significant life event:  No   Current substance abuse:  None  Anxiety or Panic symptoms:  No    PHQ 5/21/2018 8/20/2018 1/17/2019    PHQ-9 Total Score 6 16 8   Q9: Thoughts of better off dead/self-harm past 2 weeks Not at all Not at all Not at all     In the past two weeks have you had thoughts of suicide or self-harm?  No.    Do you have concerns about your personal safety or the safety of others?   No  PHQ-9  English  PHQ-9   Any Language  Suicide Assessment Five-step Evaluation and Treatment (SAFE-T)      Diarrhea is persistent   Has seen GI   cx neg  Was to follow up  She is still having very irreg explosive diarrhea alt with no stool for days  Has been on metamucil and tried miralax but it gave her horrible gas  No blood          Additional history: as documented    Reviewed  and updated as needed this visit by clinical staff  Tobacco  Allergies  Meds  Problems  Med Hx  Surg Hx  Fam Hx  Soc Hx          Reviewed and updated as needed this visit by Provider  Tobacco  Allergies  Meds  Problems  Med Hx  Surg Hx  Fam Hx         Labs reviewed in EPIC    ROS:  Constitutional, HEENT, cardiovascular, pulmonary, gi and gu systems are negative, except as otherwise noted.    OBJECTIVE:                                                    BP 90/54 (BP Location: Right arm, Patient Position: Chair, Cuff Size: Adult Large)   Pulse 68   Temp 98.8  F (37.1  C) (Tympanic)   Resp 15   Wt 70 kg (154 lb 6.4 oz)   SpO2 95%   BMI 30.51 kg/m    Body mass index is 30.51 kg/m .  GENERAL APPEARANCE: healthy, alert and no distress  NECK: no adenopathy, no asymmetry, masses, or scars and thyroid normal to palpation  RESP: lungs clear to auscultation - no rales, rhonchi or wheezes  CV: regular rates and rhythm, normal S1 S2, no S3 or S4 and no murmur, click or rub  MS: extremities normal- no gross deformities noted  PSYCH: mentation appears normal and affect normal/bright         ASSESSMENT/PLAN:                                                    1. Type 2 diabetes mellitus without complication, without long-term current use of insulin (H)  Stable no  change in treatment plan.   - lisinopril (PRINIVIL/ZESTRIL) 5 MG tablet; Take 1 tablet (5 mg) by mouth daily  Dispense: 90 tablet; Refill: 3    2. Benign essential hypertension  Stable no change in treatment plan.   - lisinopril (PRINIVIL/ZESTRIL) 5 MG tablet; Take 1 tablet (5 mg) by mouth daily  Dispense: 90 tablet; Refill: 3  - metoprolol succinate ER (TOPROL-XL) 25 MG 24 hr tablet; Take 1 tablet (25 mg) by mouth daily  Dispense: 90 tablet; Refill: 3    3. Hyperlipidemia LDL goal <100  Stable no change in treatment plan.   - atorvastatin (LIPITOR) 80 MG tablet; Take 1 tablet (80 mg) by mouth daily  Dispense: 90 tablet; Refill: 3    4. Mild major depression (H)  Stable no change in treatment plan.     5. Diarrhea, unspecified type  See below       Patient Instructions   Recheck with GI     Increase metamucil as discussed     Continue current diet     Try off the metformin for 2 weeks and see if diarrhea improves  Check blood sugars twice daily off the med am fasting and during the day or bedtime     natali holman in 2 weeks with update         Risks, benefits, side effects and rationale for treatment plan fully discussed with the patient and understanding expressed.    Ledy Benedict MD  [facility]

## 2019-05-07 NOTE — PATIENT INSTRUCTIONS
Recheck with GI     Increase metamucil as discussed     Continue current diet     Try off the metformin for 2 weeks and see if diarrhea improves  Check blood sugars twice daily off the med am fasting and during the day or bedtime     natali holman in 2 weeks with update

## 2019-06-17 ENCOUNTER — MYC MEDICAL ADVICE (OUTPATIENT)
Dept: FAMILY MEDICINE | Facility: CLINIC | Age: 69
End: 2019-06-17

## 2019-06-18 ENCOUNTER — TELEPHONE (OUTPATIENT)
Dept: FAMILY MEDICINE | Facility: CLINIC | Age: 69
End: 2019-06-18

## 2019-06-18 ENCOUNTER — MYC MEDICAL ADVICE (OUTPATIENT)
Dept: FAMILY MEDICINE | Facility: CLINIC | Age: 69
End: 2019-06-18

## 2019-06-18 NOTE — TELEPHONE ENCOUNTER
Reason for Call:  Other     Detailed comments: Please call patient she has Newslet messages going but cant respond to them - please call pt    Phone Number Patient can be reached at: Home number on file [phone] (home)    Best Time:     Can we leave a detailed message on this number? YES    Call taken on 6/18/2019 at 1:42 PM by Beth Quezada

## 2019-06-19 ENCOUNTER — TELEPHONE (OUTPATIENT)
Dept: CARDIOLOGY | Facility: CLINIC | Age: 69
End: 2019-06-19

## 2019-06-19 ENCOUNTER — MYC MEDICAL ADVICE (OUTPATIENT)
Dept: FAMILY MEDICINE | Facility: CLINIC | Age: 69
End: 2019-06-19

## 2019-06-19 DIAGNOSIS — E11.9 TYPE 2 DIABETES MELLITUS WITHOUT COMPLICATION, WITHOUT LONG-TERM CURRENT USE OF INSULIN (H): Primary | ICD-10-CM

## 2019-06-19 NOTE — TELEPHONE ENCOUNTER
Type 2 diabetes. Was taken off Metformin. Wants to start Januvia but there is FDA warning for heart issues. Is there a particular drug that is best for people with diabetes. Diabetes managed by Ledy Benedict at [facility].  Will ask Dr. Matthews for his recommendation.

## 2019-06-19 NOTE — TELEPHONE ENCOUNTER
[facility] Call Center    Phone Message    May a detailed message be left on voicemail: yes    Reason for Call: Other: Per call from PT is requesting a call back to discuss diabetic medication that doesn't affect the heart. Per PT she found a couple of them but the heart is at risk.  PT can be reached at the above #.      Action Taken: Message routed to:  [facility] ([facility]): Cardiology

## 2019-06-19 NOTE — TELEPHONE ENCOUNTER
Left detailed message to call or send Spiracur message back to Dr. Benedict regarding starting Januvia or Invokana

## 2019-06-19 NOTE — TELEPHONE ENCOUNTER
Dr. Matthews recommends Metformin. Patient states she will call the pharmacist for other recommendations then will call her prescriber for the diabetes medication to get blood sugar back under control. Then she will consider calling us back to schedule the lexiscan as recommended during last office visit.

## 2019-07-21 ENCOUNTER — MYC MEDICAL ADVICE (OUTPATIENT)
Dept: FAMILY MEDICINE | Facility: CLINIC | Age: 69
End: 2019-07-21

## 2019-08-13 ENCOUNTER — OFFICE VISIT (OUTPATIENT)
Dept: FAMILY MEDICINE | Facility: CLINIC | Age: 69
End: 2019-08-13
Payer: COMMERCIAL

## 2019-08-13 VITALS
WEIGHT: 154.4 LBS | DIASTOLIC BLOOD PRESSURE: 50 MMHG | RESPIRATION RATE: 16 BRPM | TEMPERATURE: 98.3 F | SYSTOLIC BLOOD PRESSURE: 96 MMHG | HEART RATE: 64 BPM | BODY MASS INDEX: 30.51 KG/M2

## 2019-08-13 DIAGNOSIS — L03.211 CELLULITIS OF FACE: Primary | ICD-10-CM

## 2019-08-13 DIAGNOSIS — E11.9 TYPE 2 DIABETES MELLITUS WITHOUT COMPLICATION, WITHOUT LONG-TERM CURRENT USE OF INSULIN (H): ICD-10-CM

## 2019-08-13 PROCEDURE — 87186 SC STD MICRODIL/AGAR DIL: CPT | Performed by: FAMILY MEDICINE

## 2019-08-13 PROCEDURE — 87077 CULTURE AEROBIC IDENTIFY: CPT | Performed by: FAMILY MEDICINE

## 2019-08-13 PROCEDURE — 99214 OFFICE O/P EST MOD 30 MIN: CPT | Performed by: FAMILY MEDICINE

## 2019-08-13 PROCEDURE — 87070 CULTURE OTHR SPECIMN AEROBIC: CPT | Performed by: FAMILY MEDICINE

## 2019-08-13 RX ORDER — ESCITALOPRAM OXALATE 20 MG
20 TABLET ORAL
Qty: 180 TABLET | Refills: 3 | COMMUNITY
Start: 2019-08-13 | End: 2019-09-18

## 2019-08-13 RX ORDER — CLINDAMYCIN HCL 150 MG
150 CAPSULE ORAL 3 TIMES DAILY
Qty: 30 CAPSULE | Refills: 0 | Status: SHIPPED | OUTPATIENT
Start: 2019-08-13 | End: 2019-08-19

## 2019-08-13 RX ORDER — VILAZODONE HYDROCHLORIDE 20 MG/1
20 TABLET ORAL DAILY
COMMUNITY
End: 2021-04-13

## 2019-08-13 ASSESSMENT — ANXIETY QUESTIONNAIRES
1. FEELING NERVOUS, ANXIOUS, OR ON EDGE: MORE THAN HALF THE DAYS
3. WORRYING TOO MUCH ABOUT DIFFERENT THINGS: MORE THAN HALF THE DAYS
5. BEING SO RESTLESS THAT IT IS HARD TO SIT STILL: NOT AT ALL
4. TROUBLE RELAXING: SEVERAL DAYS
GAD7 TOTAL SCORE: 9
GAD7 TOTAL SCORE: 9
7. FEELING AFRAID AS IF SOMETHING AWFUL MIGHT HAPPEN: SEVERAL DAYS
6. BECOMING EASILY ANNOYED OR IRRITABLE: MORE THAN HALF THE DAYS
GAD7 TOTAL SCORE: 9
7. FEELING AFRAID AS IF SOMETHING AWFUL MIGHT HAPPEN: SEVERAL DAYS
2. NOT BEING ABLE TO STOP OR CONTROL WORRYING: SEVERAL DAYS

## 2019-08-13 ASSESSMENT — PATIENT HEALTH QUESTIONNAIRE - PHQ9
SUM OF ALL RESPONSES TO PHQ QUESTIONS 1-9: 9
SUM OF ALL RESPONSES TO PHQ QUESTIONS 1-9: 9
10. IF YOU CHECKED OFF ANY PROBLEMS, HOW DIFFICULT HAVE THESE PROBLEMS MADE IT FOR YOU TO DO YOUR WORK, TAKE CARE OF THINGS AT HOME, OR GET ALONG WITH OTHER PEOPLE: VERY DIFFICULT

## 2019-08-13 NOTE — PATIENT INSTRUCTIONS
Schedule an eye exam    Come back in one month for lab work    Clindamycin for facial infection

## 2019-08-13 NOTE — NURSING NOTE
"Chief Complaint   Patient presents with     Infection       Initial BP 96/50 (BP Location: Right arm, Patient Position: Chair, Cuff Size: Adult Large)   Pulse 64   Temp 98.3  F (36.8  C) (Tympanic)   Resp 16   Wt 70 kg (154 lb 6.4 oz)   BMI 30.51 kg/m   Estimated body mass index is 30.51 kg/m  as calculated from the following:    Height as of 3/5/19: 1.515 m (4' 11.65\").    Weight as of this encounter: 70 kg (154 lb 6.4 oz).    Patient presents to the clinic using No DME    Health Maintenance that is potentially due pending provider review:  PHQ9    Possibly completing today per provider review.    Is there anyone who you would like to be able to receive your results? No  If yes have patient fill out MIGUEL    "

## 2019-08-13 NOTE — PROGRESS NOTES
Subjective     Ml Evans is a 69 year old female who presents to clinic today for the following health issues:    HPI   Infection        Duration: 3 weeks    Description (location/character/radiation): infection in face    Intensity:  moderate    Accompanying signs and symptoms: pressure, stinging, drainage    History (similar episodes/previous evaluation): has had 3 other times    Precipitating or alleviating factors: None    Therapies tried and outcome: absorbant pads     Diabetes Follow-up      How often are you checking your blood sugar? Two times daily    What time of day are you checking your blood sugars (select all that apply)?  Before and after meals    Have you had any blood sugars above 200?  No    Have you had any blood sugars below 70?  No    What symptoms do you notice when your blood sugar is low?  None    What concerns do you have today about your diabetes? None     Do you have any of these symptoms? (Select all that apply)  No numbness or tingling in feet.  No redness, sores or blisters on feet.  No complaints of excessive thirst.  No reports of blurry vision.  No significant changes to weight.     Have you had a diabetic eye exam in the last 12 months? No    BP Readings from Last 2 Encounters:   08/13/19 96/50   05/07/19 90/54     Hemoglobin A1C (%)   Date Value   05/06/2019 6.9 (H)   08/20/2018 7.3 (H)     LDL Cholesterol Calculated (mg/dL)   Date Value   05/06/2019 55   05/15/2018 64       Diabetes Management Resources          Reviewed and updated as needed this visit by Provider  Tobacco  Allergies  Meds  Problems  Med Hx  Surg Hx  Fam Hx         Review of Systems   ROS COMP: Constitutional, HEENT, cardiovascular, pulmonary, gi and gu systems are negative, except as otherwise noted.      Objective    BP 96/50 (BP Location: Right arm, Patient Position: Chair, Cuff Size: Adult Large)   Pulse 64   Temp 98.3  F (36.8  C) (Tympanic)   Resp 16   Wt 70 kg (154 lb 6.4 oz)   BMI  "30.51 kg/m    Body mass index is 30.51 kg/m .  Physical Exam   GENERAL APPEARANCE: healthy, alert and no distress  RESP: lungs clear to auscultation - no rales, rhonchi or wheezes  CV: regular rates and rhythm, normal S1 S2, no S3 or S4 and no murmur, click or rub  SKIN: 4 areas on the face draining purulent fluid, c/w cystic acne infection   PSYCH: mentation appears normal and affect normal/bright    Diagnostic Test Results:  Labs reviewed in Epic        Assessment & Plan     1. Type 2 diabetes mellitus without complication, without long-term current use of insulin (H)  Stable no change in treatment plan.   - empagliflozin (JARDIANCE) 10 MG TABS tablet; Take 1 tablet (10 mg) by mouth daily  Dispense: 30 tablet; Refill: 3  - Hemoglobin A1c; Future    2. Cellulitis of face    - clindamycin (CLEOCIN) 150 MG capsule; Take 1 capsule (150 mg) by mouth 3 times daily  Dispense: 30 capsule; Refill: 0     BMI:   Estimated body mass index is 30.51 kg/m  as calculated from the following:    Height as of 3/5/19: 1.515 m (4' 11.65\").    Weight as of this encounter: 70 kg (154 lb 6.4 oz).   Weight management plan: Discussed healthy diet and exercise guidelines        Patient Instructions   Schedule an eye exam    Come back in one month for lab work    Clindamycin for facial infection          Return in about 1 month (around 9/13/2019).    Risks, benefits, side effects and rationale for treatment plan fully discussed with the patient and understanding expressed.   Ledy Benedict MD  [facility]      Answers for HPI/ROS submitted by the patient on 8/13/2019   If you checked off any problems, how difficult have these problems made it for you to do your work, take care of things at home, or get along with other people?: Very difficult  PHQ9 TOTAL SCORE: 9  RAVI 7 TOTAL SCORE: 9    "

## 2019-08-14 ASSESSMENT — ANXIETY QUESTIONNAIRES: GAD7 TOTAL SCORE: 9

## 2019-08-16 ENCOUNTER — TELEPHONE (OUTPATIENT)
Dept: FAMILY MEDICINE | Facility: CLINIC | Age: 69
End: 2019-08-16

## 2019-08-16 LAB
BACTERIA SPEC CULT: ABNORMAL
Lab: ABNORMAL
SPECIMEN SOURCE: ABNORMAL

## 2019-08-16 NOTE — TELEPHONE ENCOUNTER
Reason for call:  Patient reporting a symptom    Symptom or request: Pt has cellulitis Face see Dr. Guerrero notes from 8/13/19. The Medication pt feels it is not working. Pt said there are some oozing and inflamed. Pt was told to call in if not any better.  Please Advise.      Phone Number patient can be reached at:  Home number on file [phone] (home)    Best Time:  Any Time      Can we leave a detailed message on this number:  YES    Call taken on 8/16/2019 at 1:48 PM by Candy Stauffer

## 2019-08-16 NOTE — TELEPHONE ENCOUNTER
Pt notified. Needs to be seen. Refused appointment at 340. Will come to urgent care. Skye Sanches RN

## 2019-08-16 NOTE — TELEPHONE ENCOUNTER
Needs to be seen if not feeling like is starting to improve.  3:40 with me or needs to see urgent care.

## 2019-08-16 NOTE — TELEPHONE ENCOUNTER
Component Collected Lab   Specimen Description 08/13/2019  3:30    Face    Special Requests 08/13/2019  3:30    Specimen collected in eSwab transport (white cap)    Culture Micro Abnormal  08/13/2019  3:30    Light growth   Staphylococcus aureus   Susceptibility testing in progress      Pt states she is being treated for cellulitis since Tuesday. States she is taking clindamycin 150 mg tid. It continues to ooze and red/painful. Pt wondering she should do? Pt was advised by Dr Benedict to call if not getting better. Thinks she needs a higher dose of medication. Skye Sanches RN

## 2019-08-19 ENCOUNTER — MYC MEDICAL ADVICE (OUTPATIENT)
Dept: FAMILY MEDICINE | Facility: CLINIC | Age: 69
End: 2019-08-19

## 2019-08-19 DIAGNOSIS — L03.211 CELLULITIS OF FACE: ICD-10-CM

## 2019-08-19 RX ORDER — CLINDAMYCIN HCL 150 MG
150 CAPSULE ORAL 3 TIMES DAILY
Qty: 6 CAPSULE | Refills: 0 | Status: SHIPPED | OUTPATIENT
Start: 2019-08-19 | End: 2019-09-18

## 2019-08-26 ENCOUNTER — MYC MEDICAL ADVICE (OUTPATIENT)
Dept: FAMILY MEDICINE | Facility: CLINIC | Age: 69
End: 2019-08-26

## 2019-08-26 DIAGNOSIS — L03.211 CELLULITIS OF FACE: Primary | ICD-10-CM

## 2019-08-26 RX ORDER — CLINDAMYCIN HCL 150 MG
300 CAPSULE ORAL 3 TIMES DAILY
Qty: 42 CAPSULE | Refills: 0 | Status: SHIPPED | OUTPATIENT
Start: 2019-08-26 | End: 2019-09-18

## 2019-09-05 ENCOUNTER — OFFICE VISIT (OUTPATIENT)
Dept: URGENT CARE | Facility: URGENT CARE | Age: 69
End: 2019-09-05
Payer: COMMERCIAL

## 2019-09-05 VITALS
HEART RATE: 49 BPM | OXYGEN SATURATION: 95 % | WEIGHT: 143 LBS | TEMPERATURE: 97.8 F | SYSTOLIC BLOOD PRESSURE: 118 MMHG | BODY MASS INDEX: 28.26 KG/M2 | DIASTOLIC BLOOD PRESSURE: 62 MMHG

## 2019-09-05 DIAGNOSIS — L98.9 SKIN LESION OF CHEEK: ICD-10-CM

## 2019-09-05 DIAGNOSIS — L98.9 SKIN LESION OF FACE: Primary | ICD-10-CM

## 2019-09-05 LAB
BASOPHILS # BLD AUTO: 0.1 10E9/L (ref 0–0.2)
BASOPHILS NFR BLD AUTO: 1 %
DIFFERENTIAL METHOD BLD: ABNORMAL
EOSINOPHIL # BLD AUTO: 0.2 10E9/L (ref 0–0.7)
EOSINOPHIL NFR BLD AUTO: 3 %
ERYTHROCYTE [DISTWIDTH] IN BLOOD BY AUTOMATED COUNT: 15.6 % (ref 10–15)
HCT VFR BLD AUTO: 35.7 % (ref 35–47)
HGB BLD-MCNC: 11 G/DL (ref 11.7–15.7)
LYMPHOCYTES # BLD AUTO: 2.8 10E9/L (ref 0.8–5.3)
LYMPHOCYTES NFR BLD AUTO: 34.2 %
MCH RBC QN AUTO: 24.4 PG (ref 26.5–33)
MCHC RBC AUTO-ENTMCNC: 30.8 G/DL (ref 31.5–36.5)
MCV RBC AUTO: 79 FL (ref 78–100)
MONOCYTES # BLD AUTO: 1 10E9/L (ref 0–1.3)
MONOCYTES NFR BLD AUTO: 12.9 %
NEUTROPHILS # BLD AUTO: 4 10E9/L (ref 1.6–8.3)
NEUTROPHILS NFR BLD AUTO: 48.9 %
PLATELET # BLD AUTO: 238 10E9/L (ref 150–450)
RBC # BLD AUTO: 4.5 10E12/L (ref 3.8–5.2)
WBC # BLD AUTO: 8.1 10E9/L (ref 4–11)

## 2019-09-05 PROCEDURE — 99213 OFFICE O/P EST LOW 20 MIN: CPT | Performed by: NURSE PRACTITIONER

## 2019-09-05 PROCEDURE — 87070 CULTURE OTHR SPECIMN AEROBIC: CPT | Performed by: NURSE PRACTITIONER

## 2019-09-05 PROCEDURE — 36415 COLL VENOUS BLD VENIPUNCTURE: CPT | Performed by: NURSE PRACTITIONER

## 2019-09-05 PROCEDURE — 85025 COMPLETE CBC W/AUTO DIFF WBC: CPT | Performed by: NURSE PRACTITIONER

## 2019-09-05 PROCEDURE — 2894A VOIDCORRECT: CPT | Performed by: NURSE PRACTITIONER

## 2019-09-05 PROCEDURE — 87077 CULTURE AEROBIC IDENTIFY: CPT | Performed by: NURSE PRACTITIONER

## 2019-09-06 NOTE — PROGRESS NOTES
Subjective     Ml Evans is a 69 year old female who presents to clinic today for the following health issues:    HPI     Chief Complaint   Patient presents with     Cellulitis     This episode that limited spots on face.  Had blister like spots and sterilized a needle and then poked areas.  Was drainage, chunks, blood and some almost black blood also. Has done an extended amount of antibiotics from provider. But after 4-5 days antibiotic was doubled in dosage to 300 mg.  Last night symptoms have been getting worse again.  Has been trying some wound cleanser- Skintegrity by [employer].  Has been also been putting 2 different patches on spots also with steri strips over them.          Patient Active Problem List   Diagnosis     Attention deficit disorder     Nonspecific elevation of levels of transaminase or lactic acid dehydrogenase (LDH)     Mild major depression (H)     Esophageal reflux     Allergic state     Anal fissure     Menopausal syndrome (hot flashes)     HYPERLIPIDEMIA LDL GOAL <100     GERD (gastroesophageal reflux disease)     HPV (human papilloma virus) anogenital infection     CAD (coronary artery disease)     Health Care Home     Diverticulosis     Advanced directives, counseling/discussion     Colon polyp     ACS (acute coronary syndrome) (H)     Chronic rhinitis     Benign essential hypertension     Irritable bowel syndrome with diarrhea     Type 2 diabetes mellitus without complication, without long-term current use of insulin (H)     History of ST elevation myocardial infarction (STEMI)     Status post insertion of drug-eluting stent into right coronary artery for coronary artery disease     Past Surgical History:   Procedure Laterality Date     ABDOMEN SURGERY  1981,1991,1993     APPENDECTOMY  1993     BIOPSY  2003    nose, during surgery     COLONOSCOPY  2005     COLONOSCOPY N/A 5/14/2015    Procedure: COMBINED COLONOSCOPY, SINGLE OR MULTIPLE BIOPSY/POLYPECTOMY BY BIOPSY;  Surgeon:  Ponce Christine MD;  Location: [facility]     ECTOPIC PREGNANCY SURGERY       ENDOSCOPIC RELEASE CARPAL TUNNEL  2013    Procedure: ENDOSCOPIC RELEASE CARPAL TUNNEL;  Right endoscopic carpal tunnel release;  Surgeon: Jonah Dela Cruz MD;  Location: [facility]     ENT SURGERY      nose- suspected basal cell- was benign     ESOPHAGOSCOPY, GASTROSCOPY, DUODENOSCOPY (EGD), COMBINED  2014    Procedure: COMBINED ESOPHAGOSCOPY, GASTROSCOPY, DUODENOSCOPY (EGD), BIOPSY SINGLE OR MULTIPLE;  Surgeon: Anibal Sebastian MD;  Location: [facility]     GENITOURINARY SURGERY  ,      HYSTERECTOMY, ELIECER      total hysterectomy. Unsure if ELIECER or vaginal     SURGICAL HISTORY OF -       appy     SURGICAL HISTORY OF -       T&A     VASCULAR SURGERY      angioplasty- 2 stents implanted       Social History     Tobacco Use     Smoking status: Former Smoker     Packs/day: 1.00     Years: 30.00     Pack years: 30.00     Types: Cigarettes     Start date: 1967     Last attempt to quit: 3/15/2011     Years since quittin.4     Smokeless tobacco: Never Used     Tobacco comment: occasional/daily   Substance Use Topics     Alcohol use: No     Alcohol/week: 0.0 oz     Family History   Problem Relation Age of Onset     Cancer Mother         uterine     Lipids Mother      Neurologic Disorder Mother         polymyalgia     Hypertension Mother      Other Cancer Mother         endometrial     Depression/Anxiety Mother      Other - See Comments Mother         Heart Arrythmia      Cardiovascular Father         CHF     Depression Father      C.A.D. Father         bypass x2 starting at age 55-  in his 70's     Diabetes Father         type 2     Coronary Artery Disease Father          from - age 75     Depression/Anxiety Father      Cerebrovascular Disease Father         from angioplasty      C.A.D. Maternal Grandfather      Coronary Artery Disease Maternal Grandfather          from- age 61     C.A.D. Paternal  Grandfather      Diabetes Brother      Depression Son      Psychotic Disorder Son         adhd     Diabetes Brother         type 1     Depression/Anxiety Brother      Depression/Anxiety Maternal Grandmother      Depression/Anxiety Son      Asthma Son         childhood asthma-out grown now as an adult     Coronary Artery Disease Brother         stent-MI     Melanoma No family hx of          Current Outpatient Medications   Medication Sig Dispense Refill     acetaminophen (TYLENOL) 325 MG tablet Take 325-650 mg by mouth every 6 hours as needed for mild pain Per pt takes as needed for headaches       ALPRAZolam (XANAX PO) Take 0.5-1 mg by mouth 1/2 tab in am, 1 tab in pm, then 1 tab midday prn and 1/2 tab throughout the day if needed       amphetamine-dextroamphetamine (ADDERALL XR) 20 MG per capsule Take 20 mg by mouth daily        ASPIRIN 81 MG OR TABS 1 TABLET DAILY       atorvastatin (LIPITOR) 80 MG tablet Take 1 tablet (80 mg) by mouth daily 90 tablet 3     blood glucose (NO BRAND SPECIFIED) lancets standard Use to test blood sugar 3 times daily or as directed. 100 each 11     blood glucose monitoring (NO BRAND SPECIFIED) meter device kit Use to test blood sugar 3 times daily or as directed. 1 kit 0     blood glucose monitoring (NO BRAND SPECIFIED) test strip Use to test blood sugars 3 times daily or as directed 100 strip 1     clindamycin (CLEOCIN T) 1 % solution Apply topically 2 times daily 60 mL 1     DoxePIN (SINEQUAN) 75 MG capsule Take 75 mg by mouth At Bedtime        empagliflozin (JARDIANCE) 10 MG TABS tablet Take 1 tablet (10 mg) by mouth daily 30 tablet 3     escitalopram (LEXAPRO) 20 MG tablet Take 1 tablet (20 mg) by mouth 180 tablet 3     LANsoprazole (PREVACID) 30 MG DR capsule TAKE 1 CAPSULE BY MOUTH DAILY, 30 TO 60 MINUTES BEFORE A MEAL. 90 capsule 1     lisinopril (PRINIVIL/ZESTRIL) 5 MG tablet Take 1 tablet (5 mg) by mouth daily 90 tablet 3     metoprolol succinate ER (TOPROL-XL) 25 MG 24 hr  tablet Take 1 tablet (25 mg) by mouth daily 90 tablet 3     nitroGLYcerin (NITROSTAT) 0.4 MG sublingual tablet Place 1 tablet (0.4 mg) under the tongue every 5 minutes as needed for chest pain 25 tablet 6     Nutritional Supplements (ENSURE CLEAR PO)        order for DME Equipment being ordered: BP cuff for home 1 Device 0     ranitidine (ZANTAC) 150 MG tablet Take 1 tablet (150 mg) by mouth nightly as needed for heartburn 30 tablet 1     tretinoin (RETIN-A) 0.025 % external cream Spread a pea size amount into affected area topically at bedtime.  Use sunscreen SPF>20. 45 g 1     vilazodone (VIIBRYD) 20 MG TABS tablet Take 20 mg by mouth daily       clindamycin (CLEOCIN) 150 MG capsule Take 2 capsules (300 mg) by mouth 3 times daily (Patient not taking: Reported on 9/5/2019) 42 capsule 0     clindamycin (CLEOCIN) 150 MG capsule Take 1 capsule (150 mg) by mouth 3 times daily (Patient not taking: Reported on 9/5/2019) 6 capsule 0     psyllium 0.52 G capsule Take 4 capsules by mouth At Bedtime       Allergies   Allergen Reactions     Hydrocodone Anaphylaxis     Flexeril [Cyclobenzaprine] Rash     Cipro [Ciprofloxacin] Other (See Comments)     Abd pain , proteinuria , microscopic hematuria  Possibly related to cipro     Codeine Camsylate Nausea     Penicillins Difficulty breathing     Amoxicillin Itching and Rash     Sulfa Drugs Itching and Rash     Tetracycline Itching and Rash         Reviewed and updated as needed this visit by Provider  Tobacco  Allergies  Meds  Problems  Med Hx  Surg Hx  Fam Hx         Review of Systems   ROS COMP: Constitutional, HEENT, cardiovascular, pulmonary, GI, , musculoskeletal, neuro, skin, endocrine and psych systems are negative, except as otherwise noted.      Objective    /62 (BP Location: Right arm, Patient Position: Chair, Cuff Size: Adult Regular)   Pulse (!) 49   Temp 97.8  F (36.6  C) (Tympanic)   Wt 64.9 kg (143 lb)   SpO2 95%   BMI 28.26 kg/m    Body mass  index is 28.26 kg/m .  Physical Exam   GENERAL: healthy, alert and no distress, nontoxic in appearance  EYES: Eyes grossly normal to inspection, PERRL and conjunctivae and sclerae normal  HENT: normocephalic with numerous bandaids on her face. When removed it appears that she must pick at these as they are red with the one on center of forehead slightly dished out and one on her right cheek as been picked and drained as well and has a small hole in the middle that is superficial.  NECK: supple with full ROM  RESP: lungs clear to auscultation - no rales, rhonchi or wheezes  CV: regular rate and rhythm, normal S1 S2, no S3 or S4, no murmur, click or rub, no peripheral edema   ABDOMEN: soft, nontender  MS: no gross musculoskeletal defects noted, no edema  No rash    Diagnostic Test Results:  Labs reviewed in Epic  No results found for this or any previous visit (from the past 24 hour(s)).        Assessment & Plan patient wanted forehead and right cheek lesions cultured and also wants a CBC.  Problem List Items Addressed This Visit     None      Visit Diagnoses     Skin lesion of face    -  Primary    Relevant Orders    Wound Culture Aerobic Bacterial (Completed)    CBC with platelets and differential (Completed)    Skin lesion of cheek        Relevant Orders    Wound Culture Aerobic Bacterial (Completed)    CBC with platelets and differential (Completed)               Patient Instructions   Your bloodwork should be back tomorrow.    Follow up with Dr. Benedict when cultures return.    Follow-up with your primary care provider next week and as needed.    Indications for emergent return to emergency department discussed with patient, who verbalized good understanding and agreement.  Patient understands the limitations of today's evaluation.           Return in about 4 days (around 9/9/2019) for Follow up with your primary care provider.    FIDEL Salgado [facility]

## 2019-09-06 NOTE — NURSING NOTE
"Chief Complaint   Patient presents with     Cellulitis     This episode that limited spots on face.  Had blister like spots and sterilized a needle and then poked areas.  Was drainage, chunks, blood and some almost black blood also. Has done an extended amount of antibiotics from provider. But after 4-5 days antibiotic was doubled in dosage to 300 mg.  Last night symptoms have been getting worse again.  Has been trying some wound cleanser- Skintegrity by [employer].  Has been also been putting 2 different patches on spots also with steri strips over them.        Initial /62 (BP Location: Right arm, Patient Position: Chair, Cuff Size: Adult Regular)   Pulse (!) 49   Temp 97.8  F (36.6  C) (Tympanic)   Wt 64.9 kg (143 lb)   SpO2 95%   BMI 28.26 kg/m   Estimated body mass index is 28.26 kg/m  as calculated from the following:    Height as of 3/5/19: 1.515 m (4' 11.65\").    Weight as of this encounter: 64.9 kg (143 lb).    Patient presents to the clinic using No DME    Health Maintenance that is potentially due pending provider review:  NONE    n/a    Is there anyone who you would like to be able to receive your results? No  If yes have patient fill out MIGUEL Pitts M.A.          "

## 2019-09-06 NOTE — PATIENT INSTRUCTIONS
Your bloodwork should be back tomorrow.    Follow up with Dr. Benedict when cultures return.    Follow-up with your primary care provider next week and as needed.    Indications for emergent return to emergency department discussed with patient, who verbalized good understanding and agreement.  Patient understands the limitations of today's evaluation.

## 2019-09-06 NOTE — RESULT ENCOUNTER NOTE
[facility] ED discharge antibiotic (if prescribed):  None  Incision and Drainage performed in [facility] ED [Yes / No] : No  No changes in treatment per ED lab result protocol.

## 2019-09-07 ENCOUNTER — TELEPHONE (OUTPATIENT)
Dept: URGENT CARE | Facility: URGENT CARE | Age: 69
End: 2019-09-07

## 2019-09-07 ENCOUNTER — NURSE TRIAGE (OUTPATIENT)
Dept: NURSING | Facility: CLINIC | Age: 69
End: 2019-09-07

## 2019-09-07 DIAGNOSIS — T14.8XXA WOUND INFECTION: ICD-10-CM

## 2019-09-07 DIAGNOSIS — L08.9 WOUND INFECTION: ICD-10-CM

## 2019-09-07 LAB
BACTERIA SPEC CULT: ABNORMAL
BACTERIA SPEC CULT: ABNORMAL
Lab: ABNORMAL
Lab: ABNORMAL
SPECIMEN SOURCE: ABNORMAL
SPECIMEN SOURCE: ABNORMAL

## 2019-09-07 RX ORDER — CEPHALEXIN 500 MG/1
500 CAPSULE ORAL 4 TIMES DAILY
Qty: 20 CAPSULE | Refills: 0 | Status: SHIPPED | OUTPATIENT
Start: 2019-09-07 | End: 2019-09-12

## 2019-09-07 NOTE — TELEPHONE ENCOUNTER
"Pt calls in with - multiple questions  Difficult to pin point exact nature of call     Main issue apparently is cellulitis of face  Apparently has had cellulitis - seen in ED x 4 for    Has had flare up since late July Monday doubled her dose of  Clindamycin - still not working - talked to RN in urgent care today - had ABX switched to Keflex    Then asks if she should start the keflex -yes    Was seen in  9/5 for same - see note     In reviewing her AVS > instructions read back to her >>>>     \"Follow up with Dr. Benedict when cultures return.  Follow-up with your primary care provider next week and as needed\"    When asked if she has made appointment for either she says NO    After much listening to assorted issues - asked again if she had followed the instructions noted above- she says again NO    Finally asked pt --  If I could get her over to scheduling to make appointment for the above - she says YES     Pt transferred to scheduling to make appointment     Pt also advised she can always go to her nearest UC for re-evaluation as well     Protocol and care advice reviewed  Caller states understanding of the recommended disposition  Advised to call back if further questions or concerns    Luis Enrique Duval , RN / [facility]        Reason for Disposition    [1] Caller requesting NON-URGENT health information AND [2] PCP's office is the best resource    Protocols used: INFORMATION ONLY CALL-A-AH      "

## 2019-09-07 NOTE — TELEPHONE ENCOUNTER
"RN Recommendations/Instructions per [place] ED lab result protocol  2:06PM; Patient notified of ED provider's recommendations as noted below.  Rx for Cephalexin (Keflex) 500 mg capsule, 1 capsule (500 mg) by mouth 4 times daily for 5 days. sent to [Pharmacy - [facility] in [place]].  Verified again with Patient that she has been able to tolerate Keflex in the past. From review of [facility] medications patient has been prescribed Keflex twice in the past.   Patient then states that she is \"feeling crappy\" today, no energy, \"I wouldn't want to drive.\" Feels unsteady on her feet. \"I'm worried I might have a blood infection.\" \"I can't think straight.\" \"I just feel weird.\"   Advised Patient that if she is feeling worse she should be seen in the ED. Patient states she is unsure what to do. Would like to start the medication, but would also like to speak with her provider.   Advised Patient to talk with the triage nurse for her clinic to discuss what she should do next. Patient agrees with this plan and would like to speak with the triage nurse now before doing anything else. Patient was transferred to scheduling and they will connect her with a triage nurse for further recommendations.        Please Contact your PCP clinic or return to the Emergency department if your:    Symptoms worsen or other concerning symptom's.    PCP follow-up Questions asked: YES       [RN Name]  Erin Jensen RN  [facility] RN  Lung Nodule and ED Lab Result RN  Epic pool (ED late result f/u RN): [id number]  [facility] INCIDENTAL RADIOLOGY F/U NURSES: [id number]  # [phone]        "

## 2019-09-07 NOTE — TELEPHONE ENCOUNTER
[facility] Lab result notification [Adult]    [facility] ED lab result protocol used  General culture  Reason for call  Notify of lab results, assess symptoms,  review Urgent Care providers recommendations/discharge instructions (if necessary) and advise per [place] ED lab result f/u protocol    Lab Result (including Rx patient on, if applicable)  Final Wound culture (cheek) report on 9/7/19  Emergency Dept discharge antibiotic prescribed: None  #1. Bacteria, Light growth Coagulase negative Staphylococcus   Incision and Drainage performed in [place] ED [Yes / No]: No  Patient to be notified of result, symptoms assessed and advised per [place] ED lab result protocol.    Information table from Urgent Provider visit on 9/5/19  Symptoms reported at Urgent Care visit (Chief complaint, HPI) Cellulitis        This episode that limited spots on face.  Had blister like spots and sterilized a needle and then poked areas.  Was drainage, chunks, blood and some almost black blood also. Has done an extended amount of antibiotics from provider. But after 4-5 days antibiotic was doubled in dosage to 300 mg.  Last night symptoms have been getting worse again.  Has been trying some wound cleanser- Skintegrity by [employer].  Has been also been putting 2 different patches on spots also with steri strips over them.         Significant Medical hx, if applicable (i.e. CKD, diabetes) Type 2 diabetes   Allergies Allergies   Allergen Reactions     Hydrocodone Anaphylaxis     Flexeril [Cyclobenzaprine] Rash     Cipro [Ciprofloxacin] Other (See Comments)     Abd pain , proteinuria , microscopic hematuria  Possibly related to cipro     Codeine Camsylate Nausea     Penicillins Difficulty breathing     Amoxicillin Itching and Rash     Sulfa Drugs Itching and Rash     Tetracycline Itching and Rash      Weight, if applicable Wt Readings from Last 2 Encounters:   09/05/19 64.9 kg (143 lb)   08/13/19 70 kg (154 lb 6.4 oz)     "  Coumadin/Warfarin [Yes /No] No   Creatinine Level (mg/dl) Creatinine   Date Value Ref Range Status   01/14/2019 0.80 0.52 - 1.04 mg/dL Final      Creatinine clearance (ml/min), if applicable Creatinine clearance cannot be calculated (Patient's most recent lab result is older than the maximum 90 days allowed.)   Pregnant (Yes/No/NA) No   Breastfeeding (Yes/No/NA) No   Urgent Care providers Impression and Plan (applicable information) Your bloodwork should be back tomorrow.     Follow up with Dr. Benedict when cultures return.     Follow-up with your primary care provider next week and as needed.    Indications for emergent return to emergency department discussed with patient, who verbalized good understanding and agreement.  Patient understands the limitations of today's evaluation.    Urgent Care diagnosis Skin lesion of face    -  Primary      Relevant Orders     Wound Culture Aerobic Bacterial (Completed)     CBC with platelets and differential (Completed)     Skin lesion of cheek         Relevant Orders     Wound Culture Aerobic Bacterial (Completed)     CBC with platelets and differential (Completed)        Urgent Care Provider FIDEL Salgado CNP      RN Assessment (Patient s current Symptoms), include time called.  [Insert Left message here if message left]  `1:11PM: Spoke with Patient. She states that she is improving, but still has 5 \"oozy spots\" on her face. 3 on her cheeks, one on her forehead and one under her chin. Areas are slightly red. Patient's blood sugars have been running in the \"high 200's\". Patient states that she snacks all day, doesn't \"eat meals\". Blood sugars are not morning fasting sugars. Denies fever. \"I'm alittle worried about that one under my chin.\"     RN Recommendations/Instructions per [facility] ED lab result protocol  Patient notified of lab result and treatment recommendations.  Advised that I would consult with the Ed provider and call her back.       [facility] " Emergency Department Provider Name & Recommendations (included time consulted)  1:21PM Consulted with [facility] ED provider Dr. Lior Barrett. Reviewed Patient's history, current symptoms and culture results. Patient states that she has tolerated Cephalosporins without difficulty in the past.  Dr. Barrett advised Patient start Keflex 500mg, four times a day for 5 days. She should follow up with her PCP.       [RN Name]  Erin Jensen RN  [facility] RN  Lung Nodule and ED Lab Result RN  Epic pool (ED late result f/u RN): [id number]  [facility] INCIDENTAL RADIOLOGY F/U NURSES: [id number]  Ph# [phone]    Copy of Lab result   Wound Culture Aerobic Bacterial [[id number]] (Order [id number])   Order Requisition     Wound Culture Aerobic Bacterial (Order #[id number]) on 9/5/19   Exam Information     Exam Date Exam Time Accession # Results    9/5/19  7:50 PM [id number]    Specimen Information: Head        Component Collected Lab   Specimen Description 09/05/2019  7:50    Head    Special Requests 09/05/2019  7:50    Specimen collected in eSwab transport (white cap)    Culture Micro Abnormal  09/05/2019  7:50    Light growth   Coagulase negative Staphylococcus   Susceptibility testing not routinely done

## 2019-09-11 ENCOUNTER — MYC MEDICAL ADVICE (OUTPATIENT)
Dept: FAMILY MEDICINE | Facility: CLINIC | Age: 69
End: 2019-09-11

## 2019-09-11 DIAGNOSIS — T14.8XXA WOUND INFECTION: ICD-10-CM

## 2019-09-11 DIAGNOSIS — L08.9 WOUND INFECTION: ICD-10-CM

## 2019-09-12 RX ORDER — CEPHALEXIN 500 MG/1
500 CAPSULE ORAL 4 TIMES DAILY
Qty: 20 CAPSULE | Refills: 0 | Status: SHIPPED | OUTPATIENT
Start: 2019-09-12 | End: 2019-09-18

## 2019-09-14 ENCOUNTER — NURSE TRIAGE (OUTPATIENT)
Dept: NURSING | Facility: CLINIC | Age: 69
End: 2019-09-14
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
## 2019-09-15 ENCOUNTER — OFFICE VISIT (OUTPATIENT)
Dept: URGENT CARE | Facility: URGENT CARE | Age: 69
End: 2019-09-15
Payer: COMMERCIAL

## 2019-09-15 VITALS
HEART RATE: 52 BPM | BODY MASS INDEX: 30.43 KG/M2 | DIASTOLIC BLOOD PRESSURE: 56 MMHG | WEIGHT: 154 LBS | RESPIRATION RATE: 14 BRPM | SYSTOLIC BLOOD PRESSURE: 124 MMHG | OXYGEN SATURATION: 98 % | TEMPERATURE: 96.6 F

## 2019-09-15 DIAGNOSIS — R30.0 DYSURIA: ICD-10-CM

## 2019-09-15 DIAGNOSIS — N76.0 BV (BACTERIAL VAGINOSIS): Primary | ICD-10-CM

## 2019-09-15 DIAGNOSIS — E11.9 TYPE 2 DIABETES MELLITUS WITHOUT COMPLICATION, WITHOUT LONG-TERM CURRENT USE OF INSULIN (H): ICD-10-CM

## 2019-09-15 DIAGNOSIS — B96.89 BV (BACTERIAL VAGINOSIS): Primary | ICD-10-CM

## 2019-09-15 LAB
ALBUMIN UR-MCNC: NEGATIVE MG/DL
APPEARANCE UR: CLEAR
BILIRUB UR QL STRIP: NEGATIVE
COLOR UR AUTO: YELLOW
GLUCOSE UR STRIP-MCNC: >=1000 MG/DL
HBA1C MFR BLD: 7.9 % (ref 0–5.6)
HGB UR QL STRIP: NEGATIVE
KETONES UR STRIP-MCNC: NEGATIVE MG/DL
LEUKOCYTE ESTERASE UR QL STRIP: NEGATIVE
NITRATE UR QL: NEGATIVE
PH UR STRIP: 5 PH (ref 5–7)
SOURCE: ABNORMAL
SP GR UR STRIP: 1.01 (ref 1–1.03)
SPECIMEN SOURCE: ABNORMAL
UROBILINOGEN UR STRIP-ACNC: 0.2 EU/DL (ref 0.2–1)
WET PREP SPEC: ABNORMAL

## 2019-09-15 PROCEDURE — 83036 HEMOGLOBIN GLYCOSYLATED A1C: CPT | Performed by: FAMILY MEDICINE

## 2019-09-15 PROCEDURE — 81003 URINALYSIS AUTO W/O SCOPE: CPT | Performed by: PHYSICIAN ASSISTANT

## 2019-09-15 PROCEDURE — 99214 OFFICE O/P EST MOD 30 MIN: CPT | Performed by: PHYSICIAN ASSISTANT

## 2019-09-15 PROCEDURE — 87210 SMEAR WET MOUNT SALINE/INK: CPT | Performed by: PHYSICIAN ASSISTANT

## 2019-09-15 PROCEDURE — 36415 COLL VENOUS BLD VENIPUNCTURE: CPT | Performed by: FAMILY MEDICINE

## 2019-09-15 RX ORDER — METRONIDAZOLE 500 MG/1
500 TABLET ORAL 2 TIMES DAILY
Qty: 14 TABLET | Refills: 0 | Status: ON HOLD | OUTPATIENT
Start: 2019-09-15 | End: 2019-09-19

## 2019-09-15 ASSESSMENT — ENCOUNTER SYMPTOMS
HEADACHES: 0
ADENOPATHY: 0
POLYDIPSIA: 0
NAUSEA: 0
ABDOMINAL PAIN: 1
CHILLS: 0
FEVER: 0
MUSCULOSKELETAL NEGATIVE: 1
RESPIRATORY NEGATIVE: 1
NECK PAIN: 0
CARDIOVASCULAR NEGATIVE: 1
DIZZINESS: 0
LIGHT-HEADEDNESS: 0
VOMITING: 0
COUGH: 0
FATIGUE: 0
ENDOCRINE NEGATIVE: 1
SHORTNESS OF BREATH: 0
ACTIVITY CHANGE: 0
NECK STIFFNESS: 0
WEAKNESS: 0
PALPITATIONS: 0
RHINORRHEA: 0
FREQUENCY: 0
FLANK PAIN: 0
NEUROLOGICAL NEGATIVE: 1
MYALGIAS: 0
SORE THROAT: 0
DIARRHEA: 0
CONSTITUTIONAL NEGATIVE: 1
DYSURIA: 0
HEMATURIA: 0
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
## 2019-09-15 NOTE — NURSING NOTE
"Chief Complaint   Patient presents with     Urinary Problem     Has been having dysuria, but unsure if able to tell if from urine or is interal.  Started a couple days ago.  Having itching also        Initial /56 (BP Location: Right arm, Patient Position: Chair, Cuff Size: Adult Regular)   Pulse 52   Temp 96.6  F (35.9  C) (Tympanic)   Resp 14   Wt 69.9 kg (154 lb)   SpO2 98%   BMI 30.43 kg/m   Estimated body mass index is 30.43 kg/m  as calculated from the following:    Height as of 3/5/19: 1.515 m (4' 11.65\").    Weight as of this encounter: 69.9 kg (154 lb).    Patient presents to the clinic using No DME    Health Maintenance that is potentially due pending provider review:  NONE    n/a    Is there anyone who you would like to be able to receive your results? No  If yes have patient fill out MIGUEL  Milton Pitts M.A.      "

## 2019-09-15 NOTE — PATIENT INSTRUCTIONS
Patient Education   Follow up in primary care in the next 1-2 weeks.     Bacterial Vaginosis    You have a vaginal infection called bacterial vaginosis (BV). Both good and bad bacteria are present in a healthy vagina. BV occurs when these bacteria get out of balance. The number of bad bacteria increase. And the number of good bacteria decrease. Although BV is associated with sexual activity, it is not a sexually transmitted disease.  BV may or may not cause symptoms. If symptoms do occur, they can include:    Thin, gray, milky-white, or sometimes green discharge    Unpleasant odor or  fishy  smell    Itching, burning, or pain in or around the vagina  It is not known what causes BV, but certain factors can make the problem more likely. This can include:    Douching    Having sex with a new partner    Having sex with more than one partner  BV will sometimes go away on its own. But treatment is usually recommended. This is because untreated BV can increase the risk of more serious health problems such as:    Pelvic inflammatory disease (PID)     delivery (giving birth to a baby early if you re pregnant)    HIV and certain other sexually transmitted diseases (STDs)    Infection after surgery on the reproductive organs  Home care  General care    BV is most often treated with medicines called antibiotics. These may be given as pills or as a vaginal cream. If antibiotics are prescribed, be sure to use them exactly as directed. Also, be sure to complete all of the medicine, even if your symptoms go away.    Don't douche or having sex during treatment.    If you have sex with a female partner, ask your healthcare provider if she should also be treated.  Prevention    Don't douche.    Don't have sex. If you do have sex, then take steps to lower your risk:  ? Use condoms when having sex.  ? Limit the number of sexual partners you have.  Follow-up care  Follow up with your healthcare provider, or as advised.  When to  seek medical advice  Call your healthcare provider right away if:    You have a fever of 100.4 F (38 C) or higher, or as directed by your provider.    Your symptoms worsen, or they don t go away within a few days of starting treatment.    You have new pain in the lower belly or pelvic region.    You have side effects that bother you or a reaction to the pills or cream you re prescribed.    You or any partners you have sex with have new symptoms, such as a rash, joint pain, or sores.  Date Last Reviewed: 10/1/2017    3449-5582 [employer]. [address] [place] [address]. All rights reserved. This information is not intended as a substitute for professional medical care. Always follow your healthcare professional's instructions.           Patient Education     Bacterial Vaginosis    You have a vaginal infection called bacterial vaginosis (BV). Both good and bad bacteria are present in a healthy vagina. BV occurs when these bacteria get out of balance. The number of bad bacteria increase. And the number of good bacteria decrease. Although BV is associated with sexual activity, it is not a sexually transmitted disease.  BV may or may not cause symptoms. If symptoms do occur, they can include:    Thin, gray, milky-white, or sometimes green discharge    Unpleasant odor or  fishy  smell    Itching, burning, or pain in or around the vagina  It is not known what causes BV, but certain factors can make the problem more likely. This can include:    Douching    Having sex with a new partner    Having sex with more than one partner  BV will sometimes go away on its own. But treatment is usually recommended. This is because untreated BV can increase the risk of more serious health problems such as:    Pelvic inflammatory disease (PID)     delivery (giving birth to a baby early if you re pregnant)    HIV and certain other sexually transmitted diseases (STDs)    Infection after surgery on the  reproductive organs  Home care  General care    BV is most often treated with medicines called antibiotics. These may be given as pills or as a vaginal cream. If antibiotics are prescribed, be sure to use them exactly as directed. Also, be sure to complete all of the medicine, even if your symptoms go away.    Don't douche or having sex during treatment.    If you have sex with a female partner, ask your healthcare provider if she should also be treated.  Prevention    Don't douche.    Don't have sex. If you do have sex, then take steps to lower your risk:  ? Use condoms when having sex.  ? Limit the number of sexual partners you have.  Follow-up care  Follow up with your healthcare provider, or as advised.  When to seek medical advice  Call your healthcare provider right away if:    You have a fever of 100.4 F (38 C) or higher, or as directed by your provider.    Your symptoms worsen, or they don t go away within a few days of starting treatment.    You have new pain in the lower belly or pelvic region.    You have side effects that bother you or a reaction to the pills or cream you re prescribed.    You or any partners you have sex with have new symptoms, such as a rash, joint pain, or sores.  Date Last Reviewed: 10/1/2017    5569-4358 [employer]. [address] [place] [address], [place] [address]. All rights reserved. This information is not intended as a substitute for professional medical care. Always follow your healthcare professional's instructions.

## 2019-09-16 ENCOUNTER — MYC MEDICAL ADVICE (OUTPATIENT)
Dept: FAMILY MEDICINE | Facility: CLINIC | Age: 69
End: 2019-09-16

## 2019-09-17 ASSESSMENT — PATIENT HEALTH QUESTIONNAIRE - PHQ9: SUM OF ALL RESPONSES TO PHQ QUESTIONS 1-9: 9

## 2019-09-18 ENCOUNTER — HOSPITAL ENCOUNTER (OUTPATIENT)
Facility: CLINIC | Age: 69
Setting detail: OBSERVATION
Discharge: HOME | End: 2019-09-19
Attending: FAMILY MEDICINE | Admitting: FAMILY MEDICINE
Payer: COMMERCIAL

## 2019-09-18 ENCOUNTER — APPOINTMENT (OUTPATIENT)
Dept: GENERAL RADIOLOGY | Facility: CLINIC | Age: 69
End: 2019-09-18
Attending: FAMILY MEDICINE
Payer: COMMERCIAL

## 2019-09-18 DIAGNOSIS — I10 ESSENTIAL HYPERTENSION, MALIGNANT: ICD-10-CM

## 2019-09-18 DIAGNOSIS — Z87.891 PERSONAL HISTORY OF TOBACCO USE, PRESENTING HAZARDS TO HEALTH: ICD-10-CM

## 2019-09-18 DIAGNOSIS — I25.10 CORONARY ARTERY DISEASE INVOLVING NATIVE HEART WITHOUT ANGINA PECTORIS, UNSPECIFIED VESSEL OR LESION TYPE: ICD-10-CM

## 2019-09-18 DIAGNOSIS — I25.2 HISTORY OF ST ELEVATION MYOCARDIAL INFARCTION (STEMI): ICD-10-CM

## 2019-09-18 DIAGNOSIS — I25.10 ATHEROSCLEROSIS OF NATIVE CORONARY ARTERY OF NATIVE HEART WITHOUT ANGINA PECTORIS: ICD-10-CM

## 2019-09-18 DIAGNOSIS — R07.9 CHEST PAIN, UNSPECIFIED TYPE: ICD-10-CM

## 2019-09-18 DIAGNOSIS — E11.9 TYPE 2 DIABETES MELLITUS WITHOUT COMPLICATION, WITHOUT LONG-TERM CURRENT USE OF INSULIN (H): ICD-10-CM

## 2019-09-18 PROBLEM — R19.7 DIARRHEA: Status: ACTIVE | Noted: 2019-09-18

## 2019-09-18 LAB
ALBUMIN SERPL-MCNC: 3.3 G/DL (ref 3.4–5)
ALBUMIN UR-MCNC: NEGATIVE MG/DL
ALP SERPL-CCNC: 125 U/L (ref 40–150)
ALT SERPL W P-5'-P-CCNC: 53 U/L (ref 0–50)
ANION GAP SERPL CALCULATED.3IONS-SCNC: 7 MMOL/L (ref 3–14)
APPEARANCE UR: CLEAR
AST SERPL W P-5'-P-CCNC: 41 U/L (ref 0–45)
BASOPHILS # BLD AUTO: 0.1 10E9/L (ref 0–0.2)
BASOPHILS NFR BLD AUTO: 0.8 %
BILIRUB DIRECT SERPL-MCNC: <0.1 MG/DL (ref 0–0.2)
BILIRUB SERPL-MCNC: 0.3 MG/DL (ref 0.2–1.3)
BILIRUB UR QL STRIP: NEGATIVE
BUN SERPL-MCNC: 15 MG/DL (ref 7–30)
CALCIUM SERPL-MCNC: 8.3 MG/DL (ref 8.5–10.1)
CHLORIDE SERPL-SCNC: 108 MMOL/L (ref 94–109)
CO2 SERPL-SCNC: 24 MMOL/L (ref 20–32)
COLOR UR AUTO: YELLOW
CREAT SERPL-MCNC: 0.81 MG/DL (ref 0.52–1.04)
DIFFERENTIAL METHOD BLD: ABNORMAL
EOSINOPHIL # BLD AUTO: 0.6 10E9/L (ref 0–0.7)
EOSINOPHIL NFR BLD AUTO: 7.9 %
ERYTHROCYTE [DISTWIDTH] IN BLOOD BY AUTOMATED COUNT: 15.6 % (ref 10–15)
GFR SERPL CREATININE-BSD FRML MDRD: 75 ML/MIN/{1.73_M2}
GLUCOSE BLDC GLUCOMTR-MCNC: 188 MG/DL (ref 70–99)
GLUCOSE SERPL-MCNC: 193 MG/DL (ref 70–99)
GLUCOSE UR STRIP-MCNC: >499 MG/DL
HCT VFR BLD AUTO: 33.3 % (ref 35–47)
HGB BLD-MCNC: 10 G/DL (ref 11.7–15.7)
HGB UR QL STRIP: NEGATIVE
IMM GRANULOCYTES # BLD: 0 10E9/L (ref 0–0.4)
IMM GRANULOCYTES NFR BLD: 0.3 %
KETONES UR STRIP-MCNC: NEGATIVE MG/DL
LEUKOCYTE ESTERASE UR QL STRIP: NEGATIVE
LIPASE SERPL-CCNC: 128 U/L (ref 73–393)
LYMPHOCYTES # BLD AUTO: 1.4 10E9/L (ref 0.8–5.3)
LYMPHOCYTES NFR BLD AUTO: 18.1 %
MCH RBC QN AUTO: 24.4 PG (ref 26.5–33)
MCHC RBC AUTO-ENTMCNC: 30 G/DL (ref 31.5–36.5)
MCV RBC AUTO: 81 FL (ref 78–100)
MONOCYTES # BLD AUTO: 1.3 10E9/L (ref 0–1.3)
MONOCYTES NFR BLD AUTO: 16.2 %
NEUTROPHILS # BLD AUTO: 4.4 10E9/L (ref 1.6–8.3)
NEUTROPHILS NFR BLD AUTO: 56.7 %
NITRATE UR QL: NEGATIVE
NRBC # BLD AUTO: 0 10*3/UL
NRBC BLD AUTO-RTO: 0 /100
PH UR STRIP: 5 PH (ref 5–7)
PLATELET # BLD AUTO: 191 10E9/L (ref 150–450)
POTASSIUM SERPL-SCNC: 4.2 MMOL/L (ref 3.4–5.3)
PROT SERPL-MCNC: 6.2 G/DL (ref 6.8–8.8)
RBC # BLD AUTO: 4.09 10E12/L (ref 3.8–5.2)
RBC #/AREA URNS AUTO: 1 /HPF (ref 0–2)
SODIUM SERPL-SCNC: 139 MMOL/L (ref 133–144)
SOURCE: ABNORMAL
SP GR UR STRIP: 1.02 (ref 1–1.03)
TROPONIN I SERPL-MCNC: <0.015 UG/L (ref 0–0.04)
UROBILINOGEN UR STRIP-MCNC: 0 MG/DL (ref 0–2)
WBC # BLD AUTO: 7.7 10E9/L (ref 4–11)
WBC #/AREA URNS AUTO: 1 /HPF (ref 0–5)

## 2019-09-18 PROCEDURE — 84484 ASSAY OF TROPONIN QUANT: CPT | Performed by: FAMILY MEDICINE

## 2019-09-18 PROCEDURE — 99207 ZZC CDG-CODE CATEGORY CHANGED: CPT | Performed by: PHYSICIAN ASSISTANT

## 2019-09-18 PROCEDURE — 71046 X-RAY EXAM CHEST 2 VIEWS: CPT

## 2019-09-18 PROCEDURE — 81001 URINALYSIS AUTO W/SCOPE: CPT | Performed by: FAMILY MEDICINE

## 2019-09-18 PROCEDURE — 25000132 ZZH RX MED GY IP 250 OP 250 PS 637: Performed by: PHYSICIAN ASSISTANT

## 2019-09-18 PROCEDURE — 80076 HEPATIC FUNCTION PANEL: CPT | Performed by: FAMILY MEDICINE

## 2019-09-18 PROCEDURE — 80048 BASIC METABOLIC PNL TOTAL CA: CPT | Performed by: FAMILY MEDICINE

## 2019-09-18 PROCEDURE — 83690 ASSAY OF LIPASE: CPT | Performed by: FAMILY MEDICINE

## 2019-09-18 PROCEDURE — G0378 HOSPITAL OBSERVATION PER HR: HCPCS

## 2019-09-18 PROCEDURE — 25000132 ZZH RX MED GY IP 250 OP 250 PS 637: Performed by: FAMILY MEDICINE

## 2019-09-18 PROCEDURE — 85025 COMPLETE CBC W/AUTO DIFF WBC: CPT | Performed by: FAMILY MEDICINE

## 2019-09-18 PROCEDURE — 93005 ELECTROCARDIOGRAM TRACING: CPT | Performed by: FAMILY MEDICINE

## 2019-09-18 PROCEDURE — 99285 EMERGENCY DEPT VISIT HI MDM: CPT | Mod: 25 | Performed by: FAMILY MEDICINE

## 2019-09-18 PROCEDURE — 00000146 ZZHCL STATISTIC GLUCOSE BY METER IP

## 2019-09-18 PROCEDURE — 99219 ZZC INITIAL OBSERVATION CARE,LEVL II: CPT | Performed by: PHYSICIAN ASSISTANT

## 2019-09-18 PROCEDURE — 93010 ELECTROCARDIOGRAM REPORT: CPT | Mod: Z6 | Performed by: FAMILY MEDICINE

## 2019-09-18 RX ORDER — ALPRAZOLAM 0.5 MG
0.5 TABLET ORAL EVERY MORNING
Status: DISCONTINUED | OUTPATIENT
Start: 2019-09-19 | End: 2019-09-19 | Stop reason: HOSPADM

## 2019-09-18 RX ORDER — ALPRAZOLAM 0.5 MG
1 TABLET ORAL AT BEDTIME
Status: DISCONTINUED | OUTPATIENT
Start: 2019-09-18 | End: 2019-09-19 | Stop reason: HOSPADM

## 2019-09-18 RX ORDER — VILAZODONE HYDROCHLORIDE 10 MG/1
20 TABLET ORAL DAILY
Status: DISCONTINUED | OUTPATIENT
Start: 2019-09-19 | End: 2019-09-18

## 2019-09-18 RX ORDER — DOXEPIN HYDROCHLORIDE 25 MG/1
75 CAPSULE ORAL AT BEDTIME
Status: DISCONTINUED | OUTPATIENT
Start: 2019-09-18 | End: 2019-09-19 | Stop reason: HOSPADM

## 2019-09-18 RX ORDER — VILAZODONE HYDROCHLORIDE 20 MG/1
20 TABLET ORAL DAILY
Status: DISCONTINUED | OUTPATIENT
Start: 2019-09-19 | End: 2019-09-19 | Stop reason: HOSPADM

## 2019-09-18 RX ORDER — DEXTROAMPHETAMINE SACCHARATE, AMPHETAMINE ASPARTATE MONOHYDRATE, DEXTROAMPHETAMINE SULFATE AND AMPHETAMINE SULFATE 2.5; 2.5; 2.5; 2.5 MG/1; MG/1; MG/1; MG/1
20 CAPSULE, EXTENDED RELEASE ORAL DAILY
Status: DISCONTINUED | OUTPATIENT
Start: 2019-09-19 | End: 2019-09-19 | Stop reason: HOSPADM

## 2019-09-18 RX ORDER — LISINOPRIL 5 MG/1
5 TABLET ORAL DAILY
Status: DISCONTINUED | OUTPATIENT
Start: 2019-09-19 | End: 2019-09-19 | Stop reason: HOSPADM

## 2019-09-18 RX ORDER — DEXTROSE MONOHYDRATE 25 G/50ML
25-50 INJECTION, SOLUTION INTRAVENOUS
Status: DISCONTINUED | OUTPATIENT
Start: 2019-09-18 | End: 2019-09-19 | Stop reason: HOSPADM

## 2019-09-18 RX ORDER — NICOTINE POLACRILEX 4 MG
15-30 LOZENGE BUCCAL
Status: DISCONTINUED | OUTPATIENT
Start: 2019-09-18 | End: 2019-09-19 | Stop reason: HOSPADM

## 2019-09-18 RX ORDER — METRONIDAZOLE 500 MG/1
500 TABLET ORAL 2 TIMES DAILY
Status: DISCONTINUED | OUTPATIENT
Start: 2019-09-18 | End: 2019-09-19 | Stop reason: HOSPADM

## 2019-09-18 RX ORDER — ALPRAZOLAM 0.5 MG
1 TABLET ORAL DAILY PRN
Status: DISCONTINUED | OUTPATIENT
Start: 2019-09-18 | End: 2019-09-19 | Stop reason: HOSPADM

## 2019-09-18 RX ORDER — ASPIRIN 81 MG/1
81 TABLET ORAL DAILY
COMMUNITY
End: 2024-03-06

## 2019-09-18 RX ORDER — ATORVASTATIN CALCIUM 80 MG/1
80 TABLET, FILM COATED ORAL EVERY EVENING
Status: DISCONTINUED | OUTPATIENT
Start: 2019-09-19 | End: 2019-09-18

## 2019-09-18 RX ORDER — NITROGLYCERIN 0.4 MG/1
0.4 TABLET SUBLINGUAL EVERY 5 MIN PRN
Status: DISCONTINUED | OUTPATIENT
Start: 2019-09-18 | End: 2019-09-19 | Stop reason: HOSPADM

## 2019-09-18 RX ORDER — ATORVASTATIN CALCIUM 80 MG/1
80 TABLET, FILM COATED ORAL EVERY EVENING
Status: DISCONTINUED | OUTPATIENT
Start: 2019-09-18 | End: 2019-09-19 | Stop reason: HOSPADM

## 2019-09-18 RX ADMIN — ALPRAZOLAM 1 MG: 0.5 TABLET ORAL at 22:37

## 2019-09-18 RX ADMIN — ATORVASTATIN CALCIUM 80 MG: 80 TABLET, FILM COATED ORAL at 22:51

## 2019-09-18 RX ADMIN — DOXEPIN HYDROCHLORIDE 75 MG: 25 CAPSULE ORAL at 22:44

## 2019-09-18 ASSESSMENT — ENCOUNTER SYMPTOMS
SINUS PRESSURE: 0
CHILLS: 0
NAUSEA: 1
FREQUENCY: 0
CONSTIPATION: 0
ABDOMINAL PAIN: 0
VOMITING: 1
COUGH: 0
DIAPHORESIS: 1
BLOOD IN STOOL: 0
FEVER: 0
HEADACHES: 0
SORE THROAT: 0
WHEEZING: 0
PALPITATIONS: 0
DYSURIA: 0
SHORTNESS OF BREATH: 1
DIARRHEA: 1

## 2019-09-18 ASSESSMENT — MIFFLIN-ST. JEOR: SCORE: 1152.31

## 2019-09-18 NOTE — ED PROVIDER NOTES
History     Chief Complaint   Patient presents with     Hypertension     Chest Pain     relieved by nitro      HPI  Ml Evans is a 69 year old female who presents with type 2 diabetes, coronary artery disease, prior STEMI and overdue for her Lexiscan ordered by the [facility] presents with 15 minutes of chest pain onset around 2 PM today while she was performing light activity and moving laundry has not a chest pressure anterior radiating to bilateral shoulder, with associated sense of dyspnea.  Unclear if she felt any improvement when she sat down but took 2 nitroglycerin for pain that persisted approximately 15 minutes and by the time paramedics arrived she was feeling lightheaded and near syncopal as well as diaphoretic likely related to her nitroglycerin immediately prior to the arrival.  This did improve after IV placement and hydration.  She currently has no chest pressure.  She has no current sense of diaphoresis nausea vomiting other radiation of the pain or current shortness of breath.    cardiac meds include aspirin, nitroglycerin as needed, atorvastatin, metoprolol, lisinopril.  She also has a history of anxiety and is curiously on both Xanax and on Adderall.  She is not on insulin for her diabetes.     has had a complicated recent history of facial cellulitis on antibiotics which had to be modified over the course.  Had been on clindamycin and did end up with secondary bacterial vaginosis for which she is still completing a course of Flagyl.  She also has diarrhea for the last 24 hours with 5 stools yesterday.  Recently tested for C. difficile and was negative.  Able to tolerate food and fluids and no significant abdominal pain.  Does complain of urinary frequency as well.          Allergies:  Allergies   Allergen Reactions     Hydrocodone Anaphylaxis     Flexeril [Cyclobenzaprine] Rash     Cipro [Ciprofloxacin] Other (See Comments)     Abd pain , proteinuria , microscopic  hematuria  Possibly related to cipro     Codeine Camsylate Nausea     Penicillins Difficulty breathing     Amoxicillin Itching and Rash     Sulfa Drugs Itching and Rash     Tetracycline Itching and Rash       Problem List:    Patient Active Problem List    Diagnosis Date Noted     History of ST elevation myocardial infarction (STEMI) 01/23/2019     Priority: Medium     Status post insertion of drug-eluting stent into right coronary artery for coronary artery disease 01/23/2019     Priority: Medium     Chronic rhinitis 03/09/2017     Priority: Medium     Benign essential hypertension 03/09/2017     Priority: Medium     Irritable bowel syndrome with diarrhea 03/09/2017     Priority: Medium     Type 2 diabetes mellitus without complication, without long-term current use of insulin (H) 03/09/2017     Priority: Medium     ACS (acute coronary syndrome) (H) 12/30/2015     Priority: Medium     Colon polyp 05/21/2015     Priority: Medium     Tubular adenoma polyp       Advanced directives, counseling/discussion 01/14/2013     Priority: Medium     Patient states has Advance Directive and will bring in a copy to clinic. 1/14/2013          Diverticulosis 06/14/2012     Priority: Medium     Health Care Home 04/19/2011     Priority: Medium     High priority patient - 4/19/11    DX V65.8 REPLACED WITH 61170 HEALTH CARE HOME (04/08/2013)       CAD (coronary artery disease) 03/21/2011     Priority: Medium     Two right coronary stents 2011         HPV (human papilloma virus) anogenital infection 02/22/2011     Priority: Medium     Perianal condyloma  Biopsy done 2011  FINAL DIAGNOSIS:  Skin, perianal, lesion, excision:  - High grade squamous intraepithelial lesion (moderate to severe  dysplasia) (see comment)    COMMENT:  The lesion is arising on top of condyloma. The base of the polypoid  lesion appears free of dysplasia in the planes examined. Follow up is  recommended as clinically deemed appropriate.       GERD (gastroesophageal  reflux disease) 01/20/2011     Priority: Medium     HYPERLIPIDEMIA LDL GOAL <100 10/31/2010     Priority: Medium     Menopausal syndrome (hot flashes) 05/18/2010     Priority: Medium     Wants to take premarin  Understands breast cancer risk       Anal fissure 01/03/2007     Priority: Medium     Mild major depression (H) 09/28/2006     Priority: Medium     Esophageal reflux 09/28/2006     Priority: Medium     Allergic state 09/28/2006     Priority: Medium     seasonal  Problem list name updated by automated process. Provider to review       Attention deficit disorder 05/17/2005     Priority: Medium     Treated by psychiatry  Problem list name updated by automated process. Provider to review       Nonspecific elevation of levels of transaminase or lactic acid dehydrogenase (LDH) 05/17/2005     Priority: Medium     fatty liver          Past Medical History:    Past Medical History:   Diagnosis Date     Attention deficit disorder without mention of hyperactivity      Benign essential hypertension 3/9/2017     Coronary artery disease march 15,2011     Diabetes mellitus (H)      Dysthymic disorder      Glycogenosis (H)      History of blood transfusion 1981     Malignant neoplasm (H)      Other and unspecified hyperlipidemia      Squamous cell carcinoma        Past Surgical History:    Past Surgical History:   Procedure Laterality Date     ABDOMEN SURGERY  1981,1991,1993     APPENDECTOMY  1993     BIOPSY  2003    nose, during surgery     COLONOSCOPY  2005     COLONOSCOPY N/A 5/14/2015    Procedure: COMBINED COLONOSCOPY, SINGLE OR MULTIPLE BIOPSY/POLYPECTOMY BY BIOPSY;  Surgeon: Ponce Christine MD;  Location: [facility]     ECTOPIC PREGNANCY SURGERY  1981     ENDOSCOPIC RELEASE CARPAL TUNNEL  4/16/2013    Procedure: ENDOSCOPIC RELEASE CARPAL TUNNEL;  Right endoscopic carpal tunnel release;  Surgeon: Jonah Dela Cruz MD;  Location: [facility]     ENT SURGERY  2003    nose- suspected basal cell- was benign      ESOPHAGOSCOPY, GASTROSCOPY, DUODENOSCOPY (EGD), COMBINED  2014    Procedure: COMBINED ESOPHAGOSCOPY, GASTROSCOPY, DUODENOSCOPY (EGD), BIOPSY SINGLE OR MULTIPLE;  Surgeon: Anibal Sebastian MD;  Location: [facility]     GENITOURINARY SURGERY  ,      HYSTERECTOMY, ELIECER      total hysterectomy. Unsure if ELIECER or vaginal     SURGICAL HISTORY OF -       appy     SURGICAL HISTORY OF -       T&A     VASCULAR SURGERY      angioplasty- 2 stents implanted       Family History:    Family History   Problem Relation Age of Onset     Cancer Mother         uterine     Lipids Mother      Neurologic Disorder Mother         polymyalgia     Hypertension Mother      Other Cancer Mother         endometrial     Depression/Anxiety Mother      Other - See Comments Mother         Heart Arrythmia      Cardiovascular Father         CHF     Depression Father      C.A.D. Father         bypass x2 starting at age 55-  in his 70's     Diabetes Father         type 2     Coronary Artery Disease Father          from - age 75     Depression/Anxiety Father      Cerebrovascular Disease Father         from angioplasty      C.A.D. Maternal Grandfather      Coronary Artery Disease Maternal Grandfather          from- age 61     C.A.D. Paternal Grandfather      Diabetes Brother      Depression Son      Psychotic Disorder Son         adhd     Diabetes Brother         type 1     Depression/Anxiety Brother      Depression/Anxiety Maternal Grandmother      Depression/Anxiety Son      Asthma Son         childhood asthma-out grown now as an adult     Coronary Artery Disease Brother         stent-MI     Melanoma No family hx of        Social History:  Marital Status:   [2]  Social History     Tobacco Use     Smoking status: Former Smoker     Packs/day: 1.00     Years: 30.00     Pack years: 30.00     Types: Cigarettes     Start date: 1968     Last attempt to quit: 3/15/2011     Years since quittin.5     Smokeless tobacco:  Never Used     Tobacco comment: occasional/daily   Substance Use Topics     Alcohol use: No     Alcohol/week: 0.0 oz     Drug use: No        Medications:      acetaminophen (TYLENOL) 325 MG tablet   ALPRAZolam (XANAX PO)   amphetamine-dextroamphetamine (ADDERALL XR) 20 MG per capsule   ASPIRIN 81 MG OR TABS   atorvastatin (LIPITOR) 80 MG tablet   blood glucose (NO BRAND SPECIFIED) lancets standard   blood glucose monitoring (NO BRAND SPECIFIED) meter device kit   blood glucose monitoring (NO BRAND SPECIFIED) test strip   cephALEXin (KEFLEX) 500 MG capsule   clindamycin (CLEOCIN T) 1 % solution   clindamycin (CLEOCIN) 150 MG capsule   clindamycin (CLEOCIN) 150 MG capsule   DoxePIN (SINEQUAN) 75 MG capsule   empagliflozin (JARDIANCE) 10 MG TABS tablet   escitalopram (LEXAPRO) 20 MG tablet   LANsoprazole (PREVACID) 30 MG DR capsule   lisinopril (PRINIVIL/ZESTRIL) 5 MG tablet   metoprolol succinate ER (TOPROL-XL) 25 MG 24 hr tablet   metroNIDAZOLE (FLAGYL) 500 MG tablet   nitroGLYcerin (NITROSTAT) 0.4 MG sublingual tablet   Nutritional Supplements (ENSURE CLEAR PO)   order for DME   psyllium 0.52 G capsule   ranitidine (ZANTAC) 150 MG tablet   tretinoin (RETIN-A) 0.025 % external cream   vilazodone (VIIBRYD) 20 MG TABS tablet         Review of Systems   Constitutional: Positive for diaphoresis. Negative for chills and fever.   HENT: Negative for ear pain, sinus pressure and sore throat.    Eyes: Negative for visual disturbance.   Respiratory: Positive for shortness of breath. Negative for cough and wheezing.    Cardiovascular: Positive for chest pain. Negative for palpitations.   Gastrointestinal: Positive for diarrhea, nausea and vomiting. Negative for abdominal pain, blood in stool and constipation.   Genitourinary: Negative for dysuria, frequency and urgency.   Skin: Negative for rash.   Neurological: Negative for headaches.   All other systems reviewed and are negative.      Physical Exam          Physical Exam    Constitutional: She appears distressed.   HENT:   Mouth/Throat: Oropharynx is clear and moist.   Eyes: Conjunctivae are normal.   Neck: Neck supple.   Cardiovascular: Normal rate and regular rhythm. Exam reveals no friction rub.   No murmur heard.  Pulmonary/Chest: Effort normal and breath sounds normal. No stridor. No respiratory distress. She has no wheezes. She has no rales. She exhibits no tenderness.   Abdominal: Soft. Bowel sounds are normal. She exhibits no distension and no mass. There is no tenderness. There is no guarding.   Musculoskeletal: She exhibits no edema.   Neurological: She is alert. She exhibits normal muscle tone.   Skin: No rash noted. She is not diaphoretic. No pallor.       ED Course        Procedures                 EKG Interpretation:      Interpreted by Rakesh Edwards MD  EKG done at 1519 hrs. demonstrates a sinus rhythm at 61 bpm with a normal axis and no ST change.  No T wave changes.  Normal R progression and no Q waves.  Normal intervals.  Normal conduction.  No ectopy.  Impression sinus rhythm 61 bpm and no significant change from EKG done 12/29/2015.      Critical Care time:  none               Results for orders placed or performed during the hospital encounter of 09/18/19 (from the past 24 hour(s))   CBC with platelets differential   Result Value Ref Range    WBC 7.7 4.0 - 11.0 10e9/L    RBC Count 4.09 3.8 - 5.2 10e12/L    Hemoglobin 10.0 (L) 11.7 - 15.7 g/dL    Hematocrit 33.3 (L) 35.0 - 47.0 %    MCV 81 78 - 100 fl    MCH 24.4 (L) 26.5 - 33.0 pg    MCHC 30.0 (L) 31.5 - 36.5 g/dL    RDW 15.6 (H) 10.0 - 15.0 %    Platelet Count 191 150 - 450 10e9/L    Diff Method Automated Method     % Neutrophils 56.7 %    % Lymphocytes 18.1 %    % Monocytes 16.2 %    % Eosinophils 7.9 %    % Basophils 0.8 %    % Immature Granulocytes 0.3 %    Nucleated RBCs 0 0 /100    Absolute Neutrophil 4.4 1.6 - 8.3 10e9/L    Absolute Lymphocytes 1.4 0.8 - 5.3 10e9/L    Absolute Monocytes 1.3 0.0 - 1.3  10e9/L    Absolute Eosinophils 0.6 0.0 - 0.7 10e9/L    Absolute Basophils 0.1 0.0 - 0.2 10e9/L    Abs Immature Granulocytes 0.0 0 - 0.4 10e9/L    Absolute Nucleated RBC 0.0    Basic metabolic panel   Result Value Ref Range    Sodium 139 133 - 144 mmol/L    Potassium 4.2 3.4 - 5.3 mmol/L    Chloride 108 94 - 109 mmol/L    Carbon Dioxide 24 20 - 32 mmol/L    Anion Gap 7 3 - 14 mmol/L    Glucose 193 (H) 70 - 99 mg/dL    Urea Nitrogen 15 7 - 30 mg/dL    Creatinine 0.81 0.52 - 1.04 mg/dL    GFR Estimate 75 >60 mL/min/[1.73_m2]    GFR Estimate If Black 86 >60 mL/min/[1.73_m2]    Calcium 8.3 (L) 8.5 - 10.1 mg/dL   Troponin I   Result Value Ref Range    Troponin I ES <0.015 0.000 - 0.045 ug/L   Hepatic panel   Result Value Ref Range    Bilirubin Direct <0.1 0.0 - 0.2 mg/dL    Bilirubin Total 0.3 0.2 - 1.3 mg/dL    Albumin 3.3 (L) 3.4 - 5.0 g/dL    Protein Total 6.2 (L) 6.8 - 8.8 g/dL    Alkaline Phosphatase 125 40 - 150 U/L    ALT 53 (H) 0 - 50 U/L    AST 41 0 - 45 U/L   Lipase   Result Value Ref Range    Lipase 128 73 - 393 U/L   UA with Microscopic   Result Value Ref Range    Color Urine Yellow     Appearance Urine Clear     Glucose Urine >499 (A) NEG^Negative mg/dL    Bilirubin Urine Negative NEG^Negative    Ketones Urine Negative NEG^Negative mg/dL    Specific Gravity Urine 1.020 1.003 - 1.035    Blood Urine Negative NEG^Negative    pH Urine 5.0 5.0 - 7.0 pH    Protein Albumin Urine Negative NEG^Negative mg/dL    Urobilinogen mg/dL 0.0 0.0 - 2.0 mg/dL    Nitrite Urine Negative NEG^Negative    Leukocyte Esterase Urine Negative NEG^Negative    Source Midstream Urine     WBC Urine 1 0 - 5 /HPF    RBC Urine 1 0 - 2 /HPF   Chest XR,  PA & LAT    Narrative    CHEST TWO VIEWS   9/18/2019 4:26 PM     HISTORY: Chest pain.    Comparison: 3/16/2019 radiographs.      Impression    IMPRESSION: The cardiomediastinal silhouette and pulmonary vasculature  appear normal. No infiltrates or effusions. No significant osseous  findings. No  change.       Medications - No data to display    Assessments & Plan (with Medical Decision Making)     MDM: Ml Evans is a 69 year old female who presents with a history of type 2 diabetes coronary disease prior STEMI who is overdue for Lexiscan and presents with 15 minutes of chest pressure radiating to the neck and following nitroglycerin sweats and near syncope that resolved with IV hydration with paramedics.  Arrived here with no current symptoms at rest.  She has no VTE risk.  He also has recent antibiotics for cellulitis and bacterial vaginosis and now has some diarrhea since yesterday with recent negative C. difficile testing.     Her evaluation is reassuring negative EKG no ischemic changes, normal vital signs and afebrile, no current chest pressure, normal chest x-ray and troponin but after only a brief period.  Heart score would suggest observation here with serial troponins and stress testing tomorrow which is which she was to have done before.  Her cardiologist did order Lexiscan but she still has not had it done.  Discussed with Carlton on hospitalist service who accepts for observation.  I have reviewed the nursing notes.    I have reviewed the findings, diagnosis, plan and need for follow up with the patient.       HEART Score  Background  Calculates the overall risk of adverse event in patient's presenting with chest pain.  Based on 5 criteria (each assigned 0-2 points) including suspiciousness of history, EKG, age, risk factors and troponin.    Data  69 year old female  has Attention deficit disorder; Nonspecific elevation of levels of transaminase or lactic acid dehydrogenase (LDH); Mild major depression (H); Esophageal reflux; Allergic state; Anal fissure; Menopausal syndrome (hot flashes); HYPERLIPIDEMIA LDL GOAL <100; GERD (gastroesophageal reflux disease); HPV (human papilloma virus) anogenital infection; CAD (coronary artery disease); Health Care Home; Diverticulosis; Advanced  directives, counseling/discussion; Colon polyp; ACS (acute coronary syndrome) (H); Chronic rhinitis; Benign essential hypertension; Irritable bowel syndrome with diarrhea; Type 2 diabetes mellitus without complication, without long-term current use of insulin (H); History of ST elevation myocardial infarction (STEMI); and Status post insertion of drug-eluting stent into right coronary artery for coronary artery disease on their problem list.   reports that she quit smoking about 8 years ago. Her smoking use included cigarettes. She started smoking about 51 years ago. She has a 30.00 pack-year smoking history. She has never used smokeless tobacco.  family history includes Asthma in her son; C.A.D. in her father, maternal grandfather, and paternal grandfather; Cancer in her mother; Cardiovascular in her father; Cerebrovascular Disease in her father; Coronary Artery Disease in her brother, father, and maternal grandfather; Depression in her father and son; Depression/Anxiety in her brother, father, maternal grandmother, mother, and son; Diabetes in her brother, brother, and father; Hypertension in her mother; Lipids in her mother; Neurologic Disorder in her mother; Other - See Comments in her mother; Other Cancer in her mother; Psychotic Disorder in her son.  Lab Results   Component Value Date    TROPI <0.015 09/18/2019     Criteria   0-2 points for each of 5 items (maximum of 10 points):  Score 1- History moderately suspicious for coronary syndrome  Score 0- EKG Normal  Score 2- Age 65 years or older  Score 2- Three or more risk factors for or history of atherosclerotic disease  Score 0- Within normal limits for troponin levels  Interpretation  Risk of adverse outcome  Heart Score: 5  Total Score 4-6- Adverse Outcome Risk 20.3% - Supports admission with standard rule-out management -serial troponins and stress testing        ED to Inpatient Handoff:    Discussed with Carlton*  Patient accepted for Observation  Stay  Pending studies include none  Code Status: Not Addressed           New Prescriptions    No medications on file       Final diagnoses:   History of ST elevation myocardial infarction (STEMI)   Type 2 diabetes mellitus without complication, without long-term current use of insulin (H)   Coronary artery disease involving native heart without angina pectoris, unspecified vessel or lesion type   Chest pain, unspecified type       9/18/2019   [facility] EMERGENCY DEPARTMENT     Rakesh Edwards MD  09/18/19 [id number]

## 2019-09-18 NOTE — ED NOTES
Pt was doing laundry bending over and started having chest pain.  Pt took 2 nitroglycerin at home and felt a little bit better.  EMS was called.  Pt on antibiotics at this time for staff infection on the face and bacertial vaginalosis.

## 2019-09-19 ENCOUNTER — APPOINTMENT (OUTPATIENT)
Dept: NUCLEAR MEDICINE | Facility: CLINIC | Age: 69
End: 2019-09-19
Attending: FAMILY MEDICINE
Payer: COMMERCIAL

## 2019-09-19 VITALS
HEART RATE: 55 BPM | BODY MASS INDEX: 28.51 KG/M2 | SYSTOLIC BLOOD PRESSURE: 108 MMHG | WEIGHT: 151 LBS | RESPIRATION RATE: 18 BRPM | DIASTOLIC BLOOD PRESSURE: 48 MMHG | TEMPERATURE: 98.6 F | OXYGEN SATURATION: 94 % | HEIGHT: 61 IN

## 2019-09-19 LAB
C COLI+JEJUNI+LARI FUSA STL QL NAA+PROBE: NOT DETECTED
EC STX1 GENE STL QL NAA+PROBE: NOT DETECTED
EC STX2 GENE STL QL NAA+PROBE: NOT DETECTED
ENTERIC PATHOGEN COMMENT: NORMAL
GLUCOSE BLDC GLUCOMTR-MCNC: 126 MG/DL (ref 70–99)
GLUCOSE BLDC GLUCOMTR-MCNC: 226 MG/DL (ref 70–99)
GLUCOSE BLDC GLUCOMTR-MCNC: 256 MG/DL (ref 70–99)
NOROV GI+II ORF1-ORF2 JNC STL QL NAA+PR: NOT DETECTED
RVA NSP5 STL QL NAA+PROBE: NOT DETECTED
SALMONELLA SP RPOD STL QL NAA+PROBE: NOT DETECTED
SHIGELLA SP+EIEC IPAH STL QL NAA+PROBE: NOT DETECTED
TROPONIN I SERPL-MCNC: <0.015 UG/L (ref 0–0.04)
TROPONIN I SERPL-MCNC: <0.015 UG/L (ref 0–0.04)
V CHOL+PARA RFBL+TRKH+TNAA STL QL NAA+PR: NOT DETECTED
Y ENTERO RECN STL QL NAA+PROBE: NOT DETECTED

## 2019-09-19 PROCEDURE — 87506 IADNA-DNA/RNA PROBE TQ 6-11: CPT | Performed by: PHYSICIAN ASSISTANT

## 2019-09-19 PROCEDURE — 25000128 H RX IP 250 OP 636: Performed by: FAMILY MEDICINE

## 2019-09-19 PROCEDURE — 93018 CV STRESS TEST I&R ONLY: CPT | Performed by: INTERNAL MEDICINE

## 2019-09-19 PROCEDURE — 78452 HT MUSCLE IMAGE SPECT MULT: CPT

## 2019-09-19 PROCEDURE — 25000132 ZZH RX MED GY IP 250 OP 250 PS 637: Performed by: FAMILY MEDICINE

## 2019-09-19 PROCEDURE — G0378 HOSPITAL OBSERVATION PER HR: HCPCS

## 2019-09-19 PROCEDURE — 84484 ASSAY OF TROPONIN QUANT: CPT | Performed by: FAMILY MEDICINE

## 2019-09-19 PROCEDURE — A9502 TC99M TETROFOSMIN: HCPCS | Performed by: INTERNAL MEDICINE

## 2019-09-19 PROCEDURE — 36415 COLL VENOUS BLD VENIPUNCTURE: CPT | Performed by: FAMILY MEDICINE

## 2019-09-19 PROCEDURE — 25000132 ZZH RX MED GY IP 250 OP 250 PS 637: Performed by: PHYSICIAN ASSISTANT

## 2019-09-19 PROCEDURE — 34300033 ZZH RX 343: Performed by: INTERNAL MEDICINE

## 2019-09-19 PROCEDURE — 99217 ZZC OBSERVATION CARE DISCHARGE: CPT | Performed by: INTERNAL MEDICINE

## 2019-09-19 PROCEDURE — 00000146 ZZHCL STATISTIC GLUCOSE BY METER IP

## 2019-09-19 PROCEDURE — 78452 HT MUSCLE IMAGE SPECT MULT: CPT | Mod: 26 | Performed by: INTERNAL MEDICINE

## 2019-09-19 RX ORDER — REGADENOSON 0.08 MG/ML
0.4 INJECTION, SOLUTION INTRAVENOUS ONCE
Status: COMPLETED | OUTPATIENT
Start: 2019-09-19 | End: 2019-09-19

## 2019-09-19 RX ADMIN — ALPRAZOLAM 1 MG: 0.5 TABLET ORAL at 10:42

## 2019-09-19 RX ADMIN — VILAZODONE HYDROCHLORIDE 20 MG: 20 TABLET ORAL at 09:54

## 2019-09-19 RX ADMIN — DEXTROAMPHETAMINE SACCHARATE, AMPHETAMINE ASPARTATE MONOHYDRATE, DEXTROAMPHETAMINE SULFATE, AMPHETAMINE SULFATE 20 MG: 2.5; 2.5; 2.5; 2.5 CAPSULE, EXTENDED RELEASE ORAL at 09:54

## 2019-09-19 RX ADMIN — TETROFOSMIN 10.7 MCI.: 1.38 INJECTION, POWDER, LYOPHILIZED, FOR SOLUTION INTRAVENOUS at 11:31

## 2019-09-19 RX ADMIN — ALPRAZOLAM 0.5 MG: 0.5 TABLET ORAL at 09:54

## 2019-09-19 RX ADMIN — REGADENOSON 0.4 MG: 0.08 INJECTION, SOLUTION INTRAVENOUS at 13:46

## 2019-09-19 RX ADMIN — TETROFOSMIN 31.9 MCI.: 1.38 INJECTION, POWDER, LYOPHILIZED, FOR SOLUTION INTRAVENOUS at 13:52

## 2019-09-19 RX ADMIN — LISINOPRIL 5 MG: 5 TABLET ORAL at 09:56

## 2019-09-19 NOTE — PLAN OF CARE
[facility] DISCHARGE NOTE    Patient discharged to home at 5:17 PM via wheel chair. Accompanied by spouse and staff. Discharge instructions reviewed with patient and spouse, opportunity offered to ask questions. Prescriptions - None ordered for discharge. All belongings sent with patient.    Lucrecia Willoughby RN

## 2019-09-19 NOTE — PLAN OF CARE
Patient alert and orientated. Vital signs stable. On room air and afebrile. Up independently.     Patient denied any chest pain overnight. Patient able to sleep between cares. Tele monitor on. Heart rate bradycardic; per patient this is her norm.     Patient denied any lightheadedness/dizziness, difficulty breathing, shortness of breath, or nausea. BG checks completed.      Patient reports having diarrhea prior to coming in. Enteric precautions initiated and stool sample was collected.      Plan to have lexiscan done today.

## 2019-09-19 NOTE — H&P
[facility]    History and Physical - Hospitalist Service       Date of Admission:  9/18/2019    Assessment & Plan   Ml Evans is a 69 year old female with a history of CAD, STEMI (2011), hypertension, type II DM, ADD, hypertension, and hyperlipidemia admitted on 9/18/2019. She presents following an episode of substernal chest pressure.    Chest pain  Approximately 90 minutes of substernal chest pressure extending to the jaw, felt similar to chest pressure with her STEMI in 2011.  Currently pain-free.  Troponin negative.  ECG nonischemic.  Took aspirin 324 mg at home.  Current cardiac meds include aspirin 81 mg, Lipitor 80 mg, metoprolol ER 25 mg, and lisinopril 5 mg.  - Serial troponins  - Telemetry  - Lexiscan tomorrow  - Hold metoprolol  - No caffeine diet  - EKG and NTG prn for CP    Diarrhea  5-7 episodes of brown watery stools over the last 24 hours.  No fever, chills, abdominal pain, leukocytosis.  She has been on 3 different antibiotics in the last couple weeks.  Negative C. difficile test January 2019.  - Enteric isolation  - Enteric pathogens panel  - C. difficile not ordered due to lack of fever, abdominal pain, or leukocytosis.    CAD  History of STEMI S/P ROSEMARY of RCA  Inferior STEMI 3/2011.  Normal angiography 12/2015 following apparently false-positive Lexiscan 3/2015.  Follows with [facility] cardiology.  Cardiac meds include Lipitor 80 mg, metoprolol ER 25 mg, lisinopril 5 mg.  - Continue lisinopril and Lipitor.  - Hold metoprolol until after Lexiscan.    Benign essential hypertension  Outpatient blood pressures reviewed, appears well managed with lisinopril 5 mg and metoprolol ER 25 mg.  - Continue lisinopril.  - Hold metoprolol until after Lexiscan.    Type 2 diabetes mellitus  A1c 7.9% on 9/15/2019.  Only diabetes medication is empagliflozin 10 mg.  - Hold empagliflozin, not on formulary.  - Medium correction scale.  - Hypo/hyperglycemia protocols.    Attention  deficit disorder  - Continue Adderall 20 mg.    Mild major depression  Chronic, unchanged.  Managed at home with doxepin 75 mg and vilazodone 20 mg.  - Continue doxepin and vilazodone.    Hyperlipidemia   - Continue Lipitor 80 mg.            Diet: Diabetic diet, no caffeine  DVT Prophylaxis: Pneumatic Compression Devices  Quiñones Catheter: not present  Code Status: Full    Disposition Plan   Expected discharge: Tomorrow, recommended to prior living arrangement once stress test completed.  Entered: Los Hernandez PA-C 09/18/2019, 9:22 PM     The patient's care was discussed with the Attending Physician, Dr. Getachew Willoughby and Patient.    Los Hernandez PA-C  [facility]    ______________________________________________________________________    Chief Complaint   Chest pain    History is obtained from the patient    History of Present Illness   Ml Evans is a 69 year old female with a history of type II DM, ADD, hypertension, STEMI S/P ROSEMARY, depression, and hyperlipidemia who presents with an episode of central chest pressure.  She was doing laundry today when she noticed a gradual onset of a centralized chest pressure extending up into both sides of her jaw.  She did feel mildly dyspneic with this but denies nausea or diaphoresis.  Sat down and took nitro x2, after which her chest pressure began to improve.  After the second dose of nitroglycerin, she did develop some nausea, diaphoresis, and lightheadedness all of which resolved quickly.  CP began to steadily improve, resolving completely shortly after arrival at the ED.    For the last 24 hours she has had a 5-7 brown, watery stools.  No fever or abdominal pain.  No recent travel.  No one else at home is sick.  She has recently completed multiple antibiotic courses including clindamycin and cephalexin for some facial cellulitis and is currently taking metronidazole for BV.  Negative C. difficile test in January  2019.    Currently denies dyspnea, cough, chest discomfort, abdominal pain, or nausea.    Former smoker, 25-pack-year history.  Quit in 2011.    Review of Systems    The 10 point Review of Systems is negative other than noted in the HPI or here.     Past Medical History    I have reviewed this patient's medical history and updated it with pertinent information if needed.   Past Medical History:   Diagnosis Date     Attention deficit disorder without mention of hyperactivity      Benign essential hypertension 3/9/2017     Coronary artery disease march 15,2011    Two stents placed, angioplasty     Diabetes mellitus (H)     A1C 5.7-6.4, controlled with diet     Dysthymic disorder      Glycogenosis (H)      History of blood transfusion 1981    euptured ectopic pg     Malignant neoplasm (H)     squamous cell carcinoma many years ago     Other and unspecified hyperlipidemia      Squamous cell carcinoma        Past Surgical History   I have reviewed this patient's surgical history and updated it with pertinent information if needed.  Past Surgical History:   Procedure Laterality Date     ABDOMEN SURGERY  1981,1991,1993     APPENDECTOMY  1993     BIOPSY  2003    nose, during surgery     COLONOSCOPY  2005     COLONOSCOPY N/A 5/14/2015    Procedure: COMBINED COLONOSCOPY, SINGLE OR MULTIPLE BIOPSY/POLYPECTOMY BY BIOPSY;  Surgeon: Ponce Christine MD;  Location: [facility]     ECTOPIC PREGNANCY SURGERY  1981     ENDOSCOPIC RELEASE CARPAL TUNNEL  4/16/2013    Procedure: ENDOSCOPIC RELEASE CARPAL TUNNEL;  Right endoscopic carpal tunnel release;  Surgeon: Jonah Dela Cruz MD;  Location: [facility]     ENT SURGERY  2003    nose- suspected basal cell- was benign     ESOPHAGOSCOPY, GASTROSCOPY, DUODENOSCOPY (EGD), COMBINED  8/18/2014    Procedure: COMBINED ESOPHAGOSCOPY, GASTROSCOPY, DUODENOSCOPY (EGD), BIOPSY SINGLE OR MULTIPLE;  Surgeon: Anibal Sebastian MD;  Location: [facility]     GENITOURINARY SURGERY  1981, 1991      HYSTERECTOMY, ELIECER      total hysterectomy. Unsure if ELIECER or vaginal     SURGICAL HISTORY OF -       appy     SURGICAL HISTORY OF -       T&A     VASCULAR SURGERY      angioplasty- 2 stents implanted       Social History   I have reviewed this patient's social history and updated it with pertinent information if needed.  Social History     Tobacco Use     Smoking status: Former Smoker     Packs/day: 1.00     Years: 30.00     Pack years: 30.00     Types: Cigarettes     Start date: 1968     Last attempt to quit: 3/15/2011     Years since quittin.5     Smokeless tobacco: Never Used     Tobacco comment: occasional/daily   Substance Use Topics     Alcohol use: No     Alcohol/week: 0.0 oz     Drug use: No       Family History   I have reviewed this patient's family history and updated it with pertinent information if needed.   Family History   Problem Relation Age of Onset     Cancer Mother         uterine     Lipids Mother      Neurologic Disorder Mother         polymyalgia     Hypertension Mother      Other Cancer Mother         endometrial     Depression/Anxiety Mother      Other - See Comments Mother         Heart Arrythmia      Cardiovascular Father         CHF     Depression Father      C.A.D. Father         bypass x2 starting at age 55-  in his 70's     Diabetes Father         type 2     Coronary Artery Disease Father          from - age 75     Depression/Anxiety Father      Cerebrovascular Disease Father         from angioplasty      C.A.D. Maternal Grandfather      Coronary Artery Disease Maternal Grandfather          from- age 61     C.A.D. Paternal Grandfather      Diabetes Brother      Depression Son      Psychotic Disorder Son         adhd     Diabetes Brother         type 1     Depression/Anxiety Brother      Depression/Anxiety Maternal Grandmother      Depression/Anxiety Son      Asthma Son         childhood asthma-out grown now as an adult     Coronary Artery Disease Brother          stent-MI     Melanoma No family hx of        Prior to Admission Medications   Prior to Admission Medications   Prescriptions Last Dose Informant Patient Reported? Taking?   ALPRAZolam (XANAX PO) 9/18/2019 at early am Self Yes Yes   Sig: Take 0.5-1 mg by mouth 1/2 tab in am, 1 tab in pm, then 1 tab midday prn and 1/2 tab throughout the day if needed   DoxePIN (SINEQUAN) 75 MG capsule 9/17/2019 at pm Self Yes Yes   Sig: Take 75 mg by mouth At Bedtime    LANsoprazole (PREVACID) 30 MG DR capsule Past Month at Unknown time Self No Yes   Sig: TAKE 1 CAPSULE BY MOUTH DAILY, 30 TO 60 MINUTES BEFORE A MEAL.   amphetamine-dextroamphetamine (ADDERALL XR) 20 MG per capsule 9/18/2019 at am Self Yes Yes   Sig: Take 20 mg by mouth daily    aspirin 81 MG EC tablet 9/18/2019 at am, 4 tablets Self Yes Yes   Sig: Take 324 mg by mouth once   atorvastatin (LIPITOR) 80 MG tablet 9/17/2019 at pm Self No Yes   Sig: Take 1 tablet (80 mg) by mouth daily   blood glucose (NO BRAND SPECIFIED) lancets standard  Self No No   Sig: Use to test blood sugar 3 times daily or as directed.   blood glucose monitoring (NO BRAND SPECIFIED) meter device kit  Self No No   Sig: Use to test blood sugar 3 times daily or as directed.   blood glucose monitoring (NO BRAND SPECIFIED) test strip  Self No No   Sig: Use to test blood sugars 3 times daily or as directed   clindamycin (CLEOCIN T) 1 % solution More than a month at Unknown time Self No No   Sig: Apply topically 2 times daily   empagliflozin (JARDIANCE) 10 MG TABS tablet 9/18/2019 at am Self No Yes   Sig: Take 1 tablet (10 mg) by mouth daily   lisinopril (PRINIVIL/ZESTRIL) 5 MG tablet 9/18/2019 at am Self No Yes   Sig: Take 1 tablet (5 mg) by mouth daily   metoprolol succinate ER (TOPROL-XL) 25 MG 24 hr tablet 9/18/2019 at am Self No Yes   Sig: Take 1 tablet (25 mg) by mouth daily   metroNIDAZOLE (FLAGYL) 500 MG tablet 9/18/2019 at am Self No Yes   Sig: Take 1 tablet (500 mg) by mouth 2 times daily for  7 days   nitroGLYcerin (NITROSTAT) 0.4 MG sublingual tablet  Self No No   Sig: Place 1 tablet (0.4 mg) under the tongue every 5 minutes as needed for chest pain   order for DME  Self No No   Sig: Equipment being ordered: BP cuff for home   psyllium 0.52 G capsule Past Month at Unknown time Self Yes Yes   Sig: Take 3-4 capsules by mouth nightly as needed    ranitidine (ZANTAC) 150 MG tablet Past Month at Unknown time Self Yes Yes   Sig: Take 1 tablet (150 mg) by mouth nightly as needed for heartburn   tretinoin (RETIN-A) 0.025 % external cream  Self No No   Sig: Spread a pea size amount into affected area topically at bedtime.  Use sunscreen SPF>20.   vilazodone (VIIBRYD) 20 MG TABS tablet 9/18/2019 at am Self Yes Yes   Sig: Take 20 mg by mouth daily      Facility-Administered Medications: None     Allergies   Allergies   Allergen Reactions     Hydrocodone Anaphylaxis     Flexeril [Cyclobenzaprine] Rash     Cipro [Ciprofloxacin] Other (See Comments)     Abd pain , proteinuria , microscopic hematuria  Possibly related to cipro     Codeine Camsylate Nausea     Penicillins Difficulty breathing     Amoxicillin Itching and Rash     Sulfa Drugs Itching and Rash     Tetracycline Itching and Rash       Physical Exam   Vital Signs: Temp: 98.6  F (37  C) Temp src: Oral BP: 105/43 Pulse: 64 Heart Rate: (!) 48 Resp: 14 SpO2: 96 % O2 Device: None (Room air)    Weight: 151 lbs 0 oz    General: Appears calm, comfortable, nontoxic. Answers questions quickly and appropriately with clear speech. No apparent distress.  Skin: Pink, warm, dry.  Multiple bandages on her face.  Eyes: PERRL. Sclera are white.  HENT:  Normocephalic, atraumatic. Oropharynx is moist, without lesions or exudate.  Lymph/Hematologic: No occipital, anterior/posterior cervical, supraclavicular, or axillary lymphadenopathy.  CV: RRR, clear S1/S2 without murmur, rub, or gallop. Radial pulses equal bilaterally. No lower extremity edema.  Respiratory: CTA and equal  bilaterally. Normal respiratory effort.  GI: Soft, nontender. Normal bowel sounds.  Musculoskeletal: Moving all extremities well. Normal muscle bulk and tone.  Neuro: Memory and cognition appear normal. CN II - XII grossly intact. Symmetrical extremity strength.  Psych: Normal mood and affect.         Data   Data reviewed today: I reviewed all medications, new labs and imaging results over the last 24 hours. I personally reviewed the EKG tracing showing SR without ischemic changes and the chest x-ray image(s) showing no effusions or infiltrates, normal cardiac silhouette.    Recent Labs   Lab 09/18/19  1515   WBC 7.7   HGB 10.0*   MCV 81         POTASSIUM 4.2   CHLORIDE 108   CO2 24   BUN 15   CR 0.81   ANIONGAP 7   SAMIA 8.3*   *   ALBUMIN 3.3*   PROTTOTAL 6.2*   BILITOTAL 0.3   ALKPHOS 125   ALT 53*   AST 41   LIPASE 128   TROPI <0.015     Recent Results (from the past 24 hour(s))   Chest XR,  PA & LAT    Narrative    CHEST TWO VIEWS   9/18/2019 4:26 PM     HISTORY: Chest pain.    Comparison: 3/16/2019 radiographs.      Impression    IMPRESSION: The cardiomediastinal silhouette and pulmonary vasculature  appear normal. No infiltrates or effusions. No significant osseous  findings. No change.    RICH LIZARRAGA MD

## 2019-09-19 NOTE — ED NOTES
Patient has  [place] to Observation  order. Patient has been given Patient Bill of Rights, Observation brochure and  What does Observation mean to me  forms.  Patient has been given the opportunity to ask questions about observation status and their plan of care.      Uma Moe RN

## 2019-09-19 NOTE — PLAN OF CARE
[place] [facility] [place]    Patient admitted to room 2401 at approximately 2035 via cart from emergency room. Patient was accompanied by spouse.     Verbal SBAR report received from JETHRO Kohli prior to patient arrival.     Patient ambulated to bed independently. Patient alert and oriented X 3. The patient is not having any pain.  . Admission vital signs: Blood pressure 105/43, pulse 64, temperature 98.6  F (37  C), temperature source Oral, resp. rate 14, height 1.549 m (5' 1\"), weight 68.5 kg (151 lb), SpO2 96 %, not currently breastfeeding. Patient was oriented to plan of care, call light, bed controls, tv, telephone, bathroom and visiting hours.     Risk Assessment    The following safety risks were identified during admission: none. Yellow risk band applied: NO.     Skin Initial Assessment    This writer admitted this patient and completed a full skin assessment and Diego score in the Adult PCS flowsheet. Appropriate interventions initiated as needed.     Secondary skin check completed by JETHRO Metcalf.    Diego Risk Assessment  Sensory Perception: 3-->slightly limited  Moisture: 4-->rarely moist  Activity: 3-->walks occasionally  Mobility: 4-->no limitation  Nutrition: 3-->adequate  Friction and Shear: 3-->no apparent problem  Diego Score: 20  Bed Support Surface: Atmos Air mattress  Reassessed using Bed Algorithm: No    Patient has cellulitis on face. Pin point red marks noted sporadically all over body. Band-aid to right big toe; per patient she has fungus and is treating it with a topical. Patient also has vaginosis.      Selina Bogsg RN      "

## 2019-09-19 NOTE — DISCHARGE SUMMARY
[facility]  Discharge Summary  Hospital Medicine       Date of Admission:  9/18/2019  Date of Discharge:  9/19/2019  5:19 PM  Discharging Provider: Wraner Lopez MD      Identification and Chief Compaint: Ml Evans is a 69 year old female who presented on 9/18/2019 with complaint of chest pain.    Discharge Diagnoses   Atypical chest pain, suspect GI etiology  Diarrheal episode  Coronary artery disease, compensated  Hypertension   Hypotensive episode due to nitroglycerin  Diabetes mellitus type 2      Follow-ups Needed After Discharge   Follow-up Appointments     Follow-up and recommended labs and tests       Follow up with primary care provider, Ledy Benedict, as needed.    No follow up labs or test are needed.           Hospital Course   Ml Evans is a 69 year old female with a history of CAD, STEMI (2011), hypertension, type II DM, ADD, hypertension, and hyperlipidemia admitted on 9/18/2019. She presents following an episode of substernal chest pressure.     Chest pain, atypical, possibly GI etiology    Approximately 90 minutes of substernal chest pressure extending to the jaw, felt similar to chest pressure with her STEMI in 2011 except did not radiate to jaw in 2011 and was not as severe. Resolved with nitroglycerin x 2 but had severe hypotension with the nitroglycerin  Troponin series negative.  ECG nonischemic.  Took aspirin 324 mg at home.  Current cardiac meds include aspirin 81 mg, Lipitor 80 mg, metoprolol ER 25 mg, and lisinopril 5 mg.  No recurrent chest pain.    H/o abnormal exercise stress test in 2015 with subsequent coronary angiogram showing widely patent stents in RCA and normal left coronary system, unchanged from previous, suggesting a false positive stress test. Patient had been recommended a Lexiscan nuclear stress test last winter by her cardiologist but was not done.     Lexiscan this admission shows no ischemia and previously  seen abnormal uptake was not apparent on this test. Chest pain this admission appears noncardiac and may be GI in nature. She should seek further evaluation if recurrent.      Diarrhea    5-7 episodes of brown watery stools over the last 24 hours.  No fever, chills, abdominal pain, leukocytosis.  She has been on 3 different antibiotics in the last couple weeks.  Negative C. difficile test January 2019. No fever, abdominal pain or leucocytosis to suggest C difficile this admission. Enteric panel negative. Diarrhea is improving.      CAD  History of STEMI S/P ROSEMARY of RCA    Inferior STEMI 3/2011.  Normal angiography 12/2015 following apparently false-positive Lexiscan 3/2015.  Follows with [facility] cardiology.  Cardiac meds include Lipitor 80 mg, metoprolol ER 25 mg, lisinopril 5 mg. Lexiscan nuclear stress test negative for ischemia this admission.      Benign essential hypertension    Outpatient blood pressures reviewed, appears well managed with lisinopril 5 mg and metoprolol ER 25 mg. Blood pressure well controlled and runs a little low. Had symptomatic hypotension with nitroglycerin. I cautioned patient that if she needs to take nitroglycerin for chest pain again in the future that she should try to also lie down and maybe limit her self to 1 nitroglycerin if she feels at all lightheaded with the first one.      Type 2 diabetes mellitus  A1c 7.9% on 9/15/2019.  On empagliflozin 10 mg.     Attention deficit disorder  - Continue Adderall 20 mg.     Mild major depression  Chronic, unchanged.  Managed at home with doxepin 75 mg and vilazodone 20 mg.  - Continue doxepin and vilazodone.     Hyperlipidemia   - Continue Lipitor 80 mg.    Consultations This Hospital Stay   None    Code Status   Full Code    The discharge plan was discussed with the patient, and she expressed understanding.     Time Spent on this Encounter   Total time on this discharge was 35 minutes.       Warner Lopez MD  [facility]  [facility]  ______________________________________________________________________    Physical Exam   Vital Signs: Temp: 98.6  F (37  C) Temp src: Oral BP: 108/48 Pulse: 55 Heart Rate: 64 Resp: 18 SpO2: 94 % O2 Device: None (Room air)    Weight: 151 lbs 0 oz  Constitutional: alert and oriented, NAD, nontoxic  CV: Regular, good wrist pulses, no edema  Respiratory: CTA bilaterally  GI: Soft, nontender  Skin: Warm and dry       Primary Care Physician   Ledy English Rehder  [address] [place] [id number]     Discharge Disposition   Discharged to home  Condition at discharge: Stable    Significant Results and Procedures   Results for orders placed or performed during the hospital encounter of 09/18/19   Chest XR,  PA & LAT    Narrative    CHEST TWO VIEWS   9/18/2019 4:26 PM     HISTORY: Chest pain.    Comparison: 3/16/2019 radiographs.      Impression    IMPRESSION: The cardiomediastinal silhouette and pulmonary vasculature  appear normal. No infiltrates or effusions. No significant osseous  findings. No change.    RICH LIZARRAGA MD   NM MPI w Lexiscan    Narrative    GATED MYOCARDIAL PERFUSION SCINTIGRAPHY WITH INTRAVENOUS PHARMACOLOGIC  VASODILATATION LEXISCAN -ONE DAY STUDY     9/19/2019 2:50 PM  ZACK WILLOUGHBY  69 years  Female  1950.    Indication/Clinical History: Chest pain    Impression  1.  Myocardial perfusion imaging using single isotope technique  demonstrated normal myocardial perfusion.   2. Gated images demonstrated normal wall motion.  The left ventricular  systolic function is normal.  3. Compared to the prior study from 2015, the previously reported area  of ischemia in the anterior wall and apex is no longer evident .    Procedure  Pharmacologic stress testing was performed with Lexiscan at a rate of  0.08 mg/ml rapid bolus injection, for 15 seconds, 0.4 mg/5ml  intravenously. Low-level exercise was not performed along with the  vasodilator infusion.  The heart rate was 56 at  baseline and gaby to  93 beats per minute during the Lexiscan infusion. The rest blood  pressure was 124/64 mmHg and was 120/64 mm Hg during Lexiscan  infusion. The patient experienced no symptoms  during the test.    Myocardial perfusion imaging was performed at rest, approximately 45  minutes after the injection intravenously of 10.1 mCi of Tc-99m  Myoview. At peak pharmacologic effect, 10-20 seconds after Lexiscan,   the patient was injected intravenously with 31.9 mCi of  Tc-99m  Myoview. The post-stress tomographic imaging was performed  approximately 60 minutes after stress.    EKG Findings  The resting EKG demonstrated sinus bradycardia and normal. The stress  EKG demonstrated no significant ST segment changes.    Tomographic Findings  Overall, the study quality is adequate. Body mass index 28.5 . On the  stress images, there is a small apical defect with mild reduction in  radiotracer uptake. On the rest images, no appreciable changes were  seen to this defect . Gated images demonstrated normal wall motion.  The small fixed apical defect is likely due to soft tissue  attenuation. The left ventricular ejection fraction was calculated to  be 76%. TID was absent.    BRIAN CHOUDHARY MD     Procedures    None    Discharge Orders      Reason for your hospital stay    Chest pressure of unclear etiology but does not seem to be due to heart disease.     Follow-up and recommended labs and tests     Follow up with primary care provider, Ledy Benedict, as needed.  No follow up labs or test are needed.     Activity    Your activity upon discharge: activity as tolerated     Diet    Follow this diet upon discharge: Diabetic diet     Discharge Medications   Current Discharge Medication List      CONTINUE these medications which have NOT CHANGED    Details   ALPRAZolam (XANAX PO) Take 0.5-1 mg by mouth 1/2 tab in am, 1 tab in pm, then 1 tab midday prn and 1/2 tab throughout the day if needed       amphetamine-dextroamphetamine (ADDERALL XR) 20 MG per capsule Take 20 mg by mouth daily       aspirin 81 MG EC tablet Take 324 mg by mouth once      atorvastatin (LIPITOR) 80 MG tablet Take 1 tablet (80 mg) by mouth daily  Qty: 90 tablet, Refills: 3    Associated Diagnoses: Hyperlipidemia LDL goal <100      DoxePIN (SINEQUAN) 75 MG capsule Take 75 mg by mouth At Bedtime       empagliflozin (JARDIANCE) 10 MG TABS tablet Take 1 tablet (10 mg) by mouth daily  Qty: 30 tablet, Refills: 3    Associated Diagnoses: Type 2 diabetes mellitus without complication, without long-term current use of insulin (H)      LANsoprazole (PREVACID) 30 MG DR capsule TAKE 1 CAPSULE BY MOUTH DAILY, 30 TO 60 MINUTES BEFORE A MEAL.  Qty: 90 capsule, Refills: 1    Associated Diagnoses: Gastroesophageal reflux disease, esophagitis presence not specified      lisinopril (PRINIVIL/ZESTRIL) 5 MG tablet Take 1 tablet (5 mg) by mouth daily  Qty: 90 tablet, Refills: 3    Associated Diagnoses: Type 2 diabetes mellitus without complication, without long-term current use of insulin (H); Benign essential hypertension      metoprolol succinate ER (TOPROL-XL) 25 MG 24 hr tablet Take 1 tablet (25 mg) by mouth daily  Qty: 90 tablet, Refills: 3    Associated Diagnoses: Benign essential hypertension      psyllium 0.52 G capsule Take 3-4 capsules by mouth nightly as needed       ranitidine (ZANTAC) 150 MG tablet Take 1 tablet (150 mg) by mouth nightly as needed for heartburn  Qty: 30 tablet, Refills: 1    Associated Diagnoses: Gastroesophageal reflux disease, esophagitis presence not specified      vilazodone (VIIBRYD) 20 MG TABS tablet Take 20 mg by mouth daily      blood glucose (NO BRAND SPECIFIED) lancets standard Use to test blood sugar 3 times daily or as directed.  Qty: 100 each, Refills: 11    Associated Diagnoses: Type 2 diabetes mellitus without complication, without long-term current use of insulin (H)      blood glucose monitoring (NO BRAND  SPECIFIED) meter device kit Use to test blood sugar 3 times daily or as directed.  Qty: 1 kit, Refills: 0    Comments: BRAND PER INSURANCE COVERAGE  Associated Diagnoses: Type 2 diabetes mellitus without complication, without long-term current use of insulin (H)      blood glucose monitoring (NO BRAND SPECIFIED) test strip Use to test blood sugars 3 times daily or as directed  Qty: 100 strip, Refills: 1    Comments: BRAND PER INSURANCE COVERAGE  Associated Diagnoses: Type 2 diabetes mellitus without complication, without long-term current use of insulin (H)      clindamycin (CLEOCIN T) 1 % solution Apply topically 2 times daily  Qty: 60 mL, Refills: 1    Associated Diagnoses: Folliculitis      nitroGLYcerin (NITROSTAT) 0.4 MG sublingual tablet Place 1 tablet (0.4 mg) under the tongue every 5 minutes as needed for chest pain  Qty: 25 tablet, Refills: 6    Associated Diagnoses: Coronary artery disease involving native heart without angina pectoris, unspecified vessel or lesion type      order for DME Equipment being ordered: BP cuff for home  Qty: 1 Device, Refills: 0    Associated Diagnoses: Benign essential hypertension      tretinoin (RETIN-A) 0.025 % external cream Spread a pea size amount into affected area topically at bedtime.  Use sunscreen SPF>20.  Qty: 45 g, Refills: 1    Associated Diagnoses: Acne vulgaris         STOP taking these medications       metroNIDAZOLE (FLAGYL) 500 MG tablet Comments:   Reason for Stopping:             Allergies   Allergies   Allergen Reactions     Hydrocodone Anaphylaxis     Flexeril [Cyclobenzaprine] Rash     Cipro [Ciprofloxacin] Other (See Comments)     Abd pain , proteinuria , microscopic hematuria  Possibly related to cipro     Codeine Camsylate Nausea     Penicillins Difficulty breathing     Amoxicillin Itching and Rash     Sulfa Drugs Itching and Rash     Tetracycline Itching and Rash

## 2019-09-20 ENCOUNTER — OFFICE VISIT (OUTPATIENT)
Dept: FAMILY MEDICINE | Facility: CLINIC | Age: 69
End: 2019-09-20
Payer: COMMERCIAL

## 2019-09-20 ENCOUNTER — TELEPHONE (OUTPATIENT)
Dept: FAMILY MEDICINE | Facility: CLINIC | Age: 69
End: 2019-09-20

## 2019-09-20 VITALS
HEART RATE: 65 BPM | WEIGHT: 152.8 LBS | TEMPERATURE: 98.4 F | OXYGEN SATURATION: 98 % | RESPIRATION RATE: 15 BRPM | SYSTOLIC BLOOD PRESSURE: 100 MMHG | BODY MASS INDEX: 28.87 KG/M2 | DIASTOLIC BLOOD PRESSURE: 58 MMHG

## 2019-09-20 DIAGNOSIS — L08.9 LOCAL INFECTION OF SKIN AND SUBCUTANEOUS TISSUE: ICD-10-CM

## 2019-09-20 DIAGNOSIS — I25.10 CORONARY ARTERY DISEASE INVOLVING NATIVE HEART WITHOUT ANGINA PECTORIS, UNSPECIFIED VESSEL OR LESION TYPE: Primary | ICD-10-CM

## 2019-09-20 PROCEDURE — 99495 TRANSJ CARE MGMT MOD F2F 14D: CPT | Performed by: FAMILY MEDICINE

## 2019-09-20 NOTE — PROGRESS NOTES
Subjective     Ml Evans is a 69 year old female who presents to clinic today for the following health issues:    HPI       Hospital Follow-up Visit:    Hospital/Nursing Home/IP Rehab Facility: [facility]  Date of Admission: 9/18/2019  Date of Discharge: 9/19/2019  Reason(s) for Admission: chest pain            Problems taking medications regularly:  None       Medication changes since discharge: Flagyl discontinued       Problems adhering to non-medication therapy:  None    Summary of hospitalization:  [facility] discharge summary reviewed  Diagnostic Tests/Treatments reviewed.  Follow up needed: none  Other Healthcare Providers Involved in Patient s Care:         None  Update since discharge: improved.     Post Discharge Medication Reconciliation: discharge medications reconciled, continue medications without change.  Plan of care communicated with patient     Coding guidelines for this visit:  Type of Medical   Decision Making Face-to-Face Visit       within 7 Days of discharge Face-to-Face Visit        within 14 days of discharge   Moderate Complexity [id number]   High Complexity [id number]            Chest pain episode cardiac etiol ruled out with normal bunny scan   She is feeling well    The spots on her face are finally clearing up   She would like to recheck with her dermatologist at the            Reviewed and updated as needed this visit by Provider  Tobacco  Allergies  Meds  Problems  Med Hx  Surg Hx  Fam Hx         Review of Systems   ROS COMP: Constitutional, HEENT, cardiovascular, pulmonary, gi and gu systems are negative, except as otherwise noted.      Objective    /58 (BP Location: Right arm, Patient Position: Chair, Cuff Size: Adult Large)   Pulse 65   Temp 98.4  F (36.9  C) (Tympanic)   Resp 15   Wt 69.3 kg (152 lb 12.8 oz)   SpO2 98%   BMI 28.87 kg/m    Body mass index is 28.87 kg/m .  Physical Exam   GENERAL APPEARANCE: healthy, alert and no  "distress  NECK: no adenopathy, no asymmetry, masses, or scars and thyroid normal to palpation  RESP: lungs clear to auscultation - no rales, rhonchi or wheezes  CV: regular rates and rhythm, normal S1 S2, no S3 or S4 and no murmur, click or rub  SKIN: face lesion are healed   PSYCH: mentation appears normal and affect normal/bright    Diagnostic Test Results:  Labs reviewed in Epic        Assessment & Plan     1. Coronary artery disease involving native heart without angina pectoris, unspecified vessel or lesion type  Stable no change in treatment plan.     2. Local infection of skin and subcutaneous tissue    - DERMATOLOGY REFERRAL     BMI:   Estimated body mass index is 28.87 kg/m  as calculated from the following:    Height as of 9/18/19: 1.549 m (5' 1\").    Weight as of this encounter: 69.3 kg (152 lb 12.8 oz).   Weight management plan: Discussed healthy diet and exercise guidelines        Patient Instructions   Schedule an eye exam    Need to start probiotic     Send me your blood sugars in 1-2 weeks       Return in about 1 month (around 10/20/2019).      Risks, benefits, side effects and rationale for treatment plan fully discussed with the patient and understanding expressed.   Ledy Benedict MD  [facility]      "

## 2019-09-20 NOTE — NURSING NOTE
"Chief Complaint   Patient presents with     Hospital F/U       Initial /58 (BP Location: Right arm, Patient Position: Chair, Cuff Size: Adult Large)   Pulse 65   Temp 98.4  F (36.9  C) (Tympanic)   Resp 15   Wt 69.3 kg (152 lb 12.8 oz)   SpO2 98%   BMI 28.87 kg/m   Estimated body mass index is 28.87 kg/m  as calculated from the following:    Height as of 9/18/19: 1.549 m (5' 1\").    Weight as of this encounter: 69.3 kg (152 lb 12.8 oz).    Patient presents to the clinic using No DME    Health Maintenance that is potentially due pending provider review:  Eye exam due    Reminded to schedule eye exam.    Is there anyone who you would like to be able to receive your results? Yes  If yes have patient fill out MIGUEL    "

## 2019-09-20 NOTE — TELEPHONE ENCOUNTER
ED/UC/IP follow up phone call: [facility] discharge 9/19/19  History of ST elevation Myocardial Infarction (Stemi)    RN please call to follow up.    Number of ED visits in past 12 months = 1    Pt is scheduled for apt today with Dr Benedict (3:00) for Vaginitis. This was scheduled prior to her hospital admit.

## 2019-09-26 ENCOUNTER — TELEPHONE (OUTPATIENT)
Dept: DERMATOLOGY | Facility: CLINIC | Age: 69
End: 2019-09-26

## 2019-09-26 NOTE — TELEPHONE ENCOUNTER
[facility] Call Center    Phone Message    May a detailed message be left on voicemail: yes    Reason for Call: Other: Patient would like her referral to be reviewed to determine how soon she should be seen. She would like to speak with a nurse as well because of the complexity of her skin infection in regards to being a diabetic and all the antibiotics she's been taking. Please call back to discuss, and if possible call after 10am.     Action Taken: Message routed to:  [facility] ([facility] Dermatology

## 2019-09-27 ENCOUNTER — TELEPHONE (OUTPATIENT)
Dept: FAMILY MEDICINE | Facility: CLINIC | Age: 69
End: 2019-09-27

## 2019-09-27 NOTE — TELEPHONE ENCOUNTER
Spoke with Narcisa for 15 minuteswith the following outcome: she does not have MRSA. She is seeing derm next week for her skin condition, BP has been fine since her hospitalization. Blood sugars have been a little high while she has been sick. If she is not feeling well (tired) she will rest. She will call or Mychart next week with update on blood sugars and blood pressure

## 2019-09-27 NOTE — TELEPHONE ENCOUNTER
I called and left a VM asking Ml to give us a call back to discuss her referral and a appointment ?

## 2019-09-27 NOTE — TELEPHONE ENCOUNTER
Narcisa is calling with multiple concerns:      1. Skin condition  2. MRSA  3  Diabetis not under control   4. bp is low  5. Not feeling well    She has a staph infection on her skin which was cultured.      Please call and advise.    Janice [facility]  Clinic Station

## 2019-10-03 DIAGNOSIS — E11.9 TYPE 2 DIABETES MELLITUS WITHOUT COMPLICATION, WITHOUT LONG-TERM CURRENT USE OF INSULIN (H): ICD-10-CM

## 2019-10-03 NOTE — TELEPHONE ENCOUNTER
"CONTOUR NEXT TEST  STRP  Last Written Prescription Date:  10/17/2018  Last Fill Quantity: 100,  # refills: 1   Last office visit: 9/20/2019 with prescribing provider:  RANDY   Future Office Visit:    Requested Prescriptions   Pending Prescriptions Disp Refills     CONTOUR NEXT TEST test strip [Pharmacy Med Name: CONTOUR NEXT TEST  STRP] 100 each 0     Sig: USE TO TEST BLOOD SUGAR THREE TIMES A DAY OR AS DIRECTED       Diabetic Supplies Protocol Passed - 10/3/2019  1:55 PM        Passed - Medication is active on med list        Passed - Patient is 18 years of age or older        Passed - Recent (6 mo) or future (30 days) visit within the authorizing provider's specialty     Patient had office visit in the last 6 months or has a visit in the next 30 days with authorizing provider.  See \"Patient Info\" tab in inbasket, or \"Choose Columns\" in Meds & Orders section of the refill encounter.              "

## 2019-10-11 ENCOUNTER — TELEPHONE (OUTPATIENT)
Dept: DERMATOLOGY | Facility: CLINIC | Age: 69
End: 2019-10-11

## 2019-10-11 NOTE — TELEPHONE ENCOUNTER
Spoke to patient and she does not want to wait until Oct 22nd and will call the [facility] to see if she could get into clinic sooner. Appt made and she will cancel if she gets in sooner.  Kirsten WANG RN BSN PHN  Specialty Clinics

## 2019-10-11 NOTE — TELEPHONE ENCOUNTER
"Reason for Call:  Other call back    Detailed comments: Pt states she was told to get in to see Derm asap by Dr. Benedict.  2. Local infection of skin and subcutaneous tissue     Infection that won't go away - two spots on face that \"are really [place]\" not on antibiotics - trying to fight on her own with rest and nutrition.  Doesn't know how urgent it is but would like to get in asap.  Was in hospital because blood pressure dropped 60/30 - h/o heart attack.  Diabetic - readings have been really high - on a new drug for this.  BP has been low.  A1C gone way up, HGB has dropped.  \"I just don't know what is going on - but I guess I have to start with the infection on my face\"  Worried about the infection getting into her blood - has been fatigued and also has clinical depression.  And worried that the infection is affecting her diabetes.    Phone Number Patient can be reached at: Home number on file [phone] (home)    Best Time: any    Can we leave a detailed message on this number? YES    Call taken on 10/11/2019 at 1:11 PM by Melinda Bernard      "

## 2019-10-11 NOTE — TELEPHONE ENCOUNTER
Spoke with patient and she has a staph infection on her face and chest for the last six weeks. Dr. Benedict has been treating clindamycin and spots have been cultured twice. The cultures showed staph.  She was then on Keflex.  She went to urgent care because it was not clearing up and he found her to have a vaginal infection and put on flagyl. Pt was also recent hospitalized for her heart and was found to be good. Her labs still showed that she may have an infection per pt.  She was taken off antibiotics and has been keeping the spots clean with hospital strength wound cleanser. She is noticing that they are worse with drainage again. PCP stated that she should be seen in derm. Pt was unable to go to Appt Oct 3rd with derm due to being sick. Can we fit into schedule? Please advise.  Kirsten WANG RN BSN PHN  Specialty Clinics

## 2019-10-15 ENCOUNTER — OFFICE VISIT (OUTPATIENT)
Dept: FAMILY MEDICINE | Facility: CLINIC | Age: 69
End: 2019-10-15
Payer: COMMERCIAL

## 2019-10-15 VITALS
HEART RATE: 64 BPM | TEMPERATURE: 98.7 F | RESPIRATION RATE: 15 BRPM | BODY MASS INDEX: 29.48 KG/M2 | SYSTOLIC BLOOD PRESSURE: 106 MMHG | WEIGHT: 156 LBS | DIASTOLIC BLOOD PRESSURE: 64 MMHG

## 2019-10-15 DIAGNOSIS — E11.9 TYPE 2 DIABETES MELLITUS WITHOUT COMPLICATION, WITHOUT LONG-TERM CURRENT USE OF INSULIN (H): ICD-10-CM

## 2019-10-15 DIAGNOSIS — N95.2 VAGINAL ATROPHY: ICD-10-CM

## 2019-10-15 DIAGNOSIS — N89.8 VAGINAL DISCHARGE: Primary | ICD-10-CM

## 2019-10-15 LAB
SPECIMEN SOURCE: NORMAL
WET PREP SPEC: NORMAL

## 2019-10-15 PROCEDURE — 99214 OFFICE O/P EST MOD 30 MIN: CPT | Performed by: FAMILY MEDICINE

## 2019-10-15 PROCEDURE — 87210 SMEAR WET MOUNT SALINE/INK: CPT | Performed by: FAMILY MEDICINE

## 2019-10-15 RX ORDER — ESTRADIOL 0.1 MG/G
2 CREAM VAGINAL WEEKLY
Qty: 42.5 G | Refills: 3 | Status: SHIPPED | OUTPATIENT
Start: 2019-10-15 | End: 2022-03-29

## 2019-10-15 NOTE — PROGRESS NOTES
Subjective     Ml Evans is a 69 year old female who presents to clinic today for the following health issues:    HPI   Vaginal Symptoms  Onset: over a month    Description:  Vaginal Discharge: none   Itching (Pruritis): YES  Burning sensation:  YES  Odor: no     Accompanying Signs & Symptoms:  Pain with Urination: no   Abdominal Pain: no   Fever: no     History:   Sexually active: YES  New Partner: no   Possibility of Pregnancy:  No    Precipitating factors:   Recent Antibiotic Use: YES- clindamycin, keflex, flagyl    Alleviating factors:  none    Therapies Tried and outcome: many otc products       Diabetes Follow-up      How often are you checking your blood sugar? Two times daily    What time of day are you checking your blood sugars (select all that apply)?  Before meals and After meals    Have you had any blood sugars above 200?  Yes     Have you had any blood sugars below 70?  No    What symptoms do you notice when your blood sugar is low?  Shaky    What concerns do you have today about your diabetes? Blood sugar is often over 200     Do you have any of these symptoms? (Select all that apply)  No numbness or tingling in feet.  No redness, sores or blisters on feet.  No complaints of excessive thirst.  No reports of blurry vision.  No significant changes to weight.     Have you had a diabetic eye exam in the last 12 months?     BP Readings from Last 2 Encounters:   10/15/19 106/64   09/20/19 100/58     Hemoglobin A1C (%)   Date Value   09/15/2019 7.9 (H)   05/06/2019 6.9 (H)     LDL Cholesterol Calculated (mg/dL)   Date Value   05/06/2019 55   05/15/2018 64       Diabetes Management Resources          Reviewed and updated as needed this visit by Provider  Tobacco  Allergies  Meds  Problems  Med Hx  Surg Hx  Fam Hx         Review of Systems   ROS COMP: Constitutional, HEENT, cardiovascular, pulmonary, gi and gu systems are negative, except as otherwise noted.      Objective    /64 (BP  "Location: Right arm, Patient Position: Chair, Cuff Size: Adult Large)   Pulse 64   Temp 98.7  F (37.1  C) (Tympanic)   Resp 15   Wt 70.8 kg (156 lb)   BMI 29.48 kg/m    Body mass index is 29.48 kg/m .  Physical Exam   GENERAL APPEARANCE: healthy, alert and no distress  RESP: lungs clear to auscultation - no rales, rhonchi or wheezes  CV: regular rates and rhythm, normal S1 S2, no S3 or S4 and no murmur, click or rub  ABDOMEN: soft, nontender, without hepatosplenomegaly or masses and bowel sounds normal   (female): vaginal atrophy  MS: extremities normal- no gross deformities noted  PSYCH: mentation appears normal and affect normal/bright    Diagnostic Test Results:  Labs reviewed in Epic        Assessment & Plan     1. Vaginal discharge  Negative wet prep   - Wet prep    2. Vaginal atrophy  Likely causing most of her sxs   - estradiol (ESTRACE) 0.1 MG/GM vaginal cream; Place 2 g vaginally once a week And externally  Dispense: 42.5 g; Refill: 3    3. Type 2 diabetes mellitus without complication, without long-term current use of insulin (H)  Not well controlled   - empagliflozin (JARDIANCE) 25 MG TABS tablet; Take 1 tablet (25 mg) by mouth daily  Dispense: 90 tablet; Refill: 3  - Basic metabolic panel  (Ca, Cl, CO2, Creat, Gluc, K, Na, BUN); Future  - CBC with platelets and differential; Future     BMI:   Estimated body mass index is 29.48 kg/m  as calculated from the following:    Height as of 9/18/19: 1.549 m (5' 1\").    Weight as of this encounter: 70.8 kg (156 lb).   Weight management plan: Discussed healthy diet and exercise guidelines        Patient Instructions   Use cortisone 10 (or 1.0%) use twice daily for 3 days     Then start estrogen cream weekly     Stop the jardiance 10mg and start the 25mg tab tomorrow       Return in about 2 weeks (around 10/29/2019).      Risks, benefits, side effects and rationale for treatment plan fully discussed with the patient and understanding expressed.       Ledy " Amador Benedict MD  [facility]

## 2019-10-15 NOTE — PATIENT INSTRUCTIONS
Use cortisone 10 (or 1.0%) use twice daily for 3 days     Then start estrogen cream weekly     Stop the jardiance 10mg and start the 25mg tab tomorrow

## 2019-10-15 NOTE — NURSING NOTE
"Chief Complaint   Patient presents with     Vaginal Problem       Initial /64 (BP Location: Right arm, Patient Position: Chair, Cuff Size: Adult Large)   Pulse 64   Temp 98.7  F (37.1  C) (Tympanic)   Resp 15   Wt 70.8 kg (156 lb)   BMI 29.48 kg/m   Estimated body mass index is 29.48 kg/m  as calculated from the following:    Height as of 9/18/19: 1.549 m (5' 1\").    Weight as of this encounter: 70.8 kg (156 lb).    Patient presents to the clinic using No DME    Health Maintenance that is potentially due pending provider review:  NONE    n/a    Is there anyone who you would like to be able to receive your results? Yes  If yes have patient fill out MIGUEL    "

## 2019-10-29 ENCOUNTER — OFFICE VISIT (OUTPATIENT)
Dept: FAMILY MEDICINE | Facility: CLINIC | Age: 69
End: 2019-10-29
Payer: COMMERCIAL

## 2019-10-29 VITALS
TEMPERATURE: 99 F | BODY MASS INDEX: 29.1 KG/M2 | HEART RATE: 71 BPM | DIASTOLIC BLOOD PRESSURE: 70 MMHG | RESPIRATION RATE: 16 BRPM | OXYGEN SATURATION: 98 % | WEIGHT: 154 LBS | SYSTOLIC BLOOD PRESSURE: 114 MMHG

## 2019-10-29 DIAGNOSIS — I10 BENIGN ESSENTIAL HYPERTENSION: Primary | ICD-10-CM

## 2019-10-29 DIAGNOSIS — E11.9 TYPE 2 DIABETES MELLITUS WITHOUT COMPLICATION, WITHOUT LONG-TERM CURRENT USE OF INSULIN (H): ICD-10-CM

## 2019-10-29 DIAGNOSIS — M25.50 MULTIPLE JOINT PAIN: ICD-10-CM

## 2019-10-29 DIAGNOSIS — L98.9 SKIN LESION: ICD-10-CM

## 2019-10-29 LAB
CRP SERPL-MCNC: <2.9 MG/L (ref 0–8)
ERYTHROCYTE [SEDIMENTATION RATE] IN BLOOD BY WESTERGREN METHOD: 8 MM/H (ref 0–30)

## 2019-10-29 PROCEDURE — 86200 CCP ANTIBODY: CPT | Performed by: FAMILY MEDICINE

## 2019-10-29 PROCEDURE — 36415 COLL VENOUS BLD VENIPUNCTURE: CPT | Performed by: FAMILY MEDICINE

## 2019-10-29 PROCEDURE — 86140 C-REACTIVE PROTEIN: CPT | Performed by: FAMILY MEDICINE

## 2019-10-29 PROCEDURE — 99214 OFFICE O/P EST MOD 30 MIN: CPT | Performed by: FAMILY MEDICINE

## 2019-10-29 PROCEDURE — 85652 RBC SED RATE AUTOMATED: CPT | Performed by: FAMILY MEDICINE

## 2019-10-29 RX ORDER — VILAZODONE HYDROCHLORIDE 10 MG/1
10 TABLET ORAL DAILY
COMMUNITY
Start: 2019-10-29 | End: 2021-04-13

## 2019-10-29 RX ORDER — VILAZODONE HYDROCHLORIDE 20 MG/1
20 TABLET ORAL DAILY
Status: CANCELLED | OUTPATIENT
Start: 2019-10-29

## 2019-10-29 RX ORDER — LISINOPRIL 2.5 MG/1
2.5 TABLET ORAL DAILY
Qty: 90 TABLET | Refills: 3 | Status: SHIPPED | OUTPATIENT
Start: 2019-10-29 | End: 2019-11-20

## 2019-10-29 RX ORDER — GLIPIZIDE 2.5 MG/1
2.5 TABLET, EXTENDED RELEASE ORAL DAILY
Qty: 90 TABLET | Refills: 3 | Status: SHIPPED | OUTPATIENT
Start: 2019-10-29 | End: 2020-10-12

## 2019-10-29 ASSESSMENT — ANXIETY QUESTIONNAIRES
1. FEELING NERVOUS, ANXIOUS, OR ON EDGE: MORE THAN HALF THE DAYS
6. BECOMING EASILY ANNOYED OR IRRITABLE: MORE THAN HALF THE DAYS
7. FEELING AFRAID AS IF SOMETHING AWFUL MIGHT HAPPEN: NOT AT ALL
3. WORRYING TOO MUCH ABOUT DIFFERENT THINGS: NOT AT ALL
GAD7 TOTAL SCORE: 7
4. TROUBLE RELAXING: MORE THAN HALF THE DAYS
2. NOT BEING ABLE TO STOP OR CONTROL WORRYING: SEVERAL DAYS
5. BEING SO RESTLESS THAT IT IS HARD TO SIT STILL: NOT AT ALL
GAD7 TOTAL SCORE: 7
GAD7 TOTAL SCORE: 7
7. FEELING AFRAID AS IF SOMETHING AWFUL MIGHT HAPPEN: NOT AT ALL

## 2019-10-29 ASSESSMENT — PATIENT HEALTH QUESTIONNAIRE - PHQ9
SUM OF ALL RESPONSES TO PHQ QUESTIONS 1-9: 12
SUM OF ALL RESPONSES TO PHQ QUESTIONS 1-9: 12
10. IF YOU CHECKED OFF ANY PROBLEMS, HOW DIFFICULT HAVE THESE PROBLEMS MADE IT FOR YOU TO DO YOUR WORK, TAKE CARE OF THINGS AT HOME, OR GET ALONG WITH OTHER PEOPLE: VERY DIFFICULT

## 2019-10-29 NOTE — PROGRESS NOTES
Subjective     Ml Evans is a 69 year old female who presents to clinic today for the following health issues:  PHQ-9 ([employer]) 10/29/2019   1.  Little interest or pleasure in doing things 2   2.  Feeling down, depressed, or hopeless 2   3.  Trouble falling or staying asleep, or sleeping too much 1   4.  Feeling tired or having little energy 3   5.  Poor appetite or overeating 0   6.  Feeling bad about yourself 1   7.  Trouble concentrating 3   8.  Moving slowly or restless 0   9.  Suicidal or self-harm thoughts 0   PHQ-9 Total Score 12     RAVI-7   [employer], 2002; Used with Permission) 10/29/2019   1. Feeling nervous, anxious, or on edge 2   2. Not being able to stop or control worrying 1   3. Worrying too much about different things 0   4. Trouble relaxing 2   5. Being so restless that it is hard to sit still 0   6. Becoming easily annoyed or irritable 2   7. Feeling afraid, as if something awful might happen 0   RAVI-7 Total Score 7   If you checked any problems, how difficult have they made it for you to do your work, take care of things at home, or get along with other people?      HPI   Diabetes Follow-up    How often are you checking your blood sugar? Two times daily  Blood sugar testing frequency justification:  Uncontrolled diabetes  What time of day are you checking your blood sugars (select all that apply)?  Before and after meals  Have you had any blood sugars above 200?  Yes   Have you had any blood sugars below 70?  No    What symptoms do you notice when your blood sugar is low?  None    What concerns do you have today about your diabetes? Other: elevated blood sugars     Do you have any of these symptoms? (Select all that apply)  Redness, sores, or blisters on feet     Have you had a diabetic eye exam in the last 12 months? No    BP Readings from Last 2 Encounters:   10/29/19 114/70   10/15/19 106/64     Hemoglobin A1C (%)   Date Value   09/15/2019 7.9 (H)   05/06/2019 6.9 (H)     LDL  Cholesterol Calculated (mg/dL)   Date Value   05/06/2019 55   05/15/2018 64   Blood sugar testing frequency justification:  Uncontrolled diabetes        Diabetes Management Resources    Skin lesions   Pt has been battling what appeared to be cystic acne lesions for the last several months she was seen 2 weeks ago and her face was clear from actively inflamed lesion except for a 3mm area of mild erythema on the right cheek   She comes today with five active cystic lesions on the face on the cheek the area is open and deep and painful   No fevers   She denies picking at these lesions but she cleans the area 'obsessively' she states.   She keeps them covered     Her blood sugars have been high               Reviewed and updated as needed this visit by Provider  Tobacco  Allergies  Meds  Problems  Med Hx  Surg Hx  Fam Hx         Review of Systems   ROS COMP: Constitutional, HEENT, cardiovascular, pulmonary, gi and gu systems are negative, except as otherwise noted.      Objective    /70 (BP Location: Right arm, Patient Position: Chair, Cuff Size: Adult Large)   Pulse 71   Temp 99  F (37.2  C) (Tympanic)   Resp 16   Wt 69.9 kg (154 lb)   SpO2 98%   BMI 29.10 kg/m    Body mass index is 29.1 kg/m .  Physical Exam   GENERAL APPEARANCE: healthy, alert and no distress  NECK: no adenopathy, no asymmetry, masses, or scars and thyroid normal to palpation  RESP: lungs clear to auscultation - no rales, rhonchi or wheezes  CV: regular rates and rhythm, normal S1 S2, no S3 or S4 and no murmur, click or rub  SKIN: face has multiple areas of erythema that are open and oozing. The area on the right cheek is a deep open wound 2x3 cm with exudate non tender and no erythema surrounding the wound   On the chest anteriorly and shoulders she has multiple areas of erythema and excoriation   PSYCH: mentation appears normal and anxious    Diagnostic Test Results:  Labs reviewed in Epic        Assessment & Plan     1. Benign  "essential hypertension  Low BP at this time   - lisinopril (PRINIVIL/ZESTRIL) 2.5 MG tablet; Take 1 tablet (2.5 mg) by mouth daily  Dispense: 90 tablet; Refill: 3    2. Type 2 diabetes mellitus without complication, without long-term current use of insulin (H)  Not well controlled   - glipiZIDE (GLUCOTROL XL) 2.5 MG 24 hr tablet; Take 1 tablet (2.5 mg) by mouth daily  Dispense: 90 tablet; Refill: 3    3. Skin lesion  Open wounds on the face   Derm referral     4. Multiple joint pain    - Cyclic Citrullinated Peptide Antibody IgG  - CRP inflammation  - Erythrocyte sedimentation rate auto     BMI:   Estimated body mass index is 29.1 kg/m  as calculated from the following:    Height as of 9/18/19: 1.549 m (5' 1\").    Weight as of this encounter: 69.9 kg (154 lb).   Weight management plan: Discussed healthy diet and exercise guidelines        Patient Instructions   Schedule an eye exam    Start glucotrol 2.5mg daily in the am for blood sugar     Cut dose of lisinopril in half (new Rx sent to the pharmacy)  Stop taking the the current dose of lisinopril     See Dr Huerat tomorrow at 10 am       Return in about 1 day (around 10/30/2019) for with Dr Huerta in derm .    Ledy Benedict MD  [facility]      "

## 2019-10-29 NOTE — PATIENT INSTRUCTIONS
Schedule an eye exam    Start glucotrol 2.5mg daily in the am for blood sugar     Cut dose of lisinopril in half (new Rx sent to the pharmacy)  Stop taking the the current dose of lisinopril     See Dr Huerta tomorrow at 10 am

## 2019-10-29 NOTE — NURSING NOTE
"Chief Complaint   Patient presents with     Diabetes       Initial /70 (BP Location: Right arm, Patient Position: Chair, Cuff Size: Adult Large)   Pulse 71   Temp 99  F (37.2  C) (Tympanic)   Resp 16   Wt 69.9 kg (154 lb)   SpO2 98%   BMI 29.10 kg/m   Estimated body mass index is 29.1 kg/m  as calculated from the following:    Height as of 9/18/19: 1.549 m (5' 1\").    Weight as of this encounter: 69.9 kg (154 lb).    Patient presents to the clinic using No DME    Health Maintenance that is potentially due pending provider review:  NONE    n/a    Is there anyone who you would like to be able to receive your results? Yes  If yes have patient fill out MIGUEL    "

## 2019-10-30 ENCOUNTER — OFFICE VISIT (OUTPATIENT)
Dept: DERMATOLOGY | Facility: CLINIC | Age: 69
End: 2019-10-30
Payer: COMMERCIAL

## 2019-10-30 VITALS
HEART RATE: 69 BPM | HEIGHT: 61 IN | DIASTOLIC BLOOD PRESSURE: 64 MMHG | BODY MASS INDEX: 29.1 KG/M2 | SYSTOLIC BLOOD PRESSURE: 115 MMHG

## 2019-10-30 DIAGNOSIS — L98.491 SKIN ULCER, LIMITED TO BREAKDOWN OF SKIN (H): Primary | ICD-10-CM

## 2019-10-30 DIAGNOSIS — K21.9 GASTROESOPHAGEAL REFLUX DISEASE, ESOPHAGITIS PRESENCE NOT SPECIFIED: ICD-10-CM

## 2019-10-30 LAB — CCP AB SER IA-ACNC: 1 U/ML

## 2019-10-30 PROCEDURE — 88305 TISSUE EXAM BY PATHOLOGIST: CPT | Mod: TC | Performed by: DERMATOLOGY

## 2019-10-30 PROCEDURE — 11102 TANGNTL BX SKIN SINGLE LES: CPT | Performed by: DERMATOLOGY

## 2019-10-30 PROCEDURE — 99203 OFFICE O/P NEW LOW 30 MIN: CPT | Mod: 25 | Performed by: DERMATOLOGY

## 2019-10-30 ASSESSMENT — ANXIETY QUESTIONNAIRES: GAD7 TOTAL SCORE: 7

## 2019-10-30 ASSESSMENT — PATIENT HEALTH QUESTIONNAIRE - PHQ9: SUM OF ALL RESPONSES TO PHQ QUESTIONS 1-9: 12

## 2019-10-30 NOTE — PATIENT INSTRUCTIONS
Wound Care Instructions     FOR SUPERFICIAL WOUNDS     [facility] [phone]    [facility] [phone]                       AFTER 24 HOURS YOU SHOULD REMOVE THE BANDAGE AND BEGIN DAILY DRESSING CHANGES AS FOLLOWS:     1) Remove Dressing.     2) Clean and dry the area with tap water using a Q-tip or sterile gauze pad.     3) Apply Vaseline, Aquaphor, ointment over entire wound.  Do NOT use Neosporin ointment.     4) Cover the wound with a band-aid, or a sterile non-stick gauze pad and micropore paper tape      REPEAT THESE INSTRUCTIONS AT LEAST ONCE A DAY UNTIL THE WOUND HAS COMPLETELY HEALED.    It is an old wives tale that a wound heals better when it is exposed to air and allowed to dry out. The wound will heal faster with a better cosmetic result if it is kept moist with ointment and covered with a bandage.    **Do not let the wound dry out.**      Supplies Needed:      *Cotton tipped applicators (Q-tips)    *vaseline, aquaphor  (NOT NEOSPORIN)    *Band-aids or non-stick gauze pads and micropore paper tape.      PATIENT INFORMATION:    During the healing process you will notice a number of changes. All wounds develop a small halo of redness surrounding the wound.  This means healing is occurring. Severe itching with extensive redness usually indicates sensitivity to the ointment or bandage tape used to dress the wound.  You should call our office if this develops.      Swelling  and/or discoloration around your surgical site is common, particularly when performed around the eye.    All wounds normally drain.  The larger the wound the more drainage there will be.  After 7-10 days, you will notice the wound beginning to shrink and new skin will begin to grow.  The wound is healed when you can see skin has formed over the entire area.  A healed wound has a healthy, shiny look to the surface and is red to dark pink in color to normalize.  Wounds may take approximately 4-6 weeks to  heal.  Larger wounds may take 6-8 weeks.  After the wound is healed you may discontinue dressing changes.    You may experience a sensation of tightness as your wound heals. This is normal and will gradually subside.    Your healed wound may be sensitive to temperature changes. This sensitivity improves with time, but if you re having a lot of discomfort, try to avoid temperature extremes.    Patients frequently experience itching after their wound appears to have healed because of the continue healing under the skin.  Plain Vaseline will help relieve the itching.        POSSIBLE COMPLICATIONS    BLEEDIN. Leave the bandage in place.  2. Use tightly rolled up gauze or a cloth to apply direct pressure over the bandage for 30  minutes.  3. Reapply pressure for an additional 30 minutes if necessary  4. Use additional gauze and tape to maintain pressure once the bleeding has stopped.

## 2019-10-30 NOTE — TELEPHONE ENCOUNTER
"Requested Prescriptions   Pending Prescriptions Disp Refills     LANsoprazole (PREVACID) 30 MG DR capsule [Pharmacy Med Name: LANSOPRAZOLE 30MG CPDR] 90 capsule 1     Sig: TAKE 1 CAPSULE BY MOUTH DAILY, 30 TO 60 MINUTES BEFORE A MEAL.       PPI Protocol Passed - 10/30/2019  3:13 PM        Passed - Not on Clopidogrel (unless Pantoprazole ordered)        Passed - No diagnosis of osteoporosis on record        Passed - Recent (12 mo) or future (30 days) visit within the authorizing provider's specialty     Patient has had an office visit with the authorizing provider or a provider within the authorizing providers department within the previous 12 mos or has a future within next 30 days. See \"Patient Info\" tab in inbasket, or \"Choose Columns\" in Meds & Orders section of the refill encounter.              Passed - Medication is active on med list        Passed - Patient is age 18 or older        Passed - No active pregnacy on record        Passed - No positive pregnancy test in past 12 months        Last Written Prescription Date:  5/6/19  Last Fill Quantity: 90,  # refills: 1   Last office visit: 10/29/2019 with prescribing provider:  Marichuy   Future Office Visit:   Next 5 appointments (look out 90 days)    Nov 13, 2019  1:00 PM CST  Return Visit with Kermit Mariee MD  [facility] [address] [id number] [phone]           "
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
## 2019-10-31 ENCOUNTER — TELEPHONE (OUTPATIENT)
Dept: CARDIOLOGY | Facility: CLINIC | Age: 69
End: 2019-10-31

## 2019-10-31 RX ORDER — LANSOPRAZOLE 30 MG/1
CAPSULE, DELAYED RELEASE ORAL
Qty: 90 CAPSULE | Refills: 3 | Status: SHIPPED | OUTPATIENT
Start: 2019-10-31 | End: 2020-10-15

## 2019-10-31 NOTE — TELEPHONE ENCOUNTER
Prescription approved per [facility].    Tarah PARIKH RN

## 2019-10-31 NOTE — TELEPHONE ENCOUNTER
[facility] Call Center    Phone Message    May a detailed message be left on voicemail: yes    Reason for Call: Other: Patient requesting call back from either Tarah or Taniya to discuss her Lisinopril.  SHe also wants to transfer to see Dr Dela Cruz (as she has in the past), rather than Dr Matthews (who has left the clinic).      Action Taken: Message routed to:  [facility] ([facility]): Cardio

## 2019-11-01 ENCOUNTER — CARE COORDINATION (OUTPATIENT)
Dept: CARDIOLOGY | Facility: CLINIC | Age: 69
End: 2019-11-01

## 2019-11-04 ENCOUNTER — HEALTH MAINTENANCE LETTER (OUTPATIENT)
Age: 69
End: 2019-11-04

## 2019-11-04 ENCOUNTER — CARE COORDINATION (OUTPATIENT)
Dept: CARDIOLOGY | Facility: CLINIC | Age: 69
End: 2019-11-04

## 2019-11-04 NOTE — PROGRESS NOTES
Return telephone call made to Narcisa:    She is requesting to establish care with a cardiologist.  Also, had concerns about her recent decrease in lisinopril dose, and wanted to make sure that this is OK with cardiology.    Narcisa will establish care with a new cardiologist.  Also instructed to take her BP's daily, record this, and bring this information along to her cardiology visit.

## 2019-11-05 LAB — COPATH REPORT: NORMAL

## 2019-11-07 NOTE — TELEPHONE ENCOUNTER
RECORDS RECEIVED FROM: Internal   DATE RECEIVED: 11-20   NOTES STATUS DETAILS   OFFICE NOTE from referring provider    Internal Lanie   OFFICE NOTE from other cardiologist    Internal 1-23-19 Dr. Matthews   DISCHARGE SUMMARY from hospital    Internal 8-18-19   DISCHARGE REPORT from the ER   N/A    OPERATIVE REPORT    N/A    MEDICATION LIST   Internal    LABS     BMP   Internal 9-18-19   CBC   Internal 9-18-19   CMP   N/A    Lipids   Internal 5-6-19   TSH   N/A    DIAGNOSTIC PROCEDURES     EKG   Internal 9-18-19   Monitor Reports   N/A    IMAGING (DISC & REPORT)      Echo   N/A    Stress Tests   Internal 9-19-19   Cath   N/A    MRI/MRA   N/A    CT/CTA   N/A

## 2019-11-09 ENCOUNTER — OFFICE VISIT (OUTPATIENT)
Dept: URGENT CARE | Facility: URGENT CARE | Age: 69
End: 2019-11-09
Payer: COMMERCIAL

## 2019-11-09 VITALS
SYSTOLIC BLOOD PRESSURE: 112 MMHG | DIASTOLIC BLOOD PRESSURE: 64 MMHG | WEIGHT: 155 LBS | TEMPERATURE: 98.5 F | RESPIRATION RATE: 16 BRPM | BODY MASS INDEX: 29.29 KG/M2 | HEART RATE: 56 BPM

## 2019-11-09 DIAGNOSIS — B96.89 BV (BACTERIAL VAGINOSIS): Primary | ICD-10-CM

## 2019-11-09 DIAGNOSIS — R10.2 VAGINAL PAIN: ICD-10-CM

## 2019-11-09 DIAGNOSIS — N76.0 BV (BACTERIAL VAGINOSIS): Primary | ICD-10-CM

## 2019-11-09 DIAGNOSIS — R30.0 DYSURIA: ICD-10-CM

## 2019-11-09 LAB
ALBUMIN UR-MCNC: NEGATIVE MG/DL
APPEARANCE UR: CLEAR
BILIRUB UR QL STRIP: NEGATIVE
COLOR UR AUTO: YELLOW
GLUCOSE UR STRIP-MCNC: 500 MG/DL
HGB UR QL STRIP: NEGATIVE
KETONES UR STRIP-MCNC: NEGATIVE MG/DL
LEUKOCYTE ESTERASE UR QL STRIP: NEGATIVE
NITRATE UR QL: NEGATIVE
PH UR STRIP: 5 PH (ref 5–7)
SOURCE: ABNORMAL
SP GR UR STRIP: 1.01 (ref 1–1.03)
SPECIMEN SOURCE: ABNORMAL
UROBILINOGEN UR STRIP-ACNC: 0.2 EU/DL (ref 0.2–1)
WET PREP SPEC: ABNORMAL

## 2019-11-09 PROCEDURE — 99213 OFFICE O/P EST LOW 20 MIN: CPT | Performed by: NURSE PRACTITIONER

## 2019-11-09 PROCEDURE — 81003 URINALYSIS AUTO W/O SCOPE: CPT | Performed by: NURSE PRACTITIONER

## 2019-11-09 PROCEDURE — 87210 SMEAR WET MOUNT SALINE/INK: CPT | Performed by: NURSE PRACTITIONER

## 2019-11-09 RX ORDER — CLINDAMYCIN PHOSPHATE 20 MG/G
1 CREAM VAGINAL AT BEDTIME
Qty: 40 G | Refills: 0 | Status: SHIPPED | OUTPATIENT
Start: 2019-11-09 | End: 2019-11-16

## 2019-11-09 NOTE — PATIENT INSTRUCTIONS
Use cream as directed.    Follow up with your primary care provider if symptoms worsen or do not resolve.    Follow-up with your primary care provider next week to further discuss you blood sugar control as you are spilling glucose in your urine telling us you need better control.    Indications for emergent return to emergency department discussed with patient, who verbalized good understanding and agreement.  Patient understands the limitations of today's evaluation.         Patient Education     Bacterial Vaginosis    You have a vaginal infection called bacterial vaginosis (BV). Both good and bad bacteria are present in a healthy vagina. BV occurs when these bacteria get out of balance. The number of bad bacteria increase. And the number of good bacteria decrease. Although BV is associated with sexual activity, it is not a sexually transmitted disease.  BV may or may not cause symptoms. If symptoms do occur, they can include:    Thin, gray, milky-white, or sometimes green discharge    Unpleasant odor or  fishy  smell    Itching, burning, or pain in or around the vagina  It is not known what causes BV, but certain factors can make the problem more likely. This can include:    Douching    Having sex with a new partner    Having sex with more than one partner  BV will sometimes go away on its own. But treatment is usually recommended. This is because untreated BV can increase the risk of more serious health problems such as:    Pelvic inflammatory disease (PID)     delivery (giving birth to a baby early if you re pregnant)    HIV and certain other sexually transmitted diseases (STDs)    Infection after surgery on the reproductive organs  Home care  General care    BV is most often treated with medicines called antibiotics. These may be given as pills or as a vaginal cream. If antibiotics are prescribed, be sure to use them exactly as directed. Also, be sure to complete all of the medicine, even if your  symptoms go away.    Don't douche or having sex during treatment.    If you have sex with a female partner, ask your healthcare provider if she should also be treated.  Prevention    Don't douche.    Don't have sex. If you do have sex, then take steps to lower your risk:  ? Use condoms when having sex.  ? Limit the number of sexual partners you have.  Follow-up care  Follow up with your healthcare provider, or as advised.  When to seek medical advice  Call your healthcare provider right away if:    You have a fever of 100.4 F (38 C) or higher, or as directed by your provider.    Your symptoms worsen, or they don t go away within a few days of starting treatment.    You have new pain in the lower belly or pelvic region.    You have side effects that bother you or a reaction to the pills or cream you re prescribed.    You or any partners you have sex with have new symptoms, such as a rash, joint pain, or sores.  Date Last Reviewed: 10/1/2017    5586-0093 [employer]. [address] [place] [address]. All rights reserved. This information is not intended as a substitute for professional medical care. Always follow your healthcare professional's instructions.           Patient Education     Vaginal Infection: Bacterial Vaginosis  Both good and bad bacteria are present in a healthy vagina. Bacterial vaginosis (BV) occurs when these bacteria get out of balance. The numbers of good bacteria decrease. This allows the numbers of bad bacteria to increase and cause BV. In most cases, BV is not a serious problem.  Causes of bacterial vaginosis  The cause of BV is not clear. Douching may lead to it. Having sex with a new partner or more than 1 partner makes it more likely.  Symptoms of bacterial vaginosis  Symptoms of BV vary for each woman. Some women have few symptoms or none at all. If symptoms are present, they can include:    Thin, milky white or gray or sometimes green discharge    Unpleasant  fishy   odor    Irritation, itching, and burning at opening of vagina which may indicate mixed vaginitis      Burning or irritation with sex or urination which may indicate mixed vaginitis  Diagnosing bacterial vaginosis  Your healthcare provider will ask about your symptoms and health history. He or she will also do a pelvic exam. This is an exam of your vagina and cervix. A sample of vaginal fluid or discharge may be taken. This sample is checked for signs of BV.  Treating bacterial vaginosis  BV is often treated with antibiotics. They may be given in oral pill form or as a vaginal cream. To use these medicines:    Be sure to take all of your medicine, even if your symptoms go away.    If you re taking antibiotic pills, do not drink alcohol until you re finished with all of your medicine.    If you re using vaginal cream, apply it as directed. Be aware that the cream may make condoms and diaphragms less effective.    Call your healthcare provider if symptoms do not go away within 4 days of starting treatment. Also call if you have a reaction to the medicine.  Why treatment matters  Even if you have no symptoms or your symptoms are mild, BV should be treated. Untreated BV can lead to health problems such as:    Increased risk of  delivery if you re pregnant    Increased risk of complications after surgery on the reproductive organs    Possible increased risk of pelvic inflammatory disease (PID)   Date Last Reviewed: 3/1/2017    8717-8106 [employer]. [address] [place] [address], [place] [address]. All rights reserved. This information is not intended as a substitute for professional medical care. Always follow your healthcare professional's instructions.

## 2019-11-09 NOTE — NURSING NOTE
"Chief Complaint   Patient presents with     Vaginal Problem     Has just been ongoing.  Has pain in vaginal area and hurts when urinates. Has burning and itching.  Has tried cortisone cream, aquaphor, goldbond cream.        Initial /64 (BP Location: Right arm, Patient Position: Chair, Cuff Size: Adult Regular)   Pulse 56   Temp 98.5  F (36.9  C) (Tympanic)   Resp 16   Wt 70.3 kg (155 lb)   BMI 29.29 kg/m   Estimated body mass index is 29.29 kg/m  as calculated from the following:    Height as of 10/30/19: 1.549 m (5' 1\").    Weight as of this encounter: 70.3 kg (155 lb).    Patient presents to the clinic using No DME    Health Maintenance that is potentially due pending provider review:  NONE    n/a    Is there anyone who you would like to be able to receive your results? No  If yes have patient fill out MIGUEL    Milton Pitts M.A.      "

## 2019-11-09 NOTE — PROGRESS NOTES
Subjective     Ml Evans is a 69 year old female who presents to clinic today for the following health issues:    HPI     Chief Complaint   Patient presents with     Vaginal Problem     Has just been ongoing.  Has pain in vaginal area and hurts when urinates. Has burning and itching.  Has tried cortisone cream, aquaphor, goldbond cream.          Patient Active Problem List   Diagnosis     Attention deficit disorder     Nonspecific elevation of levels of transaminase or lactic acid dehydrogenase (LDH)     Mild major depression (H)     Esophageal reflux     Allergic state     Anal fissure     Menopausal syndrome (hot flashes)     Hyperlipidemia LDL goal <100     GERD (gastroesophageal reflux disease)     HPV (human papilloma virus) anogenital infection     CAD (coronary artery disease)     Health Care Home     Diverticulosis     Advanced directives, counseling/discussion     Colon polyp     ACS (acute coronary syndrome) (H)     Chronic rhinitis     Benign essential hypertension     Irritable bowel syndrome with diarrhea     Type 2 diabetes mellitus without complication, without long-term current use of insulin (H)     History of ST elevation myocardial infarction (STEMI)     Status post insertion of drug-eluting stent into right coronary artery for coronary artery disease     Chest pain     Diarrhea     Past Surgical History:   Procedure Laterality Date     ABDOMEN SURGERY  1981,1991,1993     APPENDECTOMY  1993     BIOPSY  2003    nose, during surgery     COLONOSCOPY  2005     COLONOSCOPY N/A 5/14/2015    Procedure: COMBINED COLONOSCOPY, SINGLE OR MULTIPLE BIOPSY/POLYPECTOMY BY BIOPSY;  Surgeon: Ponce Christine MD;  Location: [facility]     ECTOPIC PREGNANCY SURGERY  1981     ENDOSCOPIC RELEASE CARPAL TUNNEL  4/16/2013    Procedure: ENDOSCOPIC RELEASE CARPAL TUNNEL;  Right endoscopic carpal tunnel release;  Surgeon: Jonah Dela Cruz MD;  Location: [facility]     ENT SURGERY  2003    nose- suspected basal  cell- was benign     ESOPHAGOSCOPY, GASTROSCOPY, DUODENOSCOPY (EGD), COMBINED  2014    Procedure: COMBINED ESOPHAGOSCOPY, GASTROSCOPY, DUODENOSCOPY (EGD), BIOPSY SINGLE OR MULTIPLE;  Surgeon: Anibal Sebastian MD;  Location: [facility]     GENITOURINARY SURGERY  ,      HYSTERECTOMY, ELIECER      total hysterectomy. Unsure if ELIECER or vaginal     SURGICAL HISTORY OF -       appy     SURGICAL HISTORY OF -       T&A     VASCULAR SURGERY      angioplasty- 2 stents implanted       Social History     Tobacco Use     Smoking status: Former Smoker     Packs/day: 1.00     Years: 30.00     Pack years: 30.00     Types: Cigarettes     Start date: 1968     Last attempt to quit: 3/15/2011     Years since quittin.6     Smokeless tobacco: Never Used     Tobacco comment: occasional/daily   Substance Use Topics     Alcohol use: No     Alcohol/week: 0.0 standard drinks     Family History   Problem Relation Age of Onset     Cancer Mother         uterine     Lipids Mother      Neurologic Disorder Mother         polymyalgia     Hypertension Mother      Other Cancer Mother         endometrial     Depression/Anxiety Mother      Other - See Comments Mother         Heart Arrythmia      Cardiovascular Father         CHF     Depression Father      C.A.D. Father         bypass x2 starting at age 55-  in his 70's     Diabetes Father         type 2     Coronary Artery Disease Father          from - age 75     Depression/Anxiety Father      Cerebrovascular Disease Father         from angioplasty      C.A.D. Maternal Grandfather      Coronary Artery Disease Maternal Grandfather          from- age 61     C.A.D. Paternal Grandfather      Diabetes Brother      Depression Son      Psychotic Disorder Son         adhd     Diabetes Brother         type 1     Depression/Anxiety Brother      Depression/Anxiety Maternal Grandmother      Depression/Anxiety Son      Asthma Son         childhood asthma-out grown now as an adult      Coronary Artery Disease Brother         stent-MI     Melanoma No family hx of          Current Outpatient Medications   Medication Sig Dispense Refill     ALPRAZolam (XANAX PO) Take 0.5-1 mg by mouth 1/2 tab in am, 1 tab in pm, then 1 tab midday prn and 1/2 tab throughout the day if needed       amphetamine-dextroamphetamine (ADDERALL XR) 20 MG per capsule Take 20 mg by mouth daily        aspirin 81 MG EC tablet Take 324 mg by mouth once       atorvastatin (LIPITOR) 80 MG tablet Take 1 tablet (80 mg) by mouth daily 90 tablet 3     blood glucose (NO BRAND SPECIFIED) lancets standard Use to test blood sugar 3 times daily or as directed. 100 each 11     blood glucose monitoring (NO BRAND SPECIFIED) meter device kit Use to test blood sugar 3 times daily or as directed. 1 kit 0     clindamycin (CLEOCIN T) 1 % solution Apply topically 2 times daily 60 mL 1     clindamycin (CLEOCIN) 2 % vaginal cream Place 1 applicator vaginally At Bedtime for 7 days 40 g 0     CONTOUR NEXT TEST test strip USE TO TEST BLOOD SUGAR THREE TIMES A DAY OR AS DIRECTED 100 each 5     DoxePIN (SINEQUAN) 75 MG capsule Take 75 mg by mouth At Bedtime        empagliflozin (JARDIANCE) 25 MG TABS tablet Take 1 tablet (25 mg) by mouth daily 90 tablet 3     estradiol (ESTRACE) 0.1 MG/GM vaginal cream Place 2 g vaginally once a week And externally 42.5 g 3     glipiZIDE (GLUCOTROL XL) 2.5 MG 24 hr tablet Take 1 tablet (2.5 mg) by mouth daily 90 tablet 3     LANsoprazole (PREVACID) 30 MG DR capsule TAKE 1 CAPSULE BY MOUTH DAILY, 30 TO 60 MINUTES BEFORE A MEAL. 90 capsule 3     lisinopril (PRINIVIL/ZESTRIL) 2.5 MG tablet Take 1 tablet (2.5 mg) by mouth daily 90 tablet 3     metoprolol succinate ER (TOPROL-XL) 25 MG 24 hr tablet Take 1 tablet (25 mg) by mouth daily 90 tablet 3     nitroGLYcerin (NITROSTAT) 0.4 MG sublingual tablet Place 1 tablet (0.4 mg) under the tongue every 5 minutes as needed for chest pain 25 tablet 6     order for DME Equipment  being ordered: BP cuff for home 1 Device 0     psyllium 0.52 G capsule Take 3-4 capsules by mouth nightly as needed        ranitidine (ZANTAC) 150 MG tablet Take 1 tablet (150 mg) by mouth nightly as needed for heartburn 30 tablet 1     tretinoin (RETIN-A) 0.025 % external cream Spread a pea size amount into affected area topically at bedtime.  Use sunscreen SPF>20. 45 g 1     vilazodone (VIIBRYD) 10 MG TABS tablet Take 1 tablet (10 mg) by mouth daily For a total of 30 mg daily       vilazodone (VIIBRYD) 20 MG TABS tablet Take 20 mg by mouth daily       Allergies   Allergen Reactions     Hydrocodone Anaphylaxis     Flexeril [Cyclobenzaprine] Rash     Cipro [Ciprofloxacin] Other (See Comments)     Abd pain , proteinuria , microscopic hematuria  Possibly related to cipro     Codeine Camsylate Nausea     Penicillins Difficulty breathing     Amoxicillin Itching and Rash     Sulfa Drugs Itching and Rash     Tetracycline Itching and Rash         Reviewed and updated as needed this visit by Provider  Tobacco  Allergies  Meds  Problems  Med Hx  Surg Hx  Fam Hx         Review of Systems   ROS COMP: Constitutional, HEENT, cardiovascular, pulmonary, GI, , musculoskeletal, neuro, skin, endocrine and psych systems are negative, except as otherwise noted.      Objective    /64 (BP Location: Right arm, Patient Position: Chair, Cuff Size: Adult Regular)   Pulse 56   Temp 98.5  F (36.9  C) (Tympanic)   Resp 16   Wt 70.3 kg (155 lb)   BMI 29.29 kg/m    Body mass index is 29.29 kg/m .  Physical Exam   GENERAL: healthy, alert and no distress, nontoxic in appearance  EYES: Eyes grossly normal to inspection, PERRL and conjunctivae and sclerae normal  HENT: normocephalic  NECK: supple with full ROM  RESP: lungs clear to auscultation - no rales, rhonchi or wheezes  CV: regular rate and rhythm, normal S1 S2, no S3 or S4, no murmur, click or rub, no peripheral edema  ABDOMEN: soft, nontender  MS: no gross musculoskeletal  "defects noted, no edema  No rash    Diagnostic Test Results:  Labs reviewed in Epic  Results for orders placed or performed in visit on 11/09/19 (from the past 24 hour(s))   *UA reflex to Microscopic and Culture ([facility] (except [place] [facility] [place])   Result Value Ref Range    Color Urine Yellow     Appearance Urine Clear     Glucose Urine 500 (A) NEG^Negative mg/dL    Bilirubin Urine Negative NEG^Negative    Ketones Urine Negative NEG^Negative mg/dL    Specific Gravity Urine 1.015 1.003 - 1.035    Blood Urine Negative NEG^Negative    pH Urine 5.0 5.0 - 7.0 pH    Protein Albumin Urine Negative NEG^Negative mg/dL    Urobilinogen Urine 0.2 0.2 - 1.0 EU/dL    Nitrite Urine Negative NEG^Negative    Leukocyte Esterase Urine Negative NEG^Negative    Source Midstream Urine    Wet prep   Result Value Ref Range    Specimen Description Vagina     Wet Prep No Trichomonas seen     Wet Prep Clue cells seen  Few   (A)     Wet Prep No yeast seen     Wet Prep WBC'S seen  Few              Assessment & Plan   Problem List Items Addressed This Visit     None      Visit Diagnoses     BV (bacterial vaginosis)    -  Primary    Relevant Medications    clindamycin (CLEOCIN) 2 % vaginal cream    Dysuria        Relevant Orders    *UA reflex to Microscopic and Culture ([facility] (except [place] [facility] [place]) (Completed)    Vaginal pain        Relevant Orders    Wet prep (Completed)             BMI:   Estimated body mass index is 29.1 kg/m  as calculated from the following:    Height as of 10/30/19: 1.549 m (5' 1\").    Weight as of 10/29/19: 69.9 kg (154 lb).   Weight management plan: Patient was referred to their PCP to discuss a diet and exercise plan.        Patient Instructions     Use cream as directed.    Follow up with your primary care provider if symptoms worsen or do not resolve.    Follow-up with your primary care provider next week to further discuss you blood sugar control as you are " spilling glucose in your urine telling us you need better control.    Indications for emergent return to emergency department discussed with patient, who verbalized good understanding and agreement.  Patient understands the limitations of today's evaluation.         Patient Education     Bacterial Vaginosis    You have a vaginal infection called bacterial vaginosis (BV). Both good and bad bacteria are present in a healthy vagina. BV occurs when these bacteria get out of balance. The number of bad bacteria increase. And the number of good bacteria decrease. Although BV is associated with sexual activity, it is not a sexually transmitted disease.  BV may or may not cause symptoms. If symptoms do occur, they can include:    Thin, gray, milky-white, or sometimes green discharge    Unpleasant odor or  fishy  smell    Itching, burning, or pain in or around the vagina  It is not known what causes BV, but certain factors can make the problem more likely. This can include:    Douching    Having sex with a new partner    Having sex with more than one partner  BV will sometimes go away on its own. But treatment is usually recommended. This is because untreated BV can increase the risk of more serious health problems such as:    Pelvic inflammatory disease (PID)     delivery (giving birth to a baby early if you re pregnant)    HIV and certain other sexually transmitted diseases (STDs)    Infection after surgery on the reproductive organs  Home care  General care    BV is most often treated with medicines called antibiotics. These may be given as pills or as a vaginal cream. If antibiotics are prescribed, be sure to use them exactly as directed. Also, be sure to complete all of the medicine, even if your symptoms go away.    Don't douche or having sex during treatment.    If you have sex with a female partner, ask your healthcare provider if she should also be treated.  Prevention    Don't douche.    Don't have sex. If  you do have sex, then take steps to lower your risk:  ? Use condoms when having sex.  ? Limit the number of sexual partners you have.  Follow-up care  Follow up with your healthcare provider, or as advised.  When to seek medical advice  Call your healthcare provider right away if:    You have a fever of 100.4 F (38 C) or higher, or as directed by your provider.    Your symptoms worsen, or they don t go away within a few days of starting treatment.    You have new pain in the lower belly or pelvic region.    You have side effects that bother you or a reaction to the pills or cream you re prescribed.    You or any partners you have sex with have new symptoms, such as a rash, joint pain, or sores.  Date Last Reviewed: 10/1/2017    5780-4364 [employer]. [address] [place] [address]. All rights reserved. This information is not intended as a substitute for professional medical care. Always follow your healthcare professional's instructions.           Patient Education     Vaginal Infection: Bacterial Vaginosis  Both good and bad bacteria are present in a healthy vagina. Bacterial vaginosis (BV) occurs when these bacteria get out of balance. The numbers of good bacteria decrease. This allows the numbers of bad bacteria to increase and cause BV. In most cases, BV is not a serious problem.  Causes of bacterial vaginosis  The cause of BV is not clear. Douching may lead to it. Having sex with a new partner or more than 1 partner makes it more likely.  Symptoms of bacterial vaginosis  Symptoms of BV vary for each woman. Some women have few symptoms or none at all. If symptoms are present, they can include:    Thin, milky white or gray or sometimes green discharge    Unpleasant  fishy  odor    Irritation, itching, and burning at opening of vagina which may indicate mixed vaginitis      Burning or irritation with sex or urination which may indicate mixed vaginitis  Diagnosing bacterial vaginosis  Your  healthcare provider will ask about your symptoms and health history. He or she will also do a pelvic exam. This is an exam of your vagina and cervix. A sample of vaginal fluid or discharge may be taken. This sample is checked for signs of BV.  Treating bacterial vaginosis  BV is often treated with antibiotics. They may be given in oral pill form or as a vaginal cream. To use these medicines:    Be sure to take all of your medicine, even if your symptoms go away.    If you re taking antibiotic pills, do not drink alcohol until you re finished with all of your medicine.    If you re using vaginal cream, apply it as directed. Be aware that the cream may make condoms and diaphragms less effective.    Call your healthcare provider if symptoms do not go away within 4 days of starting treatment. Also call if you have a reaction to the medicine.  Why treatment matters  Even if you have no symptoms or your symptoms are mild, BV should be treated. Untreated BV can lead to health problems such as:    Increased risk of  delivery if you re pregnant    Increased risk of complications after surgery on the reproductive organs    Possible increased risk of pelvic inflammatory disease (PID)   Date Last Reviewed: 3/1/2017    4451-7955 [employer]. [address] [place] [id number]. All rights reserved. This information is not intended as a substitute for professional medical care. Always follow your healthcare professional's instructions.             Return in about 1 week (around 2019) for Follow up with your primary care provider.    FIDEL Salgado [facility]

## 2019-11-13 ENCOUNTER — OFFICE VISIT (OUTPATIENT)
Dept: DERMATOLOGY | Facility: CLINIC | Age: 69
End: 2019-11-13
Payer: COMMERCIAL

## 2019-11-13 VITALS — HEART RATE: 57 BPM | SYSTOLIC BLOOD PRESSURE: 119 MMHG | OXYGEN SATURATION: 97 % | DIASTOLIC BLOOD PRESSURE: 70 MMHG

## 2019-11-13 DIAGNOSIS — L98.1 NEUROTIC EXCORIATIONS: Primary | ICD-10-CM

## 2019-11-13 DIAGNOSIS — L70.0 ACNE VULGARIS: ICD-10-CM

## 2019-11-13 DIAGNOSIS — Z12.31 VISIT FOR SCREENING MAMMOGRAM: ICD-10-CM

## 2019-11-13 PROCEDURE — 77067 SCR MAMMO BI INCL CAD: CPT | Mod: TC

## 2019-11-13 PROCEDURE — 99213 OFFICE O/P EST LOW 20 MIN: CPT | Performed by: DERMATOLOGY

## 2019-11-13 PROCEDURE — 77063 BREAST TOMOSYNTHESIS BI: CPT | Mod: TC

## 2019-11-13 RX ORDER — TRETINOIN 0.25 MG/G
CREAM TOPICAL
Qty: 45 G | Refills: 3 | Status: SHIPPED | OUTPATIENT
Start: 2019-11-13 | End: 2021-08-26
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
## 2019-11-13 NOTE — NURSING NOTE
"Initial /70 (BP Location: Left arm, Patient Position: Sitting, Cuff Size: Adult Large)   Pulse 57   SpO2 97%  Estimated body mass index is 29.29 kg/m  as calculated from the following:    Height as of 10/30/19: 1.549 m (5' 1\").    Weight as of 11/9/19: 70.3 kg (155 lb). .      "

## 2019-11-20 ENCOUNTER — PRE VISIT (OUTPATIENT)
Dept: CARDIOLOGY | Facility: CLINIC | Age: 69
End: 2019-11-20

## 2019-11-20 ENCOUNTER — OFFICE VISIT (OUTPATIENT)
Dept: CARDIOLOGY | Facility: CLINIC | Age: 69
End: 2019-11-20
Attending: INTERNAL MEDICINE
Payer: COMMERCIAL

## 2019-11-20 VITALS
WEIGHT: 155.3 LBS | SYSTOLIC BLOOD PRESSURE: 90 MMHG | BODY MASS INDEX: 29.32 KG/M2 | DIASTOLIC BLOOD PRESSURE: 54 MMHG | HEIGHT: 61 IN | HEART RATE: 58 BPM | OXYGEN SATURATION: 93 %

## 2019-11-20 DIAGNOSIS — I25.10 CORONARY ARTERY DISEASE INVOLVING NATIVE HEART WITHOUT ANGINA PECTORIS, UNSPECIFIED VESSEL OR LESION TYPE: Primary | ICD-10-CM

## 2019-11-20 PROCEDURE — 99214 OFFICE O/P EST MOD 30 MIN: CPT | Mod: ZP | Performed by: INTERNAL MEDICINE

## 2019-11-20 PROCEDURE — G0463 HOSPITAL OUTPT CLINIC VISIT: HCPCS | Mod: ZF

## 2019-11-20 ASSESSMENT — MIFFLIN-ST. JEOR: SCORE: 1171.82

## 2019-11-20 ASSESSMENT — PAIN SCALES - GENERAL: PAINLEVEL: NO PAIN (0)
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
## 2019-11-20 NOTE — PATIENT INSTRUCTIONS
Patient Instructions:  It was a pleasure to see you in the cardiology clinic today.      If you have any questions, call  Lizzy Correia RN, at [phone].  Press Option #1 for the [facility], and then press Option #4  We are encouraging the use of [employer] to communicate with your HealthCare Provider    Note the new medications: none  Stop the following medications: none    The results from today include: none  Please follow up with a nurse practitioner in one year      If you have an urgent need after hours (8:00 am to 4:30 pm) please call [phone] and ask for the cardiology fellow on call.
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
## 2019-11-20 NOTE — NURSING NOTE
Chief Complaint   Patient presents with     New Patient     ump new      Vitals were taken and medications were reconciled.     Lisa Bryant  1:58 PM

## 2019-12-08 ENCOUNTER — TELEPHONE (OUTPATIENT)
Dept: DERMATOLOGY | Facility: CLINIC | Age: 69
End: 2019-12-08

## 2019-12-08 NOTE — TELEPHONE ENCOUNTER
Reason for call:  Other   Patient called regarding (reason for call): appointment  Additional comments: Patient will not make appt if weather is bad due long drive but wants to know if Dr Mariee will see her for follow before next available appt.     Phone number to reach patient:  Home number on file [phone] (home)    Best Time:  After 9am    Can we leave a detailed message on this number?  YES or send MyChart message

## 2019-12-10 NOTE — TELEPHONE ENCOUNTER
Patient did cancel and reschedule her scheduled Derm appt. I had left a message for her yesterday to let her know we have a very long (>90 patients) wait list, but she can be added to it if she cancelled, but nothing open before end of 2019.   Katina Mari RN

## 2020-01-27 ENCOUNTER — OFFICE VISIT (OUTPATIENT)
Dept: DERMATOLOGY | Facility: CLINIC | Age: 70
End: 2020-01-27
Payer: COMMERCIAL

## 2020-01-27 VITALS — HEART RATE: 60 BPM | DIASTOLIC BLOOD PRESSURE: 57 MMHG | SYSTOLIC BLOOD PRESSURE: 98 MMHG | OXYGEN SATURATION: 95 %

## 2020-01-27 DIAGNOSIS — L72.0 EPIDERMAL CYST: Primary | ICD-10-CM

## 2020-01-27 DIAGNOSIS — L98.1 NEUROTIC EXCORIATIONS: ICD-10-CM

## 2020-01-27 PROCEDURE — 99213 OFFICE O/P EST LOW 20 MIN: CPT | Performed by: DERMATOLOGY
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
## 2020-01-28 ENCOUNTER — ANCILLARY PROCEDURE (OUTPATIENT)
Dept: GENERAL RADIOLOGY | Facility: CLINIC | Age: 70
End: 2020-01-28
Attending: INTERNAL MEDICINE
Payer: COMMERCIAL

## 2020-01-28 ENCOUNTER — OFFICE VISIT (OUTPATIENT)
Dept: FAMILY MEDICINE | Facility: CLINIC | Age: 70
End: 2020-01-28
Payer: COMMERCIAL

## 2020-01-28 VITALS
OXYGEN SATURATION: 96 % | SYSTOLIC BLOOD PRESSURE: 128 MMHG | HEIGHT: 60 IN | TEMPERATURE: 97.9 F | RESPIRATION RATE: 12 BRPM | WEIGHT: 156.8 LBS | DIASTOLIC BLOOD PRESSURE: 56 MMHG | BODY MASS INDEX: 30.78 KG/M2 | HEART RATE: 47 BPM

## 2020-01-28 DIAGNOSIS — F32.0 MILD MAJOR DEPRESSION (H): ICD-10-CM

## 2020-01-28 DIAGNOSIS — M79.641 PAIN OF RIGHT HAND: ICD-10-CM

## 2020-01-28 DIAGNOSIS — I25.10 CORONARY ARTERY DISEASE INVOLVING NATIVE CORONARY ARTERY OF NATIVE HEART WITHOUT ANGINA PECTORIS: ICD-10-CM

## 2020-01-28 DIAGNOSIS — E11.9 TYPE 2 DIABETES MELLITUS WITHOUT COMPLICATION, WITHOUT LONG-TERM CURRENT USE OF INSULIN (H): Primary | ICD-10-CM

## 2020-01-28 DIAGNOSIS — K58.0 IRRITABLE BOWEL SYNDROME WITH DIARRHEA: ICD-10-CM

## 2020-01-28 PROBLEM — Z95.5 STATUS POST INSERTION OF DRUG-ELUTING STENT INTO RIGHT CORONARY ARTERY FOR CORONARY ARTERY DISEASE: Status: RESOLVED | Noted: 2019-01-23 | Resolved: 2020-01-28

## 2020-01-28 PROBLEM — R19.7 DIARRHEA: Status: RESOLVED | Noted: 2019-09-18 | Resolved: 2020-01-28

## 2020-01-28 PROBLEM — R07.9 CHEST PAIN: Status: RESOLVED | Noted: 2019-09-18 | Resolved: 2020-01-28

## 2020-01-28 PROBLEM — K21.9 GASTROESOPHAGEAL REFLUX DISEASE WITHOUT ESOPHAGITIS: Status: ACTIVE | Noted: 2020-01-28

## 2020-01-28 PROBLEM — I25.2 HISTORY OF ST ELEVATION MYOCARDIAL INFARCTION (STEMI): Status: RESOLVED | Noted: 2019-01-23 | Resolved: 2020-01-28

## 2020-01-28 LAB
HBA1C MFR BLD: 7.2 % (ref 0–5.6)
TSH SERPL DL<=0.005 MIU/L-ACNC: 2.35 MU/L (ref 0.4–4)

## 2020-01-28 PROCEDURE — 83036 HEMOGLOBIN GLYCOSYLATED A1C: CPT | Performed by: INTERNAL MEDICINE

## 2020-01-28 PROCEDURE — 73130 X-RAY EXAM OF HAND: CPT | Mod: RT

## 2020-01-28 PROCEDURE — 84443 ASSAY THYROID STIM HORMONE: CPT | Performed by: INTERNAL MEDICINE

## 2020-01-28 PROCEDURE — 36415 COLL VENOUS BLD VENIPUNCTURE: CPT | Performed by: INTERNAL MEDICINE

## 2020-01-28 PROCEDURE — 99215 OFFICE O/P EST HI 40 MIN: CPT | Performed by: INTERNAL MEDICINE

## 2020-01-28 RX ORDER — LISINOPRIL 5 MG/1
5 TABLET ORAL DAILY
COMMUNITY
End: 2020-04-15

## 2020-01-28 ASSESSMENT — MIFFLIN-ST. JEOR: SCORE: 1162.74

## 2020-01-28 NOTE — PROGRESS NOTES
Subjective     Ml Evans is a 69 year old female who presents to clinic today for the following health issues:    Here with  Roland FRASER     diabetes-2:  Not checking regularly 4-5x week.  100-200 -fasting.  Has had low blood sugar with Jardiance and glipizide.   --on jardiance 25 mg daily, glipizide 2.5 mg daily  --has been having vaginal infections due to Jardiance  --has been on metformin, stopped 6/2019 due to side effects of diarrhea    Fatty Liver/IBS: she reports 20+ years of 'gut issues'.  History of recurring diverticulitis.  Also diagnosis of IBS.  Was given dicyclomine but this made her dizzy.  It did help with diarrhea and cramping however.  Metformin significantly worsened diarrhea.  --has very regimented/narrow diet due to IBS.  Would like to expand her diet, feeling restricted.  --saw GI, most recently 3/2019.  There was talk about pelvic floor therapy.  It was recommended to see Nutrition  --not exercising due to fecal incontinence    Hand symptoms: she reports she is dropping objects, bilateral hands.  Did have a fall on outstretched hand 3 months ago.  No pain in hands, but she reports she 'has a high pain tolerance'.  There can be intermittent pain in palm with firm handshake.  Reports fingers are 'locked' in the AM.  Known history of carpal tunnel, surgery on the right hand.      CAD: STEMI in 2011, PCI to RCA.  On high intensity statin, aspirin, BB.    Depression/Anxiety/ADHD/PTSD: sees EMDR therapist.  --on adderall, viibryd, alprazolam, doxepin -  Rx by psychiatry    GERD: on prevacid 30 mg daily and ranitidine 150 mg PRN    Skin: following with Derm.  Neurotic excoriations- face.  On/off oral and topical antibiotic.  Probably contributed to recurring vaginal infections.     Current Outpatient Medications   Medication Sig Dispense Refill     acetylcysteine (N-ACETYL CYSTEINE) 600 MG CAPS capsule Take 3,000 mg by mouth daily       ALPRAZolam (XANAX PO) Take 0.5-1 mg by mouth  1/2 tab in am, 1 tab in pm, then 1 tab midday prn and 1/2 tab throughout the day if needed       amphetamine-dextroamphetamine (ADDERALL XR) 20 MG per capsule Take 20 mg by mouth daily        aspirin 81 MG EC tablet Take 324 mg by mouth once       atorvastatin (LIPITOR) 80 MG tablet Take 1 tablet (80 mg) by mouth daily 90 tablet 3     blood glucose (NO BRAND SPECIFIED) lancets standard Use to test blood sugar 3 times daily or as directed. 100 each 11     blood glucose monitoring (NO BRAND SPECIFIED) meter device kit Use to test blood sugar 3 times daily or as directed. 1 kit 0     clindamycin (CLEOCIN T) 1 % solution Apply topically 2 times daily 60 mL 1     CONTOUR NEXT TEST test strip USE TO TEST BLOOD SUGAR THREE TIMES A DAY OR AS DIRECTED 100 each 5     DoxePIN (SINEQUAN) 75 MG capsule Take 75 mg by mouth At Bedtime        empagliflozin (JARDIANCE) 25 MG TABS tablet Take 1 tablet (25 mg) by mouth daily 90 tablet 3     estradiol (ESTRACE) 0.1 MG/GM vaginal cream Place 2 g vaginally once a week And externally 42.5 g 3     glipiZIDE (GLUCOTROL XL) 2.5 MG 24 hr tablet Take 1 tablet (2.5 mg) by mouth daily 90 tablet 3     LANsoprazole (PREVACID) 30 MG DR capsule TAKE 1 CAPSULE BY MOUTH DAILY, 30 TO 60 MINUTES BEFORE A MEAL. 90 capsule 3     lisinopril (PRINIVIL/ZESTRIL) 5 MG tablet Take 5 mg by mouth daily       metoprolol succinate ER (TOPROL-XL) 25 MG 24 hr tablet Take 1 tablet (25 mg) by mouth daily 90 tablet 3     nitroGLYcerin (NITROSTAT) 0.4 MG sublingual tablet Place 1 tablet (0.4 mg) under the tongue every 5 minutes as needed for chest pain 25 tablet 6     psyllium 0.52 G capsule Take 3-4 capsules by mouth nightly as needed        tretinoin (RETIN-A) 0.025 % external cream Use every night pea sized amoutn on face 45 g 3     vilazodone (VIIBRYD) 10 MG TABS tablet Take 1 tablet (10 mg) by mouth daily For a total of 30 mg daily       vilazodone (VIIBRYD) 20 MG TABS tablet Take 20 mg by mouth daily        ranitidine (ZANTAC) 150 MG tablet Take 1 tablet (150 mg) by mouth nightly as needed for heartburn 30 tablet 1         Reviewed and updated as needed this visit by Provider  Problems         Review of Systems   ROS COMP: Constitutional, HEENT, cardiovascular, pulmonary, GI, , musculoskeletal, neuro, skin, endocrine and psych systems are negative, except as otherwise noted.      Objective    /56 (BP Location: Right arm, Patient Position: Chair, Cuff Size: Adult Large)   Pulse (!) 47   Temp 97.9  F (36.6  C) (Tympanic)   Resp 12   Ht 1.524 m (5')   Wt 71.1 kg (156 lb 12.8 oz)   SpO2 96%   BMI 30.62 kg/m    Body mass index is 30.62 kg/m .  Physical Exam   GENERAL APPEARANCE: alert and no distress  CV: normal and equal radial pulses  MS: decreased hand  bilateral.  +Phalens right hand.  Pain with resisted wrist supination/pronation.  SKIN: no suspicious lesions or rashes          Assessment & Plan     1. Type 2 diabetes mellitus without complication, without long-term current use of insulin (H) - side effects to jardiance.  Plan as below.  - Hemoglobin A1c  - TSH with free T4 reflex    2. Irritable bowel syndrome with diarrhea -discussed meeting with a nutritionist and pelvic floor center.  Discussed using less artificial sweeteners and preservatives since this might be exacerbating her IBS symptoms.  Discussed overall healthy diet.  - NUTRITION REFERRAL  - COLORECTAL SURGERY REFERRAL    3. Pain of right hand -with history of carpal tunnel release.  Get x-ray to ensure no fracture after the fall and follow-up with hand surgeon  - Orthopedic & Spine  Referral; Future  - XR Hand Right G/E 3 Views    4. Coronary artery disease involving native coronary artery of native heart without angina pectoris -on appropriate therapy    5. Mild major depression (H) -follows with psychiatry         Patient Instructions   1. Labs today.  2. In place of Jardiance, look into GLP-1 medications such as Ozempic  injection weekly, Rybelsus - pill daily, Victoza (injection daily), Saxenda (injection weekly),Trulicity (injection weekly).  The other option would be a once daily long acting insulin such as Lantus, Basaglar, Levemir.  Continue on the Glipizide  3. For the GI issues - would recommend the Pelvic Floor Center and dietician.  Would focus diet on IBS foods, and less on 'salt free and sugar free'.  Focus on whole healthy foods that you make yourself.  4. For the hand pain - xray today.  Referral to Hand Surgeon        Dr. Robison's Tips for a Healthy Diet    1. Add more fresh fruits and vegetables to your diet.  The more colorful with the fruit or vegetable (think berries, spinach, carrots, peppers) the healthier it tends to be.  Juice is not a fruit.  Prepare the vegetables in a healthy way - steam, bake.  Avoid batter/breading, butter/oil to cook.  2. Add more fiber to your diet.  Swap out white bread, white rice, white pasta for whole grain versions.  Reduce the portion size and frequency of carbohydrates/starches.  3. Choose healthier fats such as nuts, olive oil, avocado, etc. Stay away from lard, butter.  4. Decrease the frequency and portion size of 'junk food' -pizza, candy, cookies, potato chips, etc.  5. Watch liquid calories such as coffee drinks, juice, soda, teas.  There tends to be excessive sugar in these beverages.  6. Increase protein in your diet.  Eggs, cheese, yogurt, nuts, lentils, chicken, fish are good healthy choices.  Protein keeps you wilde longer, and you are less likely to have blood sugar spikes  7. Eat healthy at least 80% of the time.  It is ok for a special treat (Mom's spaghetti dinner, cake on your birthday) every once in a while, just not every day.  When are you going to indulge (think [place] [employer] time), be sure you are eating extra healthy the day before and after.      Resources:    1. [place] Resources for Health and  Wellness:https://www.takingcharge.cs.[facility]/dig-yes-foods    2.   [place] Ways To Wellness:    https://www.[email or web address].org/services/ways-to-wellness  Ways to Wellness offers:    Nutrition and weight management     Corrective exercise and fitness training     Lifestyle and behavioral change     Healing services  Our team includes registered dietitians, certified personal trainers, life coaches, as well as a Culinary Educator.        Greater than 40 minutes was spent face-to-face with the patient by the provider.  Greater than 50% was spent in counseling or coordinating care for this patient.      Return in about 3 months (around 4/28/2020) for Diabetes Check with lab prior.    Shwetha Robison,   [facility]

## 2020-01-28 NOTE — PROGRESS NOTES
Subjective     Ml Evans is a 69 year old female who presents to clinic today for the following health issues:    HPI   New Patient/Transfer of Care  Diabetes Follow-up    How often are you checking your blood sugar? A few times a week, average 5  What time of day are you checking your blood sugars (select all that apply)?  Before meals  Have you had any blood sugars above 200?  Yes   Have you had any blood sugars below 70?  No    What symptoms do you notice when your blood sugar is low?  None    What concerns do you have today about your diabetes? Other: would like to discuss med options, side effects with jardiance     Do you have any of these symptoms? (Select all that apply)  Excessive thirst and Blurry vision    Have you had a diabetic eye exam in the last 12 months? No          Hyperlipidemia Follow-Up      Are you regularly taking any medication or supplement to lower your cholesterol?   Yes- atorvastatin    Are you having muscle aches or other side effects that you think could be caused by your cholesterol lowering medication?  No    Hypertension Follow-up      Do you check your blood pressure regularly outside of the clinic? Yes , 3 times per week    Are you following a low salt diet? No    Are your blood pressures ever more than 140 on the top number (systolic) OR more   than 90 on the bottom number (diastolic), for example 140/90? No    BP Readings from Last 2 Encounters:   01/28/20 128/56   01/27/20 98/57     Hemoglobin A1C (%)   Date Value   09/15/2019 7.9 (H)   05/06/2019 6.9 (H)     LDL Cholesterol Calculated (mg/dL)   Date Value   05/06/2019 55   05/15/2018 64         How many servings of fruits and vegetables do you eat daily?  0-1    On average, how many sweetened beverages do you drink each day (Examples: soda, juice, sweet tea, etc.  Do NOT count diet or artificially sweetened beverages)?   1    How many days per week do you exercise enough to make your heart beat faster? 3 or  less    How many minutes a day do you exercise enough to make your heart beat faster? 9 or less    How many days per week do you miss taking your medication? 0    Right hand pain, ? Injury in Sept, pain in the thumb area, no swelling or bruising, weakness, difficulty gripping.

## 2020-01-28 NOTE — PATIENT INSTRUCTIONS
1. Labs today.  2. In place of Jardiance, look into GLP-1 medications such as Ozempic injection weekly, Rybelsus - pill daily, Victoza (injection daily), Saxenda (injection weekly),Trulicity (injection weekly).  The other option would be a once daily long acting insulin such as Lantus, Basaglar, Levemir.  Continue on the Glipizide  3. For the GI issues - would recommend the Pelvic Floor Center and dietician.  Would focus diet on IBS foods, and less on 'salt free and sugar free'.  Focus on whole healthy foods that you make yourself.  4. For the hand pain - xray today.  Referral to Hand Surgeon        Dr. Robison's Tips for a Healthy Diet    1. Add more fresh fruits and vegetables to your diet.  The more colorful with the fruit or vegetable (think berries, spinach, carrots, peppers) the healthier it tends to be.  Juice is not a fruit.  Prepare the vegetables in a healthy way - steam, bake.  Avoid batter/breading, butter/oil to cook.  2. Add more fiber to your diet.  Swap out white bread, white rice, white pasta for whole grain versions.  Reduce the portion size and frequency of carbohydrates/starches.  3. Choose healthier fats such as nuts, olive oil, avocado, etc. Stay away from lard, butter.  4. Decrease the frequency and portion size of 'junk food' -pizza, candy, cookies, potato chips, etc.  5. Watch liquid calories such as coffee drinks, juice, soda, teas.  There tends to be excessive sugar in these beverages.  6. Increase protein in your diet.  Eggs, cheese, yogurt, nuts, lentils, chicken, fish are good healthy choices.  Protein keeps you wilde longer, and you are less likely to have blood sugar spikes  7. Eat healthy at least 80% of the time.  It is ok for a special treat (Mom's spaghetti dinner, cake on your birthday) every once in a while, just not every day.  When are you going to indulge (think [employer] time), be sure you are eating extra healthy the day before and after.      Resources:    1. [place]  Resources for Health and Wellness:https://www.[email or web address].cs.[facility]/dig-yes-foods    2.   [place] Ways To Wellness:    https://www.[facility].org/services/ways-to-wellness  Ways to Wellness offers:    Nutrition and weight management     Corrective exercise and fitness training     Lifestyle and behavioral change     Healing services  Our team includes registered dietitians, certified personal trainers, life coaches, as well as a Culinary Educator.

## 2020-01-31 ENCOUNTER — MYC MEDICAL ADVICE (OUTPATIENT)
Dept: FAMILY MEDICINE | Facility: CLINIC | Age: 70
End: 2020-01-31

## 2020-01-31 DIAGNOSIS — E11.9 TYPE 2 DIABETES MELLITUS WITHOUT COMPLICATION, WITHOUT LONG-TERM CURRENT USE OF INSULIN (H): Primary | ICD-10-CM

## 2020-02-02 ENCOUNTER — TELEPHONE (OUTPATIENT)
Dept: INTERNAL MEDICINE | Facility: CLINIC | Age: 70
End: 2020-02-02

## 2020-02-02 NOTE — TELEPHONE ENCOUNTER
Patient is requesting a refill for Trulicity 0.75MG/0.5ML sent to [facility] please.     Thanks,   Consuelo Lee   Pharm Tech   [facility]

## 2020-02-03 ENCOUNTER — TELEPHONE (OUTPATIENT)
Dept: INTERNAL MEDICINE | Facility: CLINIC | Age: 70
End: 2020-02-03

## 2020-02-03 NOTE — TELEPHONE ENCOUNTER
Prior Authorization Retail Medication Request    Medication/Dose: Trulicity 0.75  ICD code (if different than what is on RX):  Type 2 diabetes mellitus without complication, without long-term current use of insulin (H) [E11.9]  - Primary   Previously Tried and Failed:  Jardiance; glipizide; novolog; metformin;   Rationale: patient has failed other meds in drug class     Insurance Name:  [facility]  Insurance ID:  [id number]      Pharmacy Information (if different than what is on RX)  Name:    Phone:

## 2020-02-04 NOTE — TELEPHONE ENCOUNTER
Prior Authorization Approval    Authorization Effective Date: 1/1/2020  Authorization Expiration Date: 2/4/2021  Medication: Trulicity 0.75-APPROVED  Approved Dose/Quantity:    Reference #:     Insurance Company: [employer] [place] - Phone [phone] Fax [phone]  Expected CoPay:       CoPay Card Available:      Foundation Assistance Needed:    Which Pharmacy is filling the prescription (Not needed for infusion/clinic administered): [facility] [place], [place] - [address] [place] [address]  Pharmacy Notified: Yes  Patient Notified: Yes  **Instructed pharmacy to notify patient when script is ready to /ship.**

## 2020-02-04 NOTE — TELEPHONE ENCOUNTER
Central Prior Authorization Team   Phone: [phone]    [place] Initiation    Medication: Trulicity 0.75  Insurance Company: [facility] - Phone [phone] Fax [phone]  Pharmacy Filling the Rx: [place], [place] - [address] [place] [address]  Filling Pharmacy Phone: [phone]  Filling Pharmacy Fax: [phone]  Start Date: 2/4/2020

## 2020-02-12 ENCOUNTER — OFFICE VISIT (OUTPATIENT)
Dept: FAMILY MEDICINE | Facility: CLINIC | Age: 70
End: 2020-02-12
Payer: COMMERCIAL

## 2020-02-12 VITALS
TEMPERATURE: 97.9 F | RESPIRATION RATE: 14 BRPM | HEART RATE: 74 BPM | SYSTOLIC BLOOD PRESSURE: 118 MMHG | DIASTOLIC BLOOD PRESSURE: 64 MMHG | OXYGEN SATURATION: 100 % | WEIGHT: 159 LBS | BODY MASS INDEX: 31.05 KG/M2

## 2020-02-12 DIAGNOSIS — K66.0 ABDOMINAL ADHESIONS: ICD-10-CM

## 2020-02-12 DIAGNOSIS — M62.89 PELVIC FLOOR DYSFUNCTION: Primary | ICD-10-CM

## 2020-02-12 DIAGNOSIS — K58.0 IRRITABLE BOWEL SYNDROME WITH DIARRHEA: ICD-10-CM

## 2020-02-12 DIAGNOSIS — K21.9 GASTROESOPHAGEAL REFLUX DISEASE WITHOUT ESOPHAGITIS: ICD-10-CM

## 2020-02-12 PROCEDURE — 99214 OFFICE O/P EST MOD 30 MIN: CPT | Performed by: INTERNAL MEDICINE

## 2020-02-12 NOTE — PROGRESS NOTES
Subjective     Ml Evans is a 69 year old female who presents to clinic today for the following health issues:    Here with  Roland FRASER   See details below in the my-chart msg from 2/5/20    Diverticulitis 2000  Ruptured Ectopic Pregnancy 1981  Hysterectomy 10 years ago  Adhesions resulting in hysterectomy 10 years ago  Angioplasty procedures 2011 (open femoral artery 3 times)  Acid reflux was hospitalize in the past, recently (last Monday morning) had a severe gastro reflux, follow by vomiting    ABDOMINAL   PAIN     Onset: years    Description:   Character: Cramping  Location: RLQ and LLQ  Radiation: Back and Pelvic region    Intensity: moderate    Progression of Symptoms:  worsening and intermittent    Accompanying Signs & Symptoms:  Fever/Chills?: no   Gas/Bloating: YES- Bloating  Nausea: no   Vomitting: YES  Diarrhea?: YES  Constipation:YES  Dysuria or Hematuria: no    History:   Trauma: no   Previous similar pain: YES   Previous tests done: none    Precipitating factors:   Does the pain change with:     Food: Yes- after      BM: Yes- during a lot pain     Urination: no     Alleviating factors:  na    Therapies Tried and outcome: na    LMP:  not applicable     --has tried bentyl and had side effects (dizzy), but it did help.  --has very regimented/narrow diet due to IBS.  Would like to expand her diet, feeling restricted.  --saw GI, most recently 3/2019.  There was talk about pelvic floor therapy.  It was recommended to see Nutrition  --not exercising due to fecal incontinence  --she thinks adhesions may be causing the problem with abdominal pain.  --had severe episode of acid reflux on 2/10 that lasted all day  --she is taking prevacid 30 mg daily for many years.  --the week prior to the severe episode of reflux she had frequent belching and hiccups.  --no melena, BRBPR, epigastric pain  --has urgency for bowel movement, strains, but no bowel movement occurs.  Other times has urgency and  incontinence.  She also has chronic bladder issues.  --has very low fiber diet - rice cakes, yogurt, cream cheese.  Has appointment with RD tomorrow.      CT 1/25/2019 - FINDINGS: The liver, bilateral kidneys and adrenal glands, pancreas,  and spleen demonstrate no worrisome focal lesion. There is mild  diverticulosis without evidence for diverticulitis. No hydronephrosis.  There are moderate atherosclerotic changes of the visualized aorta and  its branches. There is no evidence of aortic dissection or aneurysm.  Moderate-to-severe fatty infiltration of the liver. Gallbladder  unremarkable. Appendix not seen. There is no free fluid in the abdomen  or pelvis. No free air in the abdomen. Bone windows reveal no  destructive lesions.  There are no abdominal or pelvic lymph nodes  that are abnormal by size criteria. The visualized lung bases  demonstrate multiple bilateral groundglass nodules stable since 2015  indicating benign lesions. There are no dilated loops of small bowel  or colon.                                                                      IMPRESSION: No acute process demonstrated within the abdomen and  pelvis.      Current Outpatient Medications   Medication Sig Dispense Refill     acetylcysteine (N-ACETYL CYSTEINE) 600 MG CAPS capsule Take 3,000 mg by mouth daily       ALPRAZolam (XANAX PO) Take 0.5-1 mg by mouth 1/2 tab in am, 1 tab in pm, then 1 tab midday prn and 1/2 tab throughout the day if needed       amphetamine-dextroamphetamine (ADDERALL XR) 20 MG per capsule Take 20 mg by mouth daily        aspirin 81 MG EC tablet Take 324 mg by mouth once       atorvastatin (LIPITOR) 80 MG tablet Take 1 tablet (80 mg) by mouth daily 90 tablet 3     blood glucose (NO BRAND SPECIFIED) lancets standard Use to test blood sugar 3 times daily or as directed. 100 each 11     blood glucose monitoring (NO BRAND SPECIFIED) meter device kit Use to test blood sugar 3 times daily or as directed. 1 kit 0      clindamycin (CLEOCIN T) 1 % solution Apply topically 2 times daily 60 mL 1     CONTOUR NEXT TEST test strip USE TO TEST BLOOD SUGAR THREE TIMES A DAY OR AS DIRECTED 100 each 5     DoxePIN (SINEQUAN) 75 MG capsule Take 75 mg by mouth At Bedtime        dulaglutide (TRULICITY) 0.75 MG/0.5ML pen Inject 0.75 mg Subcutaneous every 7 days for 28 days, THEN 1.5 mg every 7 days. 2 mL 11     empagliflozin (JARDIANCE) 25 MG TABS tablet Take 1 tablet (25 mg) by mouth daily 90 tablet 3     estradiol (ESTRACE) 0.1 MG/GM vaginal cream Place 2 g vaginally once a week And externally 42.5 g 3     glipiZIDE (GLUCOTROL XL) 2.5 MG 24 hr tablet Take 1 tablet (2.5 mg) by mouth daily 90 tablet 3     LANsoprazole (PREVACID) 30 MG DR capsule TAKE 1 CAPSULE BY MOUTH DAILY, 30 TO 60 MINUTES BEFORE A MEAL. 90 capsule 3     lisinopril (PRINIVIL/ZESTRIL) 5 MG tablet Take 5 mg by mouth daily       metoprolol succinate ER (TOPROL-XL) 25 MG 24 hr tablet Take 1 tablet (25 mg) by mouth daily 90 tablet 3     nitroGLYcerin (NITROSTAT) 0.4 MG sublingual tablet Place 1 tablet (0.4 mg) under the tongue every 5 minutes as needed for chest pain 25 tablet 6     psyllium 0.52 G capsule Take 3-4 capsules by mouth nightly as needed        ranitidine (ZANTAC) 150 MG tablet Take 1 tablet (150 mg) by mouth nightly as needed for heartburn 30 tablet 1     tretinoin (RETIN-A) 0.025 % external cream Use every night pea sized amoutn on face 45 g 3     vilazodone (VIIBRYD) 10 MG TABS tablet Take 1 tablet (10 mg) by mouth daily For a total of 30 mg daily       vilazodone (VIIBRYD) 20 MG TABS tablet Take 20 mg by mouth daily           Reviewed and updated as needed this visit by Provider         Review of Systems   ROS COMP: Constitutional, HEENT, cardiovascular, pulmonary, GI, , musculoskeletal, neuro, skin, endocrine and psych systems are negative, except as otherwise noted.      Objective    /64   Pulse 74   Temp 97.9  F (36.6  C) (Tympanic)   Resp 14   Wt  72.1 kg (159 lb)   SpO2 100%   Breastfeeding No   BMI 31.05 kg/m    Body mass index is 31.05 kg/m .  Physical Exam   GENERAL APPEARANCE: alert and no distress          Assessment & Plan     1. Pelvic floor dysfunction -very long talk about the cause of her abdominal pain and alternating constipation and diarrhea.  She is not taking enough fiber as her diet is quite restricted.  She also likely has a pelvic floor dysfunction which we talked about in detail.  She may have abdominal adhesions from her multiple previous surgeries.  Plan as below  - PHYSICAL THERAPY REFERRAL; Future  - COLORECTAL SURGERY REFERRAL    2. Irritable bowel syndrome with diarrhea -increase fiber, water, exercise.  Try to avoid constipation and the diarrhea will likely improve.  The diarrhea is also probably because of pelvic floor dysfunction    3. Gastroesophageal reflux disease without esophagitis -does not appear directly related to the Trulicity, this is a potential side effect.  If she has another episode of severe reflux, we may consider another medication in place of Trulicity.  Advised can try some Pepcid if it occurs again.  However, the reflux was starting before she started the Trulicity.    4. Abdominal adhesions -not seen on CT scan 1 year ago.  Even if she has adhesions contributing to her symptoms, surgical treatment of this would be quite risky and not likely beneficial for her.         Patient Instructions   1. Over-arching plan - avoid constipation, liberalize diet to include more fiber ideally, manage pelvic floor issues.  2. Referral to pelvic floor physical therapy and pelvic floor physician consult  3. Ideally increase fiber in diet.  Have to have adequate water to go with the fiber. Agree with starting miralax in addition to metamucil.          Patient Education     Eating a High-Fiber Diet  Fiber is what gives strength and structure to plants. Most grains, beans, vegetables, and fruits contain fiber. Foods rich in  fiber are often low in calories and fat, and they fill you up more. They may also reduce your risks for certain health problems. To find out the amount of fiber in canned, packaged, or frozen foods, read the Nutrition Facts label. It tells you how much fiber is in one serving.    Types of fiber and their benefits  There are two types of fiber: insoluble and soluble. They both aid digestion and help you maintain a healthy weight.    Insoluble fiber. This is found in whole grains, cereals, certain fruits and vegetables such as apple skin, corn, and carrots. Insoluble fiber may prevent constipation and reduce the risk for certain types of cancer. It is called insoluble because it does not dissolve in water.    Soluble fiber. This type of fiber is in oats, beans, and certain fruits and vegetables such as strawberries and peas. Soluble fiber can reduce cholesterol, which may help lower the risk for heart disease. It also helps control blood sugar levels.  Look for high-fiber foods  Try these foods to add fiber to your diet:    Whole-grain breads and cereals. Try to eat 6 to 8 ounces a day. Include wheat and oat bran cereals, whole-wheat muffins or toast, and corn tortillas in your meals.    Fruits. Try to eat 2 cups a day. Apples, oranges, strawberries, pears, and bananas are good sources. (Note: Fruit juice is low in fiber.)    Vegetables. Try to eat at least 2.5 cups a day. Add asparagus, carrots, broccoli, peas, and corn to your meals.    Beans. One cup of cooked lentils gives you over 15 grams of fiber. Try navy beans, lentils, and chickpeas.    Seeds. A small handful of seeds gives you about 3 grams of fiber. Try sunflower or brandon seeds.  Keep track of your fiber  Keep track of how much fiber you eat. Start by reading food labels. Then eat a variety of foods high in fiber. As you start to eat more fiber, ask your healthcare provider how much water you should be drinking to keep your digestive system working  smoothly.  Aim for a certain amount of fiber in your diet each day. If you are a woman, that amount is between 25 and 28 grams per day. Men should aim for 30 to 33 grams per day. After age 50, your daily fiber needs drop to 22 grams for women and 28 grams for men.  Before you reach for the fiber supplements, think about this. Fiber is found naturally in healthy whole foods. It gives you that feeling of fullness after you eat. Taking fiber supplements or eating fiber-enriched foods will not give you this full feeling.  Your fiber intake is a good measure for the quality of your overall diet. If you are missing out on your daily amount of fiber, you may be lacking other important nutrients as well.  Date Last Reviewed: 7/1/2017 2000-2019 [email or web address]. [address] [place] [address], [place] [address]. All rights reserved. This information is not intended as a substitute for professional medical care. Always follow your healthcare professional's instructions.               Return in about 2 months (around 4/12/2020) for Routine Follow-Up/Med Check.    Shwetha Robison, DO  [facility]

## 2020-02-12 NOTE — PATIENT INSTRUCTIONS
1. Over-arching plan - avoid constipation, liberalize diet to include more fiber ideally, manage pelvic floor issues.  2. Referral to pelvic floor physical therapy and pelvic floor physician consult  3. Ideally increase fiber in diet.  Have to have adequate water to go with the fiber. Agree with starting miralax in addition to metamucil.          Patient Education     Eating a High-Fiber Diet  Fiber is what gives strength and structure to plants. Most grains, beans, vegetables, and fruits contain fiber. Foods rich in fiber are often low in calories and fat, and they fill you up more. They may also reduce your risks for certain health problems. To find out the amount of fiber in canned, packaged, or frozen foods, read the Nutrition Facts label. It tells you how much fiber is in one serving.    Types of fiber and their benefits  There are two types of fiber: insoluble and soluble. They both aid digestion and help you maintain a healthy weight.    Insoluble fiber. This is found in whole grains, cereals, certain fruits and vegetables such as apple skin, corn, and carrots. Insoluble fiber may prevent constipation and reduce the risk for certain types of cancer. It is called insoluble because it does not dissolve in water.    Soluble fiber. This type of fiber is in oats, beans, and certain fruits and vegetables such as strawberries and peas. Soluble fiber can reduce cholesterol, which may help lower the risk for heart disease. It also helps control blood sugar levels.  Look for high-fiber foods  Try these foods to add fiber to your diet:    Whole-grain breads and cereals. Try to eat 6 to 8 ounces a day. Include wheat and oat bran cereals, whole-wheat muffins or toast, and corn tortillas in your meals.    Fruits. Try to eat 2 cups a day. Apples, oranges, strawberries, pears, and bananas are good sources. (Note: Fruit juice is low in fiber.)    Vegetables. Try to eat at least 2.5 cups a day. Add asparagus, carrots,  broccoli, peas, and corn to your meals.    Beans. One cup of cooked lentils gives you over 15 grams of fiber. Try navy beans, lentils, and chickpeas.    Seeds. A small handful of seeds gives you about 3 grams of fiber. Try sunflower or brandon seeds.  Keep track of your fiber  Keep track of how much fiber you eat. Start by reading food labels. Then eat a variety of foods high in fiber. As you start to eat more fiber, ask your healthcare provider how much water you should be drinking to keep your digestive system working smoothly.  Aim for a certain amount of fiber in your diet each day. If you are a woman, that amount is between 25 and 28 grams per day. Men should aim for 30 to 33 grams per day. After age 50, your daily fiber needs drop to 22 grams for women and 28 grams for men.  Before you reach for the fiber supplements, think about this. Fiber is found naturally in healthy whole foods. It gives you that feeling of fullness after you eat. Taking fiber supplements or eating fiber-enriched foods will not give you this full feeling.  Your fiber intake is a good measure for the quality of your overall diet. If you are missing out on your daily amount of fiber, you may be lacking other important nutrients as well.  Date Last Reviewed: 7/1/2017 2000-2019 [employer]. [address] [place] [address], [place] [address]. All rights reserved. This information is not intended as a substitute for professional medical care. Always follow your healthcare professional's instructions.

## 2020-02-13 ENCOUNTER — HOSPITAL ENCOUNTER (OUTPATIENT)
Dept: NUTRITION | Facility: CLINIC | Age: 70
Discharge: HOME | End: 2020-02-13
Attending: INTERNAL MEDICINE | Admitting: INTERNAL MEDICINE
Payer: COMMERCIAL

## 2020-02-13 PROCEDURE — 97802 MEDICAL NUTRITION INDIV IN: CPT | Performed by: DIETITIAN, REGISTERED

## 2020-02-14 NOTE — PROGRESS NOTES
"[facility]  Medical Nutrition Therapy    Visit Type: Initial Assessment    Ml Evans referred by Shwetha Robison for MNT related to IBS w/diarrhea      Nutrition Assessment:  Anthropometrics  Height: 5' 0\"    BMI: 31.1   (obesity class I)      Weight: 72.1 kg/159 lb    BSA: 1.75m2      IBW (kg): 45 kg/100 lb           Wt Readings from Last 18 Encounters:   02/12/20 72.1 kg (159 lb)   01/28/20 71.1 kg (156 lb 12.8 oz)   11/20/19 70.4 kg (155 lb 4.8 oz)   11/09/19 70.3 kg (155 lb)   10/29/19 69.9 kg (154 lb)   10/15/19 70.8 kg (156 lb)   09/20/19 69.3 kg (152 lb 12.8 oz)   09/18/19 68.5 kg (151 lb)   09/15/19 69.9 kg (154 lb)   09/05/19 64.9 kg (143 lb)   08/13/19 70 kg (154 lb 6.4 oz)   05/07/19 70 kg (154 lb 6.4 oz)   03/05/19 71.7 kg (158 lb)   01/23/19 72.9 kg (160 lb 12.8 oz)   01/22/19 73.5 kg (162 lb)   01/16/19 73.5 kg (162 lb)   01/14/19 73.7 kg (162 lb 6.4 oz)   12/06/18 74.8 kg (164 lb 12.8 oz)   -Per chart above, pt has gained 3 lb over the past 1 month. She is also down 3 lb over the past year. No significant wt changes over the past year.       Nutrition History  -Pt here today regarding IBS with sx of constipation and diarrhea . She has been struggling with this for many years.   -PMH: Diabetes Mellitus Type II- was on Metformin for 2-3 years but she felt that it made her GI sx worse so she stopped this. She also has a hx of diverticulosis (dx '00), hyperlipidemia, and GERD- has acid reflux often, only once resulting in vomit (takes Prevacid).   -Has fecal incontinence, which started over the past 6 months  -Had a heart attack in 2011, and at this time made some heart healthy changes to her diet including quitting smoking.   -Hx of 3 abdominal surgeries (hysterectomy, ectopic pregnancy, and adhesion repair)  -GI sx include cramping, 'explosive' diarrhea, and some constipation.  -Pt made some dietary changes over the past few months in an attempt to control the diarrhea, which " has not resulted in any symptom relief. She does take Imodium if diarrhea is severe.   -Has seen a CDE in the past for diabetes nutrition therapy   -Last A1c 1/28/20 was 7.2%  -Bowel meds: 4 Metamucil capsules at HS, stool softener prn, Imodium prn  -Other supps: Hair skin & nails supplement  -Pt lives at home w/ and they do their own grocery shopping and cooking  -Pt reports that she has been very stressed lately and wonders if this has been exacerbating GI sx    Physical Activity  -None d/t fecal incontinence   -Pt reports that she's a member of [employer] and likes the yoga classes, but hasn't been going recently       Nutrition Prescription: Using adj BW of 52 kg  Energy: 1064-9377 kcal/day   (25-30 kcal/kg)      Protein: 42-52 g/day   (1-1.2 g/kg)         Fluid: 8196-3002 mL/day  (1 mL/kcal)         Fat: low fat           Carbohydrate: 45-60 g CHO at meals and 15 g CHO at snacks   Fiber: 21 g/day                 Food Record  B: 11:30am-12pm (sleeps late): 4oz juice, 6oz Activia yogurt, 1 cup coffee w/creamer (1 Tb)  L: (2:30-3:30pm): 6oz [employer] yogurt (frozen)  Snack: 2 rice cakes  D (7:00pm): 5-7 rice cakes with garlic artichoke spread or low fat cream cheese (1 Tb)   Snack: 1 cheese stick sometimes  -Does not go out to eat  -Usual dietary intake appears inadequate in overall kcal, protein, and fiber, low in fruits, veggies, and dairy, inadequate in water    Nutrition Diagnosis:  PES: Altered GI function r/t changes in GI tract motility aeb frequent diarrhea, cramping and constipation, need for daily fiber supplements and anti-diarrheal meds, and avoidance of specific foods d/t GI sx      Nutrition Intervention:  -Educated pt on nutrition therapy for IBS with diarrhea.   -Suggested consuming small frequent meals  -Suggested slowing increasing soluble fiber in her diet through beans and oats (oranges if it doesn't cause acid reflux). Recommend also adding soft, cooked veggies and fruits w/o skin as a  gradual way to re-introduce insoluble fiber. Also suggested trying flax seed mixed w/oatmeal  -Discussed how anxiety can play a role in bowel function and suggested working on ways to relax at times of stress. Try guided meditation, listening to white noise or soothing music, doing yoga, etc.  -Recommended increasing water intake, as this is needed for fiber to work properly   -Suggested limiting acidic, spicy, fried, greasy foods, and caffeine and mint.    -Suggested starting a pro-biotic supplement and adding pre-biotic foods to the diet (banana, onion, garlic)  -Recommend stopping Metamucil, as this isn't relieving sx, and try Benefiber instead, 2 tsp TID  -Discussed the importance of exercise and starting out by walking for short distances several times per week    Nutrition Goals:  1) Stop Metamucil, start Benefiber 2 tsp TID instead  2) Start a pro-biotic supplement  3) Increase water to 6 cups per day  4) Start oatmeal w/flaxseed daily   5) Work on stress release- try guided meditation    Nutrition Follow Up / Monitoring:   GI sx, weight, PO intake    Nutrition Recommendations:  Patient to follow-up with RD in 4 weeks.  Patient has RD contact information to call/email if needed.      Start Time: 13:38  End Time: 14:55        Cecelia Brown RD, LD  Clinical Dietitian  [facility]: [phone]  [facility]: [phone]'

## 2020-02-16 ENCOUNTER — HEALTH MAINTENANCE LETTER (OUTPATIENT)
Age: 70
End: 2020-02-16

## 2020-02-24 DIAGNOSIS — E11.9 TYPE 2 DIABETES MELLITUS WITHOUT COMPLICATION, WITHOUT LONG-TERM CURRENT USE OF INSULIN (H): Primary | ICD-10-CM

## 2020-02-24 RX ORDER — LANCETS
EACH MISCELLANEOUS
Qty: 100 EACH | Refills: 1 | Status: SHIPPED | OUTPATIENT
Start: 2020-02-24 | End: 2022-03-03

## 2020-02-24 NOTE — TELEPHONE ENCOUNTER
"Requested Prescriptions   Pending Prescriptions Disp Refills     Microlet Lancets MISC [Pharmacy Med Name: MICROLET LANCETS  MISC] 100 each 10     Sig: USE TO TEST BLOOD SUGAR THREE TIMES A DAY OR AS DIRECTED       Diabetic Supplies Protocol Failed - 2/24/2020 12:21 PM        Failed - Medication is active on med list        Passed - Patient is 18 years of age or older        Passed - Recent (6 mo) or future (30 days) visit within the authorizing provider's specialty     Patient had office visit in the last 6 months or has a visit in the next 30 days with authorizing provider.  See \"Patient Info\" tab in inbasket, or \"Choose Columns\" in Meds & Orders section of the refill encounter.            Last Written Prescription Date:  10/11/19  Last Fill Quantity: 100,  # refills: 11   Last office visit: 2/12/2020 with prescribing provider:     Future Office Visit:      "

## 2020-02-25 ENCOUNTER — TRANSFERRED RECORDS (OUTPATIENT)
Dept: HEALTH INFORMATION MANAGEMENT | Facility: CLINIC | Age: 70
End: 2020-02-25

## 2020-02-25 LAB — RETINOPATHY: POSITIVE

## 2020-03-03 ENCOUNTER — TELEPHONE (OUTPATIENT)
Dept: DERMATOLOGY | Facility: CLINIC | Age: 70
End: 2020-03-03

## 2020-03-03 NOTE — TELEPHONE ENCOUNTER
Spoke to patient and advised she can either take Xanax if she has a  (she does) or we can give her Valium that day as long as she has a .    Patient verbalized understanding. Katina Mari RN

## 2020-03-03 NOTE — TELEPHONE ENCOUNTER
Reason for Call:  Other call back    Detailed comments: pt calling stating she has an appt tomorrow for a cyst removal. She would like to know if she can take a Xanax before coming in for her anxiety?    Phone Number Patient can be reached at: Cell number on file:    Telephone Information:   Mobile [phone]       Best Time: any     Can we leave a detailed message on this number? YES    Call taken on 3/3/2020 at 2:25 PM by Nieves Maria

## 2020-03-04 ENCOUNTER — OFFICE VISIT (OUTPATIENT)
Dept: DERMATOLOGY | Facility: CLINIC | Age: 70
End: 2020-03-04
Payer: COMMERCIAL

## 2020-03-04 VITALS — DIASTOLIC BLOOD PRESSURE: 66 MMHG | SYSTOLIC BLOOD PRESSURE: 115 MMHG | OXYGEN SATURATION: 93 % | HEART RATE: 67 BPM

## 2020-03-04 DIAGNOSIS — D48.5 NEOPLASM OF UNCERTAIN BEHAVIOR OF SKIN: Primary | ICD-10-CM

## 2020-03-04 PROCEDURE — 13132 CMPLX RPR F/C/C/M/N/AX/G/H/F: CPT | Performed by: DERMATOLOGY

## 2020-03-04 PROCEDURE — 11443 EXC FACE-MM B9+MARG 2.1-3 CM: CPT | Performed by: DERMATOLOGY

## 2020-03-04 PROCEDURE — 88304 TISSUE EXAM BY PATHOLOGIST: CPT | Mod: TC | Performed by: DERMATOLOGY

## 2020-03-04 NOTE — PATIENT INSTRUCTIONS
Sutured Wound Care     [facility]: [phone]    [facility]: [phone]          ? No strenuous activity for 48 hours. Resume moderate activity in 48 hours. No heavy exercising until you are seen for follow up in one week.     ? Take Tylenol as needed for discomfort.                         ? Do not drink alcoholic beverages for 48 hours.     ? Keep the pressure bandage in place for 24 hours. If the bandage becomes blood tinged or loose, reinforce it with gauze and tape.        (Refer to the reverse side of this page for management of bleeding).    ? Remove pressure bandage in 24 hours     ? Leave the flat bandage in place until your follow up appointment.    ? Keep the bandage dry. Wash around it carefully.    ? If the tape becomes soiled or starts to come off, reinforce it with additional paper tape.    ? Do not smoke for 3 weeks; smoking is detrimental to wound healing.    ? It is normal to have swelling and bruising around the surgical site. The bruising will fade in approximately 10-14 days. Elevate the area to reduce swelling.    ? Numbness, itchiness and sensitivity to temperature changes can occur after surgery and may take up to 18 months to normalize.      POSSIBLE COMPLICATIONS    BLEEDIN. Leave the bandage in place.  2. Use tightly rolled up gauze or a cloth to apply direct pressure over the bandage for 20   minutes.  3. Reapply pressure for an additional 20 minutes if necessary  4. Call the office or go to the nearest emergency room if pressure fails to stop the bleeding.  5. Use additional gauze and tape to maintain pressure once the bleeding has stopped.        PAIN:    1. Post operative pain should slowly get better, never worse.  2. A severe increase in pain may indicate a problem. Call the office if this occurs.    In case of emergency phone:Dr Mariee [phone]
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
## 2020-03-09 LAB — COPATH REPORT: NORMAL

## 2020-03-10 ENCOUNTER — ALLIED HEALTH/NURSE VISIT (OUTPATIENT)
Dept: DERMATOLOGY | Facility: CLINIC | Age: 70
End: 2020-03-10
Payer: COMMERCIAL

## 2020-03-10 DIAGNOSIS — Z48.01 ENCOUNTER FOR CHANGE OR REMOVAL OF SURGICAL WOUND DRESSING: Primary | ICD-10-CM

## 2020-03-10 PROCEDURE — 99207 ZZC NO CHARGE NURSE ONLY: CPT

## 2020-03-10 NOTE — PATIENT INSTRUCTIONS
WOUND CARE INSTRUCTIONS  for  ONE WEEK AFTER SURGERY      Right cheek    1) Leave flat bandage on your skin for one week after today s bandage change.  2) In one week when you remove the bandage, you may resume your regular skin care routine, including washing with mild soap and water, applying moisturizer, make-up and sunscreen.    3) If there are any open or bleeding areas at the incision/graft site you should begin to cover the area with a bandage daily as follows:    1) Clean and dry the area with plain tap water using a Q-tip or sterile gauze pad.  2) Apply Polysporin or Bacitracin ointment to the open area.  3) Cover the wound with a band-aid or a sterile non-stick gauze pad and micropore paper tape.         SIGNS OF INFECTION  - If you notice any of these signs of infection, call your doctor right away: expanding redness around the wound.  - Yellow or greenish-colored pus or cloudy wound drainage.    - Red streaking spreading from the wound.  - Increased swelling, tenderness, or pain around the wound.   - Fever.    Please remember that yellow and clear drainage from a wound can be normal and related to normal wound healing.  Isolated drainage from a wound without a combination of the above features does not indicate infection.       *Once the bandages are removed, the scar will be red and firm (especially in the lip/chin area). This is normal and will fade in time. It might take 6-12 months for this to happen.     *Massaging the area will help the scar soften and fade quicker. Begin to massage the area one month after the bandages have been removed. To massage apply pressure directly and firmly over the scar with the fingertips and move in a circular motion. Massage the area for a few minutes several times a day. Continue to massage the site for several months.    *Approximately 6-8 weeks after surgery it is not uncommon to see the formation of  tender pimple-like  bump along the scar. This is normal. As the  scar continues to mature and the stitches underneath the skin begin to dissolve, this might occur. Do not pick or squeeze, this will resolve on it s own. Should one break open producing a small amount of drainage, apply Polysporin or Bacitracin ointment a few times a day until the wound is completely healed.    *Numbness in the surgical area is expected. It might take 12-18 months for the feeling to return to normal. During this time sensations of itchiness, tingling and occasional sharp pains might be noted. These feelings are normal and will subside once the nerves have completely healed.         IN CASE OF EMERGENCY: Dr Mariee [phone]     If you were seen in [place] call: [phone]    If you were seen in [place] call: [phone]

## 2020-03-10 NOTE — NURSING NOTE
Pt returned to clinic for post surgery 1 week follow up bandage change. Pt has no complaints, denies pain. Bandage removed from right cheek, area cleansed with normal saline. Site is healing and wound edges approximating well. Reapplied new steri strips and paper tape.    Advised to watch for signs/sx of infection; spreading redness, drainage, odor, fever. Call or report promptly to clinic. Pt given written instructions and informed to rtc as needed. Patient verbalized understanding.     Sherly Robison LPN.................3/10/2020

## 2020-03-27 ENCOUNTER — MYC MEDICAL ADVICE (OUTPATIENT)
Dept: FAMILY MEDICINE | Facility: CLINIC | Age: 70
End: 2020-03-27

## 2020-04-03 ENCOUNTER — TELEPHONE (OUTPATIENT)
Dept: DERMATOLOGY | Facility: CLINIC | Age: 70
End: 2020-04-03

## 2020-04-03 NOTE — TELEPHONE ENCOUNTER
Spoke to patient and she reported that the stitches were poking out on her cheek. Advised pt to trim the sutures so they did not stick out and advised that she use some vaseline or aquafor on the suture site. Discussed starting to massage the area gently to help break down the scar tissue and dissolve the sutures. Advised she watch for signs of infection and call if she had symptoms. Pt agreed with plan.  Kirsten WANG RN BSN PHN  [facility] Clin[facility]

## 2020-04-03 NOTE — TELEPHONE ENCOUNTER
Reason for Call:  Other call back    Detailed comments: pt calling stating she has sutures on her face that should be dissolving. They are now sticking out and getting swollen around the area.     Phone Number Patient can be reached at: Cell number on file:    Telephone Information:   Mobile [phone]       Best Time: any     Can we leave a detailed message on this number? YES    Call taken on 4/3/2020 at 2:25 PM by Nieves Maria

## 2020-07-14 ENCOUNTER — TELEPHONE (OUTPATIENT)
Dept: DERMATOLOGY | Facility: CLINIC | Age: 70
End: 2020-07-14

## 2020-07-14 NOTE — TELEPHONE ENCOUNTER
Central Prior Authorization Team   Phone: [phone]    PA Initiation    Medication: tretinoin (RETIN-A) 0.025 % external cream   Insurance Company: [facility] - Phone [phone] Fax [phone]  Pharmacy Filling the Rx: [place], [place] - [address] [place] [address]  Filling Pharmacy Phone: [phone]  Filling Pharmacy Fax: [phone]  Start Date: 7/14/2020      
Prior Authorization Approval    Authorization Effective Date: 4/15/2020  Authorization Expiration Date: 7/14/2021  Medication: tretinoin (RETIN-A) 0.025 % external cream -APPROVED  Approved Dose/Quantity:    Reference #:     Insurance Company: [employer] [place] - Phone [phone] Fax [phone]  Expected CoPay:       CoPay Card Available:      Foundation Assistance Needed:    Which Pharmacy is filling the prescription (Not needed for infusion/clinic administered): [facility] [place], [place] - [address] [place] [address]  Pharmacy Notified: Yes  Patient Notified: Yes  **Instructed pharmacy to notify patient when script is ready to /ship.**        
Prior Authorization Retail Medication Request    Medication/Dose: tretinoin (RETIN-A) 0.025 % external cream   ICD code (if different than what is on RX):    Neurotic excoriations [L98.1]  - Primary        Acne vulgaris [L70.0]           Previously Tried and Failed:    Rationale:      Insurance Name:  [facility]  Insurance ID:  [id number]      Pharmacy Information (if different than what is on RX)  Name:  [facility]  Phone:  [phone]    
82 yo M with cough. Will give duo neb, prednisone, CXR, and reeval.

## 2020-10-12 DIAGNOSIS — E11.9 TYPE 2 DIABETES MELLITUS WITHOUT COMPLICATION, WITHOUT LONG-TERM CURRENT USE OF INSULIN (H): ICD-10-CM

## 2020-10-12 RX ORDER — GLIPIZIDE 2.5 MG/1
TABLET, EXTENDED RELEASE ORAL
Qty: 30 TABLET | Refills: 0 | Status: SHIPPED | OUTPATIENT
Start: 2020-10-12 | End: 2020-11-25

## 2020-10-13 DIAGNOSIS — I10 BENIGN ESSENTIAL HYPERTENSION: ICD-10-CM

## 2020-10-13 DIAGNOSIS — K21.9 GASTROESOPHAGEAL REFLUX DISEASE: ICD-10-CM

## 2020-10-13 DIAGNOSIS — K21.9 GASTROESOPHAGEAL REFLUX DISEASE WITHOUT ESOPHAGITIS: ICD-10-CM

## 2020-10-13 DIAGNOSIS — E78.5 HYPERLIPIDEMIA LDL GOAL <100: ICD-10-CM

## 2020-10-13 DIAGNOSIS — E11.9 TYPE 2 DIABETES MELLITUS WITHOUT COMPLICATION, WITHOUT LONG-TERM CURRENT USE OF INSULIN (H): Primary | ICD-10-CM

## 2020-10-13 NOTE — LETTER
October 15, 2020      Ml Evans  [address] [facility] [address]        Dear Ml,     We received a refill request for your Atorvastatin, Lansoprazole, Lisinopril, Metoprolol medications.  These medications has been refilled for 30 days as you are due for an office or Virtual  visit and fasting labs, orders are in to [facility] for further refills.  Please call [phone] to schedule an appointment.          Sincerely,        Ledy Benedict MD

## 2020-10-14 ENCOUNTER — TELEPHONE (OUTPATIENT)
Dept: INTERNAL MEDICINE | Facility: CLINIC | Age: 70
End: 2020-10-14

## 2020-10-14 NOTE — TELEPHONE ENCOUNTER
"Requested Prescriptions   Pending Prescriptions Disp Refills     lisinopril (ZESTRIL) 5 MG tablet [Pharmacy Med Name: LISINOPRIL 5MG TABS] 90 tablet 1     Sig: TAKE ONE TABLET BY MOUTH ONCE DAILY       ACE Inhibitors (Including Combos) Protocol Failed - 10/13/2020 11:52 AM        Failed - Normal serum creatinine on file in past 12 months     Recent Labs   Lab Test 09/18/19  1515   CR 0.81       Ok to refill medication if creatinine is low          Failed - Normal serum potassium on file in past 12 months     Recent Labs   Lab Test 09/18/19  1515   POTASSIUM 4.2             Passed - Blood pressure under 140/90 in past 12 months     BP Readings from Last 3 Encounters:   03/04/20 115/66   02/12/20 118/64   01/28/20 128/56                 Passed - Recent (12 mo) or future (30 days) visit within the authorizing provider's specialty     Patient has had an office visit with the authorizing provider or a provider within the authorizing providers department within the previous 12 mos or has a future within next 30 days. See \"Patient Info\" tab in inbasket, or \"Choose Columns\" in Meds & Orders section of the refill encounter.              Passed - Medication is active on med list        Passed - Patient is age 18 or older        Passed - No active pregnancy on record        Passed - No positive pregnancy test within past 12 months           atorvastatin (LIPITOR) 80 MG tablet [Pharmacy Med Name: ATORVASTATIN CALCIUM 80MG TABS] 90 tablet 1     Sig: TAKE ONE TABLET BY MOUTH ONCE DAILY       Statins Protocol Failed - 10/13/2020 11:52 AM        Failed - LDL on file in past 12 months     Recent Labs   Lab Test 05/06/19  1106   LDL 55             Passed - No abnormal creatine kinase in past 12 months     No lab results found.             Passed - Recent (12 mo) or future (30 days) visit within the authorizing provider's specialty     Patient has had an office visit with the authorizing provider or a provider within the authorizing " "providers department within the previous 12 mos or has a future within next 30 days. See \"Patient Info\" tab in inbasket, or \"Choose Columns\" in Meds & Orders section of the refill encounter.              Passed - Medication is active on med list        Passed - Patient is age 18 or older        Passed - No active pregnancy on record        Passed - No positive pregnancy test in past 12 months           metoprolol succinate ER (TOPROL-XL) 25 MG 24 hr tablet [Pharmacy Med Name: METOPROLOL SUCCINATE ER 25MG TB24] 90 tablet 1     Sig: TAKE ONE TABLET BY MOUTH ONCE DAILY       Beta-Blockers Protocol Passed - 10/13/2020 11:52 AM        Passed - Blood pressure under 140/90 in past 12 months     BP Readings from Last 3 Encounters:   03/04/20 115/66   02/12/20 118/64   01/28/20 128/56                 Passed - Patient is age 6 or older        Passed - Recent (12 mo) or future (30 days) visit within the authorizing provider's specialty     Patient has had an office visit with the authorizing provider or a provider within the authorizing providers department within the previous 12 mos or has a future within next 30 days. See \"Patient Info\" tab in inInsportantsket, or \"Choose Columns\" in Meds & Orders section of the refill encounter.              Passed - Medication is active on med list           LANsoprazole (PREVACID) 30 MG DR capsule [Pharmacy Med Name: LANSOPRAZOLE 30MG CPDR] 90 capsule 3     Sig: TAKE 1 CAPSULE BY MOUTH DAILY, 30 TO 60 MINUTES BEFORE A MEAL.       PPI Protocol Passed - 10/13/2020 11:52 AM        Passed - Not on Clopidogrel (unless Pantoprazole ordered)        Passed - No diagnosis of osteoporosis on record        Passed - Recent (12 mo) or future (30 days) visit within the authorizing provider's specialty     Patient has had an office visit with the authorizing provider or a provider within the authorizing providers department within the previous 12 mos or has a future within next 30 days. See \"Patient Info\" tab " "in inbasket, or \"Choose Columns\" in Meds & Orders section of the refill encounter.              Passed - Medication is active on med list        Passed - Patient is age 18 or older        Passed - No active pregnacy on record        Passed - No positive pregnancy test in past 12 months             "

## 2020-10-14 NOTE — TELEPHONE ENCOUNTER
Panel Management Review      Patient has the following on her problem list:     Depression / Dysthymia review    Measure:  Needs PHQ-9 score of 4 or less during index window.  Administer PHQ-9 and if score is 5 or more, send encounter to provider for next steps.    5 - 7 month window range: 8/30/20 to 12/28/20    PHQ-9 SCORE 1/17/2019 8/13/2019 10/29/2019   PHQ-9 Total Score - - -   PHQ-9 Total Score MyChart 8 (Mild depression) 9 (Mild depression) 12 (Moderate depression)   PHQ-9 Total Score 8 9 12       If PHQ-9 recheck is 5 or more, route to provider for next steps.    Patient is due for:  PHQ9      Action needed:   Patient needs to do PHQ9.    Type of outreach:    Sent Miner message.    Questions for provider review:    None                                                                                                                                    Nola Carlin CMA (AAMA)   (aka: Brenda Carlin)       Chart routed to Care Team .

## 2020-10-15 RX ORDER — ATORVASTATIN CALCIUM 80 MG/1
TABLET, FILM COATED ORAL
Qty: 90 TABLET | Refills: 0 | Status: SHIPPED | OUTPATIENT
Start: 2020-10-15 | End: 2021-01-21

## 2020-10-15 RX ORDER — LANSOPRAZOLE 30 MG/1
CAPSULE, DELAYED RELEASE ORAL
Qty: 90 CAPSULE | Refills: 0 | Status: SHIPPED | OUTPATIENT
Start: 2020-10-15 | End: 2021-01-15

## 2020-10-15 RX ORDER — LISINOPRIL 5 MG/1
TABLET ORAL
Qty: 90 TABLET | Refills: 0 | Status: SHIPPED | OUTPATIENT
Start: 2020-10-15 | End: 2021-01-21

## 2020-10-15 RX ORDER — METOPROLOL SUCCINATE 25 MG/1
TABLET, EXTENDED RELEASE ORAL
Qty: 90 TABLET | Refills: 0 | Status: SHIPPED | OUTPATIENT
Start: 2020-10-15 | End: 2021-01-21

## 2020-10-15 NOTE — TELEPHONE ENCOUNTER
Short refill given.  Patient due for fasting labs and follow-up with me - virtual OK if patient prefers

## 2020-10-29 NOTE — TELEPHONE ENCOUNTER
(2nd attempt) Left a voice msg for pt to call back or check <y-chart.  Whenever she calls back transfer to the care team to perform PHQ9/GAD7.  Nola Carlin CMA (JAUN)   (aka: Brenda Carlin)

## 2020-11-22 ENCOUNTER — HEALTH MAINTENANCE LETTER (OUTPATIENT)
Age: 70
End: 2020-11-22

## 2020-11-25 ENCOUNTER — VIRTUAL VISIT (OUTPATIENT)
Dept: FAMILY MEDICINE | Facility: CLINIC | Age: 70
End: 2020-11-25
Payer: COMMERCIAL

## 2020-11-25 DIAGNOSIS — K58.0 IRRITABLE BOWEL SYNDROME WITH DIARRHEA: ICD-10-CM

## 2020-11-25 DIAGNOSIS — E11.9 TYPE 2 DIABETES MELLITUS WITHOUT COMPLICATION, WITHOUT LONG-TERM CURRENT USE OF INSULIN (H): Primary | ICD-10-CM

## 2020-11-25 PROCEDURE — 99214 OFFICE O/P EST MOD 30 MIN: CPT | Mod: 95 | Performed by: INTERNAL MEDICINE

## 2020-11-25 RX ORDER — GLIPIZIDE 5 MG/1
5 TABLET, FILM COATED, EXTENDED RELEASE ORAL DAILY
Qty: 90 TABLET | Refills: 3 | Status: SHIPPED | OUTPATIENT
Start: 2020-11-25 | End: 2020-12-15

## 2020-11-25 RX ORDER — BACILLUS COAGULANS/INULIN 1B-250 MG
CAPSULE ORAL
COMMUNITY
End: 2022-03-29

## 2020-11-25 ASSESSMENT — ANXIETY QUESTIONNAIRES
IF YOU CHECKED OFF ANY PROBLEMS ON THIS QUESTIONNAIRE, HOW DIFFICULT HAVE THESE PROBLEMS MADE IT FOR YOU TO DO YOUR WORK, TAKE CARE OF THINGS AT HOME, OR GET ALONG WITH OTHER PEOPLE: SOMEWHAT DIFFICULT
GAD7 TOTAL SCORE: 10
2. NOT BEING ABLE TO STOP OR CONTROL WORRYING: MORE THAN HALF THE DAYS
7. FEELING AFRAID AS IF SOMETHING AWFUL MIGHT HAPPEN: NOT AT ALL
1. FEELING NERVOUS, ANXIOUS, OR ON EDGE: MORE THAN HALF THE DAYS
6. BECOMING EASILY ANNOYED OR IRRITABLE: MORE THAN HALF THE DAYS
3. WORRYING TOO MUCH ABOUT DIFFERENT THINGS: SEVERAL DAYS
5. BEING SO RESTLESS THAT IT IS HARD TO SIT STILL: SEVERAL DAYS

## 2020-11-25 ASSESSMENT — PATIENT HEALTH QUESTIONNAIRE - PHQ9
SUM OF ALL RESPONSES TO PHQ QUESTIONS 1-9: 7
5. POOR APPETITE OR OVEREATING: MORE THAN HALF THE DAYS

## 2020-11-25 NOTE — PATIENT INSTRUCTIONS
Diabetes-2:  1. Recommend diabetes follow-up visit if blood sugar is consistently above 180  2. Continue Trulicity at current dose  3. Increase Glipizide 5 mg once daily before a meal.  Watch for low blood sugar, review symptoms below    Cancer screenin. You are due for colon cancer screening - can be scope or FIT test.      Treatments for arthritis:  1. Over the counter pain medications   A. Tylenol (Acetaminophen) 500-1000 mg 3 x day as needed   B. Ibuprofen (or other NSAIDs such as Aleve, Aspirin, Advil) 400-600 mg 3 x day as needed - can alternate with Tylenol    C. Supplement such as Glucosamine with Chondroitin   2. Over the counter topical   A. Aspercream with Lidocaine, Capsaicin, Icy Hot, Biofreeze, Salon Pas, Blue Emu, Voltaren  3. Prescription pain medications (NSAIDS)   A. Celebrex 100-200 mg 2 x day as needed.  Similar to Ibuprofen, cannot take along with Celebrex   B. Prescription Ibuprofen  4. Physical therapy   5. Heat, ice, stretching, braces, modified activity  6. Sports Medicine or Orthopedics for Injections (steroids, 'rooster comb')  7. Joint replacement        Dr. Robison's Tips for a Healthy Diet    1. Add more fresh fruits and vegetables to your diet.  The more colorful with the fruit or vegetable (think berries, spinach, carrots, peppers) the healthier it tends to be.  Juice is not a fruit.  Prepare the vegetables in a healthy way - steam, bake.  Avoid batter/breading, butter/oil to cook.  2. Add more fiber to your diet.  Swap out white bread, white rice, white pasta for whole grain versions.  Reduce the portion size and frequency of carbohydrates/starches.  3. Choose healthier fats such as nuts, olive oil, avocado, etc. Stay away from lard, butter.  4. Decrease the frequency and portion size of 'junk food' -pizza, candy, cookies, potato chips, etc.  5. Watch liquid calories such as coffee drinks, juice, soda, teas.  There tends to be excessive sugar in these beverages.  6. Increase  protein in your diet.  Eggs, cheese, yogurt, nuts, lentils, chicken, fish are good healthy choices.  Protein keeps you wilde longer, and you are less likely to have blood sugar spikes  7. Eat healthy at least 80% of the time.  It is ok for a special treat (Mom's spaghetti dinner, cake on your birthday) every once in a while, just not every day.  When are you going to indulge (think [employer] time), be sure you are eating extra healthy the day before and after.      Resources:    1. [employer] [place] Resources for Health and Wellness:https://www.[employer].[facility].edu/dig-yes-foods    2.   [employer] [place] Ways To Wellness:    https://www.[employer].org/services/ways-to-wellness  Ways to Wellness offers:    Nutrition and weight management     Corrective exercise and fitness training     Lifestyle and behavioral change     Healing services  Our team includes registered dietitians, certified personal trainers, life coaches, as well as a Culinary Educator.    3. [facility] for Cardiovascular Disease Prevention  Consider making an appointment at the [facility] if either of the following describes you:    You are an adult who has risk factors for cardiovascular disease. Risk factors include cholesterol elevations, diabetes, high blood pressure, elevated weight, inactive living, and history of tobacco use.     You don t have traditional risk factors but have a strong family history of cardiovascular disease, which may mean you have a higher of risk of cardiovascular disease.     4. Atomic Habits by Eloy Bowie.  This a good book that looks into our habits and how to sustain good healthy habits and get rid of bad habits.      Patient Education   Treating Hypoglycemia (Low Blood Glucose)  Type 1 and Type 2 Diabetes  What is hypoglycemia?  When your blood glucose (blood sugar) level goes below 70, or you have certain symptoms, we say you have hypoglycemia (low blood glucose).   If not treated quickly, it can be dangerous. Follow  "the treatment guidelines listed here. If you over-treat low glucose, it can cause \"rebound hyperglycemia\" (high blood glucose), which is not healthy for your body.  Reduce your risk of hypoglycemia   1. Treat low blood glucose right away.  2. Eat balanced meals and snacks spread evenly throughout the day.  3. Your blood glucose should be at least 100 to drive, exercise, do heavy housework or if you can't eat for an hour.  4. Take the prescribed amount of medicines that lower blood glucose (such as sulfonylureas, meglitinides or insulin).  5. Ask your doctor before taking any herbal remedies (such as bitter melon or fenugreek), as these may lower blood glucose.  6. Watch your blood glucose carefully if you are under stress, exercising harder or more often or drinking alcohol on an empty stomach.  7. Call your care team if your blood glucose readings are often low.    What are the signs of low blood glucose?  You may have low blood glucose if:    You feel shaky.    You start sweating.    Your heart begins to beat fast.    You feel dizzy, tired or weak.    You feel nervous, crabby or confused.    You are suddenly very hungry.    You cannot see well.    You have a headache.    Your lips or mouth feel numb or tingly.  Very young children may seem dazed, confused, tired and crabby. If they are old enough to talk, their speech may slow. Some children use a special word to describe how they are feeling, such as \"silly,\" \"weird\" or \"tired.\"  Sometimes symptoms occur at night. If you are restless, sweating, having nightmares or waking up with headaches, you may have low blood glucose. Check your glucose and treat yourself if needed.  How should I treat low blood glucose?  You need to treat low blood glucose as soon as possible. It will not get better on its own.  What to do:  1. Check your blood glucose, if you can.  2. Eat or drink carbohydrate right away.  ? If glucose level is 51 to 70: eat or drink 15 grams of " carbohydrate (one carbohydrate choice from the box below).  ? If glucose level is less than 50: eat or drink 30 grams of carbohydrate (pick two different carbohydrate choices or one carbohydrate choice and have twice the amount shown.)  Each of these items has 15 grams of carbohydrate (1 carbohydrate choice):  ? 3 to 4 glucose tablets  ? 1/2 cup (4 ounces) regular soda pop (soda pop with sugar)  ? 1/2 cup (4 ounces) fruit juice  ? Small box of raisins  ? 1 cup (8 ounces) skim or low-fat milk  ? Small tube (15 grams) of glucose gel    ? Note: Do not take insulin for the carbohydrate you eat to treat a low blood glucose.  3. Wait 15 minutes before eating or drinking anything else. Then, re-check your blood glucose.  4. At your next meal, eat your regular amount of carbohydrate and take your regular dose of insulin.  5.  Repeat these steps until your glucose is between 70 and 100. Your glucose level should be at least 100 if:  ? You are going to drive. Always check glucose before you drive.  ? You are going to exercise. This includes heavy housework, yard work, running, walking or other physical activity.  ? Your next meal or snack is 1 hour or more away.  6. Check your glucose again in one hour if you take insulin, sulfonylureas or meglitinides.  Call 911 if your blood glucose does not get better.  What happens if I can't treat myself?  Your doctor may prescribe glucagon (medicine to raise your blood glucose). If your glucose is very low and you cannot eat or drink carbohydrate, someone else can give you a shot of this medicine.  If your doctor prescribes this, we will give you special instructions to share with family and friends (as well as teachers, day care providers and other caregivers, if the glucagon is for a child).   When should I call my care team?  Let your diabetes care team know if:    You have more than two to three low blood glucose readings in a row.    You have two or more low readings in 24  hours.    You have low readings at the same time of day several days in a row.    You often need to eat extra food to keep your blood glucose from getting too low.  For informational purposes only. Not to replace the advice of your health care provider.   Copyright   2006 [facility]. All rights reserved. [employer] 946166 - REV 08/18.

## 2020-11-25 NOTE — PROGRESS NOTES
"Ml Evans is a 70 year old female who is being evaluated via a billable video visit.      The patient has been notified of following:     \"This video visit will be conducted via a call between you and your physician/provider. We have found that certain health care needs can be provided without the need for an in-person physical exam.  This service lets us provide the care you need with a video conversation.  If a prescription is necessary we can send it directly to your pharmacy.  If lab work is needed we can place an order for that and you can then stop by our lab to have the test done at a later time.    Video visits are billed at different rates depending on your insurance coverage.  Please reach out to your insurance provider with any questions.    If during the course of the call the physician/provider feels a video visit is not appropriate, you will not be charged for this service.\"    Patient has given verbal consent for Video visit? Yes  How would you like to obtain your AVS? MyChart  If you are dropped from the video visit, the video invite should be resent to: Send to e-mail at: [email or web address]  Will anyone else be joining your video visit? No      Subjective     Ml Evans is a 70 year old female who presents today via video visit for the following health issues:    HPI       Pt declined come for labs .         PHQ9/GAD7 URGENT -     Hypertension:  BP  Concerns:High, and somewhat erratic  --lisinopril 5 mg daily  --metoprolol 25 mg daily    diabetes -2:  --A1C =  Home A1C done 6 weeks ago was 7.6 ([facility] brand, 99% accuracy)  --Medication questions: . Am currently taking Trulicity (1.5 mg once per week) and 2.5 mg Glipizide daily. Am wondering if Glipizide dose should be increased?  --Diet: Would like to discuss some aspects of diet as well.  --on trulicity 1.5 mg weekly, glipizide 2.5 mg daily.  Had side effects with Jardiance, metformin      PCP request today: a list of AM and " PM Blood sugar readings for the past 1 to 2 weeks.      11/24  b/4 dinner - 208             after lunch - 216    11/21  b/4 breakfast - 147     b/4 dinner - 121    10/15 b/4 lunch - 162 (did not feel right)    10/7 b/4 lunch - 198 (did not feel right/stress)    9/26 after lunch - 185    9/23 b/4 dinner - 161    9/1 after lunch - 199    8/28 after lunch - 153    8/27 b/4 lunch - 207    8/22 b/4 lunch - 152 (did not feel right/stress)    8/7 after lunch - 205    8/4 b/4 lunch - 182 (did not feel right)  tr    GI Symptoms: overall have improved.  Never did pelvic floor therapy due to COVID.  Sometimes has LLQ pain, thinks it is diverticulitis flaring up. Lasts a day or so, no fevers  --did see Nutritionist  --is limited in her diet due to chronic GI issues, so feels a bit stuck    Fibromyalgia: some days is severe, especially with low back/pelvic pain.  Pain feels 'crawly like restless legs'.  Tylenol, [employer] spray and salon pas helps.  Wedge pillow helps.  Some days there is no pain in back or other joints.  Other days multiple joints are very painful  --wt today 165 at home.       Video Start Time: 3:06 PM        Review of Systems   Constitutional, HEENT, cardiovascular, pulmonary, gi and gu systems are negative, except as otherwise noted.      Objective           Vitals:  No vitals were obtained today due to virtual visit.    Physical Exam     GENERAL: Healthy, alert and no distress  EYES: Eyes grossly normal to inspection.  No discharge or erythema, or obvious scleral/conjunctival abnormalities.  RESP: No audible wheeze, cough, or visible cyanosis.  No visible retractions or increased work of breathing.    SKIN: Visible skin clear. No significant rash, abnormal pigmentation or lesions.  NEURO: Cranial nerves grossly intact.  Mentation and speech appropriate for age.  PSYCH: Mentation appears normal, affect normal/bright, judgement and insight intact, normal speech and appearance well-groomed.             Assessment & Plan     Type 2 diabetes mellitus without complication, without long-term current use of insulin (H) - uncontrolled, increase glipizide to 5 mg.  Follow-up 1 month if blood sugar are not controlled  - glipiZIDE (GLUCOTROL XL) 5 MG 24 hr tablet; Take 1 tablet (5 mg) by mouth daily    Irritable bowel syndrome with diarrhea - managing with diet.       BMI:   Estimated body mass index is 31.05 kg/m  as calculated from the following:    Height as of 20: 1.524 m (5').    Weight as of 20: 72.1 kg (159 lb).   Weight management plan: Discussed healthy diet and exercise guidelines         Patient Instructions   Diabetes-2:  1. Recommend diabetes follow-up visit if blood sugar is consistently above 180  2. Continue Trulicity at current dose  3. Increase Glipizide 5 mg once daily before a meal.  Watch for low blood sugar, review symptoms below    Cancer screenin. You are due for colon cancer screening - can be scope or FIT test.      Treatments for arthritis:  1. Over the counter pain medications   A. Tylenol (Acetaminophen) 500-1000 mg 3 x day as needed   B. Ibuprofen (or other NSAIDs such as Aleve, Aspirin, Advil) 400-600 mg 3 x day as needed - can alternate with Tylenol    C. Supplement such as Glucosamine with Chondroitin   2. Over the counter topical   A. Aspercream with Lidocaine, Capsaicin, Icy Hot, Biofreeze, Salon Pas, Blue Emu, Voltaren  3. Prescription pain medications (NSAIDS)   A. Celebrex 100-200 mg 2 x day as needed.  Similar to Ibuprofen, cannot take along with Celebrex   B. Prescription Ibuprofen  4. Physical therapy   5. Heat, ice, stretching, braces, modified activity  6. Sports Medicine or Orthopedics for Injections (steroids, 'rooster comb')  7. Joint replacement        Dr. Robison's Tips for a Healthy Diet    1. Add more fresh fruits and vegetables to your diet.  The more colorful with the fruit or vegetable (think berries, spinach, carrots, peppers) the healthier it tends to  be.  Juice is not a fruit.  Prepare the vegetables in a healthy way - steam, bake.  Avoid batter/breading, butter/oil to cook.  2. Add more fiber to your diet.  Swap out white bread, white rice, white pasta for whole grain versions.  Reduce the portion size and frequency of carbohydrates/starches.  3. Choose healthier fats such as nuts, olive oil, avocado, etc. Stay away from lard, butter.  4. Decrease the frequency and portion size of 'junk food' -pizza, candy, cookies, potato chips, etc.  5. Watch liquid calories such as coffee drinks, juice, soda, teas.  There tends to be excessive sugar in these beverages.  6. Increase protein in your diet.  Eggs, cheese, yogurt, nuts, lentils, chicken, fish are good healthy choices.  Protein keeps you wilde longer, and you are less likely to have blood sugar spikes  7. Eat healthy at least 80% of the time.  It is ok for a special treat (Mom's spaghetti dinner, cake on your birthday) every once in a while, just not every day.  When are you going to indulge (think [employer] time), be sure you are eating extra healthy the day before and after.      Resources:    1. [employer] for Health and Wellness:[email or web address]://[email or web address]/dig-yes-foods    2.   [employer] Ways To Wellness:    https://www.[facility].org/services/ways-to-wellness  Ways to Wellness offers:    Nutrition and weight management     Corrective exercise and fitness training     Lifestyle and behavioral change     Healing services  Our team includes registered dietitians, certified personal trainers, life coaches, as well as a Culinary Educator.    3. [facility] for Cardiovascular Disease Prevention  Consider making an appointment at the [facility] if either of the following describes you:    You are an adult who has risk factors for cardiovascular disease. Risk factors include cholesterol elevations, diabetes, high blood pressure, elevated weight, inactive living, and history of tobacco use.  "    You don t have traditional risk factors but have a strong family history of cardiovascular disease, which may mean you have a higher of risk of cardiovascular disease.     4. Atomic Habits by Eloy Bowie.  This a good book that looks into our habits and how to sustain good healthy habits and get rid of bad habits.      Patient Education   Treating Hypoglycemia (Low Blood Glucose)  Type 1 and Type 2 Diabetes  What is hypoglycemia?  When your blood glucose (blood sugar) level goes below 70, or you have certain symptoms, we say you have hypoglycemia (low blood glucose).   If not treated quickly, it can be dangerous. Follow the treatment guidelines listed here. If you over-treat low glucose, it can cause \"rebound hyperglycemia\" (high blood glucose), which is not healthy for your body.  Reduce your risk of hypoglycemia   1. Treat low blood glucose right away.  2. Eat balanced meals and snacks spread evenly throughout the day.  3. Your blood glucose should be at least 100 to drive, exercise, do heavy housework or if you can't eat for an hour.  4. Take the prescribed amount of medicines that lower blood glucose (such as sulfonylureas, meglitinides or insulin).  5. Ask your doctor before taking any herbal remedies (such as bitter melon or fenugreek), as these may lower blood glucose.  6. Watch your blood glucose carefully if you are under stress, exercising harder or more often or drinking alcohol on an empty stomach.  7. Call your care team if your blood glucose readings are often low.    What are the signs of low blood glucose?  You may have low blood glucose if:    You feel shaky.    You start sweating.    Your heart begins to beat fast.    You feel dizzy, tired or weak.    You feel nervous, crabby or confused.    You are suddenly very hungry.    You cannot see well.    You have a headache.    Your lips or mouth feel numb or tingly.  Very young children may seem dazed, confused, tired and crabby. If they are old " "enough to talk, their speech may slow. Some children use a special word to describe how they are feeling, such as \"silly,\" \"weird\" or \"tired.\"  Sometimes symptoms occur at night. If you are restless, sweating, having nightmares or waking up with headaches, you may have low blood glucose. Check your glucose and treat yourself if needed.  How should I treat low blood glucose?  You need to treat low blood glucose as soon as possible. It will not get better on its own.  What to do:  1. Check your blood glucose, if you can.  2. Eat or drink carbohydrate right away.  ? If glucose level is 51 to 70: eat or drink 15 grams of carbohydrate (one carbohydrate choice from the box below).  ? If glucose level is less than 50: eat or drink 30 grams of carbohydrate (pick two different carbohydrate choices or one carbohydrate choice and have twice the amount shown.)  Each of these items has 15 grams of carbohydrate (1 carbohydrate choice):  ? 3 to 4 glucose tablets  ? 1/2 cup (4 ounces) regular soda pop (soda pop with sugar)  ? 1/2 cup (4 ounces) fruit juice  ? Small box of raisins  ? 1 cup (8 ounces) skim or low-fat milk  ? Small tube (15 grams) of glucose gel    ? Note: Do not take insulin for the carbohydrate you eat to treat a low blood glucose.  3. Wait 15 minutes before eating or drinking anything else. Then, re-check your blood glucose.  4. At your next meal, eat your regular amount of carbohydrate and take your regular dose of insulin.  5.  Repeat these steps until your glucose is between 70 and 100. Your glucose level should be at least 100 if:  ? You are going to drive. Always check glucose before you drive.  ? You are going to exercise. This includes heavy housework, yard work, running, walking or other physical activity.  ? Your next meal or snack is 1 hour or more away.  6. Check your glucose again in one hour if you take insulin, sulfonylureas or meglitinides.  Call 911 if your blood glucose does not get better.  What " happens if I can't treat myself?  Your doctor may prescribe glucagon (medicine to raise your blood glucose). If your glucose is very low and you cannot eat or drink carbohydrate, someone else can give you a shot of this medicine.  If your doctor prescribes this, we will give you special instructions to share with family and friends (as well as teachers, day care providers and other caregivers, if the glucagon is for a child).   When should I call my care team?  Let your diabetes care team know if:    You have more than two to three low blood glucose readings in a row.    You have two or more low readings in 24 hours.    You have low readings at the same time of day several days in a row.    You often need to eat extra food to keep your blood glucose from getting too low.  For informational purposes only. Not to replace the advice of your health care provider.   Copyright   2006 [employer] All rights reserved. [employer] 427160 - REV 08/18.           No follow-ups on file.    Shwetha Robison,   [facility]      Video-Visit Details    Type of service:  Video Visit    Video End Time:3:34 PM    Originating Location (pt. Location): Home    Distant Location (provider location):  [facility]     Platform used for Video Visit: Plynked

## 2020-11-26 ASSESSMENT — ANXIETY QUESTIONNAIRES: GAD7 TOTAL SCORE: 10

## 2020-12-09 DIAGNOSIS — E11.9 TYPE 2 DIABETES MELLITUS WITHOUT COMPLICATION, WITHOUT LONG-TERM CURRENT USE OF INSULIN (H): ICD-10-CM

## 2020-12-10 DIAGNOSIS — E11.9 TYPE 2 DIABETES MELLITUS WITHOUT COMPLICATION, WITHOUT LONG-TERM CURRENT USE OF INSULIN (H): ICD-10-CM

## 2020-12-14 ENCOUNTER — MYC MEDICAL ADVICE (OUTPATIENT)
Dept: FAMILY MEDICINE | Facility: CLINIC | Age: 70
End: 2020-12-14

## 2020-12-14 DIAGNOSIS — E11.9 TYPE 2 DIABETES MELLITUS WITHOUT COMPLICATION, WITHOUT LONG-TERM CURRENT USE OF INSULIN (H): ICD-10-CM

## 2020-12-15 RX ORDER — DULAGLUTIDE 1.5 MG/.5ML
1.5 INJECTION, SOLUTION SUBCUTANEOUS
Qty: 6 ML | Refills: 3 | Status: SHIPPED | OUTPATIENT
Start: 2020-12-15 | End: 2021-11-02

## 2020-12-15 RX ORDER — GLIPIZIDE 10 MG/1
10 TABLET, FILM COATED, EXTENDED RELEASE ORAL DAILY
Qty: 90 TABLET | Refills: 3 | Status: SHIPPED | OUTPATIENT
Start: 2020-12-15 | End: 2020-12-21 | Stop reason: ALTCHOICE

## 2020-12-15 NOTE — TELEPHONE ENCOUNTER
Blood sugars are still too high.  The glipizide itself does not cause an increase in blood sugar.  Recommend to continue the Trulicity at 1.5 mg weekly.  Increase glipizide to 10 mg once daily, updated pharmacy    Follow-up in 2 to 4 weeks if blood sugars are still consistently over 180

## 2020-12-17 ENCOUNTER — MYC MEDICAL ADVICE (OUTPATIENT)
Dept: FAMILY MEDICINE | Facility: CLINIC | Age: 70
End: 2020-12-17

## 2020-12-17 DIAGNOSIS — E11.9 TYPE 2 DIABETES MELLITUS WITHOUT COMPLICATION, WITHOUT LONG-TERM CURRENT USE OF INSULIN (H): ICD-10-CM

## 2020-12-17 NOTE — TELEPHONE ENCOUNTER
"Requested Prescriptions   Pending Prescriptions Disp Refills     blood glucose (CONTOUR NEXT TEST) test strip 100 each 5     Sig: Use to test blood sugar  times daily or as directed.       Diabetic Supplies Protocol Failed - 12/17/2020  3:06 PM        Failed - Recent (6 mo) or future (30 days) visit within the authorizing provider's specialty     Patient had office visit in the last 6 months or has a visit in the next 30 days with authorizing provider.  See \"Patient Info\" tab in inbasket, or \"Choose Columns\" in Meds & Orders section of the refill encounter.            Passed - Medication is active on med list        Passed - Patient is 18 years of age or older             "

## 2020-12-21 ENCOUNTER — TELEPHONE (OUTPATIENT)
Dept: INTERNAL MEDICINE | Facility: CLINIC | Age: 70
End: 2020-12-21

## 2020-12-21 DIAGNOSIS — E11.9 TYPE 2 DIABETES MELLITUS WITHOUT COMPLICATION, WITHOUT LONG-TERM CURRENT USE OF INSULIN (H): ICD-10-CM

## 2020-12-21 RX ORDER — GLUCOSAMINE HCL/CHONDROITIN SU 500-400 MG
CAPSULE ORAL
Qty: 100 EACH | Refills: 3 | Status: SHIPPED | OUTPATIENT
Start: 2020-12-21 | End: 2021-12-03

## 2020-12-21 RX ORDER — PEN NEEDLE, DIABETIC 32GX 5/32"
NEEDLE, DISPOSABLE MISCELLANEOUS
Qty: 100 EACH | Refills: 3 | Status: SHIPPED | OUTPATIENT
Start: 2020-12-21 | End: 2022-01-27

## 2021-01-11 ENCOUNTER — MYC MEDICAL ADVICE (OUTPATIENT)
Dept: FAMILY MEDICINE | Facility: CLINIC | Age: 71
End: 2021-01-11

## 2021-01-12 NOTE — TELEPHONE ENCOUNTER
Continue lantus 20 units at bedtime, this appears to have helped the blood sugar.    Can keep working on diet changes and exercise as health permits

## 2021-01-12 NOTE — TELEPHONE ENCOUNTER
Dr. Robison,    Patient sends in her blood sugar readings since changing her medication. Jil RIVERA RN

## 2021-01-13 DIAGNOSIS — K21.9 GASTROESOPHAGEAL REFLUX DISEASE WITHOUT ESOPHAGITIS: ICD-10-CM

## 2021-01-13 NOTE — TELEPHONE ENCOUNTER
"Requested Prescriptions   Pending Prescriptions Disp Refills     LANsoprazole (PREVACID) 30 MG DR capsule [Pharmacy Med Name: LANSOPRAZOLE 30MG CPDR] 90 capsule 0     Sig: TAKE 1 CAPSULE BY MOUTH DAILY, 30 TO 60 MINUTES BEFORE A MEAL.       PPI Protocol Passed - 1/13/2021  3:34 PM        Passed - Not on Clopidogrel (unless Pantoprazole ordered)        Passed - No diagnosis of osteoporosis on record        Passed - Recent (12 mo) or future (30 days) visit within the authorizing provider's specialty     Patient has had an office visit with the authorizing provider or a provider within the authorizing providers department within the previous 12 mos or has a future within next 30 days. See \"Patient Info\" tab in inbasket, or \"Choose Columns\" in Meds & Orders section of the refill encounter.              Passed - Medication is active on med list        Passed - Patient is age 18 or older        Passed - No active pregnacy on record        Passed - No positive pregnancy test in past 12 months             "

## 2021-01-15 RX ORDER — LANSOPRAZOLE 30 MG/1
CAPSULE, DELAYED RELEASE ORAL
Qty: 30 CAPSULE | Refills: 0 | Status: SHIPPED | OUTPATIENT
Start: 2021-01-15 | End: 2021-02-09

## 2021-01-15 NOTE — TELEPHONE ENCOUNTER
Medication is being filled for 1 time refill only due to:  Patient needs to be seen because last OV with Dr. Robison was 2/12/2020.     Leho message sent.    Tarah PARIKH RN, BSN

## 2021-01-18 DIAGNOSIS — I10 BENIGN ESSENTIAL HYPERTENSION: ICD-10-CM

## 2021-01-18 DIAGNOSIS — E78.5 HYPERLIPIDEMIA LDL GOAL <100: ICD-10-CM

## 2021-01-18 NOTE — TELEPHONE ENCOUNTER
"Requested Prescriptions   Pending Prescriptions Disp Refills     metoprolol succinate ER (TOPROL-XL) 25 MG 24 hr tablet [Pharmacy Med Name: METOPROLOL SUCCINATE ER 25MG TB24] 90 tablet 0     Sig: TAKE ONE TABLET BY MOUTH ONCE DAILY       Beta-Blockers Protocol Passed - 1/18/2021 12:18 PM        Passed - Blood pressure under 140/90 in past 12 months     BP Readings from Last 3 Encounters:   03/04/20 115/66   02/12/20 118/64   01/28/20 128/56                 Passed - Patient is age 6 or older        Passed - Recent (12 mo) or future (30 days) visit within the authorizing provider's specialty     Patient has had an office visit with the authorizing provider or a provider within the authorizing providers department within the previous 12 mos or has a future within next 30 days. See \"Patient Info\" tab in inbasket, or \"Choose Columns\" in Meds & Orders section of the refill encounter.              Passed - Medication is active on med list           lisinopril (ZESTRIL) 5 MG tablet [Pharmacy Med Name: LISINOPRIL 5MG TABS] 90 tablet 0     Sig: TAKE ONE TABLET BY MOUTH ONCE DAILY       ACE Inhibitors (Including Combos) Protocol Failed - 1/18/2021 12:18 PM        Failed - Normal serum creatinine on file in past 12 months     Recent Labs   Lab Test 09/18/19  1515   CR 0.81       Ok to refill medication if creatinine is low          Failed - Normal serum potassium on file in past 12 months     Recent Labs   Lab Test 09/18/19  1515   POTASSIUM 4.2             Passed - Blood pressure under 140/90 in past 12 months     BP Readings from Last 3 Encounters:   03/04/20 115/66   02/12/20 118/64   01/28/20 128/56                 Passed - Recent (12 mo) or future (30 days) visit within the authorizing provider's specialty     Patient has had an office visit with the authorizing provider or a provider within the authorizing providers department within the previous 12 mos or has a future within next 30 days. See \"Patient Info\" tab in " "inbasket, or \"Choose Columns\" in Meds & Orders section of the refill encounter.              Passed - Medication is active on med list        Passed - Patient is age 18 or older        Passed - No active pregnancy on record        Passed - No positive pregnancy test within past 12 months           atorvastatin (LIPITOR) 80 MG tablet [Pharmacy Med Name: ATORVASTATIN CALCIUM 80MG TABS] 90 tablet 0     Sig: TAKE ONE TABLET BY MOUTH ONCE DAILY       Statins Protocol Failed - 1/18/2021 12:18 PM        Failed - LDL on file in past 12 months     Recent Labs   Lab Test 05/06/19  1106   LDL 55             Passed - No abnormal creatine kinase in past 12 months     No lab results found.             Passed - Recent (12 mo) or future (30 days) visit within the authorizing provider's specialty     Patient has had an office visit with the authorizing provider or a provider within the authorizing providers department within the previous 12 mos or has a future within next 30 days. See \"Patient Info\" tab in inbasket, or \"Choose Columns\" in Meds & Orders section of the refill encounter.              Passed - Medication is active on med list        Passed - Patient is age 18 or older        Passed - No active pregnancy on record        Passed - No positive pregnancy test in past 12 months             "

## 2021-01-21 RX ORDER — LISINOPRIL 5 MG/1
TABLET ORAL
Qty: 90 TABLET | Refills: 0 | Status: SHIPPED | OUTPATIENT
Start: 2021-01-21 | End: 2021-04-13

## 2021-01-21 RX ORDER — ATORVASTATIN CALCIUM 80 MG/1
TABLET, FILM COATED ORAL
Qty: 90 TABLET | Refills: 0 | Status: SHIPPED | OUTPATIENT
Start: 2021-01-21 | End: 2021-04-13

## 2021-01-21 RX ORDER — METOPROLOL SUCCINATE 25 MG/1
TABLET, EXTENDED RELEASE ORAL
Qty: 90 TABLET | Refills: 0 | Status: SHIPPED | OUTPATIENT
Start: 2021-01-21 | End: 2021-04-13

## 2021-01-21 NOTE — TELEPHONE ENCOUNTER
Routing refill request to provider for review/approval because: Pt was advised 1/15/21 via [employer] need to schedule Wellness Visit; Nothing has been scheduled,    Advise ongoing refills.    Tarah PARIKH RN, BSN

## 2021-02-08 DIAGNOSIS — K21.9 GASTROESOPHAGEAL REFLUX DISEASE WITHOUT ESOPHAGITIS: ICD-10-CM

## 2021-02-08 NOTE — TELEPHONE ENCOUNTER
"Requested Prescriptions   Pending Prescriptions Disp Refills     LANsoprazole (PREVACID) 30 MG DR capsule [Pharmacy Med Name: LANSOPRAZOLE 30MG CPDR] 30 capsule 0     Sig: TAKE 1 CAPSULE BY MOUTH DAILY, 30 TO 60 MINUTES BEFORE A MEAL       PPI Protocol Passed - 2/8/2021 11:23 AM        Passed - Not on Clopidogrel (unless Pantoprazole ordered)        Passed - No diagnosis of osteoporosis on record        Passed - Recent (12 mo) or future (30 days) visit within the authorizing provider's specialty     Patient has had an office visit with the authorizing provider or a provider within the authorizing providers department within the previous 12 mos or has a future within next 30 days. See \"Patient Info\" tab in inbasket, or \"Choose Columns\" in Meds & Orders section of the refill encounter.              Passed - Medication is active on med list        Passed - Patient is age 18 or older        Passed - No active pregnacy on record        Passed - No positive pregnancy test in past 12 months             "

## 2021-02-09 RX ORDER — LANSOPRAZOLE 30 MG/1
CAPSULE, DELAYED RELEASE ORAL
Qty: 30 CAPSULE | Refills: 0 | Status: SHIPPED | OUTPATIENT
Start: 2021-02-09 | End: 2021-03-16

## 2021-03-15 DIAGNOSIS — K21.9 GASTROESOPHAGEAL REFLUX DISEASE WITHOUT ESOPHAGITIS: ICD-10-CM

## 2021-03-15 NOTE — TELEPHONE ENCOUNTER
"Requested Prescriptions   Pending Prescriptions Disp Refills     LANsoprazole (PREVACID) 30 MG DR capsule 30 capsule 0     Sig: Take 1 capsule (30 mg) by mouth daily 30-60 minutes before a meal.       PPI Protocol Passed - 3/15/2021  4:05 PM        Passed - Not on Clopidogrel (unless Pantoprazole ordered)        Passed - No diagnosis of osteoporosis on record        Passed - Recent (12 mo) or future (30 days) visit within the authorizing provider's specialty     Patient has had an office visit with the authorizing provider or a provider within the authorizing providers department within the previous 12 mos or has a future within next 30 days. See \"Patient Info\" tab in inbasket, or \"Choose Columns\" in Meds & Orders section of the refill encounter.              Passed - Medication is active on med list        Passed - Patient is age 18 or older        Passed - No active pregnacy on record        Passed - No positive pregnancy test in past 12 months             "

## 2021-03-16 ENCOUNTER — MYC MEDICAL ADVICE (OUTPATIENT)
Dept: FAMILY MEDICINE | Facility: CLINIC | Age: 71
End: 2021-03-16

## 2021-03-16 ENCOUNTER — MYC MEDICAL ADVICE (OUTPATIENT)
Dept: PEDIATRICS | Facility: CLINIC | Age: 71
End: 2021-03-16

## 2021-03-16 RX ORDER — LANSOPRAZOLE 30 MG/1
30 CAPSULE, DELAYED RELEASE ORAL DAILY
Qty: 30 CAPSULE | Refills: 0 | Status: SHIPPED | OUTPATIENT
Start: 2021-03-16 | End: 2021-04-13

## 2021-03-16 NOTE — TELEPHONE ENCOUNTER
Medication is being filled for 1 time refill only due to:  Patient needs to be seen because due for wellness visit and labs. Pt notified via [employer] in Jan and no appt made. Sent reminder again today..   Tarah PARIKH RN, BSN

## 2021-03-18 ENCOUNTER — MYC MEDICAL ADVICE (OUTPATIENT)
Dept: CARDIOLOGY | Facility: CLINIC | Age: 71
End: 2021-03-18

## 2021-03-18 DIAGNOSIS — I25.10 CORONARY ARTERY DISEASE INVOLVING NATIVE HEART WITHOUT ANGINA PECTORIS, UNSPECIFIED VESSEL OR LESION TYPE: ICD-10-CM

## 2021-03-19 RX ORDER — NITROGLYCERIN 0.4 MG/1
0.4 TABLET SUBLINGUAL EVERY 5 MIN PRN
Qty: 25 TABLET | Refills: 0 | Status: SHIPPED | OUTPATIENT
Start: 2021-03-19 | End: 2023-06-15

## 2021-04-01 ENCOUNTER — TELEPHONE (OUTPATIENT)
Dept: INTERNAL MEDICINE | Facility: CLINIC | Age: 71
End: 2021-04-01

## 2021-04-04 ENCOUNTER — HEALTH MAINTENANCE LETTER (OUTPATIENT)
Age: 71
End: 2021-04-04

## 2021-04-06 ENCOUNTER — TELEPHONE (OUTPATIENT)
Dept: INTERNAL MEDICINE | Facility: CLINIC | Age: 71
End: 2021-04-06

## 2021-04-06 NOTE — TELEPHONE ENCOUNTER
Staff called pt again per PCP request. PCP wants have her as a face to face, with labs prior. If unable to do labs today and change for face to face have to reschedule for another day with labs prior. Left a new msg.  Nola Carlin CMA (JAUN)   (aka: Brenda Carlin)

## 2021-04-06 NOTE — TELEPHONE ENCOUNTER
Pt schedule a video visit for today with Dr. Robison for Diabetes - have not had A1C done since a year ago - need labs done prior to the video visit. Pt will have to schedule labs prior or reschedule video visit.  Staff called today and left a voice msg, whenever she calls back advise the above per PCP request.  Nola Carlin CMA (JAUN)   (aka: Brenda Carlin)

## 2021-04-13 ENCOUNTER — APPOINTMENT (OUTPATIENT)
Dept: LAB | Facility: CLINIC | Age: 71
End: 2021-04-13
Payer: COMMERCIAL

## 2021-04-13 ENCOUNTER — OFFICE VISIT (OUTPATIENT)
Dept: FAMILY MEDICINE | Facility: CLINIC | Age: 71
End: 2021-04-13
Payer: COMMERCIAL

## 2021-04-13 VITALS
DIASTOLIC BLOOD PRESSURE: 62 MMHG | OXYGEN SATURATION: 94 % | SYSTOLIC BLOOD PRESSURE: 100 MMHG | WEIGHT: 169 LBS | HEART RATE: 65 BPM | TEMPERATURE: 99.1 F | BODY MASS INDEX: 33.18 KG/M2 | HEIGHT: 60 IN

## 2021-04-13 DIAGNOSIS — I10 BENIGN ESSENTIAL HYPERTENSION: ICD-10-CM

## 2021-04-13 DIAGNOSIS — E11.9 TYPE 2 DIABETES MELLITUS WITHOUT COMPLICATION, WITHOUT LONG-TERM CURRENT USE OF INSULIN (H): Primary | ICD-10-CM

## 2021-04-13 DIAGNOSIS — K21.9 GASTROESOPHAGEAL REFLUX DISEASE WITHOUT ESOPHAGITIS: ICD-10-CM

## 2021-04-13 DIAGNOSIS — E78.5 HYPERLIPIDEMIA LDL GOAL <100: ICD-10-CM

## 2021-04-13 DIAGNOSIS — Z78.0 MENOPAUSE: ICD-10-CM

## 2021-04-13 DIAGNOSIS — Z12.83 SCREENING EXAM FOR SKIN CANCER: ICD-10-CM

## 2021-04-13 DIAGNOSIS — Z12.31 VISIT FOR SCREENING MAMMOGRAM: ICD-10-CM

## 2021-04-13 LAB
ANION GAP SERPL CALCULATED.3IONS-SCNC: 5 MMOL/L (ref 3–14)
BUN SERPL-MCNC: 11 MG/DL (ref 7–30)
CALCIUM SERPL-MCNC: 9.2 MG/DL (ref 8.5–10.1)
CHLORIDE SERPL-SCNC: 106 MMOL/L (ref 94–109)
CHOLEST SERPL-MCNC: 158 MG/DL
CO2 SERPL-SCNC: 27 MMOL/L (ref 20–32)
CREAT SERPL-MCNC: 0.8 MG/DL (ref 0.52–1.04)
CREAT UR-MCNC: 282 MG/DL
GFR SERPL CREATININE-BSD FRML MDRD: 74 ML/MIN/{1.73_M2}
GLUCOSE SERPL-MCNC: 148 MG/DL (ref 70–99)
HBA1C MFR BLD: 7.1 % (ref 0–5.6)
HDLC SERPL-MCNC: 55 MG/DL
LDLC SERPL CALC-MCNC: 59 MG/DL
MICROALBUMIN UR-MCNC: 327 MG/L
MICROALBUMIN/CREAT UR: 115.96 MG/G CR (ref 0–25)
NONHDLC SERPL-MCNC: 103 MG/DL
POTASSIUM SERPL-SCNC: 3.9 MMOL/L (ref 3.4–5.3)
SODIUM SERPL-SCNC: 138 MMOL/L (ref 133–144)
TRIGL SERPL-MCNC: 219 MG/DL

## 2021-04-13 PROCEDURE — 80061 LIPID PANEL: CPT | Performed by: INTERNAL MEDICINE

## 2021-04-13 PROCEDURE — 82043 UR ALBUMIN QUANTITATIVE: CPT | Performed by: INTERNAL MEDICINE

## 2021-04-13 PROCEDURE — 99215 OFFICE O/P EST HI 40 MIN: CPT | Performed by: INTERNAL MEDICINE

## 2021-04-13 PROCEDURE — 36415 COLL VENOUS BLD VENIPUNCTURE: CPT | Performed by: INTERNAL MEDICINE

## 2021-04-13 PROCEDURE — 83036 HEMOGLOBIN GLYCOSYLATED A1C: CPT | Performed by: INTERNAL MEDICINE

## 2021-04-13 PROCEDURE — 80048 BASIC METABOLIC PNL TOTAL CA: CPT | Performed by: INTERNAL MEDICINE

## 2021-04-13 RX ORDER — LANSOPRAZOLE 30 MG/1
30 CAPSULE, DELAYED RELEASE ORAL DAILY
Qty: 90 CAPSULE | Refills: 3 | Status: SHIPPED | OUTPATIENT
Start: 2021-04-13 | End: 2022-03-28

## 2021-04-13 RX ORDER — ATORVASTATIN CALCIUM 80 MG/1
80 TABLET, FILM COATED ORAL DAILY
Qty: 90 TABLET | Refills: 3 | Status: SHIPPED | OUTPATIENT
Start: 2021-04-13 | End: 2022-04-18

## 2021-04-13 RX ORDER — ESCITALOPRAM OXALATE 20 MG/1
20 TABLET ORAL DAILY
COMMUNITY
End: 2024-03-06

## 2021-04-13 RX ORDER — LISINOPRIL 5 MG/1
5 TABLET ORAL DAILY
Qty: 90 TABLET | Refills: 3 | Status: SHIPPED | OUTPATIENT
Start: 2021-04-13 | End: 2022-03-29

## 2021-04-13 RX ORDER — METOPROLOL SUCCINATE 25 MG/1
25 TABLET, EXTENDED RELEASE ORAL DAILY
Qty: 90 TABLET | Refills: 3 | Status: SHIPPED | OUTPATIENT
Start: 2021-04-13 | End: 2022-03-29

## 2021-04-13 ASSESSMENT — MIFFLIN-ST. JEOR: SCORE: 1208.08

## 2021-04-13 NOTE — PATIENT INSTRUCTIONS
Toenail fungus:   1. Ok to use topical treatment, use regularly.     Healthcare maintenance:  1. Due for colonoscopy sometime before 2025  2. Consider bone density scan - Radiology test was ordered.  Please call [phone] to schedule.  3. You are due for a mammogram.  Please call [phone] to schedule.      Diabetes:   1. Continue working on diet and exercise.  2. You are due for dilated eye exam.  Have the report sent to Dr. Robison's office.  3. Follow-up in 6 months, non-fasting labs prior to visit.  4. Check my chart later for pending test results.    Joint pains:   1. Ok with home management as you have been.  Try topicals such as Voltaren, Aspercream with Lidocaine, Capsaicin, Icy Hot, Biofreeze, Salon Pas   2. Work to increase activity    Derm:   1. Recommend to see Dermatology in 1-2 months        Dr. Robison's Tips for a Healthy Diet    1. Add more fresh fruits and vegetables to your diet.  The more colorful with the fruit or vegetable (think berries, spinach, carrots, peppers) the healthier it tends to be.  Juice is not a fruit.  Prepare the vegetables in a healthy way - steam, bake.  Avoid batter/breading, butter/oil to cook.  2. Add more fiber to your diet.  Swap out white bread, white rice, white pasta for whole grain versions.  Reduce the portion size and frequency of carbohydrates/starches.  3. Choose healthier fats such as nuts, olive oil, avocado, etc. Stay away from lard, butter.  4. Decrease the frequency and portion size of 'junk food' -pizza, candy, cookies, potato chips, etc.  5. Watch liquid calories such as coffee drinks, juice, soda, teas.  There tends to be excessive sugar in these beverages.  6. Increase protein in your diet.  Eggs, cheese, yogurt, nuts, lentils, chicken, fish are good healthy choices.  Protein keeps you wilde longer, and you are less likely to have blood sugar spikes  7. Eat healthy at least 80% of the time.  It is ok for a special treat (Mom's spaghetti dinner, cake  on your birthday) every once in a while, just not every day.  When are you going to indulge (think [place] time), be sure you are eating extra healthy the day before and after.      Resources:    1. [employer] Resources for Health and Wellness:https://www.takingcharneil.[facility]/dig-yes-foods    2.   [employer] Ways To Wellness:    https://www.[employer].org/services/ways-to-wellness  Ways to Wellness offers:    Nutrition and weight management     Corrective exercise and fitness training     Lifestyle and behavioral change     Healing services  Our team includes registered dietitians, certified personal trainers, life coaches, as well as a Culinary Educator.    3. [facility] for Cardiovascular Disease Prevention  Consider making an appointment at the [facility] if either of the following describes you:    You are an adult who has risk factors for cardiovascular disease. Risk factors include cholesterol elevations, diabetes, high blood pressure, elevated weight, inactive living, and history of tobacco use.     You don t have traditional risk factors but have a strong family history of cardiovascular disease, which may mean you have a higher of risk of cardiovascular disease.     4. Atomic Habits by Eloy Bowie.  This a good book that looks into our habits and how to sustain good healthy habits and get rid of bad habits.

## 2021-04-13 NOTE — PROGRESS NOTES
Assessment & Plan     Type 2 diabetes mellitus without complication, without long-term current use of insulin (H)  - stable, refill provided.  Follow-up 6 months.  - **A1C FUTURE anytime  - Albumin Random Urine Quantitative with Creat Ratio  - Lipid panel reflex to direct LDL Fasting  - **Basic metabolic panel FUTURE anytime  - insulin glargine (LANTUS SOLOSTAR) 100 UNIT/ML pen; Inject 20 Units Subcutaneous At Bedtime  - **A1C FUTURE 6mo; Future    Hyperlipidemia LDL goal <100  - stable, refill provided  - atorvastatin (LIPITOR) 80 MG tablet; Take 1 tablet (80 mg) by mouth daily    Gastroesophageal reflux disease without esophagitis  - stable, refill provided  - LANsoprazole (PREVACID) 30 MG DR capsule; Take 1 capsule (30 mg) by mouth daily 30-60 minutes before a meal.    Benign essential hypertension  - stable, refill provided  - lisinopril (ZESTRIL) 5 MG tablet; Take 1 tablet (5 mg) by mouth daily  - metoprolol succinate ER (TOPROL-XL) 25 MG 24 hr tablet; Take 1 tablet (25 mg) by mouth daily    Menopause - get dexa  - DX Hip/Pelvis/Spine; Future    Visit for screening mammogram 0 due   - *MA Screening Digital Bilateral; Future    Screening exam for skin cancer - see derm  - DERMATOLOGY ADULT REFERRAL; Future      40 minutes spent on the date of the encounter doing chart review, history and exam, documentation and further activities per the note       Patient Instructions   Toenail fungus:   1. Ok to use topical treatment, use regularly.     Healthcare maintenance:  1. Due for colonoscopy sometime before 2025  2. Consider bone density scan - Radiology test was ordered.  Please call [phone] to schedule.  3. You are due for a mammogram.  Please call [phone] to schedule.      Diabetes:   1. Continue working on diet and exercise.  2. You are due for dilated eye exam.  Have the report sent to Dr. Robison's office.  3. Follow-up in 6 months, non-fasting labs prior to visit.  4. Check my chart later for  pending test results.    Joint pains:   1. Ok with home management as you have been.  Try topicals such as Voltaren, Aspercream with Lidocaine, Capsaicin, Icy Hot, Biofreeze, Salon Pas   2. Work to increase activity    Derm:   1. Recommend to see Dermatology in 1-2 months        Dr. Robison's Tips for a Healthy Diet    1. Add more fresh fruits and vegetables to your diet.  The more colorful with the fruit or vegetable (think berries, spinach, carrots, peppers) the healthier it tends to be.  Juice is not a fruit.  Prepare the vegetables in a healthy way - steam, bake.  Avoid batter/breading, butter/oil to cook.  2. Add more fiber to your diet.  Swap out white bread, white rice, white pasta for whole grain versions.  Reduce the portion size and frequency of carbohydrates/starches.  3. Choose healthier fats such as nuts, olive oil, avocado, etc. Stay away from lard, butter.  4. Decrease the frequency and portion size of 'junk food' -pizza, candy, cookies, potato chips, etc.  5. Watch liquid calories such as coffee drinks, juice, soda, teas.  There tends to be excessive sugar in these beverages.  6. Increase protein in your diet.  Eggs, cheese, yogurt, nuts, lentils, chicken, fish are good healthy choices.  Protein keeps you wilde longer, and you are less likely to have blood sugar spikes  7. Eat healthy at least 80% of the time.  It is ok for a special treat (Mom's spaghetti dinner, cake on your birthday) every once in a while, just not every day.  When are you going to indulge (think [place] [employer] time), be sure you are eating extra healthy the day before and after.      Resources:    1. [employer] Resources for Health and Wellness:https://www.takingcharneil.cs.[facility].edu/dig-yes-foods    2.   [employer] Ways To Wellness:    https://www.[employer].org/services/ways-to-wellness  Ways to Wellness offers:    Nutrition and weight management     Corrective exercise and fitness training     Lifestyle and behavioral change     Healing  services  Our team includes registered dietitians, certified personal trainers, life coaches, as well as a Culinary Educator.    3. [facility] for Cardiovascular Disease Prevention  Consider making an appointment at the [facility] if either of the following describes you:    You are an adult who has risk factors for cardiovascular disease. Risk factors include cholesterol elevations, diabetes, high blood pressure, elevated weight, inactive living, and history of tobacco use.     You don t have traditional risk factors but have a strong family history of cardiovascular disease, which may mean you have a higher of risk of cardiovascular disease.     4. Atomic Habits by Eloy Bowie.  This a good book that looks into our habits and how to sustain good healthy habits and get rid of bad habits.        No follow-ups on file.    Shwetha Robison DO  [facility]    Carolann Brewster is a 71 year old who presents for the following health issues  accompanied by her spouse:    HPI     Dexa: need orders  Shingles: Check insurance  Colonoscopy: Need orders  Medicare today?          Diabetic Foot exam today        Diabetes Follow-up    How often are you checking your blood sugar? A few times a week  What time of day are you checking your blood sugars (select all that apply)?  Before meals fasting in AM  Have you had any blood sugars above 200?  No  Have you had any blood sugars below 70?  No    What symptoms do you notice when your blood sugar is low?  None    What concerns do you have today about your diabetes? Blood sugars      Do you have any of these symptoms? (Select all that apply)  Excessive thirst, Blurry vision and Weight gain    Have you had a diabetic eye exam in the last 12 months? No     lantus 20 units HS (spouse give to her), trulicity 1.5 mg daily    No hypoglycemia.    Eating a lot of rice cakes - thought it was healthy    She has attended diabetes ed in the past and found it  helpful.  Has done food journaling which was helpful. Is considering restarting this.    Arthralgias:   --using back brace which has helped significantly  --pain has mostly resolved, but can still flare up.  Will have fatigue, all over joint pains - bilateral knees/shoulders.  --Tylenol helps.  --we have discussed possible fibromyalgia at previous visits.    BP Readings from Last 2 Encounters:   04/13/21 100/62   03/04/20 115/66     Hemoglobin A1C (%)   Date Value   04/13/2021 7.1 (H)   01/28/2020 7.2 (H)     LDL Cholesterol Calculated (mg/dL)   Date Value   05/06/2019 55   05/15/2018 64                 Toenail fungus: right great toe.  Has used lacquer in the past with decent success.     GI: still struggling with diarrhea at times.  Is working to broaden diet which is going fairly well.  --last c-scope 2015, recommendation was follow-up in 5-10 years.    Derm: had cyst on the right side of cheek removed by derm a few years ago.  It has come back  --there is spot on left tragus that is bleeding for a while  --she has history of HPV and wonders if this is causing the derm problem.        Review of Systems   Constitutional, HEENT, cardiovascular, pulmonary, gi and gu systems are negative, except as otherwise noted.      Objective    /62 (BP Location: Right arm, Patient Position: Chair, Cuff Size: Adult Regular)   Pulse 65   Temp 99.1  F (37.3  C) (Tympanic)   Ht 1.524 m (5')   Wt 76.7 kg (169 lb)   SpO2 94%   Breastfeeding No   BMI 33.01 kg/m    Body mass index is 33.01 kg/m .  Physical Exam   GENERAL APPEARANCE: healthy, alert and no distress  RESP: lungs clear to auscultation - no rales, rhonchi or wheezes  CV: regular rates and rhythm, normal S1 S2, no S3 or S4 and no murmur, click or rub  SKIN: subcutaneous cyst in the right cheek area.  Ulceration at left tragus  DIABETIC FOOT EXAM: normal DP and PT pulses, no trophic changes or ulcerative lesions and normal sensory exam    Results for orders placed  or performed in visit on 04/13/21 (from the past 24 hour(s))   **A1C FUTURE anytime   Result Value Ref Range    Hemoglobin A1C 7.1 (H) 0 - 5.6 %

## 2021-04-14 PROBLEM — R80.9 TYPE 2 DIABETES MELLITUS WITH MICROALBUMINURIA, WITHOUT LONG-TERM CURRENT USE OF INSULIN (H): Status: ACTIVE | Noted: 2017-03-09

## 2021-04-14 PROBLEM — E11.29 TYPE 2 DIABETES MELLITUS WITH MICROALBUMINURIA, WITHOUT LONG-TERM CURRENT USE OF INSULIN (H): Status: ACTIVE | Noted: 2017-03-09

## 2021-07-15 ENCOUNTER — MYC MEDICAL ADVICE (OUTPATIENT)
Dept: CARDIOLOGY | Facility: CLINIC | Age: 71
End: 2021-07-15

## 2021-08-24 DIAGNOSIS — L98.1 NEUROTIC EXCORIATIONS: ICD-10-CM

## 2021-08-24 DIAGNOSIS — L70.0 ACNE VULGARIS: ICD-10-CM

## 2021-08-24 NOTE — TELEPHONE ENCOUNTER
Patient is in need of a new prescription for tretinoin (retin-a) 0.025% cream.    Thank You!    Zhang Chou CPhT  [facility]

## 2021-08-25 NOTE — TELEPHONE ENCOUNTER
Forwarding the medication refill request to the provider who prescribed it.  Jonah Blanchard MD  Family Medicine

## 2021-08-26 ENCOUNTER — TELEPHONE (OUTPATIENT)
Dept: DERMATOLOGY | Facility: CLINIC | Age: 71
End: 2021-08-26

## 2021-08-26 RX ORDER — TRETINOIN 0.25 MG/G
CREAM TOPICAL
Qty: 45 G | Refills: 3 | Status: SHIPPED | OUTPATIENT
Start: 2021-08-26 | End: 2022-03-29

## 2021-08-26 NOTE — TELEPHONE ENCOUNTER
Prior Authorization Approval    Authorization Effective Date: 5/28/2021  Authorization Expiration Date: 8/26/2022  Medication: tretinoin cream-APPROVED  Approved Dose/Quantity:   Reference #:     Insurance Company: Other (see comments)Comment:  [employer] [phone]  Expected CoPay:       CoPay Card Available:      Foundation Assistance Needed:    Which Pharmacy is filling the prescription (Not needed for infusion/clinic administered): [facility] [place], [place] - [address] [place] [address]  Pharmacy Notified: Yes  Patient Notified: No    Pharmacy will notify patient when medication is ready.

## 2021-08-26 NOTE — TELEPHONE ENCOUNTER
Central Prior Authorization Team   Phone: [phone]      PA Initiation    Medication: tretinoin cream-Initiated  Insurance Company: Other (see comments)Comment:  Prime [phone]  Pharmacy Filling the Rx: [place], [place] - [address] [place] [address]  Filling Pharmacy Phone: [phone]  Filling Pharmacy Fax:    Start Date: 8/26/2021

## 2021-08-26 NOTE — TELEPHONE ENCOUNTER
Prior Authorization Retail Medication Request    Medication/Dose: tretinoin cream  ICD code (if different than what is on RX):    Previously Tried and Failed:    Rationale:      Insurance Name:  [facility] [place]  Insurance ID:  [id number]  [phone]      Thank You,  Sondra Dimas, [facility]

## 2021-09-19 ENCOUNTER — HEALTH MAINTENANCE LETTER (OUTPATIENT)
Age: 71
End: 2021-09-19

## 2021-10-01 ENCOUNTER — TRANSFERRED RECORDS (OUTPATIENT)
Dept: MULTI SPECIALTY CLINIC | Facility: CLINIC | Age: 71
End: 2021-10-01
Payer: COMMERCIAL

## 2021-10-01 LAB — RETINOPATHY: NORMAL

## 2021-10-31 DIAGNOSIS — E11.9 TYPE 2 DIABETES MELLITUS WITHOUT COMPLICATION, WITHOUT LONG-TERM CURRENT USE OF INSULIN (H): ICD-10-CM

## 2021-11-02 RX ORDER — DULAGLUTIDE 1.5 MG/.5ML
1.5 INJECTION, SOLUTION SUBCUTANEOUS
Qty: 6 ML | Refills: 1 | Status: SHIPPED | OUTPATIENT
Start: 2021-11-02 | End: 2022-03-29

## 2021-11-02 NOTE — TELEPHONE ENCOUNTER
"Requested Prescriptions   Pending Prescriptions Disp Refills     TRULICITY 1.5 MG/0.5ML pen [Pharmacy Med Name: TRULICITY 1.5MG/0.5ML SOPN] 6 mL 3     Sig: INJECT 1.5 MG SUBCUTANEOUS EVERY 7 DAYS       GLP-1 Agonists Protocol Failed - 10/31/2021 12:03 PM        Failed - HgbA1C in past 3 or 6 months     If HgbA1C is 8 or greater, it needs to be on file within the past 3 months.  If less than 8, must be on file within the past 6 months.     Recent Labs   Lab Test 04/13/21  1338   A1C 7.1*             Failed - Recent (6 mo) or future (30 days) visit within the authorizing provider's specialty     Patient had office visit in the last 6 months or has a visit in the next 30 days with authorizing provider.  See \"Patient Info\" tab in inbasket, or \"Choose Columns\" in Meds & Orders section of the refill encounter.            Passed - Medication is active on med list        Passed - Patient is age 18 or older        Passed - No active pregnancy on record        Passed - Normal serum creatinine on file in past 12 months     Recent Labs   Lab Test 04/13/21  1338   CR 0.80       Ok to refill medication if creatinine is low          Passed - No positive pregnancy test in past 12 months             "

## 2021-11-13 ENCOUNTER — HEALTH MAINTENANCE LETTER (OUTPATIENT)
Age: 71
End: 2021-11-13

## 2021-11-23 ENCOUNTER — TELEPHONE (OUTPATIENT)
Dept: CARDIOLOGY | Facility: CLINIC | Age: 71
End: 2021-11-23
Payer: COMMERCIAL

## 2021-11-23 ASSESSMENT — ENCOUNTER SYMPTOMS
JOINT SWELLING: 0
FEVER: 0
POLYDIPSIA: 0
WEIGHT GAIN: 0
BOWEL INCONTINENCE: 0
MUSCLE CRAMPS: 1
JAUNDICE: 0
DIARRHEA: 0
SINUS PAIN: 0
BACK PAIN: 1
SMELL DISTURBANCE: 0
SORE THROAT: 0
HEARTBURN: 0
EYE WATERING: 0
EYE IRRITATION: 0
CONSTIPATION: 0
DOUBLE VISION: 0
ARTHRALGIAS: 1
MYALGIAS: 1
MUSCLE WEAKNESS: 1
FATIGUE: 1
POLYPHAGIA: 0
DECREASED APPETITE: 0
VOMITING: 0
TASTE DISTURBANCE: 0
SINUS CONGESTION: 0
BLOOD IN STOOL: 0
EYE REDNESS: 0
BLOATING: 1
EYE PAIN: 0
INCREASED ENERGY: 1
TROUBLE SWALLOWING: 0
WEIGHT LOSS: 1
HALLUCINATIONS: 0
NECK MASS: 0
NIGHT SWEATS: 0
RECTAL PAIN: 0
HOARSE VOICE: 0
STIFFNESS: 1
CHILLS: 0
ALTERED TEMPERATURE REGULATION: 0
ABDOMINAL PAIN: 1
NECK PAIN: 1
NAUSEA: 0

## 2021-11-23 NOTE — TELEPHONE ENCOUNTER
[facility] Call Center    Phone Message    May a detailed message be left on voicemail: yes     Reason for Call: Other: Pt would like to have a Video apt instead of in person.  Not able to change in person to video for same date.  Please call pt to confirm this can be done.     Action Taken: Message routed to:  [facility] ([facility]): Cardio    Travel Screening: Not Applicable

## 2021-12-02 ENCOUNTER — VIRTUAL VISIT (OUTPATIENT)
Dept: CARDIOLOGY | Facility: CLINIC | Age: 71
End: 2021-12-02
Attending: INTERNAL MEDICINE
Payer: COMMERCIAL

## 2021-12-02 DIAGNOSIS — I25.10 CORONARY ARTERY DISEASE INVOLVING NATIVE CORONARY ARTERY OF NATIVE HEART WITHOUT ANGINA PECTORIS: ICD-10-CM

## 2021-12-02 DIAGNOSIS — E78.5 HYPERLIPIDEMIA LDL GOAL <70: Primary | ICD-10-CM

## 2021-12-02 PROCEDURE — 99213 OFFICE O/P EST LOW 20 MIN: CPT | Mod: 95 | Performed by: INTERNAL MEDICINE

## 2021-12-02 NOTE — PATIENT INSTRUCTIONS
Patient Instructions:  It was a pleasure to see you in the cardiology clinic today.      If you have any questions, call  Lizzy Correia RN, at [phone].  Press Option #1 for the [facility], and then press Option #4  We are encouraging the use of Blippext to communicate with your HealthCare Provider    Note the new medications: none  Stop the following medications: none    The results from today include: none   Please follow up with Dr. Martell Biggs in one year with fasting labs      If you have an urgent need after hours (8:00 am to 4:30 pm) please call [phone] and ask for the cardiology fellow on call.

## 2021-12-02 NOTE — LETTER
2021      RE: Ml Evans  [address] [facility] [address]       Dear Colleague,    Thank you for the opportunity to participate in the care of your patient, Ml Evans, at the [facility] [place] [facility]. Please see a copy of my visit note below.      Video Start Time: 1510      HPI: Ml Evans is a 71 year old female, being seen today for recheck of CAD.     She has a prior history of CAD with STEMI in  with PCI to the RCA, since then has had another angiogram in  which has shown patent RCA stents and otherwise minimal disease elsewhere. Her EF and stress tests since then have been negative.    In 2019, she developed shortness of breath and angina and was seen in the ED and at that time received a nuclear stress test which was negative and was discharged.    She continues to do well since then and has not had any similar symptoms.    She has been free of angina, dyspnea, orthopnea, PND, lightheadedness, palpitations or syncope.    10 point ROS of systems including Constitutional, Eyes, Respiratory, Cardiovascular, Gastroenterology, Genitourinary, Integumentary, Muscularskeletal, Psychiatric were all negative except for pertinent positives noted in my HPI.  GENERAL: Healthy, alert and no distress  EYES: Eyes grossly normal to inspection.  No discharge or erythema, or obvious scleral/conjunctival abnormalities.  RESP: No audible wheeze, cough, or visible cyanosis.  No visible retractions or increased work of breathing.    SKIN: Visible skin clear. No significant rash, abnormal pigmentation or lesions.  NEURO: Cranial nerves grossly intact.  Mentation and speech appropriate for age.  PSYCH: Mentation appears normal, affect normal/bright, judgement and insight intact, normal speech and appearance well-groomed.    EK19  Sinus    ECHO:   Interpretation Summary  Global and regional left  ventricular function is normal with an EF of 55-60%.   Global right ventricular function is normal. The inferior vena cava  is   normal. No pericardial effusion is present.    STRESS TEST:  9/19/19  Impression  1.  Myocardial perfusion imaging using single isotope technique  demonstrated normal myocardial perfusion.   2. Gated images demonstrated normal wall motion.  The left ventricular  systolic function is normal.  3. Compared to the prior study from 2015, the previously reported area  of ischemia in the anterior wall and apex is no longer evident .    CARDIAC CATH:  2015      ASSESSMENT AND PLAN:    1. CAD - single vessel with prior STEMI in 2011, repeat angiogram in 2015 showed patent stents without other notable CAD  -- continue aspirin, statin    2. Hypertension, well controlled  -- continue BB and ACEI    3. Hyperlipidemia, well controlled  -- statin as above    RTC annually    Martell Biggs MD

## 2021-12-03 DIAGNOSIS — E11.9 TYPE 2 DIABETES MELLITUS WITHOUT COMPLICATION, WITHOUT LONG-TERM CURRENT USE OF INSULIN (H): ICD-10-CM

## 2021-12-03 RX ORDER — ISOPROPYL ALCOHOL 0.75 G/1
SWAB TOPICAL
Qty: 100 EACH | Refills: 3 | Status: SHIPPED | OUTPATIENT
Start: 2021-12-03

## 2021-12-03 NOTE — PROGRESS NOTES
Narcisa is a 71 year old who is being evaluated via a billable video visit.      How would you like to obtain your AVS? MyChart  If the video visit is dropped, the invitation should be resent by: Send to e-mail at: [email or web address]  Will anyone else be joining your video visit? No      Video Start Time: 1510      HPI: Ml Evans is a 71 year old female, being seen today for recheck of CAD.     She has a prior history of CAD with STEMI in  with PCI to the RCA, since then has had another angiogram in  which has shown patent RCA stents and otherwise minimal disease elsewhere. Her EF and stress tests since then have been negative.    In 2019, she developed shortness of breath and angina and was seen in the ED and at that time received a nuclear stress test which was negative and was discharged.    She continues to do well since then and has not had any similar symptoms.    She has been free of angina, dyspnea, orthopnea, PND, lightheadedness, palpitations or syncope.    10 point ROS of systems including Constitutional, Eyes, Respiratory, Cardiovascular, Gastroenterology, Genitourinary, Integumentary, Muscularskeletal, Psychiatric were all negative except for pertinent positives noted in my HPI.  GENERAL: Healthy, alert and no distress  EYES: Eyes grossly normal to inspection.  No discharge or erythema, or obvious scleral/conjunctival abnormalities.  RESP: No audible wheeze, cough, or visible cyanosis.  No visible retractions or increased work of breathing.    SKIN: Visible skin clear. No significant rash, abnormal pigmentation or lesions.  NEURO: Cranial nerves grossly intact.  Mentation and speech appropriate for age.  PSYCH: Mentation appears normal, affect normal/bright, judgement and insight intact, normal speech and appearance well-groomed.    EK19  Sinus    ECHO:   Interpretation Summary  Global and regional left ventricular function is normal with an EF of 55-60%.   Global right  ventricular function is normal. The inferior vena cava  is   normal. No pericardial effusion is present.    STRESS TEST:  9/19/19  Impression  1.  Myocardial perfusion imaging using single isotope technique  demonstrated normal myocardial perfusion.   2. Gated images demonstrated normal wall motion.  The left ventricular  systolic function is normal.  3. Compared to the prior study from 2015, the previously reported area  of ischemia in the anterior wall and apex is no longer evident .    CARDIAC CATH:  2015      ASSESSMENT AND PLAN:    1. CAD - single vessel with prior STEMI in 2011, repeat angiogram in 2015 showed patent stents without other notable CAD  -- continue aspirin, statin    2. Hypertension, well controlled  -- continue BB and ACEI    3. Hyperlipidemia, well controlled  -- statin as above    RTC annually    Martell Biggs MD    Video-Visit Details    Type of service:  Video Visit    Video End Time: 1540    Originating Location (pt. Location): Home    Distant Location (provider location):  [facility]     Platform used for Video Visit: [employer]

## 2021-12-09 ENCOUNTER — HOSPITAL ENCOUNTER (OUTPATIENT)
Dept: MAMMOGRAPHY | Facility: CLINIC | Age: 71
Discharge: HOME | End: 2021-12-09
Attending: INTERNAL MEDICINE | Admitting: INTERNAL MEDICINE
Payer: COMMERCIAL

## 2021-12-09 DIAGNOSIS — Z12.31 VISIT FOR SCREENING MAMMOGRAM: ICD-10-CM

## 2021-12-09 PROCEDURE — 77063 BREAST TOMOSYNTHESIS BI: CPT

## 2022-01-16 ENCOUNTER — NURSE TRIAGE (OUTPATIENT)
Dept: NURSING | Facility: CLINIC | Age: 72
End: 2022-01-16
Payer: COMMERCIAL

## 2022-01-16 NOTE — TELEPHONE ENCOUNTER
Triage Call:    Patient called to report hypoglycemia tonight.  She reports low intake of food today.  Patient reports feeling lightheadedness and was pale.  Her blood glucose was 46.  Her blood glucose improved to 164 with 30 grams of carbohydrates.  Patient inquiring if she should take full dose of Lantus tonight.  Current blood glucose is 184.    According to the protocol, patient should Call PCP now.  On-call provider, Dr Mcdaniels, called.    MD Recommendation:  Patient should take 15 units of Lantus tonight.  Patient should check blood sugars BID.  Patient should follow up with Dr Robison on Monday.    Patient informed of provider's recommendations.  Care advice given. Patient verbalizes understanding and agrees with plan of care.     Lucrecia Dodge RN  01/16/22 1:09 AM  [facility] Nurse Advisor    COVID 19 Nurse Triage Plan/Patient Instructions    Please be aware that novel coronavirus (COVID-19) may be circulating in the community. If you develop symptoms such as fever, cough, or SOB or if you have concerns about the presence of another infection including coronavirus (COVID-19), please contact your health care provider or visit [email or web address].     Disposition/Instructions    Home care recommended. Follow home care protocol based instructions.    Thank you for taking steps to prevent the spread of this virus.  o Limit your contact with others.  o Wear a simple mask to cover your cough.  o Wash your hands well and often.    Resources    [facility]: About COVID-19: [email or web address]    CDC: What to Do If You're Sick: www.cdc.gov/coronavirus/2019-ncov/about/steps-when-sick.html    CDC: Ending Home Isolation: www.cdc.gov/coronavirus/2019-ncov/hcp/disposition-in-home-patients.html     CDC: Caring for Someone: www.cdc.gov/coronavirus/2019-ncov/if-you-are-sick/care-for-someone.html     MIHAELA: Interim Guidance for Hospital Discharge to Home:  www.health.[facility].[place]/diseases/coronavirus/hcp/hospdischarge.pdf    [facility] clinical trials (COVID-19 research studies): [email or web address].[email or web address]/umn-clinical-trials     Below are the COVID-19 hotlines at the [place] Department of Health ([facility]). Interpreters are available.   o For health questions: Call 118-420-7363 or 1-143.326.4145 (7 a.m. to 7 p.m.)  o For questions about schools and childcare: Call 971-313-9075 or 1-271.104.3348 (7 a.m. to 7 p.m.)       Reason for Disposition    [1] Caller has URGENT medication or insulin pump question AND [2] triager unable to answer question    Additional Information    Negative: Unconscious or difficult to awaken    Negative: Seizure occurs    Negative: Acting confused (e.g., disoriented, slurred speech)    Negative: Very weak (e.g., can't stand)    Negative: Sounds like a life-threatening emergency to the triager    Negative: [1] Low blood sugar symptoms persist > 30 minutes AND [2] using low blood sugar Care Advice    Negative: [1] Low blood glucose (< 70 mg/dL  or 3.9 mmol/L) persists > 30 minutes AND [2] using low blood sugar Care Advice    Negative: [1] Vomiting AND [2] signs of dehydration (e.g., very dry mouth, lightheaded, dark urine)    Negative: Patient sounds very sick or weak to the triager    Negative: [1] Low blood sugar symptoms with no other adult present AND [2] hasn't tried Care Advice    Negative: [1] Low blood glucose (< 70 mg/dL  or 3.9 mmol/L) with no other adult present AND [2] hasn't tried Care Advice    Negative: Diabetes drug error or overdose (e.g., insulin error or extra dose)    Protocols used: DIABETES - LOW BLOOD SUGAR-A-

## 2022-01-17 ENCOUNTER — MYC MEDICAL ADVICE (OUTPATIENT)
Dept: FAMILY MEDICINE | Facility: CLINIC | Age: 72
End: 2022-01-17
Payer: COMMERCIAL

## 2022-01-17 DIAGNOSIS — E11.9 TYPE 2 DIABETES MELLITUS WITHOUT COMPLICATION, WITHOUT LONG-TERM CURRENT USE OF INSULIN (H): ICD-10-CM

## 2022-01-18 RX ORDER — INSULIN GLARGINE 100 [IU]/ML
18 INJECTION, SOLUTION SUBCUTANEOUS AT BEDTIME
Qty: 30 ML | Refills: 3 | Status: SHIPPED | OUTPATIENT
Start: 2022-01-18 | End: 2023-03-17

## 2022-01-26 DIAGNOSIS — E11.9 TYPE 2 DIABETES MELLITUS WITHOUT COMPLICATION, WITHOUT LONG-TERM CURRENT USE OF INSULIN (H): ICD-10-CM

## 2022-01-27 RX ORDER — PEN NEEDLE, DIABETIC 32GX 5/32"
NEEDLE, DISPOSABLE MISCELLANEOUS
Qty: 100 EACH | Refills: 0 | Status: SHIPPED | OUTPATIENT
Start: 2022-01-27 | End: 2022-03-29

## 2022-02-07 ENCOUNTER — MYC MEDICAL ADVICE (OUTPATIENT)
Dept: FAMILY MEDICINE | Facility: CLINIC | Age: 72
End: 2022-02-07
Payer: COMMERCIAL

## 2022-03-03 ENCOUNTER — MYC MEDICAL ADVICE (OUTPATIENT)
Dept: FAMILY MEDICINE | Facility: CLINIC | Age: 72
End: 2022-03-03
Payer: COMMERCIAL

## 2022-03-03 DIAGNOSIS — D64.9 ANEMIA, UNSPECIFIED TYPE: Primary | ICD-10-CM

## 2022-03-07 NOTE — TELEPHONE ENCOUNTER
Dr Robison  Pt has virtual appt and labs scheduled  She has future a1c (almost 1 yr old), lipids and bmp  Pt wondering if you want cbc for hgb because it has been slowly trending down.   Also mycsuzanna scheduled her as virtual and she is wondering if you would prefer in person      Camilo Benitez RN

## 2022-03-15 ENCOUNTER — LAB (OUTPATIENT)
Dept: LAB | Facility: CLINIC | Age: 72
End: 2022-03-15
Payer: COMMERCIAL

## 2022-03-15 DIAGNOSIS — E78.5 HYPERLIPIDEMIA LDL GOAL <70: ICD-10-CM

## 2022-03-15 DIAGNOSIS — I25.10 CORONARY ARTERY DISEASE INVOLVING NATIVE CORONARY ARTERY OF NATIVE HEART WITHOUT ANGINA PECTORIS: ICD-10-CM

## 2022-03-15 DIAGNOSIS — D64.9 ANEMIA, UNSPECIFIED TYPE: ICD-10-CM

## 2022-03-15 LAB
ANION GAP SERPL CALCULATED.3IONS-SCNC: 3 MMOL/L (ref 3–14)
BUN SERPL-MCNC: 10 MG/DL (ref 7–30)
CALCIUM SERPL-MCNC: 9.3 MG/DL (ref 8.5–10.1)
CHLORIDE BLD-SCNC: 108 MMOL/L (ref 94–109)
CHOLEST SERPL-MCNC: 150 MG/DL
CO2 SERPL-SCNC: 31 MMOL/L (ref 20–32)
CREAT SERPL-MCNC: 0.82 MG/DL (ref 0.52–1.04)
ERYTHROCYTE [DISTWIDTH] IN BLOOD BY AUTOMATED COUNT: 12.9 % (ref 10–15)
FASTING STATUS PATIENT QL REPORTED: YES
GFR SERPL CREATININE-BSD FRML MDRD: 77 ML/MIN/1.73M2
GLUCOSE BLD-MCNC: 112 MG/DL (ref 70–99)
HCT VFR BLD AUTO: 46.2 % (ref 35–47)
HDLC SERPL-MCNC: 53 MG/DL
HGB BLD-MCNC: 15 G/DL (ref 11.7–15.7)
LDLC SERPL CALC-MCNC: 72 MG/DL
MCH RBC QN AUTO: 31.7 PG (ref 26.5–33)
MCHC RBC AUTO-ENTMCNC: 32.5 G/DL (ref 31.5–36.5)
MCV RBC AUTO: 98 FL (ref 78–100)
NONHDLC SERPL-MCNC: 97 MG/DL
PLATELET # BLD AUTO: 224 10E3/UL (ref 150–450)
POTASSIUM BLD-SCNC: 4.1 MMOL/L (ref 3.4–5.3)
RBC # BLD AUTO: 4.73 10E6/UL (ref 3.8–5.2)
SODIUM SERPL-SCNC: 142 MMOL/L (ref 133–144)
TRIGL SERPL-MCNC: 123 MG/DL
WBC # BLD AUTO: 8 10E3/UL (ref 4–11)

## 2022-03-15 PROCEDURE — 80061 LIPID PANEL: CPT

## 2022-03-15 PROCEDURE — 36415 COLL VENOUS BLD VENIPUNCTURE: CPT

## 2022-03-15 PROCEDURE — 85027 COMPLETE CBC AUTOMATED: CPT

## 2022-03-15 PROCEDURE — 80048 BASIC METABOLIC PNL TOTAL CA: CPT

## 2022-03-24 DIAGNOSIS — K21.9 GASTROESOPHAGEAL REFLUX DISEASE WITHOUT ESOPHAGITIS: ICD-10-CM

## 2022-03-28 ENCOUNTER — LAB (OUTPATIENT)
Dept: LAB | Facility: CLINIC | Age: 72
End: 2022-03-28
Payer: COMMERCIAL

## 2022-03-28 ENCOUNTER — HOSPITAL ENCOUNTER (OUTPATIENT)
Dept: BONE DENSITY | Facility: CLINIC | Age: 72
Discharge: HOME | End: 2022-03-28
Attending: INTERNAL MEDICINE | Admitting: INTERNAL MEDICINE
Payer: COMMERCIAL

## 2022-03-28 DIAGNOSIS — Z78.0 MENOPAUSE: ICD-10-CM

## 2022-03-28 DIAGNOSIS — E11.9 TYPE 2 DIABETES MELLITUS WITHOUT COMPLICATION, WITHOUT LONG-TERM CURRENT USE OF INSULIN (H): ICD-10-CM

## 2022-03-28 LAB — HBA1C MFR BLD: 6.2 % (ref 0–5.6)

## 2022-03-28 PROCEDURE — 83036 HEMOGLOBIN GLYCOSYLATED A1C: CPT

## 2022-03-28 PROCEDURE — 77080 DXA BONE DENSITY AXIAL: CPT

## 2022-03-28 PROCEDURE — 36415 COLL VENOUS BLD VENIPUNCTURE: CPT

## 2022-03-28 RX ORDER — LANSOPRAZOLE 30 MG/1
30 CAPSULE, DELAYED RELEASE ORAL DAILY
Qty: 90 CAPSULE | Refills: 1 | Status: SHIPPED | OUTPATIENT
Start: 2022-03-28 | End: 2022-10-10

## 2022-03-29 ENCOUNTER — VIRTUAL VISIT (OUTPATIENT)
Dept: FAMILY MEDICINE | Facility: CLINIC | Age: 72
End: 2022-03-29
Payer: COMMERCIAL

## 2022-03-29 DIAGNOSIS — L29.9 SCALP ITCH: Primary | ICD-10-CM

## 2022-03-29 DIAGNOSIS — E11.29 TYPE 2 DIABETES MELLITUS WITH MICROALBUMINURIA, WITHOUT LONG-TERM CURRENT USE OF INSULIN (H): ICD-10-CM

## 2022-03-29 DIAGNOSIS — R80.9 TYPE 2 DIABETES MELLITUS WITH MICROALBUMINURIA, WITHOUT LONG-TERM CURRENT USE OF INSULIN (H): ICD-10-CM

## 2022-03-29 DIAGNOSIS — F33.1 MODERATE EPISODE OF RECURRENT MAJOR DEPRESSIVE DISORDER (H): ICD-10-CM

## 2022-03-29 DIAGNOSIS — E11.9 TYPE 2 DIABETES MELLITUS WITHOUT COMPLICATION, WITHOUT LONG-TERM CURRENT USE OF INSULIN (H): ICD-10-CM

## 2022-03-29 DIAGNOSIS — I10 BENIGN ESSENTIAL HYPERTENSION: ICD-10-CM

## 2022-03-29 PROBLEM — M85.851 OSTEOPENIA OF BOTH HIPS: Status: ACTIVE | Noted: 2022-03-29

## 2022-03-29 PROBLEM — M85.852 OSTEOPENIA OF BOTH HIPS: Status: ACTIVE | Noted: 2022-03-29

## 2022-03-29 PROCEDURE — 99214 OFFICE O/P EST MOD 30 MIN: CPT | Mod: 95 | Performed by: INTERNAL MEDICINE

## 2022-03-29 RX ORDER — GLUCOSAMINE HCL/CHONDROITIN SU 500-400 MG
CAPSULE ORAL
Qty: 100 EACH | Refills: 3 | Status: SHIPPED | OUTPATIENT
Start: 2022-03-29

## 2022-03-29 RX ORDER — METOPROLOL SUCCINATE 25 MG/1
25 TABLET, EXTENDED RELEASE ORAL DAILY
Qty: 90 TABLET | Refills: 3 | Status: SHIPPED | OUTPATIENT
Start: 2022-03-29 | End: 2023-03-22

## 2022-03-29 RX ORDER — ACETAMINOPHEN 500 MG
500-1000 TABLET ORAL EVERY 4 HOURS PRN
COMMUNITY

## 2022-03-29 RX ORDER — LANCETS
EACH MISCELLANEOUS
Qty: 100 EACH | Refills: 11 | Status: SHIPPED | OUTPATIENT
Start: 2022-03-29 | End: 2023-06-15

## 2022-03-29 RX ORDER — LISINOPRIL 5 MG/1
5 TABLET ORAL DAILY
Qty: 90 TABLET | Refills: 3 | Status: SHIPPED | OUTPATIENT
Start: 2022-03-29 | End: 2023-03-22

## 2022-03-29 RX ORDER — DULAGLUTIDE 1.5 MG/.5ML
1.5 INJECTION, SOLUTION SUBCUTANEOUS
Qty: 6 ML | Refills: 3 | Status: SHIPPED | OUTPATIENT
Start: 2022-03-29 | End: 2023-03-22

## 2022-03-29 RX ORDER — PEN NEEDLE, DIABETIC 32GX 5/32"
NEEDLE, DISPOSABLE MISCELLANEOUS
Qty: 100 EACH | Refills: 3 | Status: SHIPPED | OUTPATIENT
Start: 2022-03-29 | End: 2023-06-07

## 2022-03-29 NOTE — PROGRESS NOTES
Narcisa is a 71 year old who is being evaluated via a billable video visit.      How would you like to obtain your AVS? MyChart  If the video visit is dropped, the invitation should be resent by: Send to e-mail at: [email or web address]  Will anyone else be joining your video visit? Yes: . How would they like to receive their invitation? Send to e-mail at: [email or web address]      Video Start Time: 3:44 PM    Assessment & Plan   Problem List Items Addressed This Visit     Benign essential hypertension    Relevant Medications    lisinopril (ZESTRIL) 5 MG tablet    metoprolol succinate ER (TOPROL-XL) 25 MG 24 hr tablet    Mild major depression (H)    Type 2 diabetes mellitus with microalbuminuria, without long-term current use of insulin (H)    Relevant Medications    dulaglutide (TRULICITY) 1.5 MG/0.5ML pen    lisinopril (ZESTRIL) 5 MG tablet    metoprolol succinate ER (TOPROL-XL) 25 MG 24 hr tablet    blood glucose monitoring (NO BRAND SPECIFIED) meter device kit    blood glucose (NO BRAND SPECIFIED) test strip    blood glucose calibration (NO BRAND SPECIFIED) solution    thin (NO BRAND SPECIFIED) lancets    alcohol swab prep pads    insulin pen needle (ULTICARE MICRO) 32G X 4 MM miscellaneous      Other Visit Diagnoses     Scalp itch    -  Primary    Relevant Orders    Adult Dermatology Referral    Type 2 diabetes mellitus without complication, without long-term current use of insulin (H)        Relevant Medications    dulaglutide (TRULICITY) 1.5 MG/0.5ML pen    lisinopril (ZESTRIL) 5 MG tablet    metoprolol succinate ER (TOPROL-XL) 25 MG 24 hr tablet    blood glucose monitoring (NO BRAND SPECIFIED) meter device kit    blood glucose (NO BRAND SPECIFIED) test strip    blood glucose calibration (NO BRAND SPECIFIED) solution    thin (NO BRAND SPECIFIED) lancets    alcohol swab prep pads    insulin pen needle (ULTICARE MICRO) 32G X 4 MM miscellaneous                    BMI:   Estimated body mass index is 33.01 kg/m  as  calculated from the following:    Height as of 4/13/21: 1.524 m (5').    Weight as of 4/13/21: 76.7 kg (169 lb).       Patient Instructions   Diabetes:   1. Keep up the good work  2. If you have further low blood sugar, then we should decrease the Lantus further - send a My chart message  3. Recommend follow-up in ~ 6 months - recommend face to face - last face to face visit in 2020  4. Ordered new meter    Health Care Maintenance:  1. Recommend shingles vaccine - medicare often only pays through retail pharmacy  2. Recommend pneumonia vaccine  3. You are due for dilated eye exam.  Have the report sent to Dr. Robison's office.  4. You are due for a colonoscopy.  Please call [phone] to schedule  5. Consider multi-vitamin instead of several vitamins     GERD:  1. Follow anti-reflux diet - see information in packet  2. Can use Famotidine (pecid) 40 mg at bedtime x 2 weeks  3. If symptoms persist, would get upper endoscopy    Derm  1. Referral to Derm            Patient Education     Tips to Control Acid Reflux    To control acid reflux, you ll need to make some basic diet and lifestyle changes. The simple steps outlined below may be all you ll need to ease discomfort.   Watch what you eat    Don't have fatty foods or spicy foods.    Eat fewer acidic foods, such as citrus and tomato-based foods. These can increase symptoms.    Limit drinking alcohol, caffeine, and fizzy beverages. All increase acid reflux.    Try limiting chocolate, peppermint, and spearmint. These can make acid reflux worse in some people.    Watch when you eat    Don't lie down for 3 hours after eating.    Don't snack before going to bed.    Raise your head  Raising your head and upper body by 4 to 6 inches helps limit reflux when you re lying down. Put blocks under the head of your bed frame or a wedge under your mattress to raise it.   Other changes    Lose weight, if you need to    Don t exercise near bedtime    Don't wear tight-fitting  clothes    Limit aspirin and ibuprofen    Stop smoking    [employer] last reviewed this educational content on 6/1/2019 2000-2021 [employer]. All rights reserved. This information is not intended as a substitute for professional medical care. Always follow your healthcare professional's instructions.               Return in about 6 months (around 9/29/2022) for Diabetes Check with lab prior.    Shwetha Robison DO  [facility]    Carolann Brewster is a 71 year old who presents for the following health issues  accompanied by her spouse.    History of Present Illness       Back Pain:  She presents for follow up of back pain. Patient's back pain is a recurring problem.  Location of back pain:  Right lower back, left lower back, right middle of back, left middle of back, right upper back, left upper back, right side of neck, left side of neck, right shoulder, left shoulder, right hip and left hip  Description of back pain: dull ache, gnawing and other  Back pain spreads: nowhere    Since patient first noticed back pain, pain is: always present, but gets better and worse  Does back pain interfere with her job:  Not applicable      Diabetes:   She presents for follow up of diabetes.  She is checking home blood glucose a few times a week. She checks blood glucose before and after meals.  Blood glucose is never over 200 and sometimes over 70. She is aware of hypoglycemia symptoms including shakiness, dizziness, weakness, lethargy, blurred vision, confusion and other. She is concerned about other. She is having blurry vision and weight loss. The patient has had a diabetic eye exam in the last 12 months. Eye exam performed on can't recall - w/in last 6 months. Location of last eye exam [facility] w/Opthamologist.        Migraines:   Since the patient's last clinic visit, headaches are: worsened  The patient is getting headaches:  4 days per week, approximately  She is not able to do  "normal daily activities when she has a migraine.  The patient is taking the following rescue/relief medications:  Tylenol   Patient states \"The relief is inconsistent\" from the rescue/relief medications.   The patient is taking the following medications to prevent migraines:  No medications to prevent migraines  In the past 4 weeks, the patient has gone to an Urgent Care or Emergency Room 0 times times due to headaches.    Reason for visit:  Diabetes, fibromyalgia, GERD, scalp & hair loss  Symptom onset:  More than a month  Symptoms include:  A couple instances of severe GERD, stinging itching scalp & hair loss, treatment for fibromyalgia? drop in blood glucose event (cause?),  Symptom intensity:  Moderate  Symptom progression:  Worsening  Had these symptoms before:  No  What makes it worse:  Scratching my scalp  What makes it better:  Using healing ointment on scalp    She eats 2-3 servings of fruits and vegetables daily.She consumes 0 sweetened beverage(s) daily.She exercises with enough effort to increase her heart rate 9 or less minutes per day.  She exercises with enough effort to increase her heart rate 3 or less days per week.   She is taking medications regularly.       Anxiety: she receives alprazolam 1 mg #120/month from psychiatry - [facility].  Records not available.  Also adderall 20 mg daily from same provider; she is also on doxpein, lexapro  --this was not discussed today, just for chart review     Diabetes:  --had 1 very low blood sugar in Jan; I decreased lantus from 20 to 18.  --had 1 in the 60s since; but did not eat much  --both low blood sugar occurred at bedtime, not overnight and before her lantus at bedtime was given  --wonders why she had the low blood sugar    Derm: scalp itch, wants referral to derm; has lost 'half my hair'    Fibromyalgia:   --she is using mind-body connection which is helping;  Is trying to 'talk herself out of pain' and finds this is helping    Gerd:   --occurs " more with eating pizza; has nocturnal symptoms - 1 x every few weeks  --strong family history of GERD    Current Outpatient Medications   Medication Sig Dispense Refill     acetaminophen (TYLENOL) 500 MG tablet Take 500-1,000 mg by mouth every 4 hours as needed for mild pain       acetylcysteine (N-ACETYL CYSTEINE) 600 MG CAPS capsule Take 3,000 mg by mouth daily       Alcohol Swabs (B-D SINGLE USE SWABS REGULAR) PADS USE TO SWAB AREA OF INJECTION/BETHEL AS DIRECTED. 100 each 3     ALPRAZolam (XANAX PO) Take 0.5-1 mg by mouth 1/2 tab in am, 1 tab in pm, then 1 tab midday prn and 1/2 tab throughout the day if needed       amphetamine-dextroamphetamine (ADDERALL XR) 20 MG per capsule Take 20 mg by mouth daily        aspirin 81 MG EC tablet Take 81 mg by mouth daily        atorvastatin (LIPITOR) 80 MG tablet Take 1 tablet (80 mg) by mouth daily 90 tablet 3     blood glucose (NO BRAND SPECIFIED) lancets standard Use to test blood sugar 3 times daily or as directed. 100 each 1     blood glucose monitoring (NO BRAND SPECIFIED) meter device kit Use to test blood sugar 3 times daily or as directed. 1 kit 0     CONTOUR NEXT TEST test strip USE TO TEST BLOOD SUGAR THREE TIMES A  strip 0     DoxePIN (SINEQUAN) 75 MG capsule Take 75 mg by mouth At Bedtime        dulaglutide (TRULICITY) 1.5 MG/0.5ML pen Inject 1.5 mg Subcutaneous every 7 days DUE FOR follow-up 6 mL 1     escitalopram (LEXAPRO) 20 MG tablet Take 20 mg by mouth daily       insulin glargine (LANTUS SOLOSTAR) 100 UNIT/ML pen Inject 18 Units Subcutaneous At Bedtime 30 mL 3     insulin pen needle (ULTICARE MICRO) 32G X 4 MM miscellaneous USE 1 PEN NEEDLE DAILY OR AS DIRECTED. 100 each 0     LANsoprazole (PREVACID) 30 MG DR capsule TAKE 1 CAPSULE (30 MG) BY MOUTH DAILY 30-60 MINUTES BEFORE A MEAL. 90 capsule 1     lisinopril (ZESTRIL) 5 MG tablet Take 1 tablet (5 mg) by mouth daily 90 tablet 3     metoprolol succinate ER (TOPROL-XL) 25 MG 24 hr tablet Take 1  tablet (25 mg) by mouth daily 90 tablet 3     Microlet Lancets MISC USE TO TEST BLOOD SUGAR 3 TIMES DAILY OR AS DIRECTED. 100 each 0     nitroGLYcerin (NITROSTAT) 0.4 MG sublingual tablet Place 1 tablet (0.4 mg) under the tongue every 5 minutes as needed for chest pain 25 tablet 0     Wheat Dextrin (BENEFIBER PO) Powder, every other night       Bacillus Coagulans-Inulin (PROBIOTIC) 1-250 BILLION-MG CAPS  (Patient not taking: Reported on 3/29/2022)       Cholecalciferol (VITAMIN D3 PO) Take by mouth daily Daily , Unknown dose (Patient not taking: Reported on 12/2/2021)       estradiol (ESTRACE) 0.1 MG/GM vaginal cream Place 2 g vaginally once a week And externally (Patient not taking: Reported on 4/13/2021) 42.5 g 3     psyllium 0.52 G capsule Take 3-4 capsules by mouth nightly as needed  (Patient not taking: Reported on 3/29/2022)       tretinoin (RETIN-A) 0.025 % external cream Use every night pea sized amoutn on face (Patient not taking: Reported on 3/29/2022) 45 g 3         Review of Systems   Constitutional, HEENT, cardiovascular, pulmonary, GI, , musculoskeletal, neuro, skin, endocrine and psych systems are negative, except as otherwise noted.      Objective    Vitals - Patient Reported  Weight (Patient Reported): 71.2 kg (157 lb)        Physical Exam   GENERAL: Healthy, alert and no distress  EYES: Eyes grossly normal to inspection.  No discharge or erythema, or obvious scleral/conjunctival abnormalities.  RESP: No audible wheeze, cough, or visible cyanosis.  No visible retractions or increased work of breathing.    SKIN: Visible skin clear. No significant rash, abnormal pigmentation or lesions.  NEURO: Cranial nerves grossly intact.  Mentation and speech appropriate for age.  PSYCH: Mentation appears normal, affect normal/bright, judgement and insight intact, normal speech and appearance well-groomed.                Video-Visit Details    Type of service:  Video Visit    Video End Time:4:12  PM    Originating Location (pt. Location): Home    Distant Location (provider location):  [facility]     Platform used for Video Visit: [employer]

## 2022-03-29 NOTE — PATIENT INSTRUCTIONS
Diabetes:   1. Keep up the good work  2. If you have further low blood sugar, then we should decrease the Lantus further - send a My chart message  3. Recommend follow-up in ~ 6 months - recommend face to face - last face to face visit in 2020  4. Ordered new meter    Health Care Maintenance:  1. Recommend shingles vaccine - medicare often only pays through retail pharmacy  2. Recommend pneumonia vaccine  3. You are due for dilated eye exam.  Have the report sent to Dr. Robison's office.  4. You are due for a colonoscopy.  Please call [phone] to schedule  5. Consider multi-vitamin instead of several vitamins     GERD:  1. Follow anti-reflux diet - see information in packet  2. Can use Famotidine (pecid) 40 mg at bedtime x 2 weeks  3. If symptoms persist, would get upper endoscopy    Derm  1. Referral to Derm            Patient Education     Tips to Control Acid Reflux    To control acid reflux, you ll need to make some basic diet and lifestyle changes. The simple steps outlined below may be all you ll need to ease discomfort.   Watch what you eat    Don't have fatty foods or spicy foods.    Eat fewer acidic foods, such as citrus and tomato-based foods. These can increase symptoms.    Limit drinking alcohol, caffeine, and fizzy beverages. All increase acid reflux.    Try limiting chocolate, peppermint, and spearmint. These can make acid reflux worse in some people.    Watch when you eat    Don't lie down for 3 hours after eating.    Don't snack before going to bed.    Raise your head  Raising your head and upper body by 4 to 6 inches helps limit reflux when you re lying down. Put blocks under the head of your bed frame or a wedge under your mattress to raise it.   Other changes    Lose weight, if you need to    Don t exercise near bedtime    Don't wear tight-fitting clothes    Limit aspirin and ibuprofen    Stop smoking    [employer] last reviewed this educational content on 6/1/2019 2000-2021 [employer]. All rights reserved. This information is not intended as a substitute for professional medical care. Always follow your healthcare professional's instructions.

## 2022-04-04 ENCOUNTER — TELEPHONE (OUTPATIENT)
Dept: DERMATOLOGY | Facility: CLINIC | Age: 72
End: 2022-04-04
Payer: COMMERCIAL

## 2022-04-04 NOTE — TELEPHONE ENCOUNTER
[facility] Call Center    Phone Message    May a detailed message be left on voicemail: yes     Reason for Call: Other: Pt would like a call to discuss anything she can use or do before appt with Madgalena Rojas. Her itch and sores have become unbareable. Thanks!     Action Taken: Message routed to:  Other: [place] [facility]    Travel Screening: Not Applicable

## 2022-04-05 NOTE — TELEPHONE ENCOUNTER
"Spoke to patient and advised she can try otc dandruff shampoos and antihistamines to control the itching.     \"I don't like to take antihistamines.. The sore scab over and crust over and I just don't know what to do.. They pop up in a few hours... I am trying not to scratch..\"     Patient verbalized understanding. She is scheduled to be seen 4-18-22. Katina Mari RN    "

## 2022-04-18 ENCOUNTER — OFFICE VISIT (OUTPATIENT)
Dept: DERMATOLOGY | Facility: CLINIC | Age: 72
End: 2022-04-18
Attending: INTERNAL MEDICINE
Payer: COMMERCIAL

## 2022-04-18 VITALS — SYSTOLIC BLOOD PRESSURE: 115 MMHG | OXYGEN SATURATION: 98 % | HEART RATE: 52 BPM | DIASTOLIC BLOOD PRESSURE: 63 MMHG

## 2022-04-18 DIAGNOSIS — L98.1 NEUROTIC EXCORIATIONS: Primary | ICD-10-CM

## 2022-04-18 DIAGNOSIS — L08.0 PYODERMA: ICD-10-CM

## 2022-04-18 DIAGNOSIS — L29.9 SCALP ITCH: ICD-10-CM

## 2022-04-18 DIAGNOSIS — E78.5 HYPERLIPIDEMIA LDL GOAL <100: ICD-10-CM

## 2022-04-18 PROCEDURE — 99214 OFFICE O/P EST MOD 30 MIN: CPT | Performed by: PHYSICIAN ASSISTANT

## 2022-04-18 RX ORDER — ATORVASTATIN CALCIUM 80 MG/1
TABLET, FILM COATED ORAL
Qty: 90 TABLET | Refills: 3 | Status: SHIPPED | OUTPATIENT
Start: 2022-04-18 | End: 2023-03-22

## 2022-04-18 RX ORDER — MUPIROCIN 20 MG/G
OINTMENT TOPICAL
Qty: 30 G | Refills: 3 | Status: SHIPPED | OUTPATIENT
Start: 2022-04-18 | End: 2023-01-25

## 2022-04-18 RX ORDER — AZITHROMYCIN 250 MG/1
TABLET, FILM COATED ORAL
Qty: 7 TABLET | Refills: 0 | Status: SHIPPED | OUTPATIENT
Start: 2022-04-18 | End: 2022-08-16

## 2022-04-18 RX ORDER — FLUOCINONIDE TOPICAL SOLUTION USP, 0.05% 0.5 MG/ML
SOLUTION TOPICAL
Qty: 50 ML | Refills: 3 | Status: SHIPPED | OUTPATIENT
Start: 2022-04-18 | End: 2023-01-25

## 2022-04-18 NOTE — TELEPHONE ENCOUNTER
Prescription approved per [facility] Refill Protocol. Patient notified.     Perla Calle RN  [facility]

## 2022-04-18 NOTE — PATIENT INSTRUCTIONS
Try not to touch your scalp     Start azithromycin (antibiotic) once daily for 7 days - take this with food     Apply mupirocin ointment to the open areas twice per day until healed    To any new itchy areas on unbroken skin apply fluocinonide solution twice per day - this will hopefully calm the itching down     Continue your doxepin and NAC     Follow-up in 3 months

## 2022-04-18 NOTE — LETTER
4/18/2022         RE: Ml Evans  [id number] [facility] [id number]        Dear Colleague,    Thank you for referring your patient, Ml Evans, to the [facility]. Please see a copy of my visit note below.    HPI:   Chief complaints: Ml Evans is a pleasant 72 year old female who presents for evaluation of an itchy scalp and sores on the scalp. She has had an increasingly itchy scalp for the past several years. Recently she started noticing that she will get a sharp pain in the scalp and feel something pointy in that area. She will express the area and get liquid and some firm material which seems to make the area feel immediately better. She has tried numerous washes and rubbing alcohol on the areas.       PHYSICAL EXAM:    /63 (BP Location: Right arm, Cuff Size: Adult Large)   Pulse 52   LMP  (LMP Unknown)   SpO2 98%   Skin exam performed as follows: Type 2 skin. Mood appropriate  Alert and Oriented X 3. Well developed, well nourished in no distress.  General appearance: Normal  Head including face: Normal  Eyes: conjunctiva and lids: Normal  Mouth: Lips, teeth, gums: Normal  Neck: Normal  Cardiovascular: Exam of peripheral vascular system by observation for swelling, varicosities, edema: Normal  Right upper extremity: Normal  Left upper extremity: Normal  Right lower extremity: Normal  Left lower extremity: Normal  Skin: Scalp and body hair: See below    Numerous excoriations on the scalp with some yellow crust    ASSESSMENT/PLAN:     1. Neurotic excoriations, pyoderma, scalp itch - discussed diagnosis and treatment options  --Restart NAC  --Start azithromycin x 7 days  --Start mupirocin to the open areas BID until healed  --Lidex to new itchy areas BID and try to not scratch or pick at the areas            Follow-up: 3 months  CC:   Scribed By: Magdalena Selby, MS, PA-C          Again, thank you for allowing me to participate in the care of your  patient.        Sincerely,        Magdalena Rojas PA-C

## 2022-04-18 NOTE — TELEPHONE ENCOUNTER
Patient feels refill is urgent, having had a heart attack 10 years ago. Would like refill asap. Allison Rinaldi on 4/18/2022 at 3:01 PM

## 2022-04-18 NOTE — PROGRESS NOTES
HPI:   Chief complaints: Ml Evans is a pleasant 72 year old female who presents for evaluation of an itchy scalp and sores on the scalp. She has had an increasingly itchy scalp for the past several years. Recently she started noticing that she will get a sharp pain in the scalp and feel something pointy in that area. She will express the area and get liquid and some firm material which seems to make the area feel immediately better. She has tried numerous washes and rubbing alcohol on the areas.       PHYSICAL EXAM:    /63 (BP Location: Right arm, Cuff Size: Adult Large)   Pulse 52   LMP  (LMP Unknown)   SpO2 98%   Skin exam performed as follows: Type 2 skin. Mood appropriate  Alert and Oriented X 3. Well developed, well nourished in no distress.  General appearance: Normal  Head including face: Normal  Eyes: conjunctiva and lids: Normal  Mouth: Lips, teeth, gums: Normal  Neck: Normal  Cardiovascular: Exam of peripheral vascular system by observation for swelling, varicosities, edema: Normal  Right upper extremity: Normal  Left upper extremity: Normal  Right lower extremity: Normal  Left lower extremity: Normal  Skin: Scalp and body hair: See below    Numerous excoriations on the scalp with some yellow crust    ASSESSMENT/PLAN:     1. Neurotic excoriations, pyoderma, scalp itch - discussed diagnosis and treatment options  --Restart NAC  --Start azithromycin x 7 days  --Start mupirocin to the open areas BID until healed  --Lidex to new itchy areas BID and try to not scratch or pick at the areas            Follow-up: 3 months  CC:   Scribed By: Magdalena Selby, MS, PA-C

## 2022-04-20 RX ORDER — DOXEPIN HYDROCHLORIDE 75 MG/1
CAPSULE ORAL
Qty: 90 CAPSULE | Refills: 3 | OUTPATIENT
Start: 2022-04-20

## 2022-04-20 NOTE — TELEPHONE ENCOUNTER
"Routing refill request to provider for review/approval because:  Medication is reported/historical    Pending Prescriptions:                       Disp   Refills    doxepin (SINEQUAN) 75 MG capsule [Pharmacy*90 cap*1        Sig: TAKE ONE CAPSULE BY MOUTH EVERY EVENING       FREDY       Active               Ml Evans \"Narcisa\"  Legal Name:   Ml Evans  Female, 72 year old, 1950  MRN:   [id number]  SB#:   #[id number]  Phone:   [phone] (M)        Weight:   76.7 kg (169 lb)  HM Due?:   Due    Allergies  Hydrocodone  Flexeril [Cyclobenzaprine]  Cipro [Ciprofloxacin]  Codeine Camsylate  Pcn [Penicillins]  Amoxicillin  Sulfa Drugs  Tetracycline    Pref Language:   English    PCP:   Shwetha Robison,   Coverage:   [employer] Advantage                    Ml Evans \"Narcisa\" - 4/19/2022 12:37 PM  Rx Auth Request   Interface, Eprescribing   Sent: Tue April 19, 2022 12:37 PM   To: P Refill Pool Dyad 7        very important  Medications from outside sources need reconciliation.             Medications      Requested Prescriptions     Name from pharmacy: DOXEPIN HCL 75MG CAPS         Will file in chart as: doxepin (SINEQUAN) 75 MG capsule    Sig: TAKE ONE CAPSULE BY MOUTH EVERY EVENING    Disp:  90 capsule    Refills:  1    Start: 4/20/2022    Class: E-Prescribe    Last ordered: 8 years ago by Patient Reported Last refill: 3/28/2022    Rx #: [id number]    Tricyclic Antidepressants Protocol Passed 04/19/2022 12:37 PM   Protocol Details  Blood pressure under 140/90 in past 12 months    Recent (12 mo) or future (30 days) visit within the authorizing provider's specialty     Medication is active on med list    Patient is age 18 or older    No active pregnancy on record    No positive pregnancy test in past 12 months      To be filled at: [address]           Pending      Disp Refills Start End   doxepin (SINEQUAN) 75 MG capsule [Pharmacy Med " Name: DOXEPIN HCL 75MG CAPS] 90 capsule 1 2022    Sig:  TAKE ONE CAPSULE BY MOUTH EVERY EVENING   PEPE:  No     Call Documentation    No notes of this type exist for this encounter.  Patient Information      Patient Information    Patient Name   Ml Evans ([id number]) Legal Sex   Female    1950     Patient Demographics    Address   [address] [place] [id number] Phone   [phone] (Home)   [phone] (Work)   [phone] (Mobile) *Preferred* E-mail Address   [email or web address]   Results  doxepin (SINEQUAN) 75 MG capsule [Pharmacy Med Name: DOXEPIN HCL 75MG CAPS] (Order [id number])      Additional    Details Routing      doxepin (SINEQUAN) 75 MG capsule [Pharmacy Med Name: DOXEPIN HCL 75MG CAPS]  Order: [id number]  Status: Pending      Visible to patient: No (not released)      Next appt: None      0 Result Notes                      Order Details       View Encounter       Lab and Collection Details       Routing       Result History               Result Care Coordination        Patient Communication     Not Released  Not seen Back to Top         Telephone Contact Summary      Reason for Call    Medication Refill      Routing History    Priority Sent On From To Message Type    2022 12:37 PM Interface, Eprescribing P Refill Pool Dyad 7 Rx Auth     Created by    Interface, Eprescribing on 2022 12:37 PM     Documents -- Encounter Level:    Documents: None found at the encounter level.    Documents -- Order Level:    Documents: None found at the order level.    Outpatient Medication Detail     Disp Refills Start End PEPE   doxepin (SINEQUAN) 75 MG capsule [Pharmacy Med Name: DOXEPIN HCL 75MG CAPS] 90 capsule 1 2022  No   Sig: TAKE ONE CAPSULE BY MOUTH EVERY EVENING   Class: E-Prescribe   Order: [id number]     Rosalinda English RN on 2022 at 11:47 AM

## 2022-04-30 ENCOUNTER — HEALTH MAINTENANCE LETTER (OUTPATIENT)
Age: 72
End: 2022-04-30

## 2022-05-17 ENCOUNTER — TELEPHONE (OUTPATIENT)
Dept: FAMILY MEDICINE | Facility: CLINIC | Age: 72
End: 2022-05-17
Payer: COMMERCIAL

## 2022-05-17 NOTE — TELEPHONE ENCOUNTER
Reason for call:  Patient reporting a symptom    Symptom or request: Pt asking to talk to a nurse.She has Fibromyalgia. She says for the last 3-4 weeks she has been having excruciating pain. She says she can't bear weight on the right leg. The pain is from the hip to the groin and it's getting worse. She says she can hardly walk on it.     Duration (how long have symptoms been present): 3-4 weeks    Have you been treated for this before? No    Additional comments:      Phone Number patient can be reached at:  Home number on file [phone] (home)    Best Time:  anytime    Can we leave a detailed message on this number:  YES    Call taken on 5/17/2022 at 4:35 PM by Charis Le

## 2022-05-19 NOTE — TELEPHONE ENCOUNTER
Pt called back.  No provoking incident.  Improves with rest.  Worse with twisting motion.    Appt next week to evaluate.    Mireya Olivares RN

## 2022-06-07 ENCOUNTER — TELEPHONE (OUTPATIENT)
Dept: FAMILY MEDICINE | Facility: CLINIC | Age: 72
End: 2022-06-07
Payer: COMMERCIAL

## 2022-06-07 NOTE — TELEPHONE ENCOUNTER
Pt has hip/groin pain and fell and pain improved. Pt cancelled appt. Now pain returned and wants appt. Schedule at [facility] in 1 wk. Having difficulty walking. Adv to be seen in uc/ed with any need    Camilo Benitez RN

## 2022-06-13 ENCOUNTER — TELEPHONE (OUTPATIENT)
Dept: FAMILY MEDICINE | Facility: CLINIC | Age: 72
End: 2022-06-13
Payer: COMMERCIAL

## 2022-06-13 NOTE — TELEPHONE ENCOUNTER
Reason for Call:  Other    Detailed comments: Pt calling and would like a call back from care team to discuss what she should do in regards to her right leg/hip pain. She was scheduled to be seen tomorrow but it needs to be rescheduled. She is wondering if this is something she needs to be seen right away for, or if it can wait. Would like a call back to discuss further.    Phone Number Patient can be reached at: Home number on file [phone] (home)    Best Time: anytime    Can we leave a detailed message on this number? YES    Call taken on 6/13/2022 at 5:04 PM by Mag Medellin

## 2022-06-15 ENCOUNTER — TELEPHONE (OUTPATIENT)
Dept: FAMILY MEDICINE | Facility: CLINIC | Age: 72
End: 2022-06-15
Payer: COMMERCIAL

## 2022-06-15 ENCOUNTER — APPOINTMENT (OUTPATIENT)
Dept: GENERAL RADIOLOGY | Facility: CLINIC | Age: 72
End: 2022-06-15
Attending: PHYSICIAN ASSISTANT
Payer: COMMERCIAL

## 2022-06-15 ENCOUNTER — NURSE TRIAGE (OUTPATIENT)
Dept: NURSING | Facility: CLINIC | Age: 72
End: 2022-06-15
Payer: COMMERCIAL

## 2022-06-15 ENCOUNTER — HOSPITAL ENCOUNTER (EMERGENCY)
Facility: CLINIC | Age: 72
Discharge: HOME | End: 2022-06-15
Attending: PHYSICIAN ASSISTANT | Admitting: PHYSICIAN ASSISTANT
Payer: COMMERCIAL

## 2022-06-15 VITALS
WEIGHT: 160 LBS | HEART RATE: 52 BPM | BODY MASS INDEX: 30.21 KG/M2 | DIASTOLIC BLOOD PRESSURE: 48 MMHG | HEIGHT: 61 IN | RESPIRATION RATE: 18 BRPM | OXYGEN SATURATION: 97 % | TEMPERATURE: 97.1 F | SYSTOLIC BLOOD PRESSURE: 125 MMHG

## 2022-06-15 DIAGNOSIS — M25.551 HIP PAIN, RIGHT: ICD-10-CM

## 2022-06-15 PROCEDURE — 73502 X-RAY EXAM HIP UNI 2-3 VIEWS: CPT

## 2022-06-15 PROCEDURE — G0463 HOSPITAL OUTPT CLINIC VISIT: HCPCS | Performed by: PHYSICIAN ASSISTANT

## 2022-06-15 PROCEDURE — 99213 OFFICE O/P EST LOW 20 MIN: CPT | Performed by: PHYSICIAN ASSISTANT

## 2022-06-15 ASSESSMENT — ENCOUNTER SYMPTOMS
ARTHRALGIAS: 1
CONSTITUTIONAL NEGATIVE: 1
NUMBNESS: 0
JOINT SWELLING: 0
WOUND: 0
WEAKNESS: 0

## 2022-06-15 NOTE — ED PROVIDER NOTES
History     Chief Complaint   Patient presents with     Leg Pain     Right leg pain x 2 months, spoke with care team and was advised to present to ED/UC for xray. Pt has not previously been seen for this pain     HPI  Ml Evans is a 72 year old female with a history of fibromyalgia, type 2 diabetes, coronary artery disease, diverticulosis, irritable bowel syndrome, and chronic seasonal allergies who presents for evaluation of right hip and thigh pain which began 2 months ago.  She describes experiencing pain at the anterior thigh and posterior hip on occasion.  At times she has lost her footing and falling due to the pain.  However, she is able to walk without difficulty today.  After speaking with her care team today she was advised to present to the emergency department for further evaluation and management.  She has not been previously seen or evaluated for this concern.  Has normal strength and sensation in the right hip and right lower extremity.  Denies any concerns with urination, vaginal discharge.  Denies any trauma currently.    Allergies:  Allergies   Allergen Reactions     Hydrocodone Anaphylaxis     Flexeril [Cyclobenzaprine] Rash     Cipro [Ciprofloxacin] Other (See Comments)     Abd pain , proteinuria , microscopic hematuria  Possibly related to cipro     Codeine Camsylate Nausea     Pcn [Penicillins] Difficulty breathing     Amoxicillin Itching and Rash     Sulfa Drugs Itching and Rash     Tetracycline Itching and Rash       Problem List:    Patient Active Problem List    Diagnosis Date Noted     Osteopenia of both hips 03/29/2022     Priority: Medium     3/2022.  Follow-up in 3 years       Gastroesophageal reflux disease without esophagitis 01/28/2020     Priority: Medium     Chronic rhinitis 03/09/2017     Priority: Medium     Benign essential hypertension 03/09/2017     Priority: Medium     Irritable bowel syndrome with diarrhea 03/09/2017     Priority: Medium     Dicyclomine helped  but caused dizziness.       Type 2 diabetes mellitus with microalbuminuria, without long-term current use of insulin (H) 03/09/2017     Priority: Medium     Diarrhea on metformin, stopped 6/2019.  Recurrent vaginal infections on Jardiance       Colon polyp 05/21/2015     Priority: Medium     Tubular adenoma polyp       Advanced directives, counseling/discussion 01/14/2013     Priority: Medium     Patient states has Advance Directive and will bring in a copy to clinic. 1/14/2013          Diverticulosis 06/14/2012     Priority: Medium     Coronary artery disease involving native coronary artery of native heart without angina pectoris 03/21/2011     Priority: Medium     Two right coronary stents 2011  She has a prior history of CAD with STEMI in 2011 with PCI to the RCA, since then has had another angiogram in 2015 which has shown patent RCA stents and otherwise minimal disease elsewhere. Her EF and stress tests since then have been negative         HPV (human papilloma virus) anogenital infection 02/22/2011     Priority: Medium     Perianal condyloma  Biopsy done 2011  FINAL DIAGNOSIS:  Skin, perianal, lesion, excision:  - High grade squamous intraepithelial lesion (moderate to severe  dysplasia) (see comment)    COMMENT:  The lesion is arising on top of condyloma. The base of the polypoid  lesion appears free of dysplasia in the planes examined. Follow up is  recommended as clinically deemed appropriate.       Hyperlipidemia LDL goal <70 10/31/2010     Priority: Medium     Menopausal syndrome (hot flashes) 05/18/2010     Priority: Medium     Wants to take premarin  Understands breast cancer risk       Anal fissure 01/03/2007     Priority: Medium     Mild major depression (H) 09/28/2006     Priority: Medium     Follows with Psychiatry Dorian Prince.  Rx for Alprazolam 1 mg #120/month and Adderall 20 mg daily       Seasonal allergies 09/28/2006     Priority: Medium     seasonal  Problem list name updated by  automated process. Provider to review       Attention deficit disorder 05/17/2005     Priority: Medium     Treated by psychiatry  Problem list name updated by automated process. Provider to review       Nonspecific elevation of levels of transaminase or lactic acid dehydrogenase (LDH) 05/17/2005     Priority: Medium     fatty liver       History of diverticulitis 12/10/2000     Priority: Medium     Abdominal adhesions 01/01/1990     Priority: Medium        Past Medical History:    Past Medical History:   Diagnosis Date     Attention deficit disorder without mention of hyperactivity      Benign essential hypertension 3/9/2017     Coronary artery disease march 15,2011     Diabetes mellitus (H)      Dysthymic disorder      GERD (gastroesophageal reflux disease) 1/20/2011     Glycogenosis (H)      History of blood transfusion 1981     History of ST elevation myocardial infarction (STEMI) 1/23/2019     Malignant neoplasm (H)      Other and unspecified hyperlipidemia      Squamous cell carcinoma        Past Surgical History:    Past Surgical History:   Procedure Laterality Date     ABDOMEN SURGERY  1981,1991,1993     APPENDECTOMY  1993     BIOPSY  2003    nose, during surgery     COLONOSCOPY  2005     COLONOSCOPY N/A 5/14/2015    Procedure: COMBINED COLONOSCOPY, SINGLE OR MULTIPLE BIOPSY/POLYPECTOMY BY BIOPSY;  Surgeon: Ponce Christine MD;  Location: [facility]     ECTOPIC PREGNANCY SURGERY  1981     ENDOSCOPIC RELEASE CARPAL TUNNEL  4/16/2013    Procedure: ENDOSCOPIC RELEASE CARPAL TUNNEL;  Right endoscopic carpal tunnel release;  Surgeon: Jonah Dela Cruz MD;  Location: [facility]     ENT SURGERY  2003    nose- suspected basal cell- was benign     ESOPHAGOSCOPY, GASTROSCOPY, DUODENOSCOPY (EGD), COMBINED  8/18/2014    Procedure: COMBINED ESOPHAGOSCOPY, GASTROSCOPY, DUODENOSCOPY (EGD), BIOPSY SINGLE OR MULTIPLE;  Surgeon: Anibal Sebastian MD;  Location: [facility]     GENITOURINARY SURGERY  1981, 1991     HYSTERECTOMY, ELIECER       total hysterectomy. Unsure if ELIECER or vaginal     SURGICAL HISTORY OF -       appy     SURGICAL HISTORY OF -       T&A     VASCULAR SURGERY  2011    angioplasty- 2 stents implanted       Family History:    Family History   Problem Relation Age of Onset     Cancer Mother         uterine     Lipids Mother      Neurologic Disorder Mother         polymyalgia     Hypertension Mother      Other Cancer Mother         endometrial     Depression/Anxiety Mother      Other - See Comments Mother         Heart Arrythmia      Atrial fibrillation Mother      Macular Degeneration Mother      Cardiovascular Father         CHF     Depression Father      C.A.D. Father         bypass x2 starting at age 55-  in his 70's     Diabetes Father         type 2     Coronary Artery Disease Father          from - age 75     Depression/Anxiety Father      Cerebrovascular Disease Father         from angioplasty      C.A.D. Maternal Grandfather      Coronary Artery Disease Maternal Grandfather          from- age 61     C.A.D. Paternal Grandfather      Diabetes Brother      Depression Son      Psychotic Disorder Son         adhd     Diabetes Brother         type 1     Depression/Anxiety Brother      Depression/Anxiety Maternal Grandmother      Depression/Anxiety Son      Asthma Son         childhood asthma-out grown now as an adult     Coronary Artery Disease Brother         stent-MI     Melanoma No family hx of        Social History:  Marital Status:   [2]  Social History     Tobacco Use     Smoking status: Former Smoker     Packs/day: 1.00     Years: 30.00     Pack years: 30.00     Types: Cigarettes     Start date: 1968     Quit date: 3/15/2011     Years since quittin.2     Smokeless tobacco: Never Used     Tobacco comment: occasional/daily   Vaping Use     Vaping Use: Never used   Substance Use Topics     Alcohol use: No     Alcohol/week: 0.0 standard drinks     Drug use: No        Medications:    acetaminophen  "(TYLENOL) 500 MG tablet  acetylcysteine (N-ACETYL CYSTEINE) 600 MG CAPS capsule  alcohol swab prep pads  Alcohol Swabs (B-D SINGLE USE SWABS REGULAR) PADS  ALPRAZolam (XANAX PO)  amphetamine-dextroamphetamine (ADDERALL XR) 20 MG per capsule  aspirin 81 MG EC tablet  atorvastatin (LIPITOR) 80 MG tablet  azithromycin (ZITHROMAX) 250 MG tablet  blood glucose (NO BRAND SPECIFIED) lancets standard  blood glucose (NO BRAND SPECIFIED) test strip  blood glucose calibration (NO BRAND SPECIFIED) solution  blood glucose monitoring (NO BRAND SPECIFIED) meter device kit  blood glucose monitoring (NO BRAND SPECIFIED) meter device kit  CONTOUR NEXT TEST test strip  DoxePIN (SINEQUAN) 75 MG capsule  dulaglutide (TRULICITY) 1.5 MG/0.5ML pen  escitalopram (LEXAPRO) 20 MG tablet  fluocinonide (LIDEX) 0.05 % external solution  insulin glargine (LANTUS SOLOSTAR) 100 UNIT/ML pen  insulin pen needle (ULTICARE MICRO) 32G X 4 MM miscellaneous  LANsoprazole (PREVACID) 30 MG DR capsule  lisinopril (ZESTRIL) 5 MG tablet  metoprolol succinate ER (TOPROL-XL) 25 MG 24 hr tablet  Microlet Lancets MISC  mupirocin (BACTROBAN) 2 % external ointment  nitroGLYcerin (NITROSTAT) 0.4 MG sublingual tablet  psyllium 0.52 G capsule  thin (NO BRAND SPECIFIED) lancets  Wheat Dextrin (BENEFIBER PO)          Review of Systems   Constitutional: Negative.    Genitourinary: Negative.    Musculoskeletal: Positive for arthralgias. Negative for joint swelling.   Skin: Negative for rash and wound.   Neurological: Negative for weakness and numbness.       Physical Exam   BP: 125/48  Pulse: 52  Temp: 97.1  F (36.2  C)  Resp: 18  Height: 154.9 cm (5' 1\")  Weight: 72.6 kg (160 lb)  SpO2: 97 %      Physical Exam  Constitutional:       General: She is not in acute distress.     Appearance: Normal appearance. She is normal weight.   Cardiovascular:      Pulses: Normal pulses.   Musculoskeletal:         General: No swelling.      Cervical back: Normal, normal range of motion " and neck supple. No rigidity or tenderness. No muscular tenderness.      Thoracic back: Normal.      Lumbar back: Normal.      Right hip: Tenderness present. No deformity, lacerations, bony tenderness or crepitus. Normal range of motion. Normal strength.      Left hip: Normal.      Comments: Mild tenderness to palpation over the hip flexors on the right side and on the lateral aspect of the hip, pain is elicited with hip flexion.  Ambulates without difficulty.  Strength is 5/5 in the right hip, knee, and foot/ankle.  Normal sensation in the right lower extremity.   Skin:     Capillary Refill: Capillary refill takes less than 2 seconds.      Findings: No bruising, erythema or rash.      Comments: No rashes noted over the anterior and lateral hip.   Neurological:      General: No focal deficit present.      Mental Status: She is alert and oriented to person, place, and time.      Sensory: No sensory deficit.      Motor: No weakness.      Deep Tendon Reflexes: Reflexes normal.      Reflex Scores:       Patellar reflexes are 2+ on the right side and 2+ on the left side.        ED Course                 Procedures              No results found for this or any previous visit (from the past 24 hour(s)).    Medications - No data to display    Assessments & Plan (with Medical Decision Making)   Presentation and physical exam are consistent with right hip pain which has been intermittent and ongoing for the past 2 months.  The patient does have a history of fibromyalgia which may be contributing to her pain today.  She denies any trauma or surgeries.  No fevers, no rashes.  No obvious signs of infection on exam today.  She does have tenderness to palpation over the right hip flexors as well as on the lateral aspect of the hip.    X-rays of the right hip and pelvis showed no evidence for acute fracture or dislocation.    Recommend continued use of over-the-counter Tylenol for symptomatic relief of pain.  May apply lidocaine  patches to the right hip area for additional relief of pain.  Also apply ice or heat to the affected areas for additional symptomatic relief.    Referred the patient to orthopedics for further evaluation and management due to concern for hip flexor pain/tendinitis.  Her body may be compensating to where she is developing intermittent pains at the lateral aspect of the hip as well.    Recommend urgent medical evaluation if the patient develops worsening pain, pain out of proportion to injury, numbness or tingling or loss of feeling, or redness/tenderness/swelling in the right hip or right lower extremity.      I have reviewed the nursing notes.    I have reviewed the findings, diagnosis, plan and need for follow up with the patient.    Discharge Medication List as of 6/15/2022  7:25 PM          Final diagnoses:   Hip pain, right       6/15/2022   [facility] EMERGENCY DEPT     David Washington PA-C  06/16/22 2026     [Negative] : Genitourinary

## 2022-06-15 NOTE — TELEPHONE ENCOUNTER
Attempted to contact patient. Non detailed message left to return phone call to clinic.    Bteh Gillespie RN

## 2022-06-15 NOTE — TELEPHONE ENCOUNTER
Reason for Call:  Other appointment    Detailed comments: Pt has an appt on 6/22 for pain in hip/groin/back. Pt states trouble walking for the last 2 months. Pt has a high pain tolerance. Pt is looking for a sooner working appt with any open provider. .     Phone Number Patient can be reached at: Home number on file [phone] (home)    Best Time: anytime     Can we leave a detailed message on this number? YES    Call taken on 6/15/2022 at 2:32 PM by Alla Ordoñez

## 2022-06-15 NOTE — TELEPHONE ENCOUNTER
"Narcisa is calling because for over 2 months, she has jenifer having hip pain that is steadily getting worse. She also notes she has fibromyalgia, getting used to living with that. Of note, she also has extremely high pain tolerance.    Right hip pain, from buttocks up over hip and down into groin; Cannot put any weight on that leg without severe pain, leg has given out a few times. Walking is excruciating.    She states she fell backwards into laundry room into \"a pile of nice fluffy clothes\" around the time the pain started.Another time, she was squatting and tipped back onto butt. Did fall leaning over a pet gate, knocked it out of place and fell over it, no pain at that time, bruise on breast was \"like an eggplant.\"     Per protocol, patient should go to ED now. She plans to have  drive her.    Allison Bucio RN  [place] Nurse Advisor  2:58 PM  6/15/2022    COVID 19 Nurse Triage Plan/Patient Instructions    Please be aware that novel coronavirus (COVID-19) may be circulating in the community. If you develop symptoms such as fever, cough, or SOB or if you have concerns about the presence of another infection including coronavirus (COVID-19), please contact your health care provider or visit [email or web address].     Disposition/Instructions    Additional COVID19 information to add for patients.   How can I protect others?  If you have symptoms (fever, cough, body aches or trouble breathing): Stay home and away from others (self-isolate) until:    At least 10 days have passed since your symptoms started, And     You ve had no fever--and no medicine that reduces fever--for 1 full day (24 hours), And      Your other symptoms have resolved (gotten better).     If you don t have symptoms, but a test showed that you have COVID-19 (you tested positive):    Stay home and away from others (self-isolate). Follow the tips under \"How do I self-isolate?\" below for 10 days (20 days if you have a weak immune " system).    You don't need to be retested for COVID-19 before going back to school or work. As long as you're fever-free and feeling better, you can go back to school, work and other activities after waiting the 10 or 20 days.     How do I self-isolate?    Stay in your own room, even for meals. Use your own bathroom if you can.     Stay away from others in your home. No hugging, kissing or shaking hands. No visitors.    Don t go to work, school or anywhere else.     Clean  high touch  surfaces often (doorknobs, counters, handles, etc.). Use a household cleaning spray or wipes. You ll find a full list on the EPA website:  www.epa.gov/pesticide-registration/list-n-disinfectants-use-against-sars-cov-2.    Cover your mouth and nose with a mask, tissue or washcloth to avoid spreading germs.    Wash your hands and face often. Use soap and water.    Caregivers in these groups are at risk for severe illness due to COVID-19:  o People 65 years and older  o People who live in a nursing home or long-term care facility  o People with chronic disease (lung, heart, cancer, diabetes, kidney, liver, immunologic)  o People who have a weakened immune system, including those who:  - Are in cancer treatment  - Take medicine that weakens the immune system, such as corticosteroids  - Had a bone marrow or organ transplant  - Have an immune deficiency  - Have poorly controlled HIV or AIDS  - Are obese (body mass index of 40 or higher)  - Smoke regularly    Caregivers should wear gloves while washing dishes, handling laundry and cleaning bedrooms and bathrooms.    Use caution when washing and drying laundry: Don t shake dirty laundry, and use the warmest water setting that you can.    For more tips, go to www.cdc.gov/coronavirus/2019-ncov/downloads/10Things.pdf.    How can I take care of myself?  1. Get lots of rest. Drink extra fluids (unless a doctor has told you not to).     2. Take Tylenol (acetaminophen) for fever or pain. If you have  liver or kidney problems, ask your family doctor if it s okay to take Tylenol.     Adults can take either:     650 mg (two 325 mg pills) every 4 to 6 hours, or     1,000 mg (two 500 mg pills) every 8 hours as needed.     Note: Don t take more than 3,000 mg in one day.   Acetaminophen is found in many medicines (both prescribed and over-the-counter medicines). Read all labels to be sure you don t take too much.     For children, check the Tylenol bottle for the right dose. The dose is based on the child s age or weight.    3. If you have other health problems (like cancer, heart failure, an organ transplant or severe kidney disease): Call your specialty clinic if you don t feel better in the next 2 days.    4. Know when to call 911: Emergency warning signs include:    Trouble breathing or shortness of breath    Pain or pressure in the chest that doesn t go away    Feeling confused like you haven t felt before, or not being able to wake up    Bluish-colored lips or face    What are the symptoms of COVID-19?     The most common symptoms are cough, fever and trouble breathing.     Less common symptoms include body aches, chills, diarrhea (loose, watery poops), fatigue (feeling very tired), headache, runny nose, sore throat and loss of smell.    COVID-19 can cause severe coughing (bronchitis) and lung infection (pneumonia).    How does it spread?     The virus may spread when a person coughs or sneezes into the air. The virus can travel about 6 feet this way, and it can live on surfaces.      Common  (household disinfectants) will kill the virus.    Who is at risk?  Anyone can catch COVID-19 if they re around someone who has the virus.    How can others protect themselves?     Stay away from people who have COVID-19 (or symptoms of COVID-19).    Wash hands often with soap and water. Or, use hand  with at least 60% alcohol.    Avoid touching the eyes, nose or mouth.     Wear a face mask when you go out in  public, when sick or when caring for a sick person.    Where can I get more information?    [facility]: About COVID-19: [email or web address]    CDC: What to Do If You re Sick: www.cdc.gov/coronavirus/2019-ncov/about/steps-when-sick.html    CDC: Ending Home Isolation: www.cdc.gov/coronavirus/2019-ncov/hcp/disposition-in-home-patients.html     CDC: Caring for Someone: www.cdc.gov/coronavirus/2019-ncov/if-you-are-sick/care-for-someone.html     [facility]: Interim Guidance for Hospital Discharge to Home: www.[facility]/diseases/coronavirus/hcp/hospdischarge.pdf    [facility] clinical trials (COVID-19 research studies): clinicalaffairs.[facility]-clinical-trials     Below are the COVID-19 hotlines at the [place] Department of [facility] ([facility]). Interpreters are available.   o For health questions: Call 202-394-4768 or 1-435.198.3854 (7 a.m. to 7 p.m.)  o For questions about schools and childcare: Call 070-109-5268 or 1-414.167.3650 (7 a.m. to 7 p.m.)          Thank you for taking steps to prevent the spread of this virus.  o Limit your contact with others.  o Wear a simple mask to cover your cough.  o Wash your hands well and often.    Resources    [facility]: About COVID-19: [email or web address]    CDC: What to Do If You're Sick: www.cdc.gov/coronavirus/2019-ncov/about/steps-when-sick.html    CDC: Ending Home Isolation: www.cdc.gov/coronavirus/2019-ncov/hcp/disposition-in-home-patients.html     CDC: Caring for Someone: www.cdc.gov/coronavirus/2019-ncov/if-you-are-sick/care-for-someone.html     [facility]: Interim Guidance for Hospital Discharge to Home: www.[facility]/diseases/coronavirus/hcp/hospdischarge.pdf    [facility] clinical trials (COVID-19 research studies): [email or web address].[facility]/n-clinical-trials     Below are the COVID-19 hotlines at the [place] Department [facility] ([facility]). Interpreters are available.   o For health questions: Call  430.732.2258 or 1-774.774.4528 (7 a.m. to 7 p.m.)  o For questions about schools and childcare: Call 881-187-4285 or 1-731.721.9960 (7 a.m. to 7 p.m.)                       Reason for Disposition    Can't stand (bear weight) or walk    Additional Information    Negative: Looks like a broken bone or dislocated joint (e.g., crooked or deformed)    Negative: Sounds like a life-threatening emergency to the triager    Negative: Followed a hip injury    Negative: Leg pain is main symptom    Negative: Back pain radiating (shooting) into hip    Negative: SEVERE pain (e.g., excruciating, unable to do any normal activities) and fever    Protocols used: HIP PAIN-A-[place]

## 2022-06-15 NOTE — ED TRIAGE NOTES
Right leg pain x 2 months, spoke with care team and was advised to present to ED/UC for xray. Pt has not previously been seen for this pain     Triage Assessment     Row Name 06/15/22 1675       Triage Assessment (Adult)    Airway WDL WDL       Respiratory WDL    Respiratory WDL WDL       Skin Circulation/Temperature WDL    Skin Circulation/Temperature WDL WDL       Cardiac WDL    Cardiac WDL WDL       Peripheral/Neurovascular WDL    Peripheral Neurovascular WDL WDL       Cognitive/Neuro/Behavioral WDL    Cognitive/Neuro/Behavioral WDL WDL

## 2022-06-16 NOTE — DISCHARGE INSTRUCTIONS
Recommend continued use of over-the-counter Tylenol for symptomatic relief of pain.  May apply lidocaine patches to the right hip area for additional relief of pain.  Also apply ice or heat to the affected areas for additional symptomatic relief.    Referred the patient to orthopedics for further evaluation and management.     Recommend urgent medical evaluation if the patient develops worsening pain, pain out of proportion to injury, numbness or tingling or loss of feeling, or redness/tenderness/swelling in the right hip or right lower extremity.

## 2022-06-17 ENCOUNTER — MYC MEDICAL ADVICE (OUTPATIENT)
Dept: FAMILY MEDICINE | Facility: CLINIC | Age: 72
End: 2022-06-17

## 2022-06-17 NOTE — TELEPHONE ENCOUNTER
This call encounter began 4 days ago with last communication 2 days ago, and RN sees that patient presented to ED/UC for her hip pain and was treated on 6/15/22, so closing this encounter.     Perla Calle RN  [facility]

## 2022-06-17 NOTE — TELEPHONE ENCOUNTER
Pt informed.    Pt says that she will look for other mental health providers who can prescribe.  She says that Dr Dorian Prince has been ill and may be stepping down from his practice.    Mireya Olivares RN

## 2022-06-17 NOTE — TELEPHONE ENCOUNTER
Her psychiatrist should have a partner that can fill her medication in their absence.  I do not have a sooner appointment than 6/22

## 2022-06-17 NOTE — TELEPHONE ENCOUNTER
Dr Robison,   Pt wants to know if you will refill her Adderall for 1 month while her psychiatrist is off work.

## 2022-06-18 NOTE — PROGRESS NOTES
ASSESSMENT & PLAN    Ml was seen today for pain.    Diagnoses and all orders for this visit:    Hip pain, right  -     Orthopedic  Referral  -     CT Hip Right w/o Contrast; Future  -     Physical Therapy Referral; Future  -     Cane Order for DME - ONLY FOR DME      This issue is acute and Unchanged.    We discussed these other possible diagnosis: Discussed arthritis, stress fracture, tendinosis, lumbar etiology less likely. Given severity of pain, will obtain imaging. CT as patient can not have MRI.    Plan:  - Today's Plan of Care:  CT of the Right Hip - Call [phone] to schedule MRI   Rehab: Physical Therapy: [facility] - [phone]  Discussed activity considerations and other supportive care including Ice/Heat, OTC medications as needed.    -We also discussed other future treatment options:  Consideration of Injections    Follow Up: In clinic with Dr. Bardales after MRI (wait at least 1-2 days)    Concerning signs and symptoms were reviewed.  The patient expressed understanding of this management plan and all questions were answered at this time.    Jen Bardales MD [facility]  Sports Medicine Physician  [facility]    -----  Chief Complaint   Patient presents with     Right Hip - Pain       SUBJECTIVE  Ml Evans is a/an 72 year old female who is seen as an ER referral evaluation of right hip pain.     The patient is seen with their .    Onset: About 2 months ago, started to notice anterior and posterior right hip pain. Has a history of fibromyalgia but this feels different then past flares. She notes deep anterior right groin pain that she sometimes has to hold onto the front of her leg to ambulate. Has been using a cane to ambulate but notes that it is difficult with the small base of support. No acute injury onset.    Location of Pain: Right groin and posterior hip pain.  - no back pain, pain into leg    Worsened by: pivoting and hip rotation,  ambulation  Better with: lifting her right leg with her arms to move the leg in bed or when sitting  Treatments tried: cane, Tylenol, Xanax   Associated symptoms: catching. Denies popping, numbness and tingling.    Orthopedic/Surgical history: No  Social History/Occupation: Retired    No family history pertinent to patient's problem today.    REVIEW OF SYSTEMS:  Review of Systems  Skin: no bruising, no swelling  Musculoskeletal: as above  Neurologic: no numbness, paresthesias  Remainder of review of systems is negative including constitutional, CV, pulmonary, GI, except as noted in HPI or medical history.    OBJECTIVE:  /83   Pulse 76   Wt 72.6 kg (160 lb)   LMP  (LMP Unknown)   BMI 30.23 kg/m     General: healthy, alert and in no distress  HEENT: no scleral icterus or conjunctival erythema  Skin: no suspicious lesions or rash. No jaundice.  CV: distal perfusion intact  Resp: normal respiratory effort without conversational dyspnea   Psych: normal mood and affect  Gait: normal steady gait with appropriate coordination and balance  Neuro: Normal light sensory exam of lower extremity    Low back exam:  Inspection:     no visible deformity in the low back       normal skin       normal vascular       normal lymphatic    Posture:      normal    Tender:     none    Non Tender:     remainder of lumbar spine    ROM:      full flexion       full extension    Strength:     hip flexion 5/5 bilateral       knee extension 5/5 bilateral       ankle dorsiflexion 5/5 bilateral       ankle plantarflexion 5/5 bilateral       dorsiflexion of the great toe 5/5 bilateral       able to heel and toe walk    Reflexes:     patellar (L3, L4) symmetric normal       achilles tendons (S1) symmetric normal    Sensation:    grossly intact throughout lower extremities    Special tests:      straight leg raise left negative        straight leg raise right negative    Bilateral hip exam  Inspection:      no edema or ecchymosis in hip  area    Tender:      none bilateral    Non Tender:      remainder of the hip area bilateral    ROM:     Full active and passive ROM  Bilateral  - pain with internal and external rotation righ    Strength:      flexion 5-/5 right       abduction 5/5 bilateral       adduction 5/5 bilateral    Sensation:      grossly intact in hip and thigh    Special Tests:      neg (-) SHEREE right       positive (+) FADIR right    RADIOLOGY:  I independently visualized and reviewed these images with the patient  XR Pelvis 6/15/2022 - no acute bony abnormality, no significant degenerative change  - will follow official read      Recent Results (from the past 744 hour(s))   Pelvis XR w/ unilateral hip right    Narrative    EXAM: XR PELVIS AND HIP RIGHT 1 VIEW  LOCATION: [facility]  DATE/TIME: 6/15/2022 6:29 PM    INDICATION: right hip pain  COMPARISON: None.      Impression    IMPRESSION: Both hips negative for fracture or dislocation. Pelvis negative for fracture.     Reviewed ED note  Review of the result(s) of each unique test - XR

## 2022-06-20 NOTE — TELEPHONE ENCOUNTER
Dr. Robison,    Please see my chart message from the patient about controlled substance and her situation. Jil RIVERA RN

## 2022-06-23 ENCOUNTER — OFFICE VISIT (OUTPATIENT)
Dept: ORTHOPEDICS | Facility: CLINIC | Age: 72
End: 2022-06-23
Attending: PHYSICIAN ASSISTANT
Payer: COMMERCIAL

## 2022-06-23 VITALS
WEIGHT: 160 LBS | HEART RATE: 76 BPM | SYSTOLIC BLOOD PRESSURE: 117 MMHG | DIASTOLIC BLOOD PRESSURE: 83 MMHG | BODY MASS INDEX: 30.23 KG/M2

## 2022-06-23 DIAGNOSIS — M25.551 HIP PAIN, RIGHT: ICD-10-CM

## 2022-06-23 PROCEDURE — 99204 OFFICE O/P NEW MOD 45 MIN: CPT | Performed by: PEDIATRICS

## 2022-06-23 ASSESSMENT — PAIN SCALES - GENERAL: PAINLEVEL: SEVERE PAIN (7)

## 2022-06-23 NOTE — PATIENT INSTRUCTIONS
We discussed these other possible diagnosis: Discussed arthritis, stress fracture, tendinosis, lumbar etiology less likely. Given severity of pain, will obtain imaging. CT as patient can not have MRI.    Plan:  - Today's Plan of Care:  CT of the Right Hip - Call [phone] to schedule MRI   Rehab: [facility] - [phone]  Discussed activity considerations and other supportive care including Ice/Heat, OTC medications as needed.    -We also discussed other future treatment options:  Consideration of Injections    Follow Up: In clinic with Dr. Bardales after MRI (wait at least 1-2 days)    If you have any further questions for your physician or physician s care team you can call [phone] and use option 3 to leave a voice message. Calls received during business hours will be returned same day.

## 2022-06-23 NOTE — LETTER
6/23/2022         RE: Ml Evans  [id number] [address] [address]        Dear Colleague,    Thank you for referring your patient, Ml Evans, to the [facility]. Please see a copy of my visit note below.    ASSESSMENT & PLAN    Ml was seen today for pain.    Diagnoses and all orders for this visit:    Hip pain, right  -     Orthopedic  Referral  -     CT Hip Right w/o Contrast; Future  -     Physical Therapy Referral; Future  -     Cane Order for DME - ONLY FOR DME      This issue is acute and Unchanged.    We discussed these other possible diagnosis: Discussed arthritis, stress fracture, tendinosis, lumbar etiology less likely. Given severity of pain, will obtain imaging. CT as patient can not have MRI.    Plan:  - Today's Plan of Care:  CT of the Right Hip - Call [phone] to schedule MRI   Rehab: Physical Therapy: [facility] - [phone]  Discussed activity considerations and other supportive care including Ice/Heat, OTC medications as needed.    -We also discussed other future treatment options:  Consideration of Injections    Follow Up: In clinic with Dr. Bardales after MRI (wait at least 1-2 days)    Concerning signs and symptoms were reviewed.  The patient expressed understanding of this management plan and all questions were answered at this time.    Jen Bardales MD [facility]  Sports Medicine Physician  [facility]    -----  Chief Complaint   Patient presents with     Right Hip - Pain       SUBJECTIVE  Ml Evans is a/an 72 year old female who is seen as an ER referral evaluation of right hip pain.     The patient is seen with their .    Onset: About 2 months ago, started to notice anterior and posterior right hip pain. Has a history of fibromyalgia but this feels different then past flares. She notes deep anterior right groin pain that she sometimes has to hold onto the front of her  leg to ambulate. Has been using a cane to ambulate but notes that it is difficult with the small base of support. No acute injury onset.    Location of Pain: Right groin and posterior hip pain.  - no back pain, pain into leg    Worsened by: pivoting and hip rotation, ambulation  Better with: lifting her right leg with her arms to move the leg in bed or when sitting  Treatments tried: cane, Tylenol, Xanax   Associated symptoms: catching. Denies popping, numbness and tingling.    Orthopedic/Surgical history: No  Social History/Occupation: Retired    No family history pertinent to patient's problem today.    REVIEW OF SYSTEMS:  Review of Systems  Skin: no bruising, no swelling  Musculoskeletal: as above  Neurologic: no numbness, paresthesias  Remainder of review of systems is negative including constitutional, CV, pulmonary, GI, except as noted in HPI or medical history.    OBJECTIVE:  /83   Pulse 76   Wt 72.6 kg (160 lb)   LMP  (LMP Unknown)   BMI 30.23 kg/m     General: healthy, alert and in no distress  HEENT: no scleral icterus or conjunctival erythema  Skin: no suspicious lesions or rash. No jaundice.  CV: distal perfusion intact  Resp: normal respiratory effort without conversational dyspnea   Psych: normal mood and affect  Gait: normal steady gait with appropriate coordination and balance  Neuro: Normal light sensory exam of lower extremity    Low back exam:  Inspection:     no visible deformity in the low back       normal skin       normal vascular       normal lymphatic    Posture:      normal    Tender:     none    Non Tender:     remainder of lumbar spine    ROM:      full flexion       full extension    Strength:     hip flexion 5/5 bilateral       knee extension 5/5 bilateral       ankle dorsiflexion 5/5 bilateral       ankle plantarflexion 5/5 bilateral       dorsiflexion of the great toe 5/5 bilateral       able to heel and toe walk    Reflexes:     patellar (L3, L4) symmetric normal        achilles tendons (S1) symmetric normal    Sensation:    grossly intact throughout lower extremities    Special tests:      straight leg raise left negative        straight leg raise right negative    Bilateral hip exam  Inspection:      no edema or ecchymosis in hip area    Tender:      none bilateral    Non Tender:      remainder of the hip area bilateral    ROM:     Full active and passive ROM  Bilateral  - pain with internal and external rotation righ    Strength:      flexion 5-/5 right       abduction 5/5 bilateral       adduction 5/5 bilateral    Sensation:      grossly intact in hip and thigh    Special Tests:      neg (-) SHEREE right       positive (+) FADIR right    RADIOLOGY:  I independently visualized and reviewed these images with the patient  XR Pelvis 6/15/2022 - no acute bony abnormality, no significant degenerative change  - will follow official read      Recent Results (from the past 744 hour(s))   Pelvis XR w/ unilateral hip right    Narrative    EXAM: XR PELVIS AND HIP RIGHT 1 VIEW  LOCATION: [facility]  DATE/TIME: 6/15/2022 6:29 PM    INDICATION: right hip pain  COMPARISON: None.      Impression    IMPRESSION: Both hips negative for fracture or dislocation. Pelvis negative for fracture.     Reviewed ED note  Review of the result(s) of each unique test - XR               Again, thank you for allowing me to participate in the care of your patient.        Sincerely,        Jen Bardales MD

## 2022-06-30 ENCOUNTER — HOSPITAL ENCOUNTER (OUTPATIENT)
Dept: CT IMAGING | Facility: CLINIC | Age: 72
Discharge: HOME | End: 2022-06-30
Attending: PEDIATRICS | Admitting: PEDIATRICS
Payer: COMMERCIAL

## 2022-06-30 DIAGNOSIS — M25.551 HIP PAIN, RIGHT: ICD-10-CM

## 2022-06-30 PROCEDURE — 73700 CT LOWER EXTREMITY W/O DYE: CPT | Mod: RT

## 2022-07-06 DIAGNOSIS — E11.9 TYPE 2 DIABETES MELLITUS WITHOUT COMPLICATION, WITHOUT LONG-TERM CURRENT USE OF INSULIN (H): ICD-10-CM

## 2022-07-06 NOTE — TELEPHONE ENCOUNTER
Ins is now requiring [employer] diabetic testing supplies, so we just need a new order sent to [facility] Thanks

## 2022-07-07 PROBLEM — M25.551 HIP PAIN, RIGHT: Status: ACTIVE | Noted: 2022-07-07

## 2022-07-12 ENCOUNTER — HOSPITAL ENCOUNTER (OUTPATIENT)
Dept: PHYSICAL THERAPY | Facility: CLINIC | Age: 72
Setting detail: THERAPIES SERIES
Discharge: HOME | End: 2022-07-12
Attending: PEDIATRICS
Payer: COMMERCIAL

## 2022-07-12 DIAGNOSIS — M25.551 HIP PAIN, RIGHT: ICD-10-CM

## 2022-07-12 PROCEDURE — 97110 THERAPEUTIC EXERCISES: CPT | Mod: GP | Performed by: PHYSICAL THERAPIST

## 2022-07-12 PROCEDURE — 97161 PT EVAL LOW COMPLEX 20 MIN: CPT | Mod: GP | Performed by: PHYSICAL THERAPIST

## 2022-07-12 PROCEDURE — 97140 MANUAL THERAPY 1/> REGIONS: CPT | Mod: GP | Performed by: PHYSICAL THERAPIST

## 2022-07-12 NOTE — PROGRESS NOTES
07/12/22 1400   General Information   Type of Visit Initial OP Ortho PT Evaluation   Start of Care Date 07/12/22   Referring Physician Jen Reyes MD   Patient/Family Goals Statement decrease pain   Orders Evaluate and Treat   Date of Order 06/23/22   Certification Required? Yes   Medical Diagnosis Hip pain, right (M25.551)   Body Part(s)   Body Part(s) Hip   Presentation and Etiology   Pertinent history of current problem (include personal factors and/or comorbidities that impact the POC) Pt notes an increase in pain starting on the R buttock and wrapping around her hip to the groin. She had a CT and X-ray which were cleared for fx. The pt noted an increase pain when pivoting and turning on the hip. The pt notes being sedentary for the last few months to decrease discomfort to the area. The pt previously was cleaning and going through old things. She notes some cleaning but her  covers the kitchen tasks and a . She attempted a walk and increased her pain to the area. The pt notes having fibromyalgia and believes this is contributing to the area. She notes this pain has been ongoing for about 2 months. Pt notes spending about 50% of her day in the bedroom do to pain and only move small amounts. Using the cane for the last few weeks- Dr Bardales order. She notes less pain since seeing her. No orthopedic surgeries.   Impairments A. Pain;B. Decreased WB tolerance;D. Decreased ROM;E. Decreased flexibility;F. Decreased strength and endurance;G. Impaired balance;H. Impaired gait;I. Impaired skin integrity;K. Numbness;L. Tingling;N. Headaches;P. Bowel or bladder problems   Functional Limitations perform activities of daily living;perform desired leisure / sports activities   Symptom Location R hip   How/Where did it occur With repetition/overuse   Onset date of current episode/exacerbation 06/23/22   Chronicity Chronic   Pain rating (0-10 point scale) Best (/10);Worst (/10)   Best (/10) 2    Worst (/10) 8-9   Pain quality A. Sharp;C. Aching;E. Shooting;F. Stabbing   Frequency of pain/symptoms B. Intermittent   Pain/symptoms are: Worse during the day   Pain/symptoms exacerbated by A. Sitting;B. Walking;C. Lifting;D. Carrying;G. Certain positions;H. Overhead reach;J. ADL;K. Home tasks   Pain/symptoms eased by A. Sitting;C. Rest;E. Changing positions;F. Certain positions;I. OTC medication(s);J. Braces/supports   Progression of symptoms since onset: Improved   Prior Level of Function   Functional Level Prior Comment Pt notes going for daily walks and frequent mobility. She was able to clean, etc. in the household   Current Level of Function   Patient role/employment history F. Retired   [facility]   Home/community accessibility 14 stairs- bedroom upstairs   Fall Risk Screen   Fall screen completed by PT   Have you fallen 2 or more times in the past year? Yes   Have you fallen and had an injury in the past year? No   Is patient a fall risk? No   Abuse Screen (yes response referral indicated)   Feels Unsafe at Home or Work/School no   Feels Threatened by Someone no   Does Anyone Try to Keep You From Having Contact with Others or Doing Things Outside Your Home? no   Physical Signs of Abuse Present no   Hip Objective Findings   Gait/Locomotion small steps; slow gait pattern; cane in L hand (minimal use in the clinic); no cane= even step lengths and no compensation at this time   Balance/Proprioception (Single Leg Stance) + R trendelenburg;  SLS- 4 sec holds   Side (if bilateral, select both right and left) Right   Lumbar ROM WFL for flex and SB;  limited extension with only slightly past neutral   Gluteal Tendopathy Test +   Resisted Hip Adduction Test +   Straight Leg Raise Test -   Scour Test inconclusive;  some pinching in the groin with SCOUR but not present with change in force   SHEREE Test -   FADIR Test +   Palpation TrP to R adductors and deep rotators   Posture slight toeing  out b/l; weight shift to the L   [place] Flexibility Test +   Right Hamstring Flexibility -   Right Piriformis Flexibility +   Right Hip Flexion PROM 120 degrees   Right Hip ER PROM 60 degrees in 90/90   Right Hip IR PROM 15 degrees in 90/90   Right Hip Flexion Strength 4   Right Hip Abduction Strength 3+   Right Hip IR Strength 3-, difficult for full motion   Right Hip ER Strength 4+   Right Knee Flexion Strength 5   Right Knee Extension Strength 5   Planned Therapy Interventions   Planned Therapy Interventions balance training;gait training;joint mobilization;manual therapy;neuromuscular re-education;ROM;strengthening;stretching   Planned Modality Interventions   Planned Modality Interventions Electrical stimulation;TENS;Ultrasound   Clinical Impression   Criteria for Skilled Therapeutic Interventions Met yes, treatment indicated   PT Diagnosis R hip pain   Influenced by the following impairments muscle imbalance, weakness, decreased ROM, gait abnoramlity   Functional limitations due to impairments squatting, ambulation   Clinical Presentation Stable/Uncomplicated   Clinical Presentation Rationale chronic condition with Sx for the past few months with a reduction in Sx   Clinical Decision Making (Complexity) Low complexity   Therapy Frequency 1 time/week   Predicted Duration of Therapy Intervention (days/wks) 8   Risk & Benefits of therapy have been explained Yes   Patient, Family & other staff in agreement with plan of care Yes   ORTHO GOALS   PT Ortho Eval Goals 1;2;3   Ortho Goal 1   Goal Identifier ST HEP goal   Goal Description pt will be independent with her HEP in 2 weeks to strengthen at home   Target Date 07/26/22   Ortho Goal 2   Goal Identifier LT strength goal   Goal Description Pt will have 5/5 hip strength in 8 weeks to return to prior level of fucntion.   Target Date 09/06/22   Ortho Goal 3   Goal Identifier LT balance goal   Goal Description Pt will be able to hold a SLS for 15 sec to stabilize when  ambulating in 8 weeks.   Target Date 09/20/22   Therapy Certification   Certification date from 07/12/22   Certification date to 09/20/22   Medical Diagnosis Hip pain, right (M25.551)       Megha Torres, PT, DPT

## 2022-07-12 NOTE — PROGRESS NOTES
[facility]    OUTPATIENT PHYSICAL THERAPY ORTHOPEDIC EVALUATION  PLAN OF TREATMENT FOR OUTPATIENT REHABILITATION  (COMPLETE FOR INITIAL CLAIMS ONLY)  Patient's Last Name, First Name, M.I.  YOB: 1950  Ml Evans    Provider s Name:  [facility]   Medical Record No.  [id number]   Start of Care Date:  07/12/22   Onset Date:  06/23/22   Type:     _X__PT   ___OT   ___SLP Medical Diagnosis:  Hip pain, right (M25.551)     PT Diagnosis:  R hip pain   Visits from SOC:  1      _________________________________________________________________________________  Plan of Treatment/Functional Goals:  balance training, gait training, joint mobilization, manual therapy, neuromuscular re-education, ROM, strengthening, stretching     Electrical stimulation, TENS, Ultrasound     Goals  Goal Identifier: ST HEP goal  Goal Description: pt will be independent with her HEP in 2 weeks to strengthen at home  Target Date: 07/26/22    Goal Identifier: LT strength goal  Goal Description: Pt will have 5/5 hip strength in 8 weeks to return to prior level of fucntion.  Target Date: 09/06/22    Goal Identifier: LT balance goal  Goal Description: Pt will be able to hold a SLS for 15 sec to stabilize when ambulating in 8 weeks.  Target Date: 09/20/22                                                           Therapy Frequency:  1 time/week  Predicted Duration of Therapy Intervention:  8    Megha Torres PT                 I CERTIFY THE NEED FOR THESE SERVICES FURNISHED UNDER        THIS PLAN OF TREATMENT AND WHILE UNDER MY CARE     (Physician co-signature of this document indicates review and certification of the therapy plan).                       Certification Date From:  07/12/22   Certification Date To:  09/20/22    Referring Provider:  Jen Reyes MD    Initial  Assessment        See Epic Evaluation Start of Care Date: 07/12/22

## 2022-07-18 ENCOUNTER — OFFICE VISIT (OUTPATIENT)
Dept: ORTHOPEDICS | Facility: CLINIC | Age: 72
End: 2022-07-18
Payer: COMMERCIAL

## 2022-07-18 VITALS
SYSTOLIC BLOOD PRESSURE: 110 MMHG | DIASTOLIC BLOOD PRESSURE: 60 MMHG | BODY MASS INDEX: 30.23 KG/M2 | WEIGHT: 160 LBS | HEART RATE: 60 BPM

## 2022-07-18 DIAGNOSIS — M16.11 ARTHRITIS OF RIGHT HIP: ICD-10-CM

## 2022-07-18 DIAGNOSIS — M25.551 HIP PAIN, RIGHT: Primary | ICD-10-CM

## 2022-07-18 PROCEDURE — 99213 OFFICE O/P EST LOW 20 MIN: CPT | Performed by: PEDIATRICS

## 2022-07-18 NOTE — PATIENT INSTRUCTIONS
We discussed these other possible diagnosis: Likely flare of arthritis, fracture or stress fracture less likely given CT    Plan:  - Today's Plan of Care:  Continue Physical therapy and Home Exercise Program  Discussed activity considerations and other supportive care including Ice/Heat, OTC and other topical medications as needed.    -We also discussed other future treatment options:  Referral for US guided injection  Referral to Orthopedic Surgery    Follow Up: as needed    If you have any further questions for your physician or physician s care team you can call [phone] and use option 3 to leave a voice message. Calls received during business hours will be returned same day.

## 2022-07-18 NOTE — LETTER
7/18/2022         RE: Ml Evans  [id number] [address] [facility] [address] [facility] [address]        Dear Colleague,    Thank you for referring your patient, Ml Evans, to the [facility]. Please see a copy of my visit note below.    ASSESSMENT & PLAN    Ml was seen today for pain and ct results.    Diagnoses and all orders for this visit:    Hip pain, right    Arthritis of right hip      This issue is acute and Improving.    We discussed these other possible diagnosis: Likely flare of arthritis, fracture or stress fracture less likely given CT    Plan:  - Today's Plan of Care:  Continue Physical therapy and Home Exercise Program  Discussed activity considerations and other supportive care including Ice/Heat, OTC and other topical medications as needed.    -We also discussed other future treatment options:  Referral for US guided injection  Referral to Orthopedic Surgery    Follow Up: as needed    Concerning signs and symptoms were reviewed.  The patient expressed understanding of this management plan and all questions were answered at this time.    Jen Bardales MD [facility]  Sports Medicine Physician  [facility]      SUBJECTIVE- Interim History July 18, 2022    Chief Complaint   Patient presents with     Right Hip - Pain, CT Results       Ml Evans is a 72 year old female who is seen in f/u up for    Hip pain, right  Arthritis of right hip.  Since last visit on 6/23/22, patient has been improving. She has good days and bad days. Did have one PT visit which has been helpful. Here to review CT results.  - Now ~3 months ago, noticed first onset of pain. NKI    Worsened by: walking too much, doing too much, turning, knee flexion, twisting   Better with: physical therapy, resting, elevation, tylenol, cane   Treatments tried: rest/activity avoidance, ice, Tylenol, home exercises, physical therapy (1 visits), previous imaging (xray 6/15/22 and CT Scan  6/30/22)  Associated symptoms:  Patient denies symptoms    The patient is seen with their .    Orthopedic/Surgical history: NO  Social History/Occupation: retired    No family history pertinent to patient's problem today.    REVIEW OF SYSTEMS:  Review of Systems  Skin: no bruising, no swelling  Musculoskeletal: as above  Neurologic: no numbness, paresthesias  Remainder of review of systems is negative including constitutional, CV, pulmonary, GI, except as noted in HPI or medical history.    OBJECTIVE:  /60   Pulse 60   Wt 72.6 kg (160 lb)   LMP  (LMP Unknown)   BMI 30.23 kg/m       General: healthy, alert and in no distress  HEENT: no scleral icterus or conjunctival erythema  Skin: no suspicious lesions or rash. No jaundice.  CV: distal perfusion intact  Resp: normal respiratory effort without conversational dyspnea   Psych: normal mood and affect  Gait: normal steady gait with appropriate coordination and balance  Neuro: Normal light sensory exam of lower extremity     Bilateral hip exam  Inspection:      no edema or ecchymosis in hip area     Tender:      none bilateral     Non Tender:      remainder of the hip area bilateral     ROM:     Full active and passive ROM  Bilateral  - pain with internal and external rotation right     Strength:      flexion 5-/5 right       abduction 5/5 bilateral       adduction 5/5 bilateral     Sensation:      grossly intact in hip and thigh     Special Tests:      neg (-) SHEREE right       positive (+) FADIR right    RADIOLOGY:  Final results and radiologist's interpretation, available in the [facility] health record.  Images were reviewed with the patient in the office today.  My personal interpretation of the performed imaging:   CT right hip 6/30/2022 - mild - moderate right hip degenerative changes, no fractures    Results for orders placed or performed during the hospital encounter of 06/30/22   CT Hip Right w/o Contrast    Narrative    CT HIP RIGHT WITHOUT CONTRAST  6/30/2022 12:51 PM     HISTORY: Hip pain, right.    TECHNIQUE: Axial scans were performed through the right hip with  sagittal and coronal reconstruction. Radiation dose for this scan was  reduced using automated exposure control, adjustment of the mA and/or  kV according to patient size, or iterative reconstruction technique.    COMPARISON: X-ray from 6/15/2022.    FINDINGS: Mild to moderate right hip degenerative changes with mild  superior joint space narrowing and moderate subchondral sclerosis and  subchondral cyst formation in the acetabulum. No acute fracture. No CT  evidence of stress fracture. No soft tissue abnormality.      Impression    IMPRESSION: Mild to moderate right hip degenerative changes. No  evidence of fracture or stress fracture. If symptoms persist and there  is a persistent clinical concern for stress fracture, a bone scan  could be performed in this patient who apparently cannot have MRI.    TAYLOR LOPEZ MD         SYSTEM ID:  [id number]         Review of the result(s) of each unique test - CT             Again, thank you for allowing me to participate in the care of your patient.        Sincerely,        Jen Bardales MD

## 2022-07-18 NOTE — PROGRESS NOTES
ASSESSMENT & PLAN    Ml was seen today for pain and ct results.    Diagnoses and all orders for this visit:    Hip pain, right    Arthritis of right hip      This issue is acute and Improving.    We discussed these other possible diagnosis: Likely flare of arthritis, fracture or stress fracture less likely given CT    Plan:  - Today's Plan of Care:  Continue Physical therapy and Home Exercise Program  Discussed activity considerations and other supportive care including Ice/Heat, OTC and other topical medications as needed.    -We also discussed other future treatment options:  Referral for US guided injection  Referral to Orthopedic Surgery    Follow Up: as needed    Concerning signs and symptoms were reviewed.  The patient expressed understanding of this management plan and all questions were answered at this time.    Jen Bardales MD [facility]  Sports Medicine Physician  [facility]      SUBJECTIVE- Interim History July 18, 2022    Chief Complaint   Patient presents with     Right Hip - Pain, CT Results       Ml Evans is a 72 year old female who is seen in f/u up for    Hip pain, right  Arthritis of right hip.  Since last visit on 6/23/22, patient has been improving. She has good days and bad days. Did have one PT visit which has been helpful. Here to review CT results.  - Now ~3 months ago, noticed first onset of pain. NKI    Worsened by: walking too much, doing too much, turning, knee flexion, twisting   Better with: physical therapy, resting, elevation, tylenol, cane   Treatments tried: rest/activity avoidance, ice, Tylenol, home exercises, physical therapy (1 visits), previous imaging (xray 6/15/22 and CT Scan 6/30/22)  Associated symptoms:  Patient denies symptoms    The patient is seen with their .    Orthopedic/Surgical history: NO  Social History/Occupation: retired    No family history pertinent to patient's problem today.    REVIEW OF SYSTEMS:  Review of  Systems  Skin: no bruising, no swelling  Musculoskeletal: as above  Neurologic: no numbness, paresthesias  Remainder of review of systems is negative including constitutional, CV, pulmonary, GI, except as noted in HPI or medical history.    OBJECTIVE:  /60   Pulse 60   Wt 72.6 kg (160 lb)   LMP  (LMP Unknown)   BMI 30.23 kg/m       General: healthy, alert and in no distress  HEENT: no scleral icterus or conjunctival erythema  Skin: no suspicious lesions or rash. No jaundice.  CV: distal perfusion intact  Resp: normal respiratory effort without conversational dyspnea   Psych: normal mood and affect  Gait: normal steady gait with appropriate coordination and balance  Neuro: Normal light sensory exam of lower extremity     Bilateral hip exam  Inspection:      no edema or ecchymosis in hip area     Tender:      none bilateral     Non Tender:      remainder of the hip area bilateral     ROM:     Full active and passive ROM  Bilateral  - pain with internal and external rotation right     Strength:      flexion 5-/5 right       abduction 5/5 bilateral       adduction 5/5 bilateral     Sensation:      grossly intact in hip and thigh     Special Tests:      neg (-) SHEREE right       positive (+) FADIR right    RADIOLOGY:  Final results and radiologist's interpretation, available in the [facility] health record.  Images were reviewed with the patient in the office today.  My personal interpretation of the performed imaging:   CT right hip 6/30/2022 - mild - moderate right hip degenerative changes, no fractures    Results for orders placed or performed during the hospital encounter of 06/30/22   CT Hip Right w/o Contrast    Narrative    CT HIP RIGHT WITHOUT CONTRAST 6/30/2022 12:51 PM     HISTORY: Hip pain, right.    TECHNIQUE: Axial scans were performed through the right hip with  sagittal and coronal reconstruction. Radiation dose for this scan was  reduced using automated exposure control, adjustment of the mA  and/or  kV according to patient size, or iterative reconstruction technique.    COMPARISON: X-ray from 6/15/2022.    FINDINGS: Mild to moderate right hip degenerative changes with mild  superior joint space narrowing and moderate subchondral sclerosis and  subchondral cyst formation in the acetabulum. No acute fracture. No CT  evidence of stress fracture. No soft tissue abnormality.      Impression    IMPRESSION: Mild to moderate right hip degenerative changes. No  evidence of fracture or stress fracture. If symptoms persist and there  is a persistent clinical concern for stress fracture, a bone scan  could be performed in this patient who apparently cannot have MRI.    TAYLOR LOPEZ MD         SYSTEM ID:  [id number]         Review of the result(s) of each unique test - CT

## 2022-07-26 ENCOUNTER — HOSPITAL ENCOUNTER (OUTPATIENT)
Dept: PHYSICAL THERAPY | Facility: CLINIC | Age: 72
Setting detail: THERAPIES SERIES
Discharge: HOME | End: 2022-07-26
Attending: PEDIATRICS
Payer: COMMERCIAL

## 2022-07-26 PROCEDURE — 97140 MANUAL THERAPY 1/> REGIONS: CPT | Mod: GP | Performed by: PHYSICAL THERAPIST

## 2022-07-26 PROCEDURE — 97110 THERAPEUTIC EXERCISES: CPT | Mod: GP | Performed by: PHYSICAL THERAPIST

## 2022-08-12 ENCOUNTER — TELEPHONE (OUTPATIENT)
Dept: CARDIOLOGY | Facility: CLINIC | Age: 72
End: 2022-08-12

## 2022-08-16 ENCOUNTER — VIRTUAL VISIT (OUTPATIENT)
Dept: FAMILY MEDICINE | Facility: CLINIC | Age: 72
End: 2022-08-16
Payer: COMMERCIAL

## 2022-08-16 DIAGNOSIS — M79.7 FIBROMYALGIA: Primary | ICD-10-CM

## 2022-08-16 DIAGNOSIS — Z12.11 SCREEN FOR COLON CANCER: ICD-10-CM

## 2022-08-16 DIAGNOSIS — F41.1 GAD (GENERALIZED ANXIETY DISORDER): ICD-10-CM

## 2022-08-16 DIAGNOSIS — F33.1 MODERATE EPISODE OF RECURRENT MAJOR DEPRESSIVE DISORDER (H): ICD-10-CM

## 2022-08-16 DIAGNOSIS — F98.8 ATTENTION DEFICIT DISORDER (ADD) WITHOUT HYPERACTIVITY: ICD-10-CM

## 2022-08-16 PROCEDURE — 99214 OFFICE O/P EST MOD 30 MIN: CPT | Mod: 95 | Performed by: INTERNAL MEDICINE

## 2022-08-16 ASSESSMENT — PAIN SCALES - GENERAL: PAINLEVEL: EXTREME PAIN (8)
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
## 2022-08-16 NOTE — PATIENT INSTRUCTIONS
Health Care Maintenance:  You are due for a colonoscopy.  Please call [phone] to schedule  Recommend shingles vaccine - get at retail pharmacy  Recommend pneumonia vaccine and COVID booster    Fibromyalgia:  See attached information   The alprazolam you are on can definitely cause brain fog, memory loss, fatigue; work with the psychiatrist to decrease the dose of this medication  Discussed keeping an open mind to alternative treatment for ADHD besides Adderall  [facility] has good resources for fibromyalgia   Best treatment of fibromyalgia is regular physical exercise - warm water pool therapy has been shown to be the most helpful;  however, any exercise helps with fibromyalgia;  exercise is different than chores around the house      Exercise Resources:    For Seniors or Those with Pain/Mobility Issues:  Silver Sneakers  https://tools.[employer] Games/  Anytime Fitness, Snap Fitness, [facility], [facility]  2. Essentrics Stretch (airs on PBS)  [email or web address] rouge.com/  3. Your Juniper - small group classes (in person or online) that help you stay active. Free or low cost  [employer].[email or web address]        Behavioral Health Resources for Psychiatry:    [facility]     [phone]    [facility]-works with community providers [phone]    [facility]   [phone]    [facility]      [phone]   [facility]     [phone]   Psychiatrist staff available at all 15 [place] locations    [facility] ([facility])  [phone]    [facility]  [phone]    [facility]     [phone]      [facility] (Intake & Assessment)  [phone]    [facility]   [phone]   [facility]     [phone]   [place]

## 2022-09-02 ENCOUNTER — LAB (OUTPATIENT)
Dept: LAB | Facility: CLINIC | Age: 72
End: 2022-09-02
Payer: COMMERCIAL

## 2022-09-02 DIAGNOSIS — R80.9 TYPE 2 DIABETES MELLITUS WITH MICROALBUMINURIA, WITHOUT LONG-TERM CURRENT USE OF INSULIN (H): ICD-10-CM

## 2022-09-02 DIAGNOSIS — E11.29 TYPE 2 DIABETES MELLITUS WITH MICROALBUMINURIA, WITHOUT LONG-TERM CURRENT USE OF INSULIN (H): ICD-10-CM

## 2022-09-02 LAB
CREAT UR-MCNC: 305.3 MG/DL
HBA1C MFR BLD: 6.3 % (ref 0–5.6)
MICROALBUMIN UR-MCNC: 21.6 MG/L
MICROALBUMIN/CREAT UR: 7.08 MG/G CR (ref 0–25)

## 2022-09-02 PROCEDURE — 36415 COLL VENOUS BLD VENIPUNCTURE: CPT

## 2022-09-02 PROCEDURE — 83036 HEMOGLOBIN GLYCOSYLATED A1C: CPT

## 2022-09-02 PROCEDURE — 82043 UR ALBUMIN QUANTITATIVE: CPT

## 2022-10-08 DIAGNOSIS — K21.9 GASTROESOPHAGEAL REFLUX DISEASE WITHOUT ESOPHAGITIS: ICD-10-CM

## 2022-10-10 RX ORDER — LANSOPRAZOLE 30 MG/1
30 CAPSULE, DELAYED RELEASE ORAL DAILY
Qty: 90 CAPSULE | Refills: 1 | Status: SHIPPED | OUTPATIENT
Start: 2022-10-10 | End: 2022-11-02

## 2022-10-10 NOTE — TELEPHONE ENCOUNTER
"Prescription approved per [facility] Refill Protocol.    Pending Prescriptions:                       Disp   Refills    LANsoprazole (PREVACID) 30 MG DR capsule *90 cap*1            Sig: TAKE 1 CAPSULE (30 MG) BY MOUTH DAILY 30-60           MINUTES BEFORE A MEAL.      Requested Prescriptions   Pending Prescriptions Disp Refills     LANsoprazole (PREVACID) 30 MG DR capsule [Pharmacy Med Name: LANSOPRAZOLE 30MG CPDR] 90 capsule 1     Sig: TAKE 1 CAPSULE (30 MG) BY MOUTH DAILY 30-60 MINUTES BEFORE A MEAL.       PPI Protocol Passed - 10/8/2022  9:37 AM        Passed - Not on Clopidogrel (unless Pantoprazole ordered)        Passed - No diagnosis of osteoporosis on record        Passed - Recent (12 mo) or future (30 days) visit within the authorizing provider's specialty     Patient has had an office visit with the authorizing provider or a provider within the authorizing providers department within the previous 12 mos or has a future within next 30 days. See \"Patient Info\" tab in inbasket, or \"Choose Columns\" in Meds & Orders section of the refill encounter.              Passed - Medication is active on med list        Passed - Patient is age 18 or older        Passed - No active pregnacy on record        Passed - No positive pregnancy test in past 12 months           Rosalinda English RN on 10/10/2022 at 2:50 PM    "

## 2022-10-17 ENCOUNTER — MYC MEDICAL ADVICE (OUTPATIENT)
Dept: GASTROENTEROLOGY | Facility: CLINIC | Age: 72
End: 2022-10-17

## 2022-10-21 NOTE — PROGRESS NOTES
[facility]    Outpatient Physical Therapy Discharge Note  Patient: Ml Evans  : 1950    Beginning/End Dates of Reporting Period:  22 to 22    Referring Provider: Jen Reyes MD    Therapy Diagnosis: R hip pain     Client Self Report: Pt notes improvement with her pain. She notes reducing her activites to make sure she isn't doing too much at home. Greater activity since the pain and injury.Pt is using her cane intermittently at this time.    Objective Measurements:     Unknown status of the pt at this time. As noted above, the pt stated a decrease in pain.    Goals:  Goal Identifier ST HEP goal   Goal Description pt will be independent with her HEP in 2 weeks to strengthen at home   Target Date 22   Date Met      Progress (detail required for progress note):       Goal Identifier LT strength goal   Goal Description Pt will have 5/5 hip strength in 8 weeks to return to prior level of fucntion.   Target Date 22   Date Met      Progress (detail required for progress note):       Goal Identifier LT balance goal   Goal Description Pt will be able to hold a SLS for 15 sec to stabilize when ambulating in 8 weeks.   Target Date 22   Date Met      Progress (detail required for progress note):           Plan:  Discharge from therapy.    Discharge:    Reason for Discharge: Patient has failed to schedule further appointments.    Equipment Issued: none    Discharge Plan: Patient to continue home program.

## 2022-11-01 ENCOUNTER — TELEPHONE (OUTPATIENT)
Dept: FAMILY MEDICINE | Facility: CLINIC | Age: 72
End: 2022-11-01

## 2022-11-01 ENCOUNTER — NURSE TRIAGE (OUTPATIENT)
Dept: NURSING | Facility: CLINIC | Age: 72
End: 2022-11-01

## 2022-11-01 NOTE — TELEPHONE ENCOUNTER
Patient last had covid vaccine in 2021.  Patient wasn't able to get another vaccine due to having a staff infection and then had some GI issues.    Patient is concerned about going to the clinic with sick people.  She wants to know if she should come in.  I said yes she should come in.  I said you can wear a mask and keep hands clean and away from face.  I said it would be better to go to clinic than needing to go to UC or ED.    Patient agrees, she states she will also ask the provider about getting updated vaccines.    Ml Bella, RN   11/01/22 7:08 PM  [facility] Nurse Advisor      Reason for Disposition    General information question, no triage required and triager able to answer question    Additional Information    Negative: [1] Caller is not with the adult (patient) AND [2] reporting urgent symptoms    Negative: Lab result questions    Negative: Medication questions    Negative: Caller can't be reached by phone    Negative: Caller has already spoken to PCP or another triager    Negative: RN needs further essential information from caller in order to complete triage    Negative: Requesting regular office appointment    Negative: [1] Caller requesting NON-URGENT health information AND [2] PCP's office is the best resource    Negative: Health Information question, no triage required and triager able to answer question    Protocols used: INFORMATION ONLY CALL - NO TRIAGE-A-

## 2022-11-01 NOTE — TELEPHONE ENCOUNTER
"S-(situation): Patient calling to discuss heartburn symptoms that are intensifying.    B-(background): Fibromyalgia, MI, Gerd symptoms for a couple of years , DM 2,     A-(assessment): Ongoing symptoms of Gerd worsening. Patient reports regurgitation of food and liquids, burning sensation in esophagus and nose, vomiting. Feels like her food \"just sits there and it hurts\". Feels full easily. Gets short of breath.      R-(recommendations): Scheduled appt with PCP for 11/02/22.  Rosalinda PARIKH RN      "

## 2022-11-03 ENCOUNTER — TELEPHONE (OUTPATIENT)
Dept: FAMILY MEDICINE | Facility: CLINIC | Age: 72
End: 2022-11-03

## 2022-11-03 RX ORDER — BISACODYL 5 MG
TABLET, DELAYED RELEASE (ENTERIC COATED) ORAL
Qty: 4 TABLET | Refills: 0 | Status: SHIPPED | OUTPATIENT
Start: 2022-11-03 | End: 2022-11-30

## 2022-11-03 NOTE — TELEPHONE ENCOUNTER
Screening Questions  BLUE  KIND OF PREP RED  LOCATION [review exclusion criteria] GREEN  SEDATION TYPE        YEs Are you active on mychart?       Ricki Ordering/Referring Provider?        [employer] What type of coverage do you have?     No Have you had a positive covid test in the last 90 days?     No 1. BMI  [BMI 40+ - review exclusion criteria]    Yes  2. Are you able to give consent for your medical care? [IF NO,RN REVIEW]        No  3. Are you taking any prescription pain medications on a routine schedule?      NA  3a. EXTENDED PREP What kind of prescription?     No 4. Do you have any chemical dependencies such as alcohol, street drugs, or methadone?    Yes 5. Do you have any history of post-traumatic stress syndrome, severe anxiety or history of psychosis?      **If yes 3- 5 , please schedule with MAC sedation.**          IF YES TO ANY 6 - 10 - HOSPITAL SETTING ONLY.     No 6.   Do you need assistance transferring?     No 7.   Have you had a heart or lung transplant?    No 8.   Are you currently on dialysis?   No 9.   Do you use daily home oxygen?   No 10. Do you take nitroglycerin?   10a. NA If yes, how often?     11. [FEMALES]  No Are you currently pregnant?    11a. NA If yes, how many weeks? [ Greater than 12 weeks, OR NEEDED]    No 12. Do you have Pulmonary Hypertension? *NEED PAC APPT AT [facility]*     No 13. [review exclusion criteria]  Do you have any implantable devices in your body (pacemaker, defib, LVAD)?    No 14. In the past 6 months, have you had any heart related issues including cardiomyopathy or heart attack?     14a. NA If yes, did it require cardiac stenting if so when?     No 15. Have you had a stroke or Transient ischemic attack (TIA - aka  mini stroke ) within 6 months?      No 16. Do you have mod to severe Obstructive Sleep Apnea?  [Hospital only - Ok at [place]]    No 17. Do you have SEVERE AND UNCONTROLLED asthma? *NEED PAC APPT AT [facility]*     No 18. Are you currently taking any blood  "thinners?     18a. If yes, inform patient to \"follow up w/ ordering provider for bridging instructions.\"    No 19. Do you take the medication Phentermine?    19a. If yes, \"Hold for 7 days before procedure.  Please consult your prescribing provider if you have questions about holding this medication.\"     No  20. Do you have chronic kidney disease?      Yes  21. Do you have a diagnosis of diabetes?       22. On a regular basis do you go 3-5 days between bowel movements?     [facility] 23. Preferred LOCAL Pharmacy for Pre Prescription    [ LIST ONLY ONE PHARMACY]        [facility] PHARMACY [facility], [place] - [address] [place] [address] PHARMACY [place], [place] - [address] [facility]        - CLOSING REMINDERS -    Informed patient they will need an adult    Cannot take any type of public or medical transportation alone    Conscious Sedation- Needs  for 6 hours after the procedure       MAC/General-Needs  for 24 hours after procedure    Pre-Procedure Covid test to be completed [[facility] PCR Testing Required]    Confirmed Nurse will call to complete assessment       - SCHEDULING DETAILS -     [facility]  Surgeon    11-7-22  Date of Procedure  Upper and Lower Endoscopy [EGD and Colonoscopy]  Type of Procedure Scheduled   [facility]-If you answer yes to questions #8, #20, #21Which Colonoscopy Prep was Sent?     GEN Sedation Type     NA PAC / Pre-op Required         Additional comments:  NA          "

## 2022-11-04 ENCOUNTER — ANESTHESIA EVENT (OUTPATIENT)
Dept: GASTROENTEROLOGY | Facility: CLINIC | Age: 72
End: 2022-11-04
Payer: COMMERCIAL

## 2022-11-04 RX ORDER — ONDANSETRON 2 MG/ML
4 INJECTION INTRAMUSCULAR; INTRAVENOUS EVERY 30 MIN PRN
Status: CANCELLED | OUTPATIENT
Start: 2022-11-04

## 2022-11-04 RX ORDER — ONDANSETRON 4 MG/1
4 TABLET, ORALLY DISINTEGRATING ORAL EVERY 30 MIN PRN
Status: CANCELLED | OUTPATIENT
Start: 2022-11-04

## 2022-11-04 RX ORDER — SODIUM CHLORIDE, SODIUM LACTATE, POTASSIUM CHLORIDE, CALCIUM CHLORIDE 600; 310; 30; 20 MG/100ML; MG/100ML; MG/100ML; MG/100ML
INJECTION, SOLUTION INTRAVENOUS CONTINUOUS
Status: CANCELLED | OUTPATIENT
Start: 2022-11-04

## 2022-11-04 ASSESSMENT — LIFESTYLE VARIABLES: TOBACCO_USE: 1

## 2022-11-04 NOTE — ANESTHESIA PREPROCEDURE EVALUATION
Anesthesia Pre-Procedure Evaluation    Patient: Ml Evans   MRN: [id number] : 1950        Procedure : Procedure(s):  COLONOSCOPY  ESOPHAGOGASTRODUODENOSCOPY (EGD)          Past Medical History:   Diagnosis Date     Attention deficit disorder without mention of hyperactivity      Benign essential hypertension 3/9/2017     Coronary artery disease march 15,2011    Two stents placed, angioplasty     Diabetes mellitus (H)     A1C 5.7-6.4, controlled with diet     Dysthymic disorder      GERD (gastroesophageal reflux disease) 2011     Glycogenosis (H)      History of blood transfusion     euptured ectopic pg     History of ST elevation myocardial infarction (STEMI) 2019     Malignant neoplasm (H)     squamous cell carcinoma many years ago     Other and unspecified hyperlipidemia      Squamous cell carcinoma       Past Surgical History:   Procedure Laterality Date     ABDOMEN SURGERY  ,,     APPENDECTOMY       BIOPSY      nose, during surgery     COLONOSCOPY       COLONOSCOPY N/A 2015    Procedure: COMBINED COLONOSCOPY, SINGLE OR MULTIPLE BIOPSY/POLYPECTOMY BY BIOPSY;  Surgeon: Ponce Christine MD;  Location: [facility]     ECTOPIC PREGNANCY SURGERY       ENDOSCOPIC RELEASE CARPAL TUNNEL  2013    Procedure: ENDOSCOPIC RELEASE CARPAL TUNNEL;  Right endoscopic carpal tunnel release;  Surgeon: Jonah Dela Cruz MD;  Location: [facility]     ENT SURGERY      nose- suspected basal cell- was benign     ESOPHAGOSCOPY, GASTROSCOPY, DUODENOSCOPY (EGD), COMBINED  2014    Procedure: COMBINED ESOPHAGOSCOPY, GASTROSCOPY, DUODENOSCOPY (EGD), BIOPSY SINGLE OR MULTIPLE;  Surgeon: Anibal Sebastian MD;  Location: [facility]     GENITOURINARY SURGERY  1991     HYSTERECTOMY, ELIECER      total hysterectomy. Unsure if ELIECER or vaginal     SURGICAL HISTORY OF -       appy     SURGICAL HISTORY OF -       T&A     VASCULAR SURGERY      angioplasty- 2 stents implanted       Allergies   Allergen Reactions     Hydrocodone Anaphylaxis     Flexeril [Cyclobenzaprine] Rash     Cipro [Ciprofloxacin] Other (See Comments)     Abd pain , proteinuria , microscopic hematuria  Possibly related to cipro     Codeine Camsylate Nausea     Pcn [Penicillins] Difficulty breathing     Amoxicillin Itching and Rash     Sulfa Drugs Itching and Rash     Tetracycline Itching and Rash      Social History     Tobacco Use     Smoking status: Former     Packs/day: 1.00     Years: 30.00     Pack years: 30.00     Types: Cigarettes     Start date: 1968     Quit date: 3/15/2011     Years since quittin.6     Smokeless tobacco: Never     Tobacco comments:     occasional/daily   Substance Use Topics     Alcohol use: No     Alcohol/week: 0.0 standard drinks      Wt Readings from Last 1 Encounters:   22 72.6 kg (160 lb)        Anesthesia Evaluation   Pt has had prior anesthetic. Type: General and MAC.        ROS/MED HX  ENT/Pulmonary:     (+) tobacco use, Past use,     Neurologic:       Cardiovascular:     (+) Dyslipidemia hypertension--CAD -past MI --Previous cardiac testing   Echo: Date: Results:    Stress Test: Date: Results:    ECG Reviewed: Date: 19 Results:  Tomographic Findings  Overall, the study quality is adequate. Body mass index 28.5 . On the  stress images, there is a small apical defect with mild reduction in  radiotracer uptake. On the rest images, no appreciable changes were  seen to this defect . Gated images demonstrated normal wall motion.  The small fixed apical defect is likely due to soft tissue  attenuation. The left ventricular ejection fraction was calculated to  be 76%. TID was absent.  Cath: Date: Results:      METS/Exercise Tolerance: 4 - Raking leaves, gardening    Hematologic:       Musculoskeletal: Comment: Hip pain, right  Fibromyalgia          GI/Hepatic: Comment: Nonspecific elevation of levels of transaminase or lactic acid dehydrogenase    (+) GERD, Inflammatory  bowel disease,     Renal/Genitourinary:       Endo:     (+) type II DM,     Psychiatric/Substance Use: Comment: Attention deficit disorder without mention of hyperactivity    (+) psychiatric history anxiety     Infectious Disease:       Malignancy:       Other:            Physical Exam    Airway        Mallampati: II   TM distance: > 3 FB   Neck ROM: full   Mouth opening: > 3 cm    Respiratory Devices and Support         Dental  no notable dental history   Comment: Left upper partial bridge        Cardiovascular             Pulmonary                   OUTSIDE LABS:  CBC:   Lab Results   Component Value Date    WBC 8.0 03/15/2022    WBC 7.7 09/18/2019    HGB 15.0 03/15/2022    HGB 10.0 (L) 09/18/2019    HCT 46.2 03/15/2022    HCT 33.3 (L) 09/18/2019     03/15/2022     09/18/2019     BMP:   Lab Results   Component Value Date     03/15/2022     04/13/2021    POTASSIUM 4.1 03/15/2022    POTASSIUM 3.9 04/13/2021    CHLORIDE 108 03/15/2022    CHLORIDE 106 04/13/2021    CO2 31 03/15/2022    CO2 27 04/13/2021    BUN 10 03/15/2022    BUN 11 04/13/2021    CR 0.82 03/15/2022    CR 0.80 04/13/2021     (H) 03/15/2022     (H) 04/13/2021     COAGS:   Lab Results   Component Value Date    PTT 29 05/09/2014    INR 0.94 05/09/2014     POC:   Lab Results   Component Value Date     (H) 09/19/2019     HEPATIC:   Lab Results   Component Value Date    ALBUMIN 3.3 (L) 09/18/2019    PROTTOTAL 6.2 (L) 09/18/2019    ALT 53 (H) 09/18/2019    AST 41 09/18/2019    ALKPHOS 125 09/18/2019    BILITOTAL 0.3 09/18/2019     OTHER:   Lab Results   Component Value Date    A1C 6.3 (H) 09/02/2022    SAMIA 9.3 03/15/2022    MAG 1.7 03/17/2011    LIPASE 128 09/18/2019    TSH 2.35 01/28/2020    CRP <2.9 10/29/2019    SED 8 10/29/2019       Anesthesia Plan    ASA Status:  3   NPO Status:  NPO Appropriate    Anesthesia Type: General.     - Airway: Native airway              Consents    Anesthesia Plan(s) and  associated risks, benefits, and realistic alternatives discussed. Questions answered and patient/representative(s) expressed understanding.    - Discussed:     - Discussed with:  Patient      - Extended Intubation/Ventilatory Support Discussed: No.      - Patient is DNR/DNI Status: No    Use of blood products discussed: No .     Postoperative Care    Pain management: IV analgesics, Oral pain medications.   PONV prophylaxis: Ondansetron (or other 5HT-3), Droperidol or Haldol     Comments:                Leo Kay CRNA, APRN CRNA

## 2022-11-06 ENCOUNTER — NURSE TRIAGE (OUTPATIENT)
Dept: NURSING | Facility: CLINIC | Age: 72
End: 2022-11-06

## 2022-11-06 NOTE — TELEPHONE ENCOUNTER
Nurse Triage SBAR    Is this a 2nd Level Triage? NO    Situation: Patient calling with question about pre endoscopy testing.  Consent: not needed    Background: Pt states she has a dry throat and a slight cough and wondering if she should keep her scheduled endoscopy for tomorrow.  Concerned since she wont' be wearing a mask.   Advised pt to take the home test as advised by her provider and if negative to bring test results with her at time of appt tomorrow.  Pt agrees to test at home today and bring negative results with her tomorrow or call them if positive.     Roula Ferrell RN [facility] 11/6/2022 12:40 PM    Reason for Disposition    Health Information question, no triage required and triager able to answer question    Additional Information    Negative: [1] Caller is not with the adult (patient) AND [2] reporting urgent symptoms    Negative: Lab result questions    Negative: Medication questions    Negative: Caller can't be reached by phone    Negative: Caller has already spoken to PCP or another triager    Negative: RN needs further essential information from caller in order to complete triage    Negative: Requesting regular office appointment    Negative: [1] Caller requesting NON-URGENT health information AND [2] PCP's office is the best resource    Protocols used: INFORMATION ONLY CALL - [facility]

## 2022-11-06 NOTE — TELEPHONE ENCOUNTER
Pt having a colonoscopy and endoscopy tomorrow. Has question regarding prep which were addressed. Pt also ask if she should take insulin tonight d/t not eating today. Pt states that she typically does 18 units of Lantus at 11PM-12 AM and is asking if she should still do this. Pt sent MyChart msg to PCP Fri afternoon but did not hear back.     Plan:  MD consult, Dr. Leonor Tolbert Paged by Answering Service at 1630     Reason for page: question regarding insulin prior to colonoscopy tomorrow AM.    Was connected with and spoke to Dr. Tolbert she advised pt to hold insulin tonight. Check BS before bed and in the AM beffore going to procedure and to let providers there know that she did not take her insulin and what her readings are.    Informed pt of plan and she verbalized understanding.    Cassandra Mims, RN, BSN  [facility] 4:48 PM 11/6/2022        Reason for Disposition    [1] Caller has URGENT medicine question about med that PCP or specialist prescribed AND [2] triager unable to answer question    Additional Information    Negative: [1] Intentional drug overdose AND [2] suicidal thoughts or ideas    Negative: Drug overdose and triager unable to answer question    Negative: Caller requesting a renewal or refill of a medicine patient is currently taking    Negative: Caller requesting information unrelated to medicine    Negative: Caller requesting information about COVID-19 Vaccine    Negative: Caller requesting information about Emergency Contraception    Negative: Caller requesting information about Combined Birth Control Pills    Negative: Caller requesting information about Progestin Birth Control Pills    Negative: Caller requesting information about Post-Op pain or medicines    Negative: Caller requesting a prescription antibiotic (such as Penicillin) for Strep throat and has a positive culture result    Negative: Caller requesting a prescription anti-viral med (such as Tamiflu) and has  influenza (flu) symptoms    Negative: Immunization reaction suspected    Negative: Rash while taking a medicine or within 3 days of stopping it    Negative: [1] Asthma and [2] having symptoms of asthma (cough, wheezing, etc.)    Negative: [1] Symptom of illness (e.g., headache, abdominal pain, earache, vomiting) AND [2] more than mild    Negative: Breastfeeding questions about mother's medicines and diet    Negative: MORE THAN A DOUBLE DOSE of a prescription or over-the-counter (OTC) drug    Negative: [1] DOUBLE DOSE (an extra dose or lesser amount) of prescription drug AND [2] any symptoms (e.g., dizziness, nausea, pain, sleepiness)    Negative: [1] DOUBLE DOSE (an extra dose or lesser amount) of over-the-counter (OTC) drug AND [2] any symptoms (e.g., dizziness, nausea, pain, sleepiness)    Negative: Took another person's prescription drug    Protocols used: [facility]STION [facility]

## 2022-11-07 ENCOUNTER — ANESTHESIA (OUTPATIENT)
Dept: GASTROENTEROLOGY | Facility: CLINIC | Age: 72
End: 2022-11-07
Payer: COMMERCIAL

## 2022-11-07 ENCOUNTER — HOSPITAL ENCOUNTER (OUTPATIENT)
Facility: CLINIC | Age: 72
Discharge: HOME | End: 2022-11-07
Attending: SURGERY | Admitting: SURGERY
Payer: COMMERCIAL

## 2022-11-07 VITALS
RESPIRATION RATE: 16 BRPM | WEIGHT: 160 LBS | TEMPERATURE: 98.3 F | HEART RATE: 80 BPM | OXYGEN SATURATION: 96 % | HEIGHT: 61 IN | DIASTOLIC BLOOD PRESSURE: 65 MMHG | BODY MASS INDEX: 30.21 KG/M2 | SYSTOLIC BLOOD PRESSURE: 120 MMHG

## 2022-11-07 DIAGNOSIS — Z12.11 SPECIAL SCREENING FOR MALIGNANT NEOPLASMS, COLON: Primary | ICD-10-CM

## 2022-11-07 LAB
COLONOSCOPY: NORMAL
GLUCOSE BLDC GLUCOMTR-MCNC: 76 MG/DL (ref 70–99)
GLUCOSE BLDC GLUCOMTR-MCNC: 94 MG/DL (ref 70–99)
UPPER GI ENDOSCOPY: NORMAL

## 2022-11-07 PROCEDURE — 258N000003 HC RX IP 258 OP 636: Performed by: SURGERY

## 2022-11-07 PROCEDURE — 250N000009 HC RX 250: Performed by: NURSE ANESTHETIST, CERTIFIED REGISTERED

## 2022-11-07 PROCEDURE — 250N000011 HC RX IP 250 OP 636: Performed by: NURSE ANESTHETIST, CERTIFIED REGISTERED

## 2022-11-07 PROCEDURE — 82962 GLUCOSE BLOOD TEST: CPT

## 2022-11-07 PROCEDURE — 45385 COLONOSCOPY W/LESION REMOVAL: CPT | Mod: PT | Performed by: SURGERY

## 2022-11-07 PROCEDURE — 250N000009 HC RX 250: Performed by: SURGERY

## 2022-11-07 PROCEDURE — 88305 TISSUE EXAM BY PATHOLOGIST: CPT | Mod: TC | Performed by: SURGERY

## 2022-11-07 PROCEDURE — 43239 EGD BIOPSY SINGLE/MULTIPLE: CPT | Performed by: SURGERY

## 2022-11-07 PROCEDURE — 43239 EGD BIOPSY SINGLE/MULTIPLE: CPT | Mod: 51 | Performed by: SURGERY

## 2022-11-07 PROCEDURE — 370N000017 HC ANESTHESIA TECHNICAL FEE, PER MIN: Performed by: SURGERY

## 2022-11-07 RX ORDER — PROPOFOL 10 MG/ML
INJECTION, EMULSION INTRAVENOUS CONTINUOUS PRN
Status: DISCONTINUED | OUTPATIENT
Start: 2022-11-07 | End: 2022-11-07

## 2022-11-07 RX ORDER — LIDOCAINE 40 MG/G
CREAM TOPICAL
Status: DISCONTINUED | OUTPATIENT
Start: 2022-11-07 | End: 2022-11-07 | Stop reason: HOSPADM

## 2022-11-07 RX ORDER — GLYCOPYRROLATE 0.2 MG/ML
INJECTION, SOLUTION INTRAMUSCULAR; INTRAVENOUS PRN
Status: DISCONTINUED | OUTPATIENT
Start: 2022-11-07 | End: 2022-11-07

## 2022-11-07 RX ORDER — PROPOFOL 10 MG/ML
INJECTION, EMULSION INTRAVENOUS PRN
Status: DISCONTINUED | OUTPATIENT
Start: 2022-11-07 | End: 2022-11-07

## 2022-11-07 RX ORDER — SODIUM CHLORIDE, SODIUM LACTATE, POTASSIUM CHLORIDE, CALCIUM CHLORIDE 600; 310; 30; 20 MG/100ML; MG/100ML; MG/100ML; MG/100ML
INJECTION, SOLUTION INTRAVENOUS CONTINUOUS
Status: DISCONTINUED | OUTPATIENT
Start: 2022-11-07 | End: 2022-11-07 | Stop reason: HOSPADM

## 2022-11-07 RX ORDER — LIDOCAINE HYDROCHLORIDE 10 MG/ML
INJECTION, SOLUTION EPIDURAL; INFILTRATION; INTRACAUDAL; PERINEURAL PRN
Status: DISCONTINUED | OUTPATIENT
Start: 2022-11-07 | End: 2022-11-07

## 2022-11-07 RX ADMIN — LIDOCAINE HYDROCHLORIDE 0.1 ML: 10 INJECTION, SOLUTION EPIDURAL; INFILTRATION; INTRACAUDAL; PERINEURAL at 10:14

## 2022-11-07 RX ADMIN — PROPOFOL 200 MCG/KG/MIN: 10 INJECTION, EMULSION INTRAVENOUS at 11:18

## 2022-11-07 RX ADMIN — LIDOCAINE HYDROCHLORIDE 100 MG: 10 INJECTION, SOLUTION EPIDURAL; INFILTRATION; INTRACAUDAL; PERINEURAL at 11:18

## 2022-11-07 RX ADMIN — PROPOFOL 150 MG: 10 INJECTION, EMULSION INTRAVENOUS at 11:18

## 2022-11-07 RX ADMIN — GLYCOPYRROLATE 0.2 MG: 0.2 INJECTION, SOLUTION INTRAMUSCULAR; INTRAVENOUS at 11:18

## 2022-11-07 RX ADMIN — SODIUM CHLORIDE, POTASSIUM CHLORIDE, SODIUM LACTATE AND CALCIUM CHLORIDE: 600; 310; 30; 20 INJECTION, SOLUTION INTRAVENOUS at 10:14

## 2022-11-07 ASSESSMENT — ACTIVITIES OF DAILY LIVING (ADL): ADLS_ACUITY_SCORE: 37

## 2022-11-07 NOTE — ANESTHESIA CARE TRANSFER NOTE
Patient: Ml Evans    Procedure: Procedure(s):  COLONOSCOPY, FLEXIBLE, WITH LESION REMOVAL USING SNARE  ESOPHAGOGASTRODUODENOSCOPY, WITH BIOPSY       Diagnosis: Esophageal dysphagia [R13.19]  Colon cancer screening [Z12.11]  Diagnosis Additional Information: No value filed.    Anesthesia Type:   General     Note:    Oropharynx: oropharynx clear of all foreign objects  Level of Consciousness: awake and drowsy      Independent Airway: airway patency satisfactory and stable  Dentition: dentition unchanged  Vital Signs Stable: post-procedure vital signs reviewed and stable  Report to RN Given: handoff report given  Patient transferred to: Phase II    Handoff Report: Identifed the Patient, Identified the Reponsible Provider, Reviewed the pertinent medical history, Discussed the surgical course, Reviewed Intra-OP anesthesia mangement and issues during anesthesia, Set expectations for post-procedure period and Allowed opportunity for questions and acknowledgement of understanding      Vitals:  Vitals Value Taken Time   BP 76/54 11/07/22 1144   Temp     Pulse 81 11/07/22 1144   Resp     SpO2 95 % 11/07/22 1146   Vitals shown include unvalidated device data.    Electronically Signed By: FIDEL Tavera CRNA  November 7, 2022  11:47 AM

## 2022-11-07 NOTE — LETTER
Ml Evans  [id number] [facility] [address]  November 9, 2022    Dear Ml,   This letter is to inform you of the results of your pathology report on your upper endoscopy (EGD).    Your pathology report was:  Final Diagnosis   A.  Stomach, antrum: Biopsy:  - Antral-type mucosa within normal limits  - No Helicobacter pylori-like organisms seen on H&E examination      Normal, please follow up by having a repeat upper endoscopy (EGD), if your symptoms worsen.  If you have questions, please contact your primary care physician.    If you have any questions or concerns please do not hesitate to call my office at [phone].  Sincerely,     [facility], DO  [facility]

## 2022-11-07 NOTE — H&P
[facility]    Pre-Endoscopy History and Physical     Ml Evans MRN# [id number]   YOB: 1950 Age: 72 year old     Date of Procedure: 11/7/2022  Primary care provider: Shwetha Robison  Type of Endoscopy: Colonoscopy with possible biopsy, possible polypectomy and Esophagogastroduodenoscopy with possible biopsy, possible dilation, possible foreign body removal  Reason for Procedure: hx of TAs, reflux  Type of Anesthesia Anticipated: MAC    HPI:    Ml is a 72 year old female who will be undergoing the above procedure.  last colon 2015 (TAs); no blood thinners; on prevacid 30mg BID; cardiac stents;     A history and physical has been performed. The patient's medications and allergies have been reviewed. The risks and benefits of the procedure and the sedation options and risks were discussed with the patient.  All questions were answered and informed consent was obtained.      She denies a personal or family history of anesthesia complications or bleeding disorders.     Patient Active Problem List   Diagnosis     Attention deficit disorder     Nonspecific elevation of levels of transaminase or lactic acid dehydrogenase (LDH)     Moderate episode of recurrent major depressive disorder (H)     Seasonal allergies     Anal fissure     Menopausal syndrome (hot flashes)     Hyperlipidemia LDL goal <70     HPV (human papilloma virus) anogenital infection     Coronary artery disease involving native coronary artery of native heart without angina pectoris     Diverticulosis     Advanced directives, counseling/discussion     Colon polyp     Chronic rhinitis     Benign essential hypertension     Irritable bowel syndrome with diarrhea     Type 2 diabetes mellitus with microalbuminuria, without long-term current use of insulin (H)     Gastroesophageal reflux disease without esophagitis     Osteopenia of both hips     History of diverticulitis     Abdominal adhesions     Hip  pain, right     Fibromyalgia     RAVI (generalized anxiety disorder)        Past Medical History:   Diagnosis Date     Attention deficit disorder without mention of hyperactivity      Benign essential hypertension 3/9/2017     Coronary artery disease march 15,2011    Two stents placed, angioplasty     Diabetes mellitus (H)     A1C 5.7-6.4, controlled with diet     Dysthymic disorder      GERD (gastroesophageal reflux disease) 1/20/2011     Glycogenosis (H)      History of blood transfusion 1981    euptured ectopic pg     History of ST elevation myocardial infarction (STEMI) 1/23/2019     Malignant neoplasm (H)     squamous cell carcinoma many years ago     Other and unspecified hyperlipidemia      Squamous cell carcinoma         Past Surgical History:   Procedure Laterality Date     ABDOMEN SURGERY  1981,1991,1993     APPENDECTOMY  1993     BIOPSY  2003    nose, during surgery     COLONOSCOPY  2005     COLONOSCOPY N/A 5/14/2015    Procedure: COMBINED COLONOSCOPY, SINGLE OR MULTIPLE BIOPSY/POLYPECTOMY BY BIOPSY;  Surgeon: Ponce Christine MD;  Location: [facility]     ECTOPIC PREGNANCY SURGERY  1981     ENDOSCOPIC RELEASE CARPAL TUNNEL  4/16/2013    Procedure: ENDOSCOPIC RELEASE CARPAL TUNNEL;  Right endoscopic carpal tunnel release;  Surgeon: Jonah Dela Cruz MD;  Location: [facility]     ENT SURGERY  2003    nose- suspected basal cell- was benign     ESOPHAGOSCOPY, GASTROSCOPY, DUODENOSCOPY (EGD), COMBINED  8/18/2014    Procedure: COMBINED ESOPHAGOSCOPY, GASTROSCOPY, DUODENOSCOPY (EGD), BIOPSY SINGLE OR MULTIPLE;  Surgeon: Anibal Sebastian MD;  Location: [facility]     GENITOURINARY SURGERY  1981, 1991     HYSTERECTOMY, ELIECER      total hysterectomy. Unsure if ELIECER or vaginal     SURGICAL HISTORY OF -       appy     SURGICAL HISTORY OF -       T&A     VASCULAR SURGERY  2011    angioplasty- 2 stents implanted       Social History     Tobacco Use     Smoking status: Former     Packs/day: 1.00     Years: 30.00     Pack  years: 30.00     Types: Cigarettes     Start date: 1968     Quit date: 3/15/2011     Years since quittin.6     Smokeless tobacco: Never     Tobacco comments:     occasional/daily   Substance Use Topics     Alcohol use: No     Alcohol/week: 0.0 standard drinks       Family History   Problem Relation Age of Onset     Cancer Mother         uterine     Lipids Mother      Neurologic Disorder Mother         polymyalgia     Hypertension Mother      Other Cancer Mother         endometrial     Depression/Anxiety Mother      Other - See Comments Mother         Heart Arrythmia      Atrial fibrillation Mother      Macular Degeneration Mother      Cardiovascular Father         CHF     Depression Father      C.A.D. Father         bypass x2 starting at age 55-  in his 70's     Diabetes Father         type 2     Coronary Artery Disease Father          from - age 75     Depression/Anxiety Father      Cerebrovascular Disease Father         from angioplasty      C.A.D. Maternal Grandfather      Coronary Artery Disease Maternal Grandfather          from- age 61     C.A.D. Paternal Grandfather      Diabetes Brother      Depression Son      Psychotic Disorder Son         adhd     Diabetes Brother         type 1     Depression/Anxiety Brother      Depression/Anxiety Maternal Grandmother      Depression/Anxiety Son      Asthma Son         childhood asthma-out grown now as an adult     Coronary Artery Disease Brother         stent-MI     Melanoma No family hx of        Prior to Admission medications    Medication Sig Start Date End Date Taking? Authorizing Provider   acetaminophen (TYLENOL) 500 MG tablet Take 500-1,000 mg by mouth every 4 hours as needed for mild pain   Yes Reported, Patient   acetylcysteine (N-ACETYL CYSTEINE) 600 MG CAPS capsule Take 3,000 mg by mouth daily   Yes Reported, Patient   ALPRAZolam (XANAX PO) Take 0.5-1 mg by mouth 4 times daily as needed 1/2 tab in am, 1 tab in pm, then 1 tab midday  prn and 1/2 tab throughout the day if needed   Yes Unknown, Entered By History   amphetamine-dextroamphetamine (ADDERALL XR) 20 MG per capsule Take 20 mg by mouth daily  11/6/15  Yes Reported, Patient   aspirin 81 MG EC tablet Take 81 mg by mouth daily    Yes Reported, Patient   atorvastatin (LIPITOR) 80 MG tablet TAKE ONE TABLET BY MOUTH ONCE DAILY 4/18/22  Yes Shwetha Robison DO   bisacodyl (DULCOLAX) 5 MG EC tablet Take 2 tablets at 3 pm the day before your procedure. If your procedure is before 11 am, take 2 additional tablets at 11 pm. If your procedure is after 11 am, take 2 additional tablets at 6 am. For additional instructions refer to your colonoscopy prep instructions. 11/3/22  Yes Maximilian Cobian MD   DoxePIN (SINEQUAN) 75 MG capsule Take 75 mg by mouth At Bedtime    Yes Reported, Patient   dulaglutide (TRULICITY) 1.5 MG/0.5ML pen Inject 1.5 mg Subcutaneous every 7 days 3/29/22  Yes Shwetha Robison DO   escitalopram (LEXAPRO) 20 MG tablet Take 20 mg by mouth daily   Yes Reported, Patient   fluocinonide (LIDEX) 0.05 % external solution Apply to itchy areas scalp BID x 1-2 weeks PRN 4/18/22  Yes Magdalena Selby PA-C   insulin glargine (LANTUS SOLOSTAR) 100 UNIT/ML pen Inject 18 Units Subcutaneous At Bedtime 1/18/22  Yes Shwetha Robison DO   LANsoprazole (PREVACID) 30 MG DR capsule Take 1 capsule (30 mg) by mouth 2 times daily for 30 days, THEN 1 capsule (30 mg) every morning (before breakfast) for 360 days. 30-60 minutes before a meal. 11/2/22 11/27/23 Yes Shwetha Robison DO   lisinopril (ZESTRIL) 5 MG tablet Take 1 tablet (5 mg) by mouth daily 3/29/22  Yes Shwetha Robison DO   metoprolol succinate ER (TOPROL-XL) 25 MG 24 hr tablet Take 1 tablet (25 mg) by mouth daily 3/29/22  Yes Robison, Shwetha Skye, DO   nitroGLYcerin (NITROSTAT) 0.4 MG sublingual tablet Place 1 tablet (0.4 mg) under the tongue every 5 minutes as needed for chest pain 3/19/21  Yes Martell Biggs  MD [place]   polyethylene glycol (GOLYTELY) 236 g suspension The night before the exam at 6 pm drink an 8-ounce glass every 15 minutes until the jug is half empty. If you arrive before 11 AM: Drink the other half of the Golytely jug at 11 PM night before procedure. If you arrive after 11 AM: Drink the other half of the Golytely jug at 6 AM day of procedure. For additional instructions refer to your colonoscopy prep instructions. 11/3/22  Yes Cobian, MD Maximilian   Wheat Dextrin (BENEFIBER PO) Powder, every other night   Yes Reported, Patient   alcohol swab prep pads Use to swab area of injection/bethel as directed. 3/29/22   Shwetha Robison, DO   Alcohol Swabs (B-D SINGLE USE SWABS REGULAR) PADS USE TO SWAB AREA OF INJECTION/BETHEL AS DIRECTED. 12/3/21   Shwetha Robison, DO   blood glucose (NO BRAND SPECIFIED) lancets standard Use to test blood sugar 3 times daily or as directed. 12/17/20   Shwetha Robison, DO   blood glucose (NO BRAND SPECIFIED) test strip Use to test blood sugar 1 times daily or as directed. To accompany: Blood Glucose Monitor Brands: per insurance. 3/29/22   Shwetha Robison DO   blood glucose calibration (NO BRAND SPECIFIED) solution To accompany: Blood Glucose Monitor Brands: per insurance. 3/29/22   Shwetha Robison, DO   blood glucose monitoring (NO BRAND SPECIFIED) meter device kit Use to test blood sugar 3 times daily or as directed. 7/6/22   Shwetha Robison, DO   blood glucose monitoring (NO BRAND SPECIFIED) meter device kit Use to test blood sugar 1 times daily or as directed. Preferred blood glucose meter OR supplies to accompany: Blood Glucose Monitor Brands: per insurance. 3/29/22   Shwetha Robison, DO   CONTOUR NEXT TEST test strip USE TO TEST BLOOD SUGAR THREE TIMES A DAY 3/3/22   Shwetha Robison, DO   insulin pen needle (ULTICARE MICRO) 32G X 4 MM miscellaneous USE 1 PEN NEEDLE DAILY OR AS DIRECTED. 3/29/22   Shwetha Robison DO  "  Microlet Lancets MISC USE TO TEST BLOOD SUGAR 3 TIMES DAILY OR AS DIRECTED. 3/3/22   Shwetha Robison DO   mupirocin (BACTROBAN) 2 % external ointment Apply to open areas on the scalp BID until healed 4/18/22   Magdalena Selby PA-C   thin (NO BRAND SPECIFIED) lancets Use with lanceting device. To accompany: Blood Glucose Monitor Brands: per insurance. 3/29/22   Shwetha Robison DO       Allergies   Allergen Reactions     Hydrocodone Anaphylaxis     Flexeril [Cyclobenzaprine] Rash     Cipro [Ciprofloxacin] Other (See Comments)     Abd pain , proteinuria , microscopic hematuria  Possibly related to cipro     Codeine Camsylate Nausea     Pcn [Penicillins] Difficulty breathing     Amoxicillin Itching and Rash     Sulfa Drugs Itching and Rash     Tetracycline Itching and Rash        REVIEW OF SYSTEMS:   5 point ROS negative except as noted above in HPI, including Gen., Resp., CV, GI &  system review.    PHYSICAL EXAM:   /61 (BP Location: Right arm)   Pulse 55   Temp 98.3  F (36.8  C) (Oral)   Resp 18   Ht 1.549 m (5' 1\")   Wt 72.6 kg (160 lb)   LMP  (LMP Unknown)   BMI 30.23 kg/m   Estimated body mass index is 30.23 kg/m  as calculated from the following:    Height as of this encounter: 1.549 m (5' 1\").    Weight as of this encounter: 72.6 kg (160 lb).   Constitutional: Awake, alert, no acute distress.  Eyes: No scleral icterus.  Conjunctiva are without injection.  ENMT: Mucous membranes moist, dentition and gums are intact.   Neck: Soft, supple, trachea midline.    Endocrine: n/a   Lymphatic: There is no cervical, submandibularadenopathy.  Respiratory: normal effortgs   Cardiovascular: S1, S2  Abdomen: Non-distended, non-tender,  No masses,  Musculoskeletal: No spinal or CVA tenderness. Full range of motion in the upper and lower extremities.    Skin: No skin rashes or lesions to inspection.  No petechia.    Neurologic: alerted and oriented 3x  Psychiatric: The patient's affect is not " blunted and mood is appropriate.  DIAGNOSTICS:    Not indicated    IMPRESSION   ASA Class 3 - Severe systemic disease, but not incapacitating    PLAN:   Plan for Colonoscopy with possible biopsy, possible polypectomy and Esophagogastroduodenoscopy with possible biopsy, possible dilation, possible foreign body removal. We discussed the risks, benefits and alternatives and the patient wished to proceed.  Patient is cleared for the above procedure.    The above has been forwarded to the consulting provider.    [facility], [facility]

## 2022-11-07 NOTE — ANESTHESIA POSTPROCEDURE EVALUATION
Patient: Ml Evans    Procedure: Procedure(s):  COLONOSCOPY, FLEXIBLE, WITH LESION REMOVAL USING SNARE  ESOPHAGOGASTRODUODENOSCOPY, WITH BIOPSY       Anesthesia Type:  General    Note:  Disposition: Outpatient   Postop Pain Control: Uneventful            Sign Out: Well controlled pain   PONV: No   Neuro/Psych: Uneventful            Sign Out: Acceptable/Baseline neuro status   Airway/Respiratory: Uneventful            Sign Out: Acceptable/Baseline resp. status   CV/Hemodynamics: Uneventful            Sign Out: Acceptable CV status; No obvious hypovolemia; No obvious fluid overload   Other NRE: NONE   DID A NON-ROUTINE EVENT OCCUR? No           Last vitals:  Vitals:    11/07/22 0910   BP: 129/61   Pulse: 55   Resp: 18   Temp: 36.8  C (98.3  F)       Electronically Signed By: FIDEL Tavera CRNA  November 7, 2022  11:47 AM

## 2022-11-07 NOTE — LETTER
Ml Evans  [id number] [address]    November 9, 2022    Dear Ml,  This letter is written to inform you of the results of your recent colonoscopy.  Your examination showed polyp(s) in your ascending colon. All polyps were removed in their entirety and sent for review by a pathologist. As you will see on the pathology report below, the tissue(s) were tubular adenomatous polyps. Your examination was otherwise without abnormality.    B.  Colon, ascending: Polypectomy:  - Tubular adenoma  - No evidence of high-grade dysplasia or invasive malignancy      Adenomatous polyps are entirely benign (non-cancerous); however, patients who have developed these polyps are at an increased risk for developing additional polyps in the future. If these are not eventually removed, there is a risk of developing colon cancer. We will advise more frequent examinations with you because of the risk associated with this type of polyp.    Given these findings, your personal history of tubular adenomas, I recommend that you undergo a repeat colonoscopy in 5 year(s) for surveillance. We will enter you into a recall system so you receive a reminder closer to the time that you are due for repeat examination.     Please remember that this recommendation is made with the understanding that you are not experiencing persistent changes in bowel function, bleeding per rectum, and/or significant abdominal pain. If you experience these symptoms, please contact your primary care provider for a further evaluation.     If you have any questions or concerns about the results of your colonoscopy or the appropriate follow-up, please contact my assistant at [phone].      Sincerely,        [facility],   [facility]  ___

## 2022-11-08 LAB
PATH REPORT.COMMENTS IMP SPEC: NORMAL
PATH REPORT.COMMENTS IMP SPEC: NORMAL
PATH REPORT.FINAL DX SPEC: NORMAL
PATH REPORT.GROSS SPEC: NORMAL
PATH REPORT.MICROSCOPIC SPEC OTHER STN: NORMAL
PATH REPORT.RELEVANT HX SPEC: NORMAL
PHOTO IMAGE: NORMAL

## 2022-11-08 PROCEDURE — 88305 TISSUE EXAM BY PATHOLOGIST: CPT | Mod: 26 | Performed by: PATHOLOGY

## 2022-11-18 ENCOUNTER — TELEPHONE (OUTPATIENT)
Dept: FAMILY MEDICINE | Facility: CLINIC | Age: 72
End: 2022-11-18

## 2022-11-18 NOTE — TELEPHONE ENCOUNTER
Reason for Call:  Request for results:    Name of test or procedure:  Colonoscopy/Endoscopy    Date of test of procedure: 11/7/22    Location of the test or procedure: [place]    OK to leave the result message on voice mail or with a family member? YES    Phone number Patient can be reached at:  Home number on file [phone] (home)    Additional comments [place]  Pt would like to discuss results with someone ASAP.  Please call patient to discuss        Call taken on 11/18/2022 at 4:06 PM by Mag Kiran

## 2022-11-20 ENCOUNTER — HEALTH MAINTENANCE LETTER (OUTPATIENT)
Age: 72
End: 2022-11-20

## 2022-11-21 NOTE — TELEPHONE ENCOUNTER
Patient has follow-up questions about recent colonoscopy and endoscopy, including next steps, GERD medications, diet, etc.  She was advised to reach out to Dr. Cobian's office for any immediate questions re: results, since they performed the testing, etc.   Then RN did assist patient with scheduling telephone visit with PCP next week to discuss f/u questions r/t GERD, medication, diet.    Patient is also scheduled for in-person with PCP in December for annual routine visit.     Perla Calle RN  [facility]

## 2022-11-30 ENCOUNTER — VIRTUAL VISIT (OUTPATIENT)
Dept: FAMILY MEDICINE | Facility: CLINIC | Age: 72
End: 2022-11-30
Payer: COMMERCIAL

## 2022-11-30 DIAGNOSIS — R13.19 ESOPHAGEAL DYSPHAGIA: Primary | ICD-10-CM

## 2022-11-30 DIAGNOSIS — R10.12 LUQ ABDOMINAL PAIN: ICD-10-CM

## 2022-11-30 DIAGNOSIS — K44.9 HIATAL HERNIA: ICD-10-CM

## 2022-11-30 DIAGNOSIS — K21.9 GASTROESOPHAGEAL REFLUX DISEASE WITHOUT ESOPHAGITIS: ICD-10-CM

## 2022-11-30 PROCEDURE — 99214 OFFICE O/P EST MOD 30 MIN: CPT | Mod: 95 | Performed by: INTERNAL MEDICINE

## 2022-11-30 ASSESSMENT — PATIENT HEALTH QUESTIONNAIRE - PHQ9: SUM OF ALL RESPONSES TO PHQ QUESTIONS 1-9: 9

## 2022-11-30 NOTE — PATIENT INSTRUCTIONS
Reflux/Swallowing  Get a swallowing study; they will call you to schedule  Get CT due to abdominal pain.  Radiology test was ordered.  Please call [phone] to schedule.  Referral to GI - esophageal expert - they will call you to schedule  Increase lansoprazole 30 mg twice daily x 1 month, then back to 30 mg once daily

## 2022-11-30 NOTE — PROGRESS NOTES
Narcisa is a 72 year old who is being evaluated via a billable telephone visit.      What phone number would you like to be contacted at? [phone]  How would [facility]? [facility]    Assessment & Plan   Problem List Items Addressed This Visit     Gastroesophageal reflux disease without esophagitis    Relevant Orders    Speech Therapy Referral    CT Abdomen Pelvis w Contrast    Adult GI  Referral - Consult Only   Other Visit Diagnoses     Esophageal dysphagia    -  Primary    Relevant Orders    Speech Therapy Referral    CT Abdomen Pelvis w Contrast    Adult GI  Referral - Consult Only    LUQ abdominal pain        Relevant Orders    Speech Therapy Referral    CT Abdomen Pelvis w Contrast    Adult GI  Referral - Consult Only    Hiatal hernia        Relevant Orders    Speech Therapy Referral    CT Abdomen Pelvis w Contrast    Adult GI  Referral - Consult Only             Patient Instructions   Reflux/Swallowing  1. Get a swallowing study; they will call you to schedule  2. Get CT due to abdominal pain.  Radiology test was ordered.  Please call [phone] to schedule.  3. Referral to GI - esophageal expert - they will call you to schedule  4. Increase lansoprazole 30 mg twice daily x 1 month, then back to 30 mg once daily      No follow-ups on file.    Shwetha Robison, [facility]    Subjective   Narcisa is a 72 year old accompanied by her spouse, presenting for the following health issues:  Follow Up (From colonoscopy and also had endoscopy 11/7/22 would like to know the test results )      HPI       Chief Complaint   Patient presents with     Follow Up     From colonoscopy and also had endoscopy 11/7/22 would like to know the test results        Medication Helping Symptoms:  NO- is vomiting is having is having difficulty swallowing. Not eating peanut butter bread, fullness after eating only having small meals, is having shortness of breath,  "is is happing more often, having hiccups, bloating and burping more .     From RN triage note yesterday \"Ongoing symptoms of Gerd worsening. Patient reports regurgitation of food and liquids, burning sensation in esophagus and nose, vomiting. Feels like her food \"just sits there and it hurts\". Feels full easily. Gets short of breath.  \"    She reports 2 episodes that started 2 weeks ago - nausea, epigastric pain, vomiting, diarrhea,. Fatigue    Known history of IBS and GERD    Feels food is getting stuck in esophagus    Avoiding spicy foods    Having non-exertional chest pain    Today, scopes as below    She reports worsening of the swallowing symptoms and reflux symptoms     Needs to take more water to take pills and eat certain foods    Has been sitting up straighter with eating and this helps;  Is eating slower and this helps    Is following anti-GERD diet    GERD is more frequent despite higher dose of PPI    Has occ left sided abdominal pain which is severe at times; this is new the last few weeks    She has many questions about anatomy of the abnormalities seen on scopes, her symptoms, pathology, etc     Skin lesion  --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 Pertinent negatives include normal sphincter tone.        A 4 mm polyp was found in the ascending colon. The polyp was sessile. The polyp was removed with a cold snare. Resection and retrieval were complete. Verification of patient identification for the specimen was        done by the physician, nurse and technician using the patient's name,  birth date and medical record number. Estimated blood loss was minimal.  A few small-mouthed diverticula were found in the recto-sigmoid colon        and sigmoid colon.                                                                                   "   Impression:            - One 4 mm polyp in the ascending colon, removed with                          a cold snare. Resected and retrieved.                        - Diverticulosis in the recto-sigmoid colon and in the sigmoid colon.   Recommendation:        - Patient has a contact number available for                          emergencies. The signs and symptoms of potential                          delayed complications were discussed with the patient.                          Return to normal activities tomorrow. Written                          discharge instructions were provided to the patient.                          - Resume previous diet.                          - Continue present medications.                          - Await pathology results.                          - Repeat colonoscopy in 5 years for surveillance.                          - Return to primary care physician PRN.                                                          egd    - Gastroesophageal flap valve classified as Hill Grade III (minimal fold, loose to endoscope, hiatal hernia                          likely).                          - Small hiatal hernia.                          - Mucosal changes suspicious for chronic gastritis.  Biopsied.                          - A few gastric polyps.                          - Normal ampulla, duodenal bulb, first portion of the                          duodenum and second portion of the duodenum.   Recommendation:        - Patient has a contact number available for                          emergencies. The signs and symptoms of potential                          delayed complications were discussed with the patient.                          Return to normal activities tomorrow. Written                          discharge instructions were provided to the patient.                          - Resume previous diet.                          - Continue present medications.                           - Await pathology results.                          - Return to referring physician PRN.                    Final Diagnosis   A.  Stomach, antrum: Biopsy:  - Antral-type mucosa within normal limits  - No Helicobacter pylori-like organisms seen on H&E examination      B.  Colon, ascending: Polypectomy:  - Tubular adenoma  - No evidence of high-grade dysplasia or invasive malignancy         Current Outpatient Medications   Medication Sig Dispense Refill     acetaminophen (TYLENOL) 500 MG tablet Take 500-1,000 mg by mouth every 4 hours as needed for mild pain       acetylcysteine (N-ACETYL CYSTEINE) 600 MG CAPS capsule Take 3,000 mg by mouth daily       alcohol swab prep pads Use to swab area of injection/bethel as directed. 100 each 3     Alcohol Swabs (B-D SINGLE USE SWABS REGULAR) PADS USE TO SWAB AREA OF INJECTION/BETHEL AS DIRECTED. 100 each 3     ALPRAZolam (XANAX PO) Take 0.5-1 mg by mouth 4 times daily as needed 1/2 tab in am, 1 tab in pm, then 1 tab midday prn and 1/2 tab throughout the day if needed       amphetamine-dextroamphetamine (ADDERALL XR) 20 MG per capsule Take 20 mg by mouth daily        aspirin 81 MG EC tablet Take 81 mg by mouth daily        atorvastatin (LIPITOR) 80 MG tablet TAKE ONE TABLET BY MOUTH ONCE DAILY 90 tablet 3     blood glucose (NO BRAND SPECIFIED) lancets standard Use to test blood sugar 3 times daily or as directed. 100 each 1     blood glucose (NO BRAND SPECIFIED) test strip Use to test blood sugar 1 times daily or as directed. To accompany: Blood Glucose Monitor Brands: per insurance. 100 strip 6     blood glucose calibration (NO BRAND SPECIFIED) solution To accompany: Blood Glucose Monitor Brands: per insurance. 1 each 1     blood glucose monitoring (NO BRAND SPECIFIED) meter device kit Use to test blood sugar 3 times daily or as directed. 1 kit 0     blood glucose monitoring (NO BRAND SPECIFIED) meter device kit Use to test blood sugar 1 times daily or as directed. Preferred  blood glucose meter OR supplies to accompany: Blood Glucose Monitor Brands: per insurance. 1 kit 0     CONTOUR NEXT TEST test strip USE TO TEST BLOOD SUGAR THREE TIMES A  strip 0     DoxePIN (SINEQUAN) 75 MG capsule Take 75 mg by mouth At Bedtime        dulaglutide (TRULICITY) 1.5 MG/0.5ML pen Inject 1.5 mg Subcutaneous every 7 days 6 mL 3     escitalopram (LEXAPRO) 20 MG tablet Take 20 mg by mouth daily       fluocinonide (LIDEX) 0.05 % external solution Apply to itchy areas scalp BID x 1-2 weeks PRN 50 mL 3     insulin glargine (LANTUS SOLOSTAR) 100 UNIT/ML pen Inject 18 Units Subcutaneous At Bedtime 30 mL 3     insulin pen needle (ULTICARE MICRO) 32G X 4 MM miscellaneous USE 1 PEN NEEDLE DAILY OR AS DIRECTED. 100 each 3     LANsoprazole (PREVACID) 30 MG DR capsule Take 1 capsule (30 mg) by mouth 2 times daily for 30 days, THEN 1 capsule (30 mg) every morning (before breakfast) for 360 days. 30-60 minutes before a meal. (Patient taking differently: Take 1 capsule (30 mg) by mouth 2 times daily for 30 days, THEN 1 capsule (30 mg) every morning (before breakfast) for 360 days. 30-60 minutes before a meal.    Taking one time daily) 90 capsule 3     lisinopril (ZESTRIL) 5 MG tablet Take 1 tablet (5 mg) by mouth daily 90 tablet 3     metoprolol succinate ER (TOPROL-XL) 25 MG 24 hr tablet Take 1 tablet (25 mg) by mouth daily 90 tablet 3     Microlet Lancets MISC USE TO TEST BLOOD SUGAR 3 TIMES DAILY OR AS DIRECTED. 100 each 0     mupirocin (BACTROBAN) 2 % external ointment Apply to open areas on the scalp BID until healed 30 g 3     nitroGLYcerin (NITROSTAT) 0.4 MG sublingual tablet Place 1 tablet (0.4 mg) under the tongue every 5 minutes as needed for chest pain 25 tablet 0     thin (NO BRAND SPECIFIED) lancets Use with lanceting device. To accompany: Blood Glucose Monitor Brands: per insurance. 100 each 11     Wheat Dextrin (BENEFIBER PO) Powder, every other night       bisacodyl (DULCOLAX) 5 MG EC tablet Take  2 tablets at 3 pm the day before your procedure. If your procedure is before 11 am, take 2 additional tablets at 11 pm. If your procedure is after 11 am, take 2 additional tablets at 6 am. For additional instructions refer to your colonoscopy prep instructions. 4 tablet 0     polyethylene glycol (GOLYTELY) 236 g suspension The night before the exam at 6 pm drink an 8-ounce glass every 15 minutes until the jug is half empty. If you arrive before 11 AM: Drink the other half of the Golytely jug at 11 PM night before procedure. If you arrive after 11 AM: Drink the other half of the Golytely jug at 6 AM day of procedure. For additional instructions refer to your colonoscopy prep instructions. 4000 mL 0           Review of Systems   Constitutional, HEENT, cardiovascular, pulmonary, gi and gu systems are negative, except as otherwise noted.      Objective    Vitals - Patient Reported  Pain Score: Extreme Pain (9)  Pain Loc: Abdomen      Vitals:  No vitals were obtained today due to virtual visit.    Physical Exam   healthy, alert and no distress  PSYCH: Alert and oriented times 3; coherent speech, normal   rate and volume, able to articulate logical thoughts, able   to abstract reason, no tangential thoughts, no hallucinations   or delusions  Her affect is anxious  RESP: No cough, no audible wheezing, able to talk in full sentences  Remainder of exam unable to be completed due to telephone visits                Phone call duration: 35 minutes

## 2022-12-04 ENCOUNTER — TELEPHONE (OUTPATIENT)
Dept: FAMILY MEDICINE | Facility: CLINIC | Age: 72
End: 2022-12-04

## 2022-12-04 NOTE — TELEPHONE ENCOUNTER
Prior Authorization Retail Medication Request    Medication/Dose: lansoprazole  ICD code (if different than what is on RX):    Previously Tried and Failed:    Rationale:      Insurance Name:  [facility] [place] PART D  Insurance ID:  [id number]  [phone]      MAX 1 PER DAY PER INS, LIMITATION OVERRIDE      Thank You,  Sondra Dimas, [facility]

## 2022-12-05 NOTE — TELEPHONE ENCOUNTER
Central Prior Authorization Team   Phone: [phone]    PA Initiation    Medication: lansoprazole  Insurance Company: [facility] - Phone [phone] Fax [phone]  Pharmacy Filling the Rx: [place], [place] - [address] [place] [address]  Filling Pharmacy Phone: [phone]  Filling Pharmacy Fax:    Start Date: 12/5/2022

## 2022-12-06 NOTE — TELEPHONE ENCOUNTER
Prior Authorization Approval    Authorization Effective Date: 9/6/2022  Authorization Expiration Date: 12/5/2023  Medication: lansoprazole  Approved Dose/Quantity:    Reference #:     Insurance Company: [employer] [place] - Phone [phone] Fax [phone]  Expected CoPay:       CoPay Card Available:      Foundation Assistance Needed:    Which Pharmacy is filling the prescription (Not needed for infusion/clinic administered): [facility] [place], [place] - [address] [place] [address]  Pharmacy Notified: Yes  Patient Notified: Yes  **Instructed pharmacy to notify patient when script is ready to /ship.**

## 2022-12-07 ENCOUNTER — TELEPHONE (OUTPATIENT)
Dept: DERMATOLOGY | Facility: CLINIC | Age: 72
End: 2022-12-07

## 2022-12-07 NOTE — TELEPHONE ENCOUNTER
Unable to reach patient. Message left that she can either shampoo or not shampoo-up to her per Magdalena Selby PA-C. She does not have to bring in lost hair per Magdalena.    Call back if further questions.    Katina Mari RN

## 2022-12-07 NOTE — TELEPHONE ENCOUNTER
Reason for Call:  Other call back    Detailed comments: Patient calling-has questions about upcoming appt. (12/08/22) with Magdalena. She would like to know if she should come with freshly washed hair, or not freshly washed so that Magdalena can see the scaly patches on her head. Pt. is worried that Magdalena will not see the true condition of her scalp. Luke is also going to bring the hair in that she has lost-has been a lot. Patient mentions that her fibromyalgia is acting up and she may have to cancel appt. tomorrow if she is feeling worse.     Phone Number Patient can be reached at: Home number on file [phone] (home)    Best Time: any    Can we leave a detailed message on this number? YES    Call taken on 12/7/2022 at 12:13 PM by Fidel Worley

## 2023-01-02 ENCOUNTER — TELEPHONE (OUTPATIENT)
Dept: FAMILY MEDICINE | Facility: CLINIC | Age: 73
End: 2023-01-02
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
## 2023-01-06 ENCOUNTER — TELEPHONE (OUTPATIENT)
Dept: GASTROENTEROLOGY | Facility: CLINIC | Age: 73
End: 2023-01-06

## 2023-01-06 NOTE — TELEPHONE ENCOUNTER
LVM for scheduling new visit in GI, (left K pod since call center couldn't access template). Per Mellisa MORELOS, Please schedule this patient as an Eso next available. Sent [employer].

## 2023-01-24 DIAGNOSIS — L08.0 PYODERMA: ICD-10-CM

## 2023-01-24 DIAGNOSIS — L29.9 SCALP ITCH: ICD-10-CM

## 2023-01-24 DIAGNOSIS — L98.1 NEUROTIC EXCORIATIONS: ICD-10-CM

## 2023-01-24 NOTE — LETTER
Ml Evans  [id number] [facility] [id number]        January 25, 2023      Dear Ml Evans,      Our records indicate that you are due for a follow up with Magdalena Selby for your Dermatology care.       We are booking months out in advance. Please contact our office at [phone], to schedule this appointment at your earliest convenience.        Sincerely,      Your [facility] Dermatology care team

## 2023-01-24 NOTE — TELEPHONE ENCOUNTER
Requested Prescriptions   Pending Prescriptions Disp Refills     fluocinonide (LIDEX) 0.05 % external solution 50 mL 3     Sig: Apply to itchy areas scalp BID x 1-2 weeks PRN       There is no refill protocol information for this order        mupirocin (BACTROBAN) 2 % external ointment 30 g 3     Sig: Apply to open areas on the scalp BID until healed       There is no refill protocol information for this order        Last office visit: 4/18/2022 with prescribing provider:  THOMAS OLMEDO    Future Office Visit:          Fidel Worley  [facility]

## 2023-01-25 RX ORDER — MUPIROCIN 20 MG/G
OINTMENT TOPICAL
Qty: 30 G | Refills: 0 | Status: SHIPPED | OUTPATIENT
Start: 2023-01-25

## 2023-01-25 RX ORDER — FLUOCINONIDE TOPICAL SOLUTION USP, 0.05% 0.5 MG/ML
SOLUTION TOPICAL
Qty: 50 ML | Refills: 0 | Status: SHIPPED | OUTPATIENT
Start: 2023-01-25

## 2023-01-25 NOTE — TELEPHONE ENCOUNTER
Last OV 4/18/22. 3 month f/u advised.    Mychart appointment reminder letter sent.  Refilled x 1.  Selina Taylor RN on 1/25/2023 at 3:26 PM

## 2023-02-24 ENCOUNTER — HOSPITAL ENCOUNTER (EMERGENCY)
Facility: CLINIC | Age: 73
Discharge: HOME | End: 2023-02-24
Attending: EMERGENCY MEDICINE | Admitting: EMERGENCY MEDICINE
Payer: COMMERCIAL

## 2023-02-24 ENCOUNTER — APPOINTMENT (OUTPATIENT)
Dept: CT IMAGING | Facility: CLINIC | Age: 73
End: 2023-02-24
Attending: EMERGENCY MEDICINE
Payer: COMMERCIAL

## 2023-02-24 VITALS
TEMPERATURE: 98.1 F | DIASTOLIC BLOOD PRESSURE: 79 MMHG | HEIGHT: 61 IN | WEIGHT: 160 LBS | SYSTOLIC BLOOD PRESSURE: 124 MMHG | BODY MASS INDEX: 30.21 KG/M2 | HEART RATE: 55 BPM | OXYGEN SATURATION: 98 % | RESPIRATION RATE: 16 BRPM

## 2023-02-24 DIAGNOSIS — R10.84 DIFFUSE ABDOMINAL PAIN: ICD-10-CM

## 2023-02-24 DIAGNOSIS — R11.2 NAUSEA AND VOMITING, UNSPECIFIED VOMITING TYPE: ICD-10-CM

## 2023-02-24 LAB
ALBUMIN SERPL BCG-MCNC: 4 G/DL (ref 3.5–5.2)
ALBUMIN UR-MCNC: NEGATIVE MG/DL
ALP SERPL-CCNC: 94 U/L (ref 35–104)
ALT SERPL W P-5'-P-CCNC: 18 U/L (ref 10–35)
ANION GAP SERPL CALCULATED.3IONS-SCNC: 13 MMOL/L (ref 7–15)
APPEARANCE UR: CLEAR
AST SERPL W P-5'-P-CCNC: 25 U/L (ref 10–35)
BASOPHILS # BLD AUTO: 0 10E3/UL (ref 0–0.2)
BASOPHILS NFR BLD AUTO: 0 %
BILIRUB SERPL-MCNC: 0.8 MG/DL
BILIRUB UR QL STRIP: NEGATIVE
BUN SERPL-MCNC: 17.3 MG/DL (ref 8–23)
CALCIUM SERPL-MCNC: 8.7 MG/DL (ref 8.8–10.2)
CHLORIDE SERPL-SCNC: 108 MMOL/L (ref 98–107)
COLOR UR AUTO: YELLOW
CREAT SERPL-MCNC: 0.73 MG/DL (ref 0.51–0.95)
DEPRECATED HCO3 PLAS-SCNC: 21 MMOL/L (ref 22–29)
EOSINOPHIL # BLD AUTO: 0.1 10E3/UL (ref 0–0.7)
EOSINOPHIL NFR BLD AUTO: 0 %
ERYTHROCYTE [DISTWIDTH] IN BLOOD BY AUTOMATED COUNT: 12.6 % (ref 10–15)
GFR SERPL CREATININE-BSD FRML MDRD: 87 ML/MIN/1.73M2
GLUCOSE SERPL-MCNC: 134 MG/DL (ref 70–99)
GLUCOSE UR STRIP-MCNC: NEGATIVE MG/DL
HCT VFR BLD AUTO: 42.3 % (ref 35–47)
HGB BLD-MCNC: 14 G/DL (ref 11.7–15.7)
HGB UR QL STRIP: ABNORMAL
HOLD SPECIMEN: NORMAL
HOLD SPECIMEN: NORMAL
IMM GRANULOCYTES # BLD: 0 10E3/UL
IMM GRANULOCYTES NFR BLD: 0 %
KETONES UR STRIP-MCNC: 15 MG/DL
LEUKOCYTE ESTERASE UR QL STRIP: NEGATIVE
LIPASE SERPL-CCNC: 21 U/L (ref 13–60)
LYMPHOCYTES # BLD AUTO: 0.7 10E3/UL (ref 0.8–5.3)
LYMPHOCYTES NFR BLD AUTO: 5 %
MCH RBC QN AUTO: 31.4 PG (ref 26.5–33)
MCHC RBC AUTO-ENTMCNC: 33.1 G/DL (ref 31.5–36.5)
MCV RBC AUTO: 95 FL (ref 78–100)
MONOCYTES # BLD AUTO: 0.9 10E3/UL (ref 0–1.3)
MONOCYTES NFR BLD AUTO: 7 %
MUCOUS THREADS #/AREA URNS LPF: PRESENT /LPF
NEUTROPHILS # BLD AUTO: 11.6 10E3/UL (ref 1.6–8.3)
NEUTROPHILS NFR BLD AUTO: 88 %
NITRATE UR QL: NEGATIVE
NRBC # BLD AUTO: 0 10E3/UL
NRBC BLD AUTO-RTO: 0 /100
PH UR STRIP: 5.5 [PH] (ref 5–7)
PLATELET # BLD AUTO: 178 10E3/UL (ref 150–450)
POTASSIUM SERPL-SCNC: 4.2 MMOL/L (ref 3.4–5.3)
PROT SERPL-MCNC: 6.4 G/DL (ref 6.4–8.3)
RBC # BLD AUTO: 4.46 10E6/UL (ref 3.8–5.2)
RBC URINE: 1 /HPF
SODIUM SERPL-SCNC: 142 MMOL/L (ref 136–145)
SP GR UR STRIP: 1.02 (ref 1–1.03)
SQUAMOUS EPITHELIAL: 1 /HPF
UROBILINOGEN UR STRIP-MCNC: NORMAL MG/DL
WBC # BLD AUTO: 13.2 10E3/UL (ref 4–11)
WBC URINE: <1 /HPF

## 2023-02-24 PROCEDURE — 250N000009 HC RX 250: Performed by: EMERGENCY MEDICINE

## 2023-02-24 PROCEDURE — 85025 COMPLETE CBC W/AUTO DIFF WBC: CPT | Performed by: EMERGENCY MEDICINE

## 2023-02-24 PROCEDURE — 36415 COLL VENOUS BLD VENIPUNCTURE: CPT | Performed by: EMERGENCY MEDICINE

## 2023-02-24 PROCEDURE — 250N000011 HC RX IP 250 OP 636: Performed by: EMERGENCY MEDICINE

## 2023-02-24 PROCEDURE — 96361 HYDRATE IV INFUSION ADD-ON: CPT | Performed by: EMERGENCY MEDICINE

## 2023-02-24 PROCEDURE — 99284 EMERGENCY DEPT VISIT MOD MDM: CPT | Performed by: EMERGENCY MEDICINE

## 2023-02-24 PROCEDURE — 96360 HYDRATION IV INFUSION INIT: CPT | Mod: 59 | Performed by: EMERGENCY MEDICINE

## 2023-02-24 PROCEDURE — 250N000013 HC RX MED GY IP 250 OP 250 PS 637: Performed by: EMERGENCY MEDICINE

## 2023-02-24 PROCEDURE — 99285 EMERGENCY DEPT VISIT HI MDM: CPT | Mod: 25 | Performed by: EMERGENCY MEDICINE

## 2023-02-24 PROCEDURE — 74177 CT ABD & PELVIS W/CONTRAST: CPT

## 2023-02-24 PROCEDURE — 80053 COMPREHEN METABOLIC PANEL: CPT | Performed by: EMERGENCY MEDICINE

## 2023-02-24 PROCEDURE — 81001 URINALYSIS AUTO W/SCOPE: CPT | Performed by: EMERGENCY MEDICINE

## 2023-02-24 PROCEDURE — 83690 ASSAY OF LIPASE: CPT | Performed by: EMERGENCY MEDICINE

## 2023-02-24 PROCEDURE — 258N000003 HC RX IP 258 OP 636: Performed by: EMERGENCY MEDICINE

## 2023-02-24 RX ORDER — ACETAMINOPHEN 500 MG
1000 TABLET ORAL ONCE
Status: COMPLETED | OUTPATIENT
Start: 2023-02-24 | End: 2023-02-24

## 2023-02-24 RX ORDER — IOPAMIDOL 755 MG/ML
74 INJECTION, SOLUTION INTRAVASCULAR ONCE
Status: COMPLETED | OUTPATIENT
Start: 2023-02-24 | End: 2023-02-24

## 2023-02-24 RX ORDER — ONDANSETRON 4 MG/1
4 TABLET, ORALLY DISINTEGRATING ORAL EVERY 8 HOURS PRN
Qty: 10 TABLET | Refills: 0 | Status: SHIPPED | OUTPATIENT
Start: 2023-02-24 | End: 2023-06-14 | Stop reason: SINTOL

## 2023-02-24 RX ADMIN — SODIUM CHLORIDE 61 ML: 9 INJECTION, SOLUTION INTRAVENOUS at 13:13

## 2023-02-24 RX ADMIN — SODIUM CHLORIDE, POTASSIUM CHLORIDE, SODIUM LACTATE AND CALCIUM CHLORIDE 1000 ML: 600; 310; 30; 20 INJECTION, SOLUTION INTRAVENOUS at 11:40

## 2023-02-24 RX ADMIN — IOPAMIDOL 74 ML: 755 INJECTION, SOLUTION INTRAVENOUS at 13:13

## 2023-02-24 RX ADMIN — ACETAMINOPHEN 1000 MG: 500 TABLET ORAL at 13:51

## 2023-02-24 ASSESSMENT — ACTIVITIES OF DAILY LIVING (ADL)
ADLS_ACUITY_SCORE: 37

## 2023-02-24 NOTE — ED TRIAGE NOTES
"Pt arrives from home via [facility] with c/o diffuse abdominal pain and nausea/vomiting that only happens at night. Pt has had these symptoms the last couple of days. Pt reports \"dark, almost black vomit\" last night.      Triage Assessment     Row Name 02/24/23 1033       Triage Assessment (Adult)    Airway WDL WDL       Respiratory WDL    Respiratory WDL WDL       Skin Circulation/Temperature WDL    Skin Circulation/Temperature WDL WDL       Cardiac WDL    Cardiac WDL WDL       Peripheral/Neurovascular WDL    Peripheral Neurovascular WDL WDL       Cognitive/Neuro/Behavioral WDL    Cognitive/Neuro/Behavioral WDL WDL       Crow Coma Scale    Best Eye Response 4-->(E4) spontaneous    Best Motor Response 6-->(M6) obeys commands    Best Verbal Response 5-->(V5) oriented    Crow Coma Scale Score 15              "

## 2023-02-24 NOTE — DISCHARGE INSTRUCTIONS
Return to the emergency department for worsening pain, repeated vomiting, fevers, or other concerns.  Use ondansetron as needed for nausea.  If not feeling better over the next 2 to 3 days get rechecked in clinic.

## 2023-02-24 NOTE — ED PROVIDER NOTES
History     Chief Complaint   Patient presents with     Abdominal Pain     Nausea & Vomiting     HPI  Ml Evans is a 72 year old female who presents for nausea, vomiting, diarrhea, and abdominal pain.  Pain has been diffuse throughout her abdomen, does wax and wane, worse overnight but better now.  Pain is located mostly on the left side but is throughout her abdomen.  Nausea and vomiting.  No blood in her stool or in her vomit.  No fevers or chills.  No dysuria or urinary frequency.    Allergies:  Allergies   Allergen Reactions     Hydrocodone Anaphylaxis     Flexeril [Cyclobenzaprine] Rash     Cipro [Ciprofloxacin] Other (See Comments)     Abd pain , proteinuria , microscopic hematuria  Possibly related to cipro     Codeine Camsylate Nausea     Flu Virus Vaccine      Large lumpy, itchy welts on torso and face after a flu shot 10 to 15 years ago.     Pcn [Penicillins] Difficulty breathing     Amoxicillin Itching and Rash     Sulfa Drugs Itching and Rash     Tetracycline Itching and Rash       Problem List:    Patient Active Problem List    Diagnosis Date Noted     Fibromyalgia 08/16/2022     Priority: Medium     RAVI (generalized anxiety disorder) 08/16/2022     Priority: Medium     Hip pain, right 07/07/2022     Priority: Medium     Osteopenia of both hips 03/29/2022     Priority: Medium     3/2022.  Follow-up in 3 years       Gastroesophageal reflux disease without esophagitis 01/28/2020     Priority: Medium     Chronic rhinitis 03/09/2017     Priority: Medium     Benign essential hypertension 03/09/2017     Priority: Medium     Irritable bowel syndrome with diarrhea 03/09/2017     Priority: Medium     Dicyclomine helped but caused dizziness.       Type 2 diabetes mellitus with microalbuminuria, without long-term current use of insulin (H) 03/09/2017     Priority: Medium     Diarrhea on metformin, stopped 6/2019.  Recurrent vaginal infections on Jardiance       Colon polyp 05/21/2015     Priority:  Medium     Tubular adenoma polyp       Advanced directives, counseling/discussion 01/14/2013     Priority: Medium     Patient states has Advance Directive and will bring in a copy to clinic. 1/14/2013          Diverticulosis 06/14/2012     Priority: Medium     Coronary artery disease involving native coronary artery of native heart without angina pectoris 03/21/2011     Priority: Medium     Two right coronary stents 2011  She has a prior history of CAD with STEMI in 2011 with PCI to the RCA, since then has had another angiogram in 2015 which has shown patent RCA stents and otherwise minimal disease elsewhere. Her EF and stress tests since then have been negative         HPV (human papilloma virus) anogenital infection 02/22/2011     Priority: Medium     Perianal condyloma  Biopsy done 2011  FINAL DIAGNOSIS:  Skin, perianal, lesion, excision:  - High grade squamous intraepithelial lesion (moderate to severe  dysplasia) (see comment)    COMMENT:  The lesion is arising on top of condyloma. The base of the polypoid  lesion appears free of dysplasia in the planes examined. Follow up is  recommended as clinically deemed appropriate.       Hyperlipidemia LDL goal <70 10/31/2010     Priority: Medium     Menopausal syndrome (hot flashes) 05/18/2010     Priority: Medium     Wants to take premarin  Understands breast cancer risk       Anal fissure 01/03/2007     Priority: Medium     Moderate episode of recurrent major depressive disorder (H) 09/28/2006     Priority: Medium     Follows with Psychiatry Dorian Prince.  Rx for Alprazolam 1 mg #120/month and Adderall 20 mg daily       Seasonal allergies 09/28/2006     Priority: Medium     seasonal  Problem list name updated by automated process. Provider to review       Attention deficit disorder 05/17/2005     Priority: Medium     Treated by psychiatry  Problem list name updated by automated process. Provider to review       Nonspecific elevation of levels of transaminase or  lactic acid dehydrogenase (LDH) 05/17/2005     Priority: Medium     fatty liver       History of diverticulitis 12/10/2000     Priority: Medium     Abdominal adhesions 01/01/1990     Priority: Medium        Past Medical History:    Past Medical History:   Diagnosis Date     Attention deficit disorder without mention of hyperactivity      Benign essential hypertension 3/9/2017     Coronary artery disease march 15,2011     Diabetes mellitus (H)      Dysthymic disorder      GERD (gastroesophageal reflux disease) 1/20/2011     Glycogenosis (H)      History of blood transfusion 1981     History of ST elevation myocardial infarction (STEMI) 1/23/2019     Malignant neoplasm (H)      Other and unspecified hyperlipidemia      Squamous cell carcinoma        Past Surgical History:    Past Surgical History:   Procedure Laterality Date     ABDOMEN SURGERY  1981,1991,1993     APPENDECTOMY  1993     BIOPSY  2003    nose, during surgery     COLONOSCOPY  2005     COLONOSCOPY N/A 5/14/2015    Procedure: COMBINED COLONOSCOPY, SINGLE OR MULTIPLE BIOPSY/POLYPECTOMY BY BIOPSY;  Surgeon: Ponce Christine MD;  Location: [facility]     COLONOSCOPY N/A 11/7/2022    Procedure: COLONOSCOPY, FLEXIBLE, WITH LESION REMOVAL USING SNARE;  Surgeon: Maximilian Cobian MD;  Location: [facility]     ECTOPIC PREGNANCY SURGERY  1981     ENDOSCOPIC RELEASE CARPAL TUNNEL  4/16/2013    Procedure: ENDOSCOPIC RELEASE CARPAL TUNNEL;  Right endoscopic carpal tunnel release;  Surgeon: Jonah Dela Cruz MD;  Location: [facility]     ENT SURGERY  2003    nose- suspected basal cell- was benign     ESOPHAGOSCOPY, GASTROSCOPY, DUODENOSCOPY (EGD), COMBINED  8/18/2014    Procedure: COMBINED ESOPHAGOSCOPY, GASTROSCOPY, DUODENOSCOPY (EGD), BIOPSY SINGLE OR MULTIPLE;  Surgeon: Anibal Sebastian MD;  Location: [facility]     ESOPHAGOSCOPY, GASTROSCOPY, DUODENOSCOPY (EGD), COMBINED N/A 11/7/2022    Procedure: ESOPHAGOGASTRODUODENOSCOPY, WITH BIOPSY;  Surgeon: Maximilian Cobian MD;   Location: [facility]     GENITOURINARY SURGERY  ,      HYSTERECTOMY, ELIECER      total hysterectomy. Unsure if ELIECER or vaginal     SURGICAL HISTORY OF -       appy     SURGICAL HISTORY OF -       T&A     VASCULAR SURGERY  2011    angioplasty- 2 stents implanted       Family History:    Family History   Problem Relation Age of Onset     Cancer Mother         uterine     Lipids Mother      Neurologic Disorder Mother         polymyalgia     Hypertension Mother      Other Cancer Mother         endometrial     Depression/Anxiety Mother      Other - See Comments Mother         Heart Arrythmia      Atrial fibrillation Mother      Macular Degeneration Mother      Cardiovascular Father         CHF     Depression Father      C.A.D. Father         bypass x2 starting at age 55-  in his 70's     Diabetes Father         type 2     Coronary Artery Disease Father          from - age 75     Depression/Anxiety Father      Cerebrovascular Disease Father         from angioplasty      C.A.D. Maternal Grandfather      Coronary Artery Disease Maternal Grandfather          from- age 61     C.A.D. Paternal Grandfather      Diabetes Brother      Depression Son      Psychotic Disorder Son         adhd     Diabetes Brother         type 1     Depression/Anxiety Brother      Depression/Anxiety Maternal Grandmother      Depression/Anxiety Son      Asthma Son         childhood asthma-out grown now as an adult     Coronary Artery Disease Brother         stent-MI     Melanoma No family hx of        Social History:  Marital Status:   [2]  Social History     Tobacco Use     Smoking status: Former     Packs/day: 1.00     Years: 30.00     Pack years: 30.00     Types: Cigarettes     Start date: 1968     Quit date: 3/15/2011     Years since quittin.9     Smokeless tobacco: Never     Tobacco comments:     occasional/daily   Vaping Use     Vaping Use: Never used   Substance Use Topics     Alcohol use: No     Alcohol/week: 0.0  "standard drinks     Drug use: No        Medications:    ondansetron (ZOFRAN ODT) 4 MG ODT tab  acetaminophen (TYLENOL) 500 MG tablet  acetylcysteine (N-ACETYL CYSTEINE) 600 MG CAPS capsule  alcohol swab prep pads  Alcohol Swabs (B-D SINGLE USE SWABS REGULAR) PADS  ALPRAZolam (XANAX PO)  amphetamine-dextroamphetamine (ADDERALL XR) 20 MG per capsule  aspirin 81 MG EC tablet  atorvastatin (LIPITOR) 80 MG tablet  blood glucose (NO BRAND SPECIFIED) lancets standard  blood glucose (NO BRAND SPECIFIED) test strip  blood glucose calibration (NO BRAND SPECIFIED) solution  blood glucose monitoring (NO BRAND SPECIFIED) meter device kit  blood glucose monitoring (NO BRAND SPECIFIED) meter device kit  CONTOUR NEXT TEST test strip  DoxePIN (SINEQUAN) 75 MG capsule  dulaglutide (TRULICITY) 1.5 MG/0.5ML pen  escitalopram (LEXAPRO) 20 MG tablet  fluocinonide (LIDEX) 0.05 % external solution  insulin glargine (LANTUS SOLOSTAR) 100 UNIT/ML pen  insulin pen needle (ULTICARE MICRO) 32G X 4 MM miscellaneous  LANsoprazole (PREVACID) 30 MG DR capsule  lisinopril (ZESTRIL) 5 MG tablet  metoprolol succinate ER (TOPROL-XL) 25 MG 24 hr tablet  Microlet Lancets MISC  mupirocin (BACTROBAN) 2 % external ointment  nitroGLYcerin (NITROSTAT) 0.4 MG sublingual tablet  thin (NO BRAND SPECIFIED) lancets  Wheat Dextrin (BENEFIBER PO)          Review of Systems    Physical Exam   BP: 124/79  Pulse: 55  Temp: 98.1  F (36.7  C)  Resp: 16  Height: 154.9 cm (5' 1\")  Weight: 72.6 kg (160 lb)  SpO2: 98 %      Physical Exam  Vitals and nursing note reviewed.   Constitutional:       General: She is in acute distress.      Appearance: She is well-developed. She is not diaphoretic.   HENT:      Head: Normocephalic and atraumatic.      Right Ear: External ear normal.      Left Ear: External ear normal.      Nose: Nose normal.   Eyes:      General: No scleral icterus.     Conjunctiva/sclera: Conjunctivae normal.   Cardiovascular:      Rate and Rhythm: Normal rate " and regular rhythm.   Pulmonary:      Effort: Pulmonary effort is normal. No respiratory distress.      Breath sounds: No stridor.   Abdominal:      General: There is no distension.      Palpations: Abdomen is soft.      Tenderness: There is generalized abdominal tenderness. There is no guarding or rebound.   Musculoskeletal:      Cervical back: Normal range of motion.   Skin:     General: Skin is warm and dry.   Neurological:      Mental Status: She is alert and oriented to person, place, and time.   Psychiatric:         Behavior: Behavior normal.         ED Course                 Procedures              Critical Care time:  none               Results for orders placed or performed during the hospital encounter of 02/24/23 (from the past 24 hour(s))   UA reflex to Microscopic   Result Value Ref Range    Color Urine Yellow Colorless, Straw, Light Yellow, Yellow    Appearance Urine Clear Clear    Glucose Urine Negative Negative mg/dL    Bilirubin Urine Negative Negative    Ketones Urine 15 (A) Negative mg/dL    Specific Gravity Urine 1.025 1.003 - 1.035    Blood Urine Trace (A) Negative    pH Urine 5.5 5.0 - 7.0    Protein Albumin Urine Negative Negative mg/dL    Urobilinogen Urine Normal Normal, 2.0 mg/dL    Nitrite Urine Negative Negative    Leukocyte Esterase Urine Negative Negative    RBC Urine 1 <=2 /HPF    WBC Urine <1 <=5 /HPF    Squamous Epithelials Urine 1 <=1 /HPF    Mucus Urine Present (A) None Seen /LPF   CBC with Platelets & Differential    Narrative    The following orders were created for panel order CBC with Platelets & Differential.  Procedure                               Abnormality         Status                     ---------                               -----------         ------                     CBC with platelets and d...[[id number]]  Abnormal            Final result                 Please view results for these tests on the individual orders.   Comprehensive metabolic panel   Result Value  Ref Range    Sodium 142 136 - 145 mmol/L    Potassium 4.2 3.4 - 5.3 mmol/L    Chloride 108 (H) 98 - 107 mmol/L    Carbon Dioxide (CO2) 21 (L) 22 - 29 mmol/L    Anion Gap 13 7 - 15 mmol/L    Urea Nitrogen 17.3 8.0 - 23.0 mg/dL    Creatinine 0.73 0.51 - 0.95 mg/dL    Calcium 8.7 (L) 8.8 - 10.2 mg/dL    Glucose 134 (H) 70 - 99 mg/dL    Alkaline Phosphatase 94 35 - 104 U/L    AST 25 10 - 35 U/L    ALT 18 10 - 35 U/L    Protein Total 6.4 6.4 - 8.3 g/dL    Albumin 4.0 3.5 - 5.2 g/dL    Bilirubin Total 0.8 <=1.2 mg/dL    GFR Estimate 87 >60 mL/min/1.73m2   Lipase   Result Value Ref Range    Lipase 21 13 - 60 U/L   Hanna Draw    Narrative    The following orders were created for panel order [facility] Draw.  Procedure                               Abnormality         Status                     ---------                               -----------         ------                     Extra Blue Top Tube[[id number]]                              Final result               Extra Red Top Tube[[id number]]                               Final result                 Please view results for these tests on the individual orders.   CBC with platelets and differential   Result Value Ref Range    WBC Count 13.2 (H) 4.0 - 11.0 10e3/uL    RBC Count 4.46 3.80 - 5.20 10e6/uL    Hemoglobin 14.0 11.7 - 15.7 g/dL    Hematocrit 42.3 35.0 - 47.0 %    MCV 95 78 - 100 fL    MCH 31.4 26.5 - 33.0 pg    MCHC 33.1 31.5 - 36.5 g/dL    RDW 12.6 10.0 - 15.0 %    Platelet Count 178 150 - 450 10e3/uL    % Neutrophils 88 %    % Lymphocytes 5 %    % Monocytes 7 %    % Eosinophils 0 %    % Basophils 0 %    % Immature Granulocytes 0 %    NRBCs per 100 WBC 0 <1 /100    Absolute Neutrophils 11.6 (H) 1.6 - 8.3 10e3/uL    Absolute Lymphocytes 0.7 (L) 0.8 - 5.3 10e3/uL    Absolute Monocytes 0.9 0.0 - 1.3 10e3/uL    Absolute Eosinophils 0.1 0.0 - 0.7 10e3/uL    Absolute Basophils 0.0 0.0 - 0.2 10e3/uL    Absolute Immature Granulocytes 0.0 <=0.4 10e3/uL    Absolute NRBCs 0.0  10e3/uL   Extra Blue Top Tube   Result Value Ref Range    Hold Specimen JIC    Extra Red Top Tube   Result Value Ref Range    Hold Specimen JI    CT Abdomen Pelvis w Contrast    Narrative    CT ABDOMEN AND PELVIS WITH CONTRAST 2/24/2023 1:29 PM    CLINICAL HISTORY: Abdominal pain. Diffuse abdominal pain and vomiting.    TECHNIQUE: CT scan of the abdomen and pelvis was performed following  injection of IV contrast. Multiplanar reformats were obtained. Dose  reduction techniques were used.  CONTRAST:  74 mL Isovue-370    COMPARISON: January 25, 2019    FINDINGS:   LOWER CHEST: No infiltrates or effusions.    HEPATOBILIARY: No significant mass or bile duct dilatation. No  calcified gallstones.     PANCREAS: No significant mass, duct dilatation, or inflammatory  change.    SPLEEN: Normal size.    ADRENAL GLANDS: No significant nodules.    KIDNEYS/BLADDER: No significant mass, stones, or hydronephrosis.    BOWEL: Diverticulosis in the colon. No acute inflammatory change. No  obstruction.     PELVIC ORGANS: No pelvic masses.    ADDITIONAL FINDINGS: No free air or free fluid. There are moderate  atherosclerotic changes of the visualized aorta and its branches.  There is no evidence of aortic dissection or aneurysm.     MUSCULOSKELETAL: No frankly destructive bony lesions.      Impression    IMPRESSION: No acute process demonstrated.    LEE CURRY MD         SYSTEM ID:  [id number]     *Note: Due to a large number of results and/or encounters for the requested time period, some results have not been displayed. A complete set of results can be found in Results Review.       Medications   lactated ringers BOLUS 1,000 mL (0 mLs Intravenous Stopped 2/24/23 1351)   iopamidol (ISOVUE-370) solution 74 mL (74 mLs Intravenous $Given 2/24/23 1313)   sodium chloride 0.9 % bag 500mL for CT scan flush use (61 mLs Intravenous $Given 2/24/23 1313)   acetaminophen (TYLENOL) tablet 1,000 mg (1,000 mg Oral $Given 2/24/23 1351)        Assessments & Plan (with Medical Decision Making)   72-year-old female presents with abdominal pain with vomiting and diarrhea.  Vital signs are reassuring.  She says she is feeling better after receiving ondansetron via EMS.  She is given IV fluids here.  I reviewed her virtual visit from clinic on 11/30/2022.  I reviewed her endoscopy and colonoscopy results from 11/7/2022.  I reviewed her virtual visit from 11/2/2022.  White blood cell count is 13.2, nonspecific.  Hemoglobin is 14, no signs of anemia.  Electrolytes are within normal limits.  LFTs are normal, no signs of hepatitis.  Lipase is normal, no signs of pancreatitis.  Urinalysis is negative for signs of infection or blood.  CT of the abdomen pelvis obtained, images reviewed independently as well as radiology read reviewed, no signs of obstruction, appendicitis, or diverticulitis.  On recheck the patient is feeling better and is safe to discharge home with a prescription for ondansetron and instructions to return if she has worsening of her symptoms or other concerns, otherwise follow-up in clinic.  The patient is in agreement with this plan.    I have reviewed the nursing notes.    I have reviewed the findings, diagnosis, plan and need for follow up with the patient.             New Prescriptions    ONDANSETRON (ZOFRAN ODT) 4 MG ODT TAB    Take 1 tablet (4 mg) by mouth every 8 hours as needed for nausea       Final diagnoses:   Nausea and vomiting, unspecified vomiting type   Diffuse abdominal pain       2/24/2023   [facility] EMERGENCY DEPT     Dominic Barrientos MD  02/24/23 [id number]

## 2023-03-15 DIAGNOSIS — E11.9 TYPE 2 DIABETES MELLITUS WITHOUT COMPLICATION, WITHOUT LONG-TERM CURRENT USE OF INSULIN (H): ICD-10-CM

## 2023-03-16 NOTE — TELEPHONE ENCOUNTER
Pending Prescriptions:                       Disp   Refills    LANTUS SOLOSTAR 100 UNIT/ML soln [Pharmacy*30 mL  3        Sig: Inject 18 Units Subcutaneous At Bedtime    Routing refill request to provider for review/approval because:  Labs not current:  A1c    Lab Results   Component Value Date    A1C 6.3 09/02/2022    A1C 6.2 03/28/2022    A1C 7.1 04/13/2021    A1C 7.2 01/28/2020    A1C 7.9 09/15/2019    A1C 6.9 05/06/2019    A1C 7.3 08/20/2018     Crystal Jeong RN  [facility]

## 2023-03-17 RX ORDER — INSULIN GLARGINE 100 [IU]/ML
18 INJECTION, SOLUTION SUBCUTANEOUS AT BEDTIME
Qty: 30 ML | Refills: 3 | Status: SHIPPED | OUTPATIENT
Start: 2023-03-17 | End: 2023-06-14

## 2023-03-21 ENCOUNTER — TELEPHONE (OUTPATIENT)
Dept: GASTROENTEROLOGY | Facility: CLINIC | Age: 73
End: 2023-03-21
Payer: COMMERCIAL

## 2023-03-21 NOTE — TELEPHONE ENCOUNTER
LVM with pod numbers.  Patient was called a few months ago for scheduling, and returned call back on 3/20. Called pt back on 3/21.  Per RN: This pt can be scheduled with Dr Tovar any available slot. (New, return, either is good)   There are some virtual openings throughout April (Mondays), otherwise in June for in-person (Thursdays).  Sent [facility].

## 2023-03-22 ENCOUNTER — OFFICE VISIT (OUTPATIENT)
Dept: FAMILY MEDICINE | Facility: CLINIC | Age: 73
End: 2023-03-22
Payer: COMMERCIAL

## 2023-03-22 VITALS
RESPIRATION RATE: 20 BRPM | DIASTOLIC BLOOD PRESSURE: 70 MMHG | HEIGHT: 61 IN | WEIGHT: 163.5 LBS | TEMPERATURE: 98.7 F | SYSTOLIC BLOOD PRESSURE: 108 MMHG | OXYGEN SATURATION: 97 % | HEART RATE: 50 BPM | BODY MASS INDEX: 30.87 KG/M2

## 2023-03-22 DIAGNOSIS — L30.4 INTERTRIGO: ICD-10-CM

## 2023-03-22 DIAGNOSIS — R80.9 TYPE 2 DIABETES MELLITUS WITH MICROALBUMINURIA, WITHOUT LONG-TERM CURRENT USE OF INSULIN (H): ICD-10-CM

## 2023-03-22 DIAGNOSIS — F33.1 MODERATE EPISODE OF RECURRENT MAJOR DEPRESSIVE DISORDER (H): ICD-10-CM

## 2023-03-22 DIAGNOSIS — Z00.00 ENCOUNTER FOR MEDICARE ANNUAL WELLNESS EXAM: Primary | ICD-10-CM

## 2023-03-22 DIAGNOSIS — I10 BENIGN ESSENTIAL HYPERTENSION: ICD-10-CM

## 2023-03-22 DIAGNOSIS — E11.9 TYPE 2 DIABETES MELLITUS WITHOUT COMPLICATION, WITHOUT LONG-TERM CURRENT USE OF INSULIN (H): ICD-10-CM

## 2023-03-22 DIAGNOSIS — E78.5 HYPERLIPIDEMIA LDL GOAL <100: ICD-10-CM

## 2023-03-22 DIAGNOSIS — E11.29 TYPE 2 DIABETES MELLITUS WITH MICROALBUMINURIA, WITHOUT LONG-TERM CURRENT USE OF INSULIN (H): ICD-10-CM

## 2023-03-22 DIAGNOSIS — I25.10 CORONARY ARTERY DISEASE INVOLVING NATIVE CORONARY ARTERY OF NATIVE HEART WITHOUT ANGINA PECTORIS: ICD-10-CM

## 2023-03-22 LAB
CHOLEST SERPL-MCNC: 150 MG/DL
HDLC SERPL-MCNC: 58 MG/DL
LDLC SERPL CALC-MCNC: 63 MG/DL
NONHDLC SERPL-MCNC: 92 MG/DL
TRIGL SERPL-MCNC: 145 MG/DL

## 2023-03-22 PROCEDURE — 80061 LIPID PANEL: CPT | Performed by: INTERNAL MEDICINE

## 2023-03-22 PROCEDURE — 36415 COLL VENOUS BLD VENIPUNCTURE: CPT | Performed by: INTERNAL MEDICINE

## 2023-03-22 PROCEDURE — G0438 PPPS, INITIAL VISIT: HCPCS | Performed by: INTERNAL MEDICINE

## 2023-03-22 PROCEDURE — 99214 OFFICE O/P EST MOD 30 MIN: CPT | Mod: 25 | Performed by: INTERNAL MEDICINE

## 2023-03-22 RX ORDER — ATORVASTATIN CALCIUM 80 MG/1
80 TABLET, FILM COATED ORAL DAILY
Qty: 90 TABLET | Refills: 3 | Status: SHIPPED | OUTPATIENT
Start: 2023-03-22 | End: 2024-03-20

## 2023-03-22 RX ORDER — LISINOPRIL 5 MG/1
5 TABLET ORAL DAILY
Qty: 90 TABLET | Refills: 3 | Status: SHIPPED | OUTPATIENT
Start: 2023-03-22 | End: 2024-04-11

## 2023-03-22 RX ORDER — DULAGLUTIDE 1.5 MG/.5ML
1.5 INJECTION, SOLUTION SUBCUTANEOUS
Qty: 6 ML | Refills: 3 | Status: SHIPPED | OUTPATIENT
Start: 2023-03-22 | End: 2023-06-14 | Stop reason: SINTOL

## 2023-03-22 RX ORDER — NYSTATIN 100000 U/G
CREAM TOPICAL 2 TIMES DAILY
Qty: 30 G | Refills: 11 | Status: SHIPPED | OUTPATIENT
Start: 2023-03-22 | End: 2024-05-28

## 2023-03-22 RX ORDER — METOPROLOL SUCCINATE 25 MG/1
25 TABLET, EXTENDED RELEASE ORAL DAILY
Qty: 90 TABLET | Refills: 3 | Status: SHIPPED | OUTPATIENT
Start: 2023-03-22 | End: 2024-04-12

## 2023-03-22 ASSESSMENT — ANXIETY QUESTIONNAIRES
7. FEELING AFRAID AS IF SOMETHING AWFUL MIGHT HAPPEN: SEVERAL DAYS
1. FEELING NERVOUS, ANXIOUS, OR ON EDGE: MORE THAN HALF THE DAYS
IF YOU CHECKED OFF ANY PROBLEMS ON THIS QUESTIONNAIRE, HOW DIFFICULT HAVE THESE PROBLEMS MADE IT FOR YOU TO DO YOUR WORK, TAKE CARE OF THINGS AT HOME, OR GET ALONG WITH OTHER PEOPLE: VERY DIFFICULT
GAD7 TOTAL SCORE: 8
5. BEING SO RESTLESS THAT IT IS HARD TO SIT STILL: NOT AT ALL
GAD7 TOTAL SCORE: 8
2. NOT BEING ABLE TO STOP OR CONTROL WORRYING: SEVERAL DAYS
3. WORRYING TOO MUCH ABOUT DIFFERENT THINGS: SEVERAL DAYS
6. BECOMING EASILY ANNOYED OR IRRITABLE: MORE THAN HALF THE DAYS

## 2023-03-22 ASSESSMENT — PATIENT HEALTH QUESTIONNAIRE - PHQ9
SUM OF ALL RESPONSES TO PHQ QUESTIONS 1-9: 11
5. POOR APPETITE OR OVEREATING: SEVERAL DAYS

## 2023-03-22 ASSESSMENT — PAIN SCALES - GENERAL: PAINLEVEL: SEVERE PAIN (7)

## 2023-03-22 NOTE — PATIENT INSTRUCTIONS
Diabetes / hypertension   Refills sent  Blood work today    Health Care Maintenance  You are due for Pneumonia and Shingles vaccine(s)    Hair loss:  Could be related to age since your mother lost her hair    Fibromyalgia  Consider warm water pool therapy - [place] in [facility] pool  Keep up the good work staying active, stretching,e tc  No blood test for fibromyalgia     Skin  Use the nystatin cream under the abdominal skin fold up to 2x day as needed  Keep skin dry        Patient Education   Personalized Prevention Plan  You are due for the preventive services outlined below.  Your care team is available to assist you in scheduling these services.  If you have already completed any of these items, please share that information with your care team to update in your medical record.  Health Maintenance Due   Topic Date Due    Pneumococcal Vaccine (1 - PCV) Never done    Zoster (Shingles) Vaccine (1 of 2) Never done    LUNG CANCER SCREENING  Never done    Diabetic Foot Exam  04/13/2022    Flu Vaccine (1) Never done    Eye Exam  10/01/2022    A1C Lab  03/02/2023    Cholesterol Lab  03/15/2023       Preventing Falls at Home  A person can fall for many reasons. Older adults may fall because reaction time slows down as we age. Your muscles and joints may get stiff, weak, or less flexible because of illness, medicines, or a physical condition.   Other health problems that make falls more likely include:   Arthritis  Dizziness or lightheadedness when you stand up (orthostatic hypotension)  History of a stroke  Dizziness  Anemia  Certain medicines taken for mental illness or to control blood pressure.  Problems with balance or gait  Bladder or urinary problems  History of falling  Changes in vision (vision impairment)  Changes in thinking skills and memory (cognitive impairment)  Injuries from a fall can include serious injuries such as broken bones, dislocated joints, internal bleeding and cuts. Injuries like these can  limit your independence.   Prevention tips  To help prevent falls and fall-related injuries, follow the tips below.    Floors  To make floors safer:   Put nonskid pads under area rugs.  Remove small rugs.  Replace worn floor coverings.  Tack carpets firmly to each step on carpeted stairs. Put nonskid strips on the edges of uncarpeted stairs.  Keep floors and stairs free of clutter and cords.  Arrange furniture so there are clear pathways.  Clean up any spills right away.  Bathrooms    To make bathrooms safer:   Install grab bars in the tub or shower.  Apply nonskid strips or put a nonskid rubber mat in the tub or shower.  Sit on a bath chair to bathe.  Use bathmats with nonskid backing.  Lighting  To improve visibility in your home:    Keep a flashlight in each room. Or put a lamp next to the bed within easy reach.  Put nightlights in the bedrooms, hallways, kitchen, and bathrooms.  Make sure all stairways have good lighting.  Take your time when going up and down stairs.  Put handrails on both sides of stairs and in walkways for more support. To prevent injury to your wrist or arm, don t use handrails to pull yourself up.  Install grab bars to pull yourself up.  Move or rearrange items that you use often. This will make them easier to find or reach.  Look at your home to find any safety hazards. Especially look at doorways, walkways, and the driveway. Remove or repair any safety problems that you find.  Other changes to make  Look around to find any safety hazards. Look closely at doorways, walkways, and the driveway. Remove or repair any safety problems that you find.  Wear shoes that fit well.  Take your time when going up and down stairs.  Put handrails on both sides of stairs and in walkways for more support. To prevent injury to your wrist or arm, don t use handrails to pull yourself up.  Install grab bars wherever needed to pull yourself up.  Arrange items that you use often. This will make them easier to  find or reach.    [employer] last reviewed this educational content on 3/1/2020    5563-7386 [employer]. All rights reserved. This information is not intended as a substitute for professional medical care. Always follow your healthcare professional's instructions.

## 2023-03-22 NOTE — LETTER
March 28, 2023      Narcisa Baerdaryl  [address] [facility] [address]        Dear ,    We are writing to inform you of your test results.    Cholesterol is well controlled and similar to last check 1 year ago.    Resulted Orders   Lipid panel reflex to direct LDL Non-fasting   Result Value Ref Range    Cholesterol 150 <200 mg/dL    Triglycerides 145 <150 mg/dL    Direct Measure HDL 58 >=50 mg/dL    LDL Cholesterol Calculated 63 <=100 mg/dL    Non HDL Cholesterol 92 <130 mg/dL    Narrative    Cholesterol  Desirable:  <200 mg/dL    Triglycerides  Normal:  Less than 150 mg/dL  Borderline High:  150-199 mg/dL  High:  200-499 mg/dL  Very High:  Greater than or equal to 500 mg/dL    Direct Measure HDL  Female:  Greater than or equal to 50 mg/dL   Male:  Greater than or equal to 40 mg/dL    LDL Cholesterol  Desirable:  <100mg/dL  Above Desirable:  100-129 mg/dL   Borderline High:  130-159 mg/dL   High:  160-189 mg/dL   Very High:  >= 190 mg/dL    Non HDL Cholesterol  Desirable:  130 mg/dL  Above Desirable:  130-159 mg/dL  Borderline High:  160-189 mg/dL  High:  190-219 mg/dL  Very High:  Greater than or equal to 220 mg/dL       If you have any questions or concerns, please call the clinic at the number listed above.       Sincerely,      Shwetha Robison, DO

## 2023-03-22 NOTE — PROGRESS NOTES
SUBJECTIVE:   Narcisa is a 72 year old who presents for Preventive Visit.  No flowsheet data found.Patient has been advised of split billing requirements and indicates understanding: Yes  Are you in the first 12 months of your Medicare coverage?  No    HPI      Chief Complaint   Patient presents with     medicare wellness      Hypertension     Diabetes     Lipids     Depression     Anxiety       Have you ever done Advance Care Planning? (For example, a Health Directive, POLST, or a discussion with a medical provider or your loved ones about your wishes): Yes, patient states has an Advance Care Planning document and will bring a copy to the clinic.       Fall risk  Fallen 2 or more times in the past year?: Yes  Any fall with injury in the past year?: No  Timed Up and Go Test (>13.5 is fall risk; contact physician) : 17    Cognitive Screening   1) Repeat 3 items (Leader, Season, Table)    2) Clock draw: NORMAL  3) 3 item recall: Recalls 3 objects  Results: 3 items recalled: COGNITIVE IMPAIRMENT LESS LIKELY    Mini-CogTM Copyright TATIANA Georges. Licensed by the author for use in [facility]; reprinted with permission (kelsy@[email or web address]). All rights reserved.      Do you have sleep apnea, excessive snoring or daytime drowsiness?: yes     daytime drowsiness    Reviewed and updated as needed this visit by clinical staff   Tobacco  Allergies  Meds              Reviewed and updated as needed this visit by Provider                 Social History     Tobacco Use     Smoking status: Former     Packs/day: 1.00     Years: 30.00     Pack years: 30.00     Types: Cigarettes     Start date: 1968     Quit date: 3/15/2011     Years since quittin.0     Smokeless tobacco: Never     Tobacco comments:     occasional/daily   Substance Use Topics     Alcohol use: No     Alcohol/week: 0.0 standard drinks       No flowsheet data found.No flowsheet data found.  Do you have a current opioid prescription? No  Do you use any other  controlled substances or medications that are not prescribed by a provider?  Adderall           Diabetes Follow-up    How often are you checking your blood sugar? Two times daily but not every day; often 90s-120s;  Peak 150 in the last few months  Blood sugar testing frequency justification:  eat the same thing all the time  What time of day are you checking your blood sugars (select all that apply)?  only when feel it low  Have you had any blood sugars above 200?  Yes some times  Have you had any blood sugars below 70?  Yes one time    What symptoms do you notice when your blood sugar is low?  Shaky, Dizzy, Weak, Lethargy, Blurred vision and Confusion    What concerns do you have today about your diabetes? None     Do you have any of these symptoms? (Select all that apply)  Numbness in feet, Redness, sores, or blisters on feet, Excessive thirst and Blurry vision    Have you had a diabetic eye exam in the last 12 months? No     trulicity 1.5 mg weekly    lantus 18 units    No hypoglycemia    She has many food intolerances    Hair loss:  --she reports she is losing clumps of hair;  Her derm is aware; doesn't have appointment until  July  --she has lots of flaking in the scalp  --previously could only wrap a hair binder 2x around her hair, and now can wrap it around 5 x    fibromyalgia  --stretches regularly to help manage pain  --still has flare ups of pain keeping her from attending doc appointments, social events, etc  --not exercising as much due to snow/ice; used to go to [employer]  --heard there was a blood test for fibromyalgia  --wonders if she actually has long term lyme and not fibromyalgia         Hyperlipidemia Follow-Up      Are you regularly taking any medication or supplement to lower your cholesterol?   Yes- PM    Are you having muscle aches or other side effects that you think could be caused by your cholesterol lowering medication?  No    Hypertension Follow-up      Do you check your blood pressure  regularly outside of the clinic? Yes some times    Are you following a low salt diet? Yes    Are your blood pressures ever more than 140 on the top number (systolic) OR more   than 90 on the bottom number (diastolic), for example 140/90? No    BP Readings from Last 2 Encounters:   23 108/70   23 124/79     Hemoglobin A1C (%)   Date Value   2022 6.3 (H)   2022 6.2 (H)   2021 7.1 (H)   2020 7.2 (H)     LDL Cholesterol Calculated (mg/dL)   Date Value   03/15/2022 72   2021 59   2019 55       Depression and Anxiety Follow-Up    How are you doing with your depression since your last visit? Worsened family    How are you doing with your anxiety since your last visit?  Worsened family    Are you having other symptoms that might be associated with depression or anxiety? Yes:  family    Have you had a significant life event? Health Concerns     Do you have any concerns with your use of alcohol or other drugs? No    Social History     Tobacco Use     Smoking status: Former     Packs/day: 1.00     Years: 30.00     Pack years: 30.00     Types: Cigarettes     Start date: 1968     Quit date: 3/15/2011     Years since quittin.0     Smokeless tobacco: Never     Tobacco comments:     occasional/daily   Vaping Use     Vaping Use: Never used   Substance Use Topics     Alcohol use: No     Alcohol/week: 0.0 standard drinks     Drug use: No     PHQ 10/29/2019 2020 2022   PHQ-9 Total Score 12 7 9   Q9: Thoughts of better off dead/self-harm past 2 weeks Not at all Not at all Not at all     RAVI-7 SCORE 2019 10/29/2019 2020   Total Score - - -   Total Score 9 (mild anxiety) 7 (mild anxiety) -   Total Score 9 7 10     Last PHQ-9 2022   1.  Little interest or pleasure in doing things 1   2.  Feeling down, depressed, or hopeless 1   3.  Trouble falling or staying asleep, or sleeping too much 0   4.  Feeling tired or having little energy 3   5.  Poor appetite  or overeating 0   6.  Feeling bad about yourself 1   7.  Trouble concentrating 3   8.  Moving slowly or restless 0   Q9: Thoughts of better off dead/self-harm past 2 weeks 0   PHQ-9 Total Score 9   Difficulty at work, home, or with people Somewhat difficult     RAVI-7  11/25/2020   1. Feeling nervous, anxious, or on edge 2   2. Not being able to stop or control worrying 2   3. Worrying too much about different things 1   4. Trouble relaxing 2   5. Being so restless that it is hard to sit still 1   6. Becoming easily annoyed or irritable 2   7. Feeling afraid, as if something awful might happen 0   RAVI-7 Total Score 10   If you checked any problems, how difficult have they made it for you to do your work, take care of things at home, or get along with other people? Somewhat difficult       Suicide Assessment Five-step Evaluation and Treatment (SAFE-T)      Current providers sharing in care for this patient include:   Patient Care Team:  Shwetha Robison DO as PCP - General (Internal Medicine)  Taniya Agustin, RN as Nurse Coordinator (Cardiology)  Talya Reina PA-C (Inactive) as Physician Assistant (Physician Assistant)  Shwetha Robison DO as Assigned PCP  Martell Biggs MD as MD (Interventional Cardiology)  Martell Biggs MD as Assigned Heart and Vascular Provider  Magdalena Selby PA-C as Assigned Surgical Provider  Jen Bardales MD as Assigned Musculoskeletal Provider    The following health maintenance items are reviewed in Epic and correct as of today:  Health Maintenance   Topic Date Due     Pneumococcal Vaccine: 65+ Years (1 - PCV) Never done     ZOSTER IMMUNIZATION (1 of 2) Never done     LUNG CANCER SCREENING  Never done     MEDICARE ANNUAL WELLNESS VISIT  03/07/2018     DIABETIC FOOT EXAM  04/13/2022     INFLUENZA VACCINE (1) Never done     EYE EXAM  10/01/2022     A1C  03/02/2023     LIPID  03/15/2023     DTAP/TDAP/TD IMMUNIZATION (2 - Td or  Tdap) 04/10/2023     PHQ-9  05/30/2023     ANNUAL REVIEW OF HM ORDERS  08/16/2023     MICROALBUMIN  09/02/2023     MAMMO SCREENING  12/09/2023     BMP  02/24/2024     FALL RISK ASSESSMENT  03/22/2024     DEXA  03/28/2025     COLORECTAL CANCER SCREENING  11/07/2027     ADVANCE CARE PLANNING  03/22/2028     HEPATITIS C SCREENING  Completed     DEPRESSION ACTION PLAN  Completed     COVID-19 Vaccine  Completed     IPV IMMUNIZATION  Aged Out     MENINGITIS IMMUNIZATION  Aged Out     Current Outpatient Medications   Medication Sig Dispense Refill     acetaminophen (TYLENOL) 500 MG tablet Take 500-1,000 mg by mouth every 4 hours as needed for mild pain       acetylcysteine (N-ACETYL CYSTEINE) 600 MG CAPS capsule Take 3,000 mg by mouth daily       alcohol swab prep pads Use to swab area of injection/bethel as directed. 100 each 3     Alcohol Swabs (B-D SINGLE USE SWABS REGULAR) PADS USE TO SWAB AREA OF INJECTION/BETHEL AS DIRECTED. 100 each 3     ALPRAZolam (XANAX PO) Take 0.5-1 mg by mouth 4 times daily as needed 1/2 tab in am, 1 tab in pm, then 1 tab midday prn and 1/2 tab throughout the day if needed       amphetamine-dextroamphetamine (ADDERALL XR) 20 MG per capsule Take 20 mg by mouth daily        aspirin 81 MG EC tablet Take 81 mg by mouth daily        atorvastatin (LIPITOR) 80 MG tablet TAKE ONE TABLET BY MOUTH ONCE DAILY 90 tablet 3     blood glucose (NO BRAND SPECIFIED) lancets standard Use to test blood sugar 3 times daily or as directed. 100 each 1     blood glucose (NO BRAND SPECIFIED) test strip Use to test blood sugar 1 times daily or as directed. To accompany: Blood Glucose Monitor Brands: per insurance. 100 strip 6     blood glucose calibration (NO BRAND SPECIFIED) solution To accompany: Blood Glucose Monitor Brands: per insurance. 1 each 1     blood glucose monitoring (NO BRAND SPECIFIED) meter device kit Use to test blood sugar 3 times daily or as directed. 1 kit 0     blood glucose monitoring (NO BRAND  SPECIFIED) meter device kit Use to test blood sugar 1 times daily or as directed. Preferred blood glucose meter OR supplies to accompany: Blood Glucose Monitor Brands: per insurance. 1 kit 0     CONTOUR NEXT TEST test strip USE TO TEST BLOOD SUGAR THREE TIMES A  strip 0     DoxePIN (SINEQUAN) 75 MG capsule Take 75 mg by mouth At Bedtime        dulaglutide (TRULICITY) 1.5 MG/0.5ML pen Inject 1.5 mg Subcutaneous every 7 days 6 mL 3     escitalopram (LEXAPRO) 20 MG tablet Take 20 mg by mouth daily       fluocinonide (LIDEX) 0.05 % external solution Apply to itchy areas scalp BID x 1-2 weeks PRN. Need follow up appointment in Derm. 50 mL 0     insulin pen needle (ULTICARE MICRO) 32G X 4 MM miscellaneous USE 1 PEN NEEDLE DAILY OR AS DIRECTED. 100 each 3     LANsoprazole (PREVACID) 30 MG DR capsule Take 1 capsule (30 mg) by mouth 2 times daily for 30 days, THEN 1 capsule (30 mg) every morning (before breakfast) for 360 days. 30-60 minutes before a meal. (Patient taking differently: Take 1 capsule (30 mg) by mouth 2 times daily for 30 days, THEN 1 capsule (30 mg) every morning (before breakfast) for 360 days. 30-60 minutes before a meal.    Taking one time daily) 90 capsule 3     LANTUS SOLOSTAR 100 UNIT/ML soln INJECT 18 UNITS SUBCUTANEOUS AT BEDTIME 30 mL 3     lisinopril (ZESTRIL) 5 MG tablet Take 1 tablet (5 mg) by mouth daily 90 tablet 3     metoprolol succinate ER (TOPROL-XL) 25 MG 24 hr tablet Take 1 tablet (25 mg) by mouth daily 90 tablet 3     Microlet Lancets MISC USE TO TEST BLOOD SUGAR 3 TIMES DAILY OR AS DIRECTED. 100 each 0     nitroGLYcerin (NITROSTAT) 0.4 MG sublingual tablet Place 1 tablet (0.4 mg) under the tongue every 5 minutes as needed for chest pain 25 tablet 0     ondansetron (ZOFRAN ODT) 4 MG ODT tab Take 1 tablet (4 mg) by mouth every 8 hours as needed for nausea 10 tablet 0     thin (NO BRAND SPECIFIED) lancets Use with lanceting device. To accompany: Blood Glucose Monitor Brands: per  "insurance. 100 each 11     Wheat Dextrin (BENEFIBER PO) Powder, every other night       mupirocin (BACTROBAN) 2 % external ointment Apply to open areas on the scalp BID until healed. Need follow up appointment in Derm. 30 g 0             Review of Systems  Constitutional, HEENT, cardiovascular, pulmonary, GI, , musculoskeletal, neuro, skin, endocrine and psych systems are negative, except as otherwise noted.    OBJECTIVE:   /70 (BP Location: Right arm, Patient Position: Sitting, Cuff Size: Adult Regular)   Pulse 50   Temp 98.7  F (37.1  C) (Tympanic)   Resp 20   Ht 1.549 m (5' 0.98\")   Wt 74.2 kg (163 lb 8 oz)   LMP  (LMP Unknown)   SpO2 97%   BMI 30.91 kg/m   Estimated body mass index is 30.91 kg/m  as calculated from the following:    Height as of this encounter: 1.549 m (5' 0.98\").    Weight as of this encounter: 74.2 kg (163 lb 8 oz).  Physical Exam  GENERAL: healthy, alert and no distress  EYES: Eyes grossly normal to inspection, PERRL and conjunctivae and sclerae normal  HENT: ear canals and TM's normal, nose and mouth without ulcers or lesions  NECK: no adenopathy, no asymmetry, masses, or scars and thyroid normal to palpation  RESP: lungs clear to auscultation - no rales, rhonchi or wheezes  CV: bradycardia, normal S1 S2, no S3 or S4, no murmur, click or rub, peripheral pulses strong and no peripheral edema  ABDOMEN: soft, nontender, no hepatosplenomegaly, no masses and bowel sounds normal  MS: no gross musculoskeletal defects noted, no edema  SKIN: erythema and maceration in lower abdominal skin fold  NEURO: Normal strength and tone, mentation intact and speech normal  PSYCH: mentation appears normal and anxious        ASSESSMENT / PLAN:   (Z00.00) Encounter for Medicare annual wellness exam  (primary encounter diagnosis)  Comment:   Plan: PRIMARY CARE FOLLOW-UP SCHEDULING            (E11.29,  R80.9) Type 2 diabetes mellitus with microalbuminuria, without long-term current use of insulin " "(H)  Comment: check lab  Plan: HEMOGLOBIN A1C            (I25.10) Coronary artery disease involving native coronary artery of native heart without angina pectoris  Comment: check lab  Plan: Lipid panel reflex to direct LDL Non-fasting           (F33.1) Moderate episode of recurrent major depressive disorder (H)  Comment: follows with psychiatry.  Worse due to family stressors  Plan:     (E78.5) Hyperlipidemia LDL goal <100  Comment:  - stable, refill provided  Plan: atorvastatin (LIPITOR) 80 MG tablet            (E11.9) Type 2 diabetes mellitus without complication, without long-term current use of insulin (H)  Comment:  - stable, refill provided  Plan: dulaglutide (TRULICITY) 1.5 MG/0.5ML pen,         lisinopril (ZESTRIL) 5 MG tablet, metoprolol         succinate ER (TOPROL XL) 25 MG 24 hr tablet            (I10) Benign essential hypertension  Comment:  - stable, refill provided  Plan: lisinopril (ZESTRIL) 5 MG tablet, metoprolol         succinate ER (TOPROL XL) 25 MG 24 hr tablet            (L30.4) Intertrigo  Comment: start med  Plan: nystatin (MYCOSTATIN) 815125 UNIT/GM external         cream          Diabetes / hypertension   1. Refills sent  2. Blood work today    Health Care Maintenance  1. You are due for Pneumonia and Shingles vaccine(s)    Hair loss:  1. Could be related to age since your mother lost her hair    Fibromyalgia  1. Consider warm water pool therapy - [place] in [facility], Hotel pool  2. Keep up the good work staying active, stretching,e tc  3. No blood test for fibromyalgia     Skin  1. Use the nystatin cream under the abdominal skin fold up to 2x day as needed  Keep skin dry    Patient has been advised of split billing requirements and indicates understanding: Yes      COUNSELING:  Reviewed preventive health counseling, as reflected in patient instructions      BMI:   Estimated body mass index is 30.91 kg/m  as calculated from the following:    Height as of this encounter: 1.549 m (5' 0.98\").    " Weight as of this encounter: 74.2 kg (163 lb 8 oz).   Weight management plan: Discussed healthy diet and exercise guidelines      She reports that she quit smoking about 12 years ago. Her smoking use included cigarettes. She started smoking about 55 years ago. She has a 30.00 pack-year smoking history. She has never used smokeless tobacco.      Appropriate preventive services were discussed with this patient, including applicable screening as appropriate for cardiovascular disease, diabetes, osteopenia/osteoporosis, and glaucoma.  As appropriate for age/gender, discussed screening for colorectal cancer, prostate cancer, breast cancer, and cervical cancer. Checklist reviewing preventive services available has been given to the patient.    Reviewed patients plan of care and provided an AVS. The Complex Care Plan (for patients with higher acuity and needing more deliberate coordination of services) for Ml meets the Care Plan requirement. This Care Plan has been established and reviewed with the Patient.          Shwetha Robison [facility]    Identified Health Risks:    I have reviewed Opioid Use Disorder and Substance Use Disorder risk factors and made any needed referrals.       She is at risk for falling and has been provided with information to reduce the risk of falling at home.

## 2023-03-24 NOTE — TELEPHONE ENCOUNTER
REFERRAL INFORMATION:    Referring Provider:  Shwetha Robison DO    Referring Clinic:  [facility]    Reason for Visit/Diagnosis: Esophageal dysphagia, LUQ abdominal pain     FUTURE VISIT INFORMATION:    Appointment Date: 04-17-23    Appointment Time: @ 2pm     NOTES STATUS DETAILS   OFFICE NOTE from Referring Provider Internal 11-30-22 Shwetha Robison DO   OFFICE NOTE from Other Specialist N/A    HOSPITAL DISCHARGE SUMMARY/  ED VISITS N/A    OPERATIVE REPORT N/A    MEDICATION LIST Internal         ENDOSCOPY  Internal EGD: 11-07-22   COLONOSCOPY Internal 11-07-22   ERCP N/A    EUS N/A    STOOL TESTING Internal 01-15-19   PERTINENT LABS Internal C-diff: 02-24-23, 09-18-19, 09-15-19, 01-15-19 *additional in epic   PATHOLOGY REPORTS (RELATED) Internal 11-07-22   IMAGING (CT, MRI, EGD, MRCP, Small Bowel Follow Through/SBT, MR/CT Enterography) Internal CT abd: 02-24-23, 01-25-19  XR chest: 09-18-19, 03-16-19  XR KUB: 03-05-19

## 2023-04-05 ENCOUNTER — TRANSFERRED RECORDS (OUTPATIENT)
Dept: HEALTH INFORMATION MANAGEMENT | Facility: CLINIC | Age: 73
End: 2023-04-05
Payer: COMMERCIAL

## 2023-04-05 LAB — RETINOPATHY: NEGATIVE

## 2023-04-15 ENCOUNTER — NURSE TRIAGE (OUTPATIENT)
Dept: NURSING | Facility: CLINIC | Age: 73
End: 2023-04-15
Payer: COMMERCIAL

## 2023-04-15 ENCOUNTER — HEALTH MAINTENANCE LETTER (OUTPATIENT)
Age: 73
End: 2023-04-15

## 2023-04-16 NOTE — TELEPHONE ENCOUNTER
Nurse Triage SBAR    Is this a 2nd Level Triage? YES, LICENSED PRACTITIONER REVIEW IS REQUIRED    Situation:    She is wondering if she should hold her lantus since she has been vomiting today.     Background:   Pt has thrown up 5-6 times and has had diarrhea. She no longer feels sick but she has not been eating today.    Assessment:   pt appears to be well now, no nausea and her BS is 115    Protocol Recommended Disposition:   Call PCP Now    Recommendation:   She can take 14 units per MD and then to make sure she checks her BS in the AM. Also try to eat small meals so she doesn't go to low.     Paged to provider    Does the patient meet one of the following criteria for ADS visit consideration? No    Reason for Disposition    [1] Caller has URGENT medicine question about med that PCP or specialist prescribed AND [2] triager unable to answer question    Protocols used: MEDICATION QUESTION CALL-A-    Ute Beck RN on 4/15/2023 at 11:54 PM

## 2023-04-17 ENCOUNTER — PRE VISIT (OUTPATIENT)
Dept: GASTROENTEROLOGY | Facility: CLINIC | Age: 73
End: 2023-04-17

## 2023-04-17 ENCOUNTER — VIRTUAL VISIT (OUTPATIENT)
Dept: GASTROENTEROLOGY | Facility: CLINIC | Age: 73
End: 2023-04-17
Payer: COMMERCIAL

## 2023-04-17 DIAGNOSIS — R13.19 ESOPHAGEAL DYSPHAGIA: Primary | ICD-10-CM

## 2023-04-17 DIAGNOSIS — R11.2 NAUSEA AND VOMITING, UNSPECIFIED VOMITING TYPE: ICD-10-CM

## 2023-04-17 DIAGNOSIS — K44.9 HIATAL HERNIA: ICD-10-CM

## 2023-04-17 DIAGNOSIS — R14.0 BLOATING: ICD-10-CM

## 2023-04-17 DIAGNOSIS — R11.10 REGURGITATION OF FOOD: ICD-10-CM

## 2023-04-17 DIAGNOSIS — K21.9 GASTROESOPHAGEAL REFLUX DISEASE WITHOUT ESOPHAGITIS: ICD-10-CM

## 2023-04-17 DIAGNOSIS — R10.12 LUQ ABDOMINAL PAIN: ICD-10-CM

## 2023-04-17 DIAGNOSIS — R07.89 OTHER CHEST PAIN: ICD-10-CM

## 2023-04-17 PROCEDURE — 99205 OFFICE O/P NEW HI 60 MIN: CPT | Mod: VID | Performed by: INTERNAL MEDICINE

## 2023-04-17 PROCEDURE — 99417 PROLNG OP E/M EACH 15 MIN: CPT | Mod: VID | Performed by: INTERNAL MEDICINE

## 2023-04-17 ASSESSMENT — PAIN SCALES - GENERAL: PAINLEVEL: NO PAIN (0)

## 2023-04-17 NOTE — LETTER
"    4/17/2023         RE: Ml Evans  [address] [facility] [address]        Dear Colleague,    Thank you for referring your patient, Ml Evans, to the [facility] [place]. Please see a copy of my visit note below.      Gastroenterology Visit for: Ml Evans 1950   MRN: [id number]  April 17, 2023      Ml Evans is a 73 year old female who is being evaluated via a billable video (or phone) visit.      The patient has been notified of following:     \"This video (or phone) visit will be conducted via a call between you and your physician/provider. We have found that certain health care needs can be provided without the need for an in-person physical exam.  This service lets us provide the care you need with a video conversation.  If a prescription is necessary we can send it directly to your pharmacy.  If lab work is needed we can place an order for that and you can then stop by our lab to have the test done at a later time.    If during the course of the call the physician/provider feels a video/phone visit is not appropriate, you will not be charged for this service.\"     Patient confirmed that they are in [place] for today's visit      Reason for Visit:  chief complaint dysphagia, abdominal pain    Referred by: Ricki  / [facility] / [facility] [place] [id number]  Patient Care Team:  Shwetha Robison DO as PCP - General (Internal Medicine)  Taniya Agustin, RN as Nurse Coordinator (Cardiology)  Talya Reina PA-C (Inactive) as Physician Assistant (Physician Assistant)  Shwetha Robison DO as Assigned PCP  Martell Biggs MD as MD (Interventional Cardiology)  Martell Biggs MD as Assigned Heart and Vascular Provider  Magdalena Selby PA-C as Assigned Surgical Provider  Jen Bardales MD as Assigned Musculoskeletal Provider    History of Present Illness:   Ml Evans " is a 73 year old female.  Having GERD issues for 4 to 5 years.  She reports a remote history of intermittent pain in left lower quadrant and eventually she was found to have diverticulitis with abscess in 2013.  This was managed conservatively with spontaneous improvement.  However she continued to have intermittent left lower quadrant pain and diarrhea.  Patient reports that now she is having following issues that are getting worse in the last 6 months:    1.  Dysphagia and regurgitation: Patient reports that she has been having difficulty with swallowing solids, primarily vegetables and raw meat which has been intermittent.  There are times when she cannot swallow this food types and then there are times when she is able to swallow multiple pills without any issues.  She reports that this issue has been happening more frequently over the last 6 months.  She reports regurgitation of ingested materials as well as chest tightness with this.  She denies any odynophagia.  She denies any significant weight loss either.    She reports having significant reflux over years.  She reports that she has regurgitation and heartburn both and underwent endoscopies in the past.  She reports family history of acid reflux disease.    2.  Nausea and vomiting: She also reports that she has been feeling nauseous at times and throwing up stomach contents that are bilious in nature.  She reports that she is worried that she is having large hiatal hernia causing problems.  She reports vomiting material that is ingested 24 hours ago and associated with significant amount of mucus exudates.  She reports that.  As of diarrhea and abdominal pain also considered with episodes of vomiting.  She reports having sharp, intermittent, pain that moves around and diffuse in the abdomen.  This episodes last for less than 5 minutes and eventually resolves.    3.  Bloating, burping and loose stools: Patient also reports having significant amount of  burping and bloating throughout the day.  She reports loose intermittent stools.  She denies any constipation.  She reports that this have been going on in the last 6 months as well.    Patient denies symptom onset with starting any new medication.  She is currently taking lansoprazole 30 mg twice a day with some improvement in acid reflux and dysphagia symptoms.  Patient with history of severe fibromyalgia and has pain issues however she denies taking any opioids currently.       Wt Readings from Last 5 Encounters:   03/22/23 74.2 kg (163 lb 8 oz)   02/24/23 72.6 kg (160 lb)   11/07/22 72.6 kg (160 lb)   07/18/22 72.6 kg (160 lb)   06/23/22 72.6 kg (160 lb)        Esophageal Questionnaire(s)    BEDQ Questionnaire      4/17/2023    12:07 AM   BEDQ Questionnaire: How Often Have You Had the Following?   Trouble eating solid food (meat, bread, vegetables) 2   Trouble eating soft foods (yogurt, jello, pudding) 2   Trouble swallowing liquids 1   Pain while swallowing 0   Coughing or choking while swallowing foods or liquids 2   Total Score: 7         4/17/2023    12:07 AM   BEDQ Questionnaire: Discomfort/Pain Ratings   Eating solid food (meat, bread, vegetables) 2   Eating soft foods (yogurt, jello, pudding) 2   Drinking liquid 2   Total Score: 6       Eckardt Questionnaire      4/17/2023    12:11 AM   Eckardt Questionnaire   Dysphagia 1   Regurgitation 1   Retrosternal Pain 1   Weight Loss (kg) 0   Total Score:  3       Promis 10 Questionnaire      4/17/2023    12:16 AM   PROMIS 10 FLOWSHEET DATA   In general, would you say your health is: 3   In general, would you say your quality of life is: 3   In general, how would you rate your physical health? 2   In general, how would you rate your mental health, including your mood and your ability to think? 2   In general, how would you rate your satisfaction with your social activities and relationships? 2   In general, please rate how well you carry out your usual social  activities and roles. (This includes activities at home, at work and in your community, and responsibilities as a parent, child, spouse, employee, friend, etc.) 1   To what extent are you able to carry out your everyday physical activities such as walking, climbing stairs, carrying groceries, or moving a chair? 3   In the past 7 days, how often have you been bothered by emotional problems such as feeling anxious, depressed, or irritable? 4   In the past 7 days, how would you rate your fatigue on average? 3   In the past 7 days, how would you rate your pain on average, where 0 means no pain, and 10 means worst imaginable pain? 6   Mental health question re-calculation - no clinical value 2   Physical health question re-calculation - no clinical value 3   Pain question re-calculation - no clinical value 3   Global Mental Health Score 9   Global Physical Health Score 11   PROMIS TOTAL - SUBSCORES 20           STUDIES & PROCEDURES:    Lab Results   Component Value Date    EXAMENDO  11/07/2022     _______________________________________________________________________________  Patient Name: Ml Evans       Procedure Date: 11/7/2022 11:03 AM  MRN: [id number]                       YOB: 1950  Admit Type: Outpatient                Age: 72  Gender: Female                        Attending MD: CARLOS ARBOLEDA MD  Total Sedation Time:                    _______________________________________________________________________________     Procedure:             Upper GI endoscopy  Indications:           Suspected gastro-esophageal reflux disease  Providers:             CARLOS ARBOLEDA MD  Referring MD:          LILA SOMMER DO  Medicines:             Monitored Anesthesia Care  Complications:         No immediate complications.  _______________________________________________________________________________  Procedure:             After obtaining informed consent, the endoscope was                           passed under direct vision. Throughout the procedure,                          the patient's blood pressure, pulse, and oxygen                          saturations were monitored continuously. The Endoscope                          was introduced through the mouth, and advanced to the                          second part of duodenum. The upper GI endoscopy was                          accomplished without difficulty. The patient tolerated                          the procedure well.                                                                                   Findings:       The gastroesophageal flap valve was visualized endoscopically and        classified as Hill Grade III (minimal fold, loose to endoscope, hiatal        hernia likely).       A small hiatal hernia was present.       Scattered moderate inflammation characterized by adherent blood,        congestion (edema) and granularity was found on the greater curvature of        the stomach and in the gastric antrum. Biopsies were taken with a cold        forceps for Helicobacter pylori testing. Verification of patient        identification for the specimen was done by the physician, nurse and        technician using the patient's name, birth date and medical record        number. Estimated blood loss was minimal.       A few 3 to 5 mm pedunculated and sessile polyps with no bleeding and no        stigmata of recent bleeding were found in the gastric body and in the        gastric antrum.       The ampulla, duodenal bulb, first portion of the duodenum and second        portion of the duodenum were normal.                                                                                   Impression:            - Gastroesophageal flap valve classified as Hill Grade                          III (minimal fold, loose to endoscope, hiatal hernia                          likely).                         - Small hiatal hernia.                         - Mucosal  changes suspicious for chronic gastritis.                          Biopsied.                         - A few gastric polyps.                         - Normal ampulla, duodenal bulb, first portion of the                          duodenum and second portion of the duodenum.  Recommendation:        - Patient has a contact number available for                          emergencies. The signs and symptoms of potential                          delayed complications were discussed with the patient.                          Return to normal activities tomorrow. Written                          discharge instructions were provided to the patient.                         - Resume previous diet.                         - Continue present medications.                         - Await pathology results.                         - Return to referring physician PRN.                                                                                     Electronically Signed by Dr. Cobian  ________________  ECU HealthCHRISTOPHER COBIAN MD  11/7/2022 11:48:23 AM  I was physically present for the entire viewing portion of the exam.  [id number]-MFXCHRISTOPHER COBIAN MD  Number of Addenda: 0    Note Initiated On: 11/7/2022 11:03 AM  Scope In:  Scope Out:      EXAMENDO  08/18/2014     _______________________________________________________________________________  Patient Name: Ml Evans       Procedure Date: 8/18/2014 10:09:00 AM       MRN: [id number]                       YOB: 1950                    Admit Type: Outpatient                Age: 64                                     Gender: Female                        Attending MD: Anibal Loya MD           _______________________________________________________________________________     Procedure:                Upper GI endoscopy  Indications:              Heartburn  Providers:                Anibal Sebastian MD, Ml Jose, RN  Referring MD:             Ledy Benedict,  MD  Medicines:                Propofol per Anesthesia  Complications:            No immediate complications  _______________________________________________________________________________  Procedure:                After obtaining informed consent, the endoscope was                             passed under direct vision. Throughout the                             procedure, the patient's blood pressure, pulse, and                             oxygen saturations were monitored continuously. The                             Gastroscope was introduced through the mouth, and                             advanced to the second part of duodenum. The upper                             GI endoscopy was accomplished without difficulty.                             The patient tolerated the procedure fairly well.                                                                                   Findings:       Localized erythematous mucosa was found in the duodenal bulb. The exam        of the duodenum was otherwise normal. Localized mild inflammation        characterized by congestion (edema) was found in the prepyloric region        of the stomach. Biopsies were taken with a cold forceps for Helicobacter        pylori testing. The cardia and gastric fundus were normal on        retroflexion. No gross lesions were noted in the entire esophagus. The        Z-line was regular and was found 37 cm from the incisors.                                                                                   Impression:               - Erythematous duodenopathy.                            - Gastritis. This was biopsied.                            - No gross lesions in esophagus.                            - Z-line regular, 37 cm from the incisors.                             [Biopsied].  Recommendation:           - Await pathology results.                                                                                     Anibal Sebastian  MD  _____________________  Anibal Sebastian MD  Signed Date: 8/18/2014 10:37:38 AM  Number of Addenda: 0  Note Initiated On: 8/18/2014 10:09:00 AM  Scope Withdrawal Time: 0 hours 0 minutes 0 seconds   Scope Withdrawal Time: 0 hours 0 minutes 0 seconds   Total Procedure Duration: 0 hours 0 minutes 0 seconds   Total Procedure Duration: 0 hours 0 minutes 0 seconds       COLONOSCOPY  11/07/2022     _______________________________________________________________________________  Patient Name: Ml Evans       Procedure Date: 11/7/2022 11:02 AM  MRN: [id number]                       YOB: 1950  Admit Type: Outpatient                Age: 72  Gender: Female                        Attending MD: CARLOS ARBOLEDA MD  Total Sedation Time:                    _______________________________________________________________________________     Procedure:             Colonoscopy  Indications:           High risk colon cancer surveillance: Personal history                          of colonic polyps  Providers:             CARLOS ARBOLEDA MD  Referring MD:          LILA SOMMER DO  Medicines:             Monitored Anesthesia Care  Complications:         No immediate complications.  _______________________________________________________________________________  Procedure:             After obtaining informed consent, the colonoscope was                          passed under direct vision. Throughout the procedure,                          the patient's blood pressure, pulse, and oxygen                          saturations were monitored continuously. The                          Colonoscope was introduced through the anus and                          advanced to the cecum, identified by appendiceal                          orifice and ileocecal valve. The Colonoscope was                          introduced through the anus and advanced to the cecum,                          identified by appendiceal orifice  and ileocecal valve.                          The colonoscopy was performed without difficulty. The                          patient tolerated the procedure well. The quality of                          the bowel preparation was adequate. The ileocecal                          valve, appendiceal orifice, and rectum were                          photographed.                                                                                   Findings:       The perianal and digital rectal examinations were normal. Pertinent        negatives include normal sphincter tone.       A 4 mm polyp was found in the ascending colon. The polyp was sessile.        The polyp was removed with a cold snare. Resection and retrieval were        complete. Verification of patient identification for the specimen was        done by the physician, nurse and technician using the patient's name,        birth date and medical record number. Estimated blood loss was minimal.       A few small-mouthed diverticula were found in the recto-sigmoid colon        and sigmoid colon.                                                                                   Impression:            - One 4 mm polyp in the ascending colon, removed with                          a cold snare. Resected and retrieved.                         - Diverticulosis in the recto-sigmoid colon and in the                          sigmoid colon.  Recommendation:        - Patient has a contact number available for                          emergencies. The signs and symptoms of potential                          delayed complications were discussed with the patient.                          Return to normal activities tomorrow. Written                          discharge instructions were provided to the patient.                         - Resume previous diet.                         - Continue present medications.                         - Await pathology results.                          - Repeat colonoscopy in 5 years for surveillance.                         - Return to primary care physician PRN.                                                                                     Electronically Signed by Dr. Cobian  ________________  CARLOS COBIAN MD  11/7/2022 11:52:06 AM  I was physically present for the entire viewing portion of the exam.  [id number]-ZLECHRISTOPHER COBIAN MD  Number of Addenda: 0    Note Initiated On: 11/7/2022 11:02 AM  Scope In: 11:27:17 AM  Scope Out: 11:39:36 AM      COLONOSCOPY  05/14/2015     _______________________________________________________________________________  Patient Name: Ml Evans       Procedure Date: 5/14/2015 8:02 AM  MRN: [id number]                       YOB: 1950  Admit Type: Outpatient                Age: 65  Gender: Female                        Attending MD: Ponce Christine MD  _______________________________________________________________________________     Procedure:           Colonoscopy  Indications:         Abdominal pain in the left lower quadrant, Abdominal                        pain in the right lower quadrant, Rectal bleeding  Providers:           Ponce Christine MD, Cheryl Barajas, RN  Referring MD:        Taniya Duarte  Medicines:           Monitored Anesthesia Care  Complications:       No immediate complications.  _______________________________________________________________________________  Procedure:           After obtaining informed consent, the colonoscope was                        passed under direct vision. Throughout the procedure,                        the patient's blood pressure, pulse, and oxygen                        saturations were monitored continuously. The Colonoscope                        was introduced through the anus and advanced to the                        cecum, identified by appendiceal orifice and ileocecal                        valve. The colonoscopy was performed without  difficulty.                        The patient tolerated the procedure well. The quality of                        the bowel preparation was good.                                                                                   Findings:       The perianal and digital rectal examinations were normal. Pertinent        negatives include normal sphincter tone and no palpable rectal lesions.       A pedunculated polyp was found in the cecum. The polyp was 5 mm in size.        The polyp was removed with a cold biopsy forceps. Resection and        retrieval were complete.       Many medium-mouthed diverticula were found in the sigmoid colon.       There was mild spasm in the sigmoid colon.       The retroflexed view of the distal rectum and anal verge was normal and        showed no anal or rectal abnormalities.                                                                                   Impression:          - One 5 mm polyp in the cecum. Resected and retrieved.                       - Diverticulosis in the sigmoid colon.                       - Mild colonic spasm.  Recommendation:      - Use fiber, for example Citrucel, Fibercon, Konsyl or                        Metamucil.                       - Repeat colonoscopy in 5-10 years for surveillance                        based on pathology results.                                                                                     Signed electronically by Ponce Christine M.D.  ___________________  Ponce Christine MD  5/14/2015 8:48 AM  Number of Addenda: 0    Note Initiated On: 5/14/2015 8:02 AM              Prior medical records were reviewed including, but not limited to, notes from referring providers, lab work, radiographic tests, and other diagnostic tests. Pertinent results were summarized above.     History     Past Medical History:   Diagnosis Date    Attention deficit disorder without mention of hyperactivity     Benign essential hypertension 3/9/2017     Coronary artery disease march 15,2011    Two stents placed, angioplasty    Diabetes mellitus (H)     A1C 5.7-6.4, controlled with diet    Dysthymic disorder     GERD (gastroesophageal reflux disease) 1/20/2011    Glycogenosis (H)     History of blood transfusion 1981    euptured ectopic pg    History of ST elevation myocardial infarction (STEMI) 1/23/2019    Malignant neoplasm (H)     squamous cell carcinoma many years ago    Other and unspecified hyperlipidemia     Squamous cell carcinoma        Past Surgical History:   Procedure Laterality Date    ABDOMEN SURGERY  1981,1991,1993    APPENDECTOMY  1993    BIOPSY  2003    nose, during surgery    COLONOSCOPY  2005    COLONOSCOPY N/A 5/14/2015    Procedure: COMBINED COLONOSCOPY, SINGLE OR MULTIPLE BIOPSY/POLYPECTOMY BY BIOPSY;  Surgeon: Ponce Christine MD;  Location: [facility]    COLONOSCOPY N/A 11/7/2022    Procedure: COLONOSCOPY, FLEXIBLE, WITH LESION REMOVAL USING SNARE;  Surgeon: Maximilian Cobian MD;  Location: [facility]    ECTOPIC PREGNANCY SURGERY  1981    ENDOSCOPIC RELEASE CARPAL TUNNEL  4/16/2013    Procedure: ENDOSCOPIC RELEASE CARPAL TUNNEL;  Right endoscopic carpal tunnel release;  Surgeon: Jonah Dela Cruz MD;  Location: [facility]    ENT SURGERY  2003    nose- suspected basal cell- was benign    ESOPHAGOSCOPY, GASTROSCOPY, DUODENOSCOPY (EGD), COMBINED  8/18/2014    Procedure: COMBINED ESOPHAGOSCOPY, GASTROSCOPY, DUODENOSCOPY (EGD), BIOPSY SINGLE OR MULTIPLE;  Surgeon: Anibal Sebastian MD;  Location: [facility]    ESOPHAGOSCOPY, GASTROSCOPY, DUODENOSCOPY (EGD), COMBINED N/A 11/7/2022    Procedure: ESOPHAGOGASTRODUODENOSCOPY, WITH BIOPSY;  Surgeon: Maximilian Cobian MD;  Location: [facility]    GENITOURINARY SURGERY  1981, 1991    HYSTERECTOMY, ELIECER      total hysterectomy. Unsure if ELIECER or vaginal    SURGICAL HISTORY OF -       appy    SURGICAL HISTORY OF -       T&A    VASCULAR SURGERY  2011    angioplasty- 2 stents implanted       Social History     Socioeconomic  History    Marital status:      Spouse name: Not on file    Number of children: Not on file    Years of education: Not on file    Highest education level: Not on file   Occupational History     Employer: RETIRED   Tobacco Use    Smoking status: Former     Packs/day: 1.00     Years: 30.00     Pack years: 30.00     Types: Cigarettes     Start date: 1968     Quit date: 3/15/2011     Years since quittin.0    Smokeless tobacco: Never    Tobacco comments:     occasional/daily   Vaping Use    Vaping status: Never Used   Substance and Sexual Activity    Alcohol use: No     Alcohol/week: 0.0 standard drinks of alcohol    Drug use: No    Sexual activity: Yes     Partners: Male     Birth control/protection: None   Other Topics Concern    Parent/sibling w/ CABG, MI or angioplasty before 66F 55M? Yes     Comment: father   Social History Narrative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

## 2023-04-17 NOTE — NURSING NOTE
Is the patient currently in the state of [place]? YES    Visit mode:VIDEO    If the visit is dropped, the patient can be reconnected by: VIDEO VISIT: Text to cell phone: [phone]    Will anyone else be joining the visit? YES: How would they like to receive their invitation? Send to e-mail at: [email or web address]      How would you like to obtain your AVS? MyChart    Are changes needed to the allergy or medication list? NO    Reason for visit: Video Visit    Ligia Chahal

## 2023-04-18 ENCOUNTER — PATIENT OUTREACH (OUTPATIENT)
Dept: GASTROENTEROLOGY | Facility: CLINIC | Age: 73
End: 2023-04-18
Payer: COMMERCIAL

## 2023-04-18 DIAGNOSIS — K58.0 IRRITABLE BOWEL SYNDROME WITH DIARRHEA: ICD-10-CM

## 2023-04-18 DIAGNOSIS — R11.10 REGURGITATION OF FOOD: ICD-10-CM

## 2023-04-18 DIAGNOSIS — R14.0 BLOATING: Primary | ICD-10-CM

## 2023-04-18 DIAGNOSIS — R11.2 NAUSEA AND VOMITING, UNSPECIFIED VOMITING TYPE: ICD-10-CM

## 2023-04-18 DIAGNOSIS — R13.19 ESOPHAGEAL DYSPHAGIA: ICD-10-CM

## 2023-04-19 ENCOUNTER — TELEPHONE (OUTPATIENT)
Dept: GASTROENTEROLOGY | Facility: CLINIC | Age: 73
End: 2023-04-19
Payer: COMMERCIAL

## 2023-05-01 ENCOUNTER — TELEPHONE (OUTPATIENT)
Dept: GASTROENTEROLOGY | Facility: CLINIC | Age: 73
End: 2023-05-01
Payer: COMMERCIAL

## 2023-05-01 NOTE — TELEPHONE ENCOUNTER
MARGRE[facility] , sent [employer]    Schedule a follow up appt with Magdalena Thomas or Alix Callahan in 2 months (around 8/1/23) per Dr. Tovar's request. Return Esophageal, in person or virtual.

## 2023-05-02 ENCOUNTER — TELEPHONE (OUTPATIENT)
Dept: FAMILY MEDICINE | Facility: CLINIC | Age: 73
End: 2023-05-02
Payer: COMMERCIAL

## 2023-05-02 NOTE — TELEPHONE ENCOUNTER
Patient called with questions about her XR Esophagram and the NM Gastric emptying. The XR esophagram is tomorrow morning.   1. She wants to know if there are any medications she needs to hold the night before or the morning of the procedures. She mentioned that Xanax and Doxepin may be two medications she may needs to hold.  2. Due to her fibromyalgia she cannot be in one position for a long time. She doesn't know if she needs to stay absolutely still for the images or if she will be laying down.    Routing to Yonny MORELOS and PCP.   Ashley Clark RN on 5/2/2023 at 4:34 PM

## 2023-05-03 ENCOUNTER — HOSPITAL ENCOUNTER (OUTPATIENT)
Dept: GENERAL RADIOLOGY | Facility: CLINIC | Age: 73
Discharge: HOME | End: 2023-05-03
Attending: INTERNAL MEDICINE | Admitting: INTERNAL MEDICINE
Payer: COMMERCIAL

## 2023-05-03 DIAGNOSIS — R13.19 ESOPHAGEAL DYSPHAGIA: ICD-10-CM

## 2023-05-03 DIAGNOSIS — R11.10 REGURGITATION OF FOOD: ICD-10-CM

## 2023-05-03 PROCEDURE — 74220 X-RAY XM ESOPHAGUS 1CNTRST: CPT

## 2023-05-09 ENCOUNTER — HOSPITAL ENCOUNTER (OUTPATIENT)
Dept: NUCLEAR MEDICINE | Facility: CLINIC | Age: 73
Setting detail: NUCLEAR MEDICINE
Discharge: HOME | End: 2023-05-09
Attending: INTERNAL MEDICINE | Admitting: INTERNAL MEDICINE
Payer: COMMERCIAL

## 2023-05-09 DIAGNOSIS — R11.2 NAUSEA AND VOMITING, UNSPECIFIED VOMITING TYPE: ICD-10-CM

## 2023-05-09 PROCEDURE — 343N000001 HC RX 343: Performed by: INTERNAL MEDICINE

## 2023-05-09 PROCEDURE — A9541 TC99M SULFUR COLLOID: HCPCS | Performed by: INTERNAL MEDICINE

## 2023-05-09 PROCEDURE — 78264 GASTRIC EMPTYING IMG STUDY: CPT

## 2023-05-09 RX ADMIN — Medication 1 MILLICURIE: at 08:40

## 2023-05-10 ENCOUNTER — MYC MEDICAL ADVICE (OUTPATIENT)
Dept: FAMILY MEDICINE | Facility: CLINIC | Age: 73
End: 2023-05-10
Payer: COMMERCIAL

## 2023-05-10 NOTE — TELEPHONE ENCOUNTER
Dr Robison  Per My Chart pt has an update for you on everything that's happening now, with her health.

## 2023-05-11 ENCOUNTER — TELEPHONE (OUTPATIENT)
Dept: GASTROENTEROLOGY | Facility: CLINIC | Age: 73
End: 2023-05-11
Payer: COMMERCIAL

## 2023-05-11 NOTE — TELEPHONE ENCOUNTER
"[facility], sent [email or web address] to schedule the following appointment:        New appt with Mag Villa. Reason: discuss gastroparesis diet // referred by Dr. Tovar.    Next available \"New GI Nutrition\" appt, in person or virtual. Left call center #    "

## 2023-05-12 ENCOUNTER — TELEPHONE (OUTPATIENT)
Dept: GASTROENTEROLOGY | Facility: CLINIC | Age: 73
End: 2023-05-12
Payer: COMMERCIAL

## 2023-05-12 DIAGNOSIS — R13.19 ESOPHAGEAL DYSPHAGIA: Primary | ICD-10-CM

## 2023-05-12 DIAGNOSIS — R11.2 NAUSEA AND VOMITING, UNSPECIFIED VOMITING TYPE: ICD-10-CM

## 2023-05-12 DIAGNOSIS — K44.9 HIATAL HERNIA: ICD-10-CM

## 2023-05-12 DIAGNOSIS — K21.9 GASTROESOPHAGEAL REFLUX DISEASE WITHOUT ESOPHAGITIS: ICD-10-CM

## 2023-05-12 DIAGNOSIS — R11.10 REGURGITATION OF FOOD: ICD-10-CM

## 2023-05-12 NOTE — TELEPHONE ENCOUNTER
[facility] Call Center    Phone Message    May a detailed message be left on voicemail: yes     Reason for Call: Other: Has questions about what the nutritionist is needed and next steps. Is asking about being seen virtually for this. Please call to discuss     Action Taken: Message routed to:  [facility] ([facility] gi    Travel Screening: Not Applicable

## 2023-05-15 NOTE — TELEPHONE ENCOUNTER
Returned pt call to discuss provider's recommendation following recent testing.  Pt recommended to see nutritionist for information and instructions on a gastroparesis diet as pt has delayed emptying of stomach on recent study.  Per provider, following esophagram, EGD and esophageal manometry orders in place.  Detailed vm left for call back as needed as well as mychart message sent.

## 2023-05-24 NOTE — PATIENT INSTRUCTIONS
Up GI consultation    Set up CT scan     Cut metformin dose in half and see if this helps     See me back in one month    Self

## 2023-06-06 ENCOUNTER — PATIENT OUTREACH (OUTPATIENT)
Dept: GASTROENTEROLOGY | Facility: CLINIC | Age: 73
End: 2023-06-06
Payer: COMMERCIAL

## 2023-06-06 NOTE — TELEPHONE ENCOUNTER
Left message for pt regarding upcoming manometry testing and if pt has any questions. Sent [employer] message with updated instructions.

## 2023-06-13 ENCOUNTER — LAB (OUTPATIENT)
Dept: LAB | Facility: CLINIC | Age: 73
End: 2023-06-13
Payer: COMMERCIAL

## 2023-06-13 ENCOUNTER — TELEPHONE (OUTPATIENT)
Dept: GASTROENTEROLOGY | Facility: CLINIC | Age: 73
End: 2023-06-13

## 2023-06-13 DIAGNOSIS — E11.29 TYPE 2 DIABETES MELLITUS WITH MICROALBUMINURIA, WITHOUT LONG-TERM CURRENT USE OF INSULIN (H): ICD-10-CM

## 2023-06-13 DIAGNOSIS — R80.9 TYPE 2 DIABETES MELLITUS WITH MICROALBUMINURIA, WITHOUT LONG-TERM CURRENT USE OF INSULIN (H): ICD-10-CM

## 2023-06-13 LAB — HBA1C MFR BLD: 6.3 % (ref 0–5.6)

## 2023-06-13 PROCEDURE — 36415 COLL VENOUS BLD VENIPUNCTURE: CPT

## 2023-06-13 PROCEDURE — 83036 HEMOGLOBIN GLYCOSYLATED A1C: CPT

## 2023-06-13 NOTE — TELEPHONE ENCOUNTER
"[facility], sent mychart to reschedule the following appointment:    Appt on 6/26/23 with Dr. Tovar due to the provider no longer being available. Ok to reschedule with Dr. Riddle, Magdalena Thomas, or Alix Callahan. \"Return Esophageal \" appt virtual (Magdalena Thomas has in person availability). Left call center #  "

## 2023-06-14 ENCOUNTER — OFFICE VISIT (OUTPATIENT)
Dept: FAMILY MEDICINE | Facility: CLINIC | Age: 73
End: 2023-06-14
Payer: COMMERCIAL

## 2023-06-14 VITALS
OXYGEN SATURATION: 97 % | WEIGHT: 161.9 LBS | TEMPERATURE: 98.9 F | HEIGHT: 61 IN | SYSTOLIC BLOOD PRESSURE: 102 MMHG | HEART RATE: 63 BPM | BODY MASS INDEX: 30.57 KG/M2 | DIASTOLIC BLOOD PRESSURE: 70 MMHG

## 2023-06-14 DIAGNOSIS — E11.29 TYPE 2 DIABETES MELLITUS WITH MICROALBUMINURIA, WITHOUT LONG-TERM CURRENT USE OF INSULIN (H): ICD-10-CM

## 2023-06-14 DIAGNOSIS — K31.84 GASTROPARESIS: Primary | ICD-10-CM

## 2023-06-14 DIAGNOSIS — R80.9 TYPE 2 DIABETES MELLITUS WITH MICROALBUMINURIA, WITHOUT LONG-TERM CURRENT USE OF INSULIN (H): ICD-10-CM

## 2023-06-14 PROCEDURE — 99214 OFFICE O/P EST MOD 30 MIN: CPT | Performed by: INTERNAL MEDICINE

## 2023-06-14 RX ORDER — INSULIN GLARGINE 100 [IU]/ML
20 INJECTION, SOLUTION SUBCUTANEOUS AT BEDTIME
Qty: 30 ML | Refills: 3 | Status: SHIPPED | OUTPATIENT
Start: 2023-06-14 | End: 2024-07-10

## 2023-06-14 RX ORDER — PROCHLORPERAZINE MALEATE 5 MG
5 TABLET ORAL EVERY 8 HOURS PRN
Qty: 30 TABLET | Refills: 1 | Status: SHIPPED | OUTPATIENT
Start: 2023-06-14 | End: 2024-04-02

## 2023-06-14 ASSESSMENT — PATIENT HEALTH QUESTIONNAIRE - PHQ9
SUM OF ALL RESPONSES TO PHQ QUESTIONS 1-9: 14
10. IF YOU CHECKED OFF ANY PROBLEMS, HOW DIFFICULT HAVE THESE PROBLEMS MADE IT FOR YOU TO DO YOUR WORK, TAKE CARE OF THINGS AT HOME, OR GET ALONG WITH OTHER PEOPLE: VERY DIFFICULT
SUM OF ALL RESPONSES TO PHQ QUESTIONS 1-9: 14

## 2023-06-14 ASSESSMENT — PAIN SCALES - GENERAL: PAINLEVEL: NO PAIN (0)

## 2023-06-14 NOTE — PATIENT INSTRUCTIONS
Diabetes  Recommend to stop Trulicity - may be contributing or the primary cause of the gastroparesis  Increase Lantus from 18 to 20 units  Start checking blood sugar 4-7 x week - sometimes fasting, sometimes ~ 1-2 hrs after a meal  If blood sugar are often > 180, follow-up with me - may consider glipizide    Gastroparesis  Keep upcoming appointments for GI evaluation  Compazine as needed for nausea;  do not use regularly - just a few times/week as needed

## 2023-06-14 NOTE — PROGRESS NOTES
Assessment & Plan     Gastroparesis -severe gastroparesis seen on gastric emptying study.  She is having manometry and hydrogen breath testing to evaluate for small bowel obstruction and other esophageal problems.  Discussed very rare use of Compazine when not regular use.  The GLP-1 may be contributing if not the cause of the gastroparesis, so we will stop the Trulicity  - prochlorperazine (COMPAZINE) 5 MG tablet; Take 1 tablet (5 mg) by mouth every 8 hours as needed for nausea or vomiting    Type 2 diabetes mellitus with microalbuminuria, without long-term current use of insulin (H) -stop Trulicity and increase Lantus from 18 to 20 units.  May need further titration or reinstating glipizide if blood sugars often above 1  - insulin glargine (LANTUS SOLOSTAR) 100 UNIT/ML pen; Inject 20 Units Subcutaneous At Bedtime             Patient Instructions   Diabetes  1. Recommend to stop Trulicity - may be contributing or the primary cause of the gastroparesis  2. Increase Lantus from 18 to 20 units  3. Start checking blood sugar 4-7 x week - sometimes fasting, sometimes ~ 1-2 hrs after a meal  4. If blood sugar are often > 180, follow-up with me - may consider glipizide    Gastroparesis  1. Keep upcoming appointments for GI evaluation  2. Compazine as needed for nausea;  do not use regularly - just a few times/week as needed      Shwetha Robison, [facility]    Carolann Brewster is a 73 year old, presenting for the following health issues:  Hypertension, Diabetes, Lipids, Gastrointestinal Problem (Gastro-esophageal dysphagia, gastroparesis (diagnosed by recent testing & [facility] tests [facility] - [facility] Tovar)), and Health Maintenance (Will wait on pcv 20 aware has to go to pharmacy to shingles and TD )        6/14/2023     8:28 AM   Additional Questions   Roomed by antonio sanchez   Accompanied by self         6/14/2023     8:28 AM   Patient Reported Additional Medications   Patient reports  taking the following new medications none     HPI     Chief Complaint   Patient presents with     Hypertension     Diabetes     Lipids     Gastrointestinal Problem     Gastro-esophageal dysphagia, gastroparesis (diagnosed by recent testing & more tests scheduled at [facility] - Dr. Tovar)     Health Maintenance     Will wait on pcv 20 aware has to go to pharmacy to shingles and TD      GI  --this is the main issue she wants to discuss today  --wants something for nausea;  zofran caused headache although was helpful  --GI has diagnosed her with gastroparesis, GI recommended to see RD  --she has been on trulicity since 1/2020  --she is very scared of the diagnosis of gastroparesis; tearful today describing symptoms and diagnosis     Diabetes Follow-up    How often are you checking your blood sugar? A few times a week  What time of day are you checking your blood sugars (select all that apply)?  Before and after meals  Have you had any blood sugars above 200?  No  Have you had any blood sugars below 70?  No    What symptoms do you notice when your blood sugar is low?  Shaky, Dizzy, Weak, Lethargy, Blurred vision and Confusion    What concerns do you have today about your diabetes? Other: long term effects     Do you have any of these symptoms? (Select all that apply)  Numbness in feet     She is on GLP1 which is known to cause gastroparesis    She is undergoing multiple GI studies for small bowel obstruction    lantus 18 units HS    Had episode of hypoglycemia and lantus was decreased from 20 to 18    Was on glipizide in the past, stopped in place of trulicity          Hyperlipidemia Follow-Up      Are you regularly taking any medication or supplement to lower your cholesterol?   Yes- PM    Are you having muscle aches or other side effects that you think could be caused by your cholesterol lowering medication?  No    Hypertension Follow-up      Do you check your blood pressure regularly outside of the clinic? Yes  sometimes    Are you following a low salt diet? Yes    Are your blood pressures ever more than 140 on the top number (systolic) OR more   than 90 on the bottom number (diastolic), for example 140/90? No    BP Readings from Last 2 Encounters:   06/14/23 102/70   03/22/23 108/70     Hemoglobin A1C (%)   Date Value   06/13/2023 6.3 (H)   09/02/2022 6.3 (H)   04/13/2021 7.1 (H)   01/28/2020 7.2 (H)     LDL Cholesterol Calculated (mg/dL)   Date Value   03/22/2023 63   03/15/2022 72   04/13/2021 59   05/06/2019 55         How many servings of fruits and vegetables do you eat daily?  0-1    On average, how many sweetened beverages do you drink each day (Examples: soda, juice, sweet tea, etc.  Do NOT count diet or artificially sweetened beverages)?   0    How many days per week do you exercise enough to make your heart beat faster? 0    How many minutes a day do you exercise enough to make your heart beat faster? 0    How many days per week do you miss taking your medication? 0        Current Outpatient Medications   Medication Sig Dispense Refill     acetaminophen (TYLENOL) 500 MG tablet Take 500-1,000 mg by mouth every 4 hours as needed for mild pain       acetylcysteine (N-ACETYL CYSTEINE) 600 MG CAPS capsule Take 3,000 mg by mouth daily       alcohol swab prep pads Use to swab area of injection/bethel as directed. 100 each 3     Alcohol Swabs (B-D SINGLE USE SWABS REGULAR) PADS USE TO SWAB AREA OF INJECTION/BETHEL AS DIRECTED. 100 each 3     ALPRAZolam (XANAX PO) Take 0.5-1 mg by mouth 4 times daily as needed 1/2 tab in am, 1 tab in pm, then 1 tab midday prn and 1/2 tab throughout the day if needed       amphetamine-dextroamphetamine (ADDERALL XR) 20 MG per capsule Take 20 mg by mouth daily        aspirin 81 MG EC tablet Take 81 mg by mouth daily        atorvastatin (LIPITOR) 80 MG tablet Take 1 tablet (80 mg) by mouth daily 90 tablet 3     blood glucose (NO BRAND SPECIFIED) lancets standard Use to test blood sugar 3  times daily or as directed. 100 each 1     blood glucose (NO BRAND SPECIFIED) test strip Use to test blood sugar 1 times daily or as directed. To accompany: Blood Glucose Monitor Brands: per insurance. 100 strip 6     blood glucose calibration (NO BRAND SPECIFIED) solution To accompany: Blood Glucose Monitor Brands: per insurance. 1 each 1     blood glucose monitoring (NO BRAND SPECIFIED) meter device kit Use to test blood sugar 1 times daily or as directed. Preferred blood glucose meter OR supplies to accompany: Blood Glucose Monitor Brands: per insurance. 1 kit 0     CONTOUR NEXT TEST test strip USE TO TEST BLOOD SUGAR THREE TIMES A  strip 0     DoxePIN (SINEQUAN) 75 MG capsule Take 75 mg by mouth At Bedtime        dulaglutide (TRULICITY) 1.5 MG/0.5ML pen Inject 1.5 mg Subcutaneous every 7 days 6 mL 3     escitalopram (LEXAPRO) 20 MG tablet Take 20 mg by mouth daily       fluocinonide (LIDEX) 0.05 % external solution Apply to itchy areas scalp BID x 1-2 weeks PRN. Need follow up appointment in Derm. 50 mL 0     LANsoprazole (PREVACID) 30 MG DR capsule Take 1 capsule (30 mg) by mouth 2 times daily for 30 days, THEN 1 capsule (30 mg) every morning (before breakfast) for 360 days. 30-60 minutes before a meal. (Patient taking differently: Take 1 capsule (30 mg) by mouth 2 times daily for 30 days, THEN 1 capsule (30 mg) every morning (before breakfast) for 360 days. 30-60 minutes before a meal.    Taking one time daily) 90 capsule 3     LANTUS SOLOSTAR 100 UNIT/ML soln INJECT 18 UNITS SUBCUTANEOUS AT BEDTIME 30 mL 3     lisinopril (ZESTRIL) 5 MG tablet Take 1 tablet (5 mg) by mouth daily 90 tablet 3     metoprolol succinate ER (TOPROL XL) 25 MG 24 hr tablet Take 1 tablet (25 mg) by mouth daily 90 tablet 3     Microlet Lancets MISC USE TO TEST BLOOD SUGAR 3 TIMES DAILY OR AS DIRECTED. 100 each 0     mupirocin (BACTROBAN) 2 % external ointment Apply to open areas on the scalp BID until healed. Need follow up  "appointment in Derm. 30 g 0     nitroGLYcerin (NITROSTAT) 0.4 MG sublingual tablet Place 1 tablet (0.4 mg) under the tongue every 5 minutes as needed for chest pain 25 tablet 0     nystatin (MYCOSTATIN) 866505 UNIT/GM external cream Apply topically 2 times daily 30 g 11     ULTICARE MICRO 32G X 4 MM insulin pen needle USE 1 PEN NEEDLE DAILY OR AS DIRECTED. 100 each 1     Wheat Dextrin (BENEFIBER PO) Powder, every other night       blood glucose monitoring (NO BRAND SPECIFIED) meter device kit Use to test blood sugar 3 times daily or as directed. 1 kit 0     ondansetron (ZOFRAN ODT) 4 MG ODT tab Take 1 tablet (4 mg) by mouth every 8 hours as needed for nausea 10 tablet 0     thin (NO BRAND SPECIFIED) lancets Use with lanceting device. To accompany: Blood Glucose Monitor Brands: per insurance. 100 each 11           Review of Systems   Constitutional, HEENT, cardiovascular, pulmonary, gi and gu systems are negative, except as otherwise noted.      Objective    /70 (BP Location: Right arm, Patient Position: Sitting, Cuff Size: Adult Regular)   Pulse 63   Temp 98.9  F (37.2  C) (Tympanic)   Ht 1.549 m (5' 0.98\")   Wt 73.4 kg (161 lb 14.4 oz)   LMP  (LMP Unknown)   SpO2 97%   BMI 30.61 kg/m    Body mass index is 30.61 kg/m .  Physical Exam   GENERAL APPEARANCE: alert, no distress and over weight  PSYCH: mentation appears normal and worried                Hemoglobin A1C   Date Value Ref Range Status   06/13/2023 6.3 (H) 0.0 - 5.6 % Final     Comment:     Normal <5.7%   Prediabetes 5.7-6.4%    Diabetes 6.5% or higher     Note: Adopted from ADA consensus guidelines.   09/02/2022 6.3 (H) 0.0 - 5.6 % Final     Comment:     Normal <5.7%   Prediabetes 5.7-6.4%    Diabetes 6.5% or higher     Note: Adopted from ADA consensus guidelines.   03/28/2022 6.2 (H) 0.0 - 5.6 % Final     Comment:     Normal <5.7%   Prediabetes 5.7-6.4%    Diabetes 6.5% or higher     Note: Adopted from ADA consensus guidelines.   04/13/2021 7.1 " (H) 0 - 5.6 % Final     Comment:     Normal <5.7% Prediabetes 5.7-6.4%  Diabetes 6.5% or higher - adopted from ADA   consensus guidelines.     01/28/2020 7.2 (H) 0 - 5.6 % Final     Comment:     Normal <5.7% Prediabetes 5.7-6.4%  Diabetes 6.5% or higher - adopted from ADA   consensus guidelines.     09/15/2019 7.9 (H) 0 - 5.6 % Final     Comment:     Normal <5.7% Prediabetes 5.7-6.4%  Diabetes 6.5% or higher - adopted from ADA   consensus guidelines.

## 2023-06-15 ENCOUNTER — OFFICE VISIT (OUTPATIENT)
Dept: GASTROENTEROLOGY | Facility: CLINIC | Age: 73
End: 2023-06-15
Payer: COMMERCIAL

## 2023-06-15 ENCOUNTER — TELEPHONE (OUTPATIENT)
Dept: GASTROENTEROLOGY | Facility: CLINIC | Age: 73
End: 2023-06-15

## 2023-06-15 ENCOUNTER — OFFICE VISIT (OUTPATIENT)
Dept: CARDIOLOGY | Facility: CLINIC | Age: 73
End: 2023-06-15
Attending: INTERNAL MEDICINE
Payer: COMMERCIAL

## 2023-06-15 VITALS
RESPIRATION RATE: 14 BRPM | BODY MASS INDEX: 31.62 KG/M2 | DIASTOLIC BLOOD PRESSURE: 62 MMHG | HEART RATE: 46 BPM | HEIGHT: 60 IN | OXYGEN SATURATION: 100 % | SYSTOLIC BLOOD PRESSURE: 112 MMHG

## 2023-06-15 VITALS
WEIGHT: 163.6 LBS | OXYGEN SATURATION: 94 % | SYSTOLIC BLOOD PRESSURE: 121 MMHG | BODY MASS INDEX: 31.95 KG/M2 | HEART RATE: 51 BPM | DIASTOLIC BLOOD PRESSURE: 62 MMHG

## 2023-06-15 DIAGNOSIS — I25.10 CORONARY ARTERY DISEASE INVOLVING NATIVE HEART WITHOUT ANGINA PECTORIS, UNSPECIFIED VESSEL OR LESION TYPE: ICD-10-CM

## 2023-06-15 DIAGNOSIS — K21.9 GASTROESOPHAGEAL REFLUX DISEASE WITHOUT ESOPHAGITIS: ICD-10-CM

## 2023-06-15 DIAGNOSIS — K44.9 HIATAL HERNIA: ICD-10-CM

## 2023-06-15 DIAGNOSIS — R11.10 REGURGITATION OF FOOD: ICD-10-CM

## 2023-06-15 DIAGNOSIS — R13.19 ESOPHAGEAL DYSPHAGIA: Primary | ICD-10-CM

## 2023-06-15 PROCEDURE — 99204 OFFICE O/P NEW MOD 45 MIN: CPT | Mod: GC | Performed by: INTERNAL MEDICINE

## 2023-06-15 PROCEDURE — 91010 ESOPHAGUS MOTILITY STUDY: CPT

## 2023-06-15 PROCEDURE — G0463 HOSPITAL OUTPT CLINIC VISIT: HCPCS | Performed by: INTERNAL MEDICINE

## 2023-06-15 PROCEDURE — 91037 ESOPH IMPED FUNCTION TEST: CPT

## 2023-06-15 RX ORDER — NITROGLYCERIN 0.4 MG/1
0.4 TABLET SUBLINGUAL EVERY 5 MIN PRN
Qty: 25 TABLET | Refills: 1 | Status: SHIPPED | OUTPATIENT
Start: 2023-06-15 | End: 2024-07-10

## 2023-06-15 ASSESSMENT — PAIN SCALES - GENERAL
PAINLEVEL: NO PAIN (0)
PAINLEVEL: MODERATE PAIN (4)
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
## 2023-06-15 NOTE — PROGRESS NOTES
Non-Invasive GI Procedure Visit    Ml Evans is a 73 year old female with history of Data Unavailable.   Patient stated reason for procedure: Dysphagia, GERD and nausea, vomiting      COVID-19 PCR Results         No data to display            COVID-19 Antibody Results, Testing for Immunity         No data to display                Pre-Procedure Assessment  Patient presents to clinic today for Esophageal Manometry Study    Referring Provider: Dr. Tovar  Patient has undergone previous endoscopy.    Does patient report taking a PPI (omeprazole, pantoprazole, rabeprazole, lansoprazole, esomeprazole, dexlansoprazole)? Yes. Is patient taking a PPI twice daily? No  Does patient report taking a H2 blocker (ranitidine, or famotidine)? No  Does patient report taking opioids? No  Patient reported that last food and/or drink was last consumed 6 hours ago.  Esophageal Questionnaire(s) Completed: Yes - Esophageal Questionnaire(s)    BEDQ Questionnaire      4/17/2023    12:07 AM   BEDQ Questionnaire: How Often Have You Had the Following?   Trouble eating solid food (meat, bread, vegetables) 2   Trouble eating soft foods (yogurt, jello, pudding) 2   Trouble swallowing liquids 1   Pain while swallowing 0   Coughing or choking while swallowing foods or liquids 2   Total Score: 7         4/17/2023    12:07 AM   BEDQ Questionnaire: Discomfort/Pain Ratings   Eating solid food (meat, bread, vegetables) 2   Eating soft foods (yogurt, jello, pudding) 2   Drinking liquid 2   Total Score: 6       Eckardt Questionnaire      4/17/2023    12:11 AM   Eckardt Questionnaire   Dysphagia 1   Regurgitation 1   Retrosternal Pain 1   Weight Loss (kg) 0   Total Score:  3       Promis 10 Questionnaire      4/17/2023    12:16 AM   PROMIS 10 FLOWSHEET DATA   In general, would you say your health is: 3   In general, would you say your quality of life is: 3   In general, how would you rate your physical health? 2   In general, how would you rate  "your mental health, including your mood and your ability to think? 2   In general, how would you rate your satisfaction with your social activities and relationships? 2   In general, please rate how well you carry out your usual social activities and roles. (This includes activities at home, at work and in your community, and responsibilities as a parent, child, spouse, employee, friend, etc.) 1   To what extent are you able to carry out your everyday physical activities such as walking, climbing stairs, carrying groceries, or moving a chair? 3   In the past 7 days, how often have you been bothered by emotional problems such as feeling anxious, depressed, or irritable? 4   In the past 7 days, how would you rate your fatigue on average? 3   In the past 7 days, how would you rate your pain on average, where 0 means no pain, and 10 means worst imaginable pain? 6   Mental health question re-calculation - no clinical value 2   Physical health question re-calculation - no clinical value 3   Pain question re-calculation - no clinical value 3   Global Mental Health Score 9   Global Physical Health Score 11   PROMIS TOTAL - SUBSCORES 20       Patient Hx  Patient's history, medications and allergies were reviewed.     Height: 5' 0\"   Weight: 0 lbs 0 oz    Patient Active Problem List    Diagnosis Date Noted     Gastroparesis 06/14/2023     Priority: Medium     Fibromyalgia 08/16/2022     Priority: Medium     RAVI (generalized anxiety disorder) 08/16/2022     Priority: Medium     Hip pain, right 07/07/2022     Priority: Medium     Osteopenia of both hips 03/29/2022     Priority: Medium     3/2022.  Follow-up in 3 years       Gastroesophageal reflux disease without esophagitis 01/28/2020     Priority: Medium     Chronic rhinitis 03/09/2017     Priority: Medium     Benign essential hypertension 03/09/2017     Priority: Medium     Resting HR 50s       Irritable bowel syndrome with diarrhea 03/09/2017     Priority: Medium     " Dicyclomine helped but caused dizziness.       Type 2 diabetes mellitus with microalbuminuria, without long-term current use of insulin (H) 03/09/2017     Priority: Medium     Diarrhea on metformin, stopped 6/2019.  Recurrent vaginal infections on Jardiance; trulicity stopped 6/2023 due to gastroparesis seen on gastric emptying study       Colon polyp 05/21/2015     Priority: Medium     Tubular adenoma polyp       Advanced directives, counseling/discussion 01/14/2013     Priority: Medium     Patient states has Advance Directive and will bring in a copy to clinic. 1/14/2013          Diverticulosis 06/14/2012     Priority: Medium     Coronary artery disease involving native coronary artery of native heart without angina pectoris 03/21/2011     Priority: Medium     Two right coronary stents 2011  She has a prior history of CAD with STEMI in 2011 with PCI to the RCA, since then has had another angiogram in 2015 which has shown patent RCA stents and otherwise minimal disease elsewhere. Her EF and stress tests since then have been negative         HPV (human papilloma virus) anogenital infection 02/22/2011     Priority: Medium     Perianal condyloma  Biopsy done 2011  FINAL DIAGNOSIS:  Skin, perianal, lesion, excision:  - High grade squamous intraepithelial lesion (moderate to severe  dysplasia) (see comment)    COMMENT:  The lesion is arising on top of condyloma. The base of the polypoid  lesion appears free of dysplasia in the planes examined. Follow up is  recommended as clinically deemed appropriate.       Hyperlipidemia LDL goal <70 10/31/2010     Priority: Medium     Menopausal syndrome (hot flashes) 05/18/2010     Priority: Medium     Wants to take premarin  Understands breast cancer risk       Anal fissure 01/03/2007     Priority: Medium     Moderate episode of recurrent major depressive disorder (H) 09/28/2006     Priority: Medium     Follows with Psychiatry Dorian Prince.  Rx for Alprazolam 1 mg #120/month  and Adderall 20 mg daily       Seasonal allergies 09/28/2006     Priority: Medium     seasonal  Problem list name updated by automated process. Provider to review       Attention deficit disorder 05/17/2005     Priority: Medium     Treated by psychiatry         NAFL (nonalcoholic fatty liver) 05/17/2005     Priority: Medium     fatty liver       History of diverticulitis 12/10/2000     Priority: Medium     Abdominal adhesions 01/01/1990     Priority: Medium      Prior to Admission medications    Medication Sig Start Date End Date Taking? Authorizing Provider   acetaminophen (TYLENOL) 500 MG tablet Take 500-1,000 mg by mouth every 4 hours as needed for mild pain    Reported, Patient   acetylcysteine (N-ACETYL CYSTEINE) 600 MG CAPS capsule Take 3,000 mg by mouth daily    Reported, Patient   alcohol swab prep pads Use to swab area of injection/bethel as directed. 3/29/22   Shwetha Robison,    Alcohol Swabs (B-D SINGLE USE SWABS REGULAR) PADS USE TO SWAB AREA OF INJECTION/BETHEL AS DIRECTED. 12/3/21   Shwetha Robison DO   ALPRAZolam (XANAX PO) Take 0.5-1 mg by mouth 4 times daily as needed 1/2 tab in am, 1 tab in pm, then 1 tab midday prn and 1/2 tab throughout the day if needed    Unknown, Entered By History   amphetamine-dextroamphetamine (ADDERALL XR) 20 MG per capsule Take 20 mg by mouth daily  11/6/15   Reported, Patient   aspirin 81 MG EC tablet Take 81 mg by mouth daily     Reported, Patient   atorvastatin (LIPITOR) 80 MG tablet Take 1 tablet (80 mg) by mouth daily 3/22/23   Shwetha Robison DO   blood glucose (NO BRAND SPECIFIED) lancets standard Use to test blood sugar 3 times daily or as directed. 12/17/20   Shwetha Robison DO   blood glucose (NO BRAND SPECIFIED) test strip Use to test blood sugar 1 times daily or as directed. To accompany: Blood Glucose Monitor Brands: per insurance. 3/29/22   Shwetha Robison DO   blood glucose calibration (NO BRAND SPECIFIED) solution To  accompany: Blood Glucose Monitor Brands: per insurance. 3/29/22   Shwetha Robison DO   blood glucose monitoring (NO BRAND SPECIFIED) meter device kit Use to test blood sugar 1 times daily or as directed. Preferred blood glucose meter OR supplies to accompany: Blood Glucose Monitor Brands: per insurance. 3/29/22   Shwetha Robison DO   CONTOUR NEXT TEST test strip USE TO TEST BLOOD SUGAR THREE TIMES A DAY 3/3/22   Shwetha Robison DO   DoxePIN (SINEQUAN) 75 MG capsule Take 75 mg by mouth At Bedtime     Reported, Patient   escitalopram (LEXAPRO) 20 MG tablet Take 20 mg by mouth daily    Reported, Patient   fluocinonide (LIDEX) 0.05 % external solution Apply to itchy areas scalp BID x 1-2 weeks PRN. Need follow up appointment in Derm. 1/25/23   Magdalena Selby PA-C   insulin glargine (LANTUS SOLOSTAR) 100 UNIT/ML pen Inject 20 Units Subcutaneous At Bedtime 6/14/23   Shwetha Robison DO   LANsoprazole (PREVACID) 30 MG DR capsule Take 1 capsule (30 mg) by mouth 2 times daily for 30 days, THEN 1 capsule (30 mg) every morning (before breakfast) for 360 days. 30-60 minutes before a meal.  Patient taking differently: Take 1 capsule (30 mg) by mouth 2 times daily for 30 days, THEN 1 capsule (30 mg) every morning (before breakfast) for 360 days. 30-60 minutes before a meal.    Taking one time daily 11/2/22 11/27/23  Shwetha Robison DO   lisinopril (ZESTRIL) 5 MG tablet Take 1 tablet (5 mg) by mouth daily 3/22/23   Shwetha Robison DO   metoprolol succinate ER (TOPROL XL) 25 MG 24 hr tablet Take 1 tablet (25 mg) by mouth daily 3/22/23   Shwetha Robison DO   Microlet Lancets MISC USE TO TEST BLOOD SUGAR 3 TIMES DAILY OR AS DIRECTED. 3/3/22   Shwetha Robison DO   mupirocin (BACTROBAN) 2 % external ointment Apply to open areas on the scalp BID until healed. Need follow up appointment in Derm. 1/25/23   Magdalena Selby, BELLE   nitroGLYcerin (NITROSTAT) 0.4 MG sublingual tablet Place  1 tablet (0.4 mg) under the tongue every 5 minutes as needed for chest pain 3/19/21   Martell Biggs MD   nystatin (MYCOSTATIN) 112337 UNIT/GM external cream Apply topically 2 times daily 3/22/23   Shwetha Robison DO   prochlorperazine (COMPAZINE) 5 MG tablet Take 1 tablet (5 mg) by mouth every 8 hours as needed for nausea or vomiting 6/14/23   Shwetha Robison DO   ULTICARE MICRO 32G X 4 MM insulin pen needle USE 1 PEN NEEDLE DAILY OR AS DIRECTED. 6/7/23   Shwetha Robison DO   Wheat Dextrin (BENEFIBER PO) Powder, every other night    Reported, Patient     Allergies   Allergen Reactions     Hydrocodone Anaphylaxis     Flexeril [Cyclobenzaprine] Rash     Cipro [Ciprofloxacin] Other (See Comments)     Abd pain , proteinuria , microscopic hematuria  Possibly related to cipro     Codeine Camsylate Nausea     Influenza Virus Vaccine      Large lumpy, itchy welts on torso and face after a flu shot 10 to 15 years ago.     Pcn [Penicillins] Difficulty breathing     Amoxicillin Itching and Rash     Sulfa Antibiotics Itching and Rash     Tetracycline Itching and Rash     Past Medical History:   Diagnosis Date     Attention deficit disorder without mention of hyperactivity      Benign essential hypertension 3/9/2017     Coronary artery disease march 15,2011    Two stents placed, angioplasty     Diabetes mellitus (H)     A1C 5.7-6.4, controlled with diet     Dysthymic disorder      GERD (gastroesophageal reflux disease) 1/20/2011     Glycogenosis (H)      History of blood transfusion 1981    euptured ectopic pg     History of ST elevation myocardial infarction (STEMI) 1/23/2019     Malignant neoplasm (H)     squamous cell carcinoma many years ago     Other and unspecified hyperlipidemia      Squamous cell carcinoma      Past Surgical History:   Procedure Laterality Date     ABDOMEN SURGERY  1981,1991,1993     APPENDECTOMY  1993     BIOPSY  2003    nose, during surgery     COLONOSCOPY  2005      COLONOSCOPY N/A 2015    Procedure: COMBINED COLONOSCOPY, SINGLE OR MULTIPLE BIOPSY/POLYPECTOMY BY BIOPSY;  Surgeon: Ponce Christine MD;  Location: [facility]     COLONOSCOPY N/A 2022    Procedure: COLONOSCOPY, FLEXIBLE, WITH LESION REMOVAL USING SNARE;  Surgeon: Maximilian Cobian MD;  Location: [facility]     ECTOPIC PREGNANCY SURGERY       ENDOSCOPIC RELEASE CARPAL TUNNEL  2013    Procedure: ENDOSCOPIC RELEASE CARPAL TUNNEL;  Right endoscopic carpal tunnel release;  Surgeon: Jonah Dela Cruz MD;  Location: [facility]     ENT SURGERY      nose- suspected basal cell- was benign     ESOPHAGOSCOPY, GASTROSCOPY, DUODENOSCOPY (EGD), COMBINED  2014    Procedure: COMBINED ESOPHAGOSCOPY, GASTROSCOPY, DUODENOSCOPY (EGD), BIOPSY SINGLE OR MULTIPLE;  Surgeon: Anibal Sebastian MD;  Location: [facility]     ESOPHAGOSCOPY, GASTROSCOPY, DUODENOSCOPY (EGD), COMBINED N/A 2022    Procedure: ESOPHAGOGASTRODUODENOSCOPY, WITH BIOPSY;  Surgeon: Maximilian Cobian MD;  Location: [facility]     GENITOURINARY SURGERY  ,      HYSTERECTOMY, ELIECER      total hysterectomy. Unsure if ELIECER or vaginal     SURGICAL HISTORY OF -       appy     SURGICAL HISTORY OF -       T&A     VASCULAR SURGERY      angioplasty- 2 stents implanted     Family History   Problem Relation Age of Onset     Cancer Mother         uterine     Lipids Mother      Neurologic Disorder Mother         polymyalgia     Hypertension Mother      Other Cancer Mother         endometrial     Depression/Anxiety Mother      Other - See Comments Mother         Heart Arrythmia      Atrial fibrillation Mother      Macular Degeneration Mother      Cardiovascular Father         CHF     Depression Father      C.A.D. Father         bypass x2 starting at age 55-  in his 70's     Diabetes Father         type 2     Coronary Artery Disease Father          from - age 75     Depression/Anxiety Father      Cerebrovascular Disease Father         from angioplasty       POP. Maternal Grandfather      Coronary Artery Disease Maternal Grandfather          from- age 61     C.A.D. Paternal Grandfather      Diabetes Brother      Depression Son      Psychotic Disorder Son         adhd     Diabetes Brother         type 1     Depression/Anxiety Brother      Depression/Anxiety Maternal Grandmother      Depression/Anxiety Son      Asthma Son         childhood asthma-out grown now as an adult     Coronary Artery Disease Brother         stent-MI     Melanoma No family hx of      Social History     Tobacco Use     Smoking status: Former     Packs/day: 1.00     Years: 30.00     Pack years: 30.00     Types: Cigarettes     Start date: 1968     Quit date: 3/15/2011     Years since quittin.2     Smokeless tobacco: Never     Tobacco comments:     occasional/daily   Vaping Use     Vaping status: Never Used   Substance Use Topics     Alcohol use: No     Alcohol/week: 0.0 standard drinks of alcohol        Pre-Procedure Education & Consent  Procedure education was provided to: Patient  Teaching method: Explanation  Barriers to learning: No Barrier    Patient indicated understanding of pre-procedure instruction and appropriate consent was obtained and documented.    ____________________________________________________________________    Post-Procedure Documentation: Esophageal Manometry    Manometry catheter was placed via right nare to 47.5 cm and normal saline swallows given per protocol. Manometry catheter was removed at the end of test.    Discharge instructions given to patient.    Notification of pending test results sent to provider for interpretation. Please reference scanned document for final interpretation of results. Patient will follow up with referring provider for test results.    Moni Mcdaniel RN on 6/15/2023 at 11:01 AM

## 2023-06-15 NOTE — TELEPHONE ENCOUNTER
Writer received a message from provider to help get Pt set up for a follow visit 2-3 weeks after Pt's manometry on 6/15/2023. Writer called and left message, along with call back number, for the Pt.

## 2023-06-15 NOTE — PATIENT INSTRUCTIONS
"You were seen today in the Cardiovascular Clinic at the [facility].      Cardiology Providers you saw during your visit: Dr. Burden       Medications Discontinued:  There are no discontinued medications.        Recommendations:    -follow up with Dr. Biggs in 1 year.           Please feel free to call me with any questions or concerns.        Questions: [phone].   First press #1 for [facility] for \"Medical Questions\" to reach Lizzy Correia RN.      [phone].   First press #1 for the [facility] and then press #1     On Call Cardiologist for after hours or on weekends: [phone]   option #4 and ask to speak to the on-call Cardiologist.          If you need a medication refill please contact your pharmacy.  Please allow 3 business days for your refill to be completed.    "

## 2023-06-15 NOTE — PATIENT INSTRUCTIONS
Esophogeal Manometry Study  1. Resume regular diet.  2. You may have a bloody nose or sore throat after the procedure.  3. If you have questions call [phone] from 7:00am-5:00pm.  For afterhours questions call GI doctor on call at [phone].   DISCHARGE non-distended bilaterally

## 2023-06-15 NOTE — NURSING NOTE
"  Reviewed Med list    Completed AVS    Follow-up orders placed    Marked chart \"Ready for Checkout\"    Sent Refill for nitroglycerin SL  Susu Gusman RN on 6/15/2023 at 3:43 PM      "

## 2023-06-15 NOTE — NURSING NOTE
Chief Complaint   Patient presents with     Follow Up     Annie 15, 2023 - reason for visit: annual cardiology visit, coronary artery disease involving native vessel       Vitals were taken and medications reconciled.    Roderick Sotelo, EMT  2:14 PM

## 2023-06-21 ENCOUNTER — VIRTUAL VISIT (OUTPATIENT)
Dept: GASTROENTEROLOGY | Facility: CLINIC | Age: 73
End: 2023-06-21
Attending: INTERNAL MEDICINE
Payer: COMMERCIAL

## 2023-06-21 VITALS — SYSTOLIC BLOOD PRESSURE: 121 MMHG | WEIGHT: 158 LBS | BODY MASS INDEX: 30.86 KG/M2 | DIASTOLIC BLOOD PRESSURE: 62 MMHG

## 2023-06-21 DIAGNOSIS — K21.9 GASTROESOPHAGEAL REFLUX DISEASE WITHOUT ESOPHAGITIS: ICD-10-CM

## 2023-06-21 DIAGNOSIS — R11.2 NAUSEA AND VOMITING, UNSPECIFIED VOMITING TYPE: ICD-10-CM

## 2023-06-21 DIAGNOSIS — K31.84 GASTROPARESIS: Primary | ICD-10-CM

## 2023-06-21 DIAGNOSIS — R11.10 REGURGITATION OF FOOD: ICD-10-CM

## 2023-06-21 DIAGNOSIS — R14.0 BLOATING: ICD-10-CM

## 2023-06-21 DIAGNOSIS — E11.9 TYPE 2 DIABETES MELLITUS (H): ICD-10-CM

## 2023-06-21 DIAGNOSIS — R10.12 LUQ ABDOMINAL PAIN: ICD-10-CM

## 2023-06-21 DIAGNOSIS — R13.19 ESOPHAGEAL DYSPHAGIA: ICD-10-CM

## 2023-06-21 PROCEDURE — 97802 MEDICAL NUTRITION INDIV IN: CPT | Mod: 95 | Performed by: DIETITIAN, REGISTERED

## 2023-06-21 PROCEDURE — 99207 PR NO CHARGE LOS: CPT | Mod: 95 | Performed by: DIETITIAN, REGISTERED

## 2023-06-21 ASSESSMENT — PATIENT HEALTH QUESTIONNAIRE - PHQ9
SUM OF ALL RESPONSES TO PHQ QUESTIONS 1-9: 15
10. IF YOU CHECKED OFF ANY PROBLEMS, HOW DIFFICULT HAVE THESE PROBLEMS MADE IT FOR YOU TO DO YOUR WORK, TAKE CARE OF THINGS AT HOME, OR GET ALONG WITH OTHER PEOPLE: EXTREMELY DIFFICULT
SUM OF ALL RESPONSES TO PHQ QUESTIONS 1-9: 15

## 2023-06-21 ASSESSMENT — PAIN SCALES - GENERAL: PAINLEVEL: NO PAIN (0)

## 2023-06-21 NOTE — LETTER
"    6/21/2023         RE: Ml Evans  [id number] [address] [facility], [place] [address]        Dear Colleague,    Thank you for referring your patient, Ml Evans, to the [facility] [place]. Please see a copy of my visit note below.    Virtual Visit Details    Type of service:  Video Visit   Video Start Time: 1:33 PM  Video End Time:2:37 PM    Originating Location (pt. Location): Home    Distant Location (provider location):  Off-site    Platform used for Video Visit: [facility] Outpatient Medical Nutrition Therapy      Time Spent:  64 minutes  Session Type:  Initial   Referring Physician:  Jadon WANG MD  Reason for RD Visit:   Gastroparesis, type 2 diabetes, esophageal dysphagia     Nutrition Assessment:  Patient is a 73 year old female with history that includes type 2 diabetes, dysphagia, regurgitation of food, and recent GES showed significant delayed gastric emptying (gastroparesis), small bowel obstruction, coronary artery disease, fibromyalgia, ADD, diverticulosis and diverticulitis, RAVI, GERD.     She stated that 6 years ago, symptoms started. Had \"bad case\" of diverticulitis and was taking care of bowels and took antibiotics and had some improvement. For the initial 3 years when she first started having symptoms, couldn't leave the house, had to wear depends and missed family events due to could not be away from the bathroom. She stated that her family was/is not very supportive of her or believe that she had symptoms. She also stated that she had a heart attack in 2011, and also has hx of fibromyalgia. Has not been craving anything that would increase her gastroparesis symptoms such as raw vegs but did crave licorice recently and heard that licorice may help. She has a lot of bloating and looks 9 months pregnant. Sometimes has a lot of belching and sometimes a lot of vomiting. She has lower left sided pain/soreness. She feels like her " reflux symptoms may be a little better and not currently having issues overnight. Likes diet coke or diet 7-up but trying to decrease and at the same time increasing water intake. She feels that the diet soda sometimes helps with swallowing. Drinks 3-4 cups water per day. She is making some changes for example sitting up after eating whereas before would lie down right away after eating. She is trying to relax with eating as well. In general she does not eat big meals and likes to eat very small amount more often and has done this most of her life. She has not been eating a lot of meats. Does not tolerate beef. Likes salmon and tolerates boiled chicken, but hasn't been eating lately. Her recent GES study showed significant delayed gastric emptying with 50% in stomach after 4 hours.     Patient Active Problem List   Diagnosis    Attention deficit disorder    NAFL (nonalcoholic fatty liver)    Moderate episode of recurrent major depressive disorder (H)    Seasonal allergies    Anal fissure    Menopausal syndrome (hot flashes)    Hyperlipidemia LDL goal <70    HPV (human papilloma virus) anogenital infection    Coronary artery disease involving native coronary artery of native heart without angina pectoris    Diverticulosis    Advanced directives, counseling/discussion    Colon polyp    Chronic rhinitis    Benign essential hypertension    Irritable bowel syndrome with diarrhea    Type 2 diabetes mellitus with microalbuminuria, without long-term current use of insulin (H)    Gastroesophageal reflux disease without esophagitis    Osteopenia of both hips    History of diverticulitis    Abdominal adhesions    Hip pain, right    Fibromyalgia    RAVI (generalized anxiety disorder)    Gastroparesis     Height:   Ht Readings from Last 1 Encounters:   06/15/23 1.524 m (5')     Weight:  Wt Readings from Last 10 Encounters:   06/21/23 71.7 kg (158 lb)   06/15/23 74.2 kg (163 lb 9.6 oz)   06/14/23 73.4 kg (161 lb 14.4 oz)    03/22/23 74.2 kg (163 lb 8 oz)   02/24/23 72.6 kg (160 lb)   11/07/22 72.6 kg (160 lb)   07/18/22 72.6 kg (160 lb)   06/23/22 72.6 kg (160 lb)   06/15/22 72.6 kg (160 lb)   04/13/21 76.7 kg (169 lb)     BMI: Estimated body mass index is 30.86 kg/m  as calculated from the following:    Height as of 6/15/23: 1.524 m (5').    Weight as of this encounter: 71.7 kg (158 lb).    Diet Recall:  (some usual/recent meals/snacks/beverages): Reported addiction to diet coke and trying to decrease and also likes almonds but didn't tolerate so discontinued. Previously could tolerated some salads with spinach and chicken or tuna, but symptoms started to get worse and worse.   Meal Food    Breakfast Activa yogurt (freezes it) and pack kaur doone cookies   Lunch 2 pc WW toast lightly toast with very small amount seedless plum jelly and string cheese   Dinner Bakery bread with earth balance margarine melted with 4 greek olives or prev was eating shredded wheat or cheerios   Snacks bed time snack frozen activa yogurt or sometimes has one All fruit popsicle at night   Beverages Coffee with cream, 1 x 12 oz or less caffeine free diet coke/day or will have diet 7-up and diet cherry 7-up, trying to get more water, drinks 24-32 oz water   Alcohol Intake None. Can't handle alcohol     Frequency of eating/taking out meals: not usually. Rarely DQ 1x every 3 months.     Labs:    Last Comprehensive Metabolic Panel:  Sodium   Date Value Ref Range Status   02/24/2023 142 136 - 145 mmol/L Final   04/13/2021 138 133 - 144 mmol/L Final     Potassium   Date Value Ref Range Status   02/24/2023 4.2 3.4 - 5.3 mmol/L Final     Comment:     Specimen slightly hemolyzed, potassium may be falsely elevated.   03/15/2022 4.1 3.4 - 5.3 mmol/L Final   04/13/2021 3.9 3.4 - 5.3 mmol/L Final     Chloride   Date Value Ref Range Status   02/24/2023 108 (H) 98 - 107 mmol/L Final   03/15/2022 108 94 - 109 mmol/L Final   04/13/2021 106 94 - 109 mmol/L Final     Carbon  Dioxide   Date Value Ref Range Status   04/13/2021 27 20 - 32 mmol/L Final     Carbon Dioxide (CO2)   Date Value Ref Range Status   02/24/2023 21 (L) 22 - 29 mmol/L Final   03/15/2022 31 20 - 32 mmol/L Final     Anion Gap   Date Value Ref Range Status   02/24/2023 13 7 - 15 mmol/L Final   03/15/2022 3 3 - 14 mmol/L Final   04/13/2021 5 3 - 14 mmol/L Final     Glucose   Date Value Ref Range Status   02/24/2023 134 (H) 70 - 99 mg/dL Final   03/15/2022 112 (H) 70 - 99 mg/dL Final   04/13/2021 148 (H) 70 - 99 mg/dL Final     Comment:     Fasting specimen     GLUCOSE BY METER POCT   Date Value Ref Range Status   11/07/2022 76 70 - 99 mg/dL Final     Urea Nitrogen   Date Value Ref Range Status   02/24/2023 17.3 8.0 - 23.0 mg/dL Final   03/15/2022 10 7 - 30 mg/dL Final   04/13/2021 11 7 - 30 mg/dL Final     Creatinine   Date Value Ref Range Status   02/24/2023 0.73 0.51 - 0.95 mg/dL Final   04/13/2021 0.80 0.52 - 1.04 mg/dL Final     GFR Estimate   Date Value Ref Range Status   02/24/2023 87 >60 mL/min/1.73m2 Final     Comment:     eGFR calculated using 2021 CKD-EPI equation.   04/13/2021 74 >60 mL/min/[1.73_m2] Final     Comment:     Non  GFR Calc  Starting 12/18/2018, serum creatinine based estimated GFR (eGFR) will be   calculated using the Chronic Kidney Disease Epidemiology Collaboration   (CKD-EPI) equation.       Calcium   Date Value Ref Range Status   02/24/2023 8.7 (L) 8.8 - 10.2 mg/dL Final   04/13/2021 9.2 8.5 - 10.1 mg/dL Final     CBC RESULTS:   Recent Labs   Lab Test 02/24/23  1142   WBC 13.2*   RBC 4.46   HGB 14.0   HCT 42.3   MCV 95   MCH 31.4   MCHC 33.1   RDW 12.6          Pertinent Medications/vitamin and mineral supplements:      Current Outpatient Medications   Medication    acetaminophen (TYLENOL) 500 MG tablet    acetylcysteine (N-ACETYL CYSTEINE) 600 MG CAPS capsule    alcohol swab prep pads    Alcohol Swabs (B-D SINGLE USE SWABS REGULAR) PADS    ALPRAZolam (XANAX PO)     amphetamine-dextroamphetamine (ADDERALL XR) 20 MG per capsule    aspirin 81 MG EC tablet    atorvastatin (LIPITOR) 80 MG tablet    blood glucose (NO BRAND SPECIFIED) lancets standard    blood glucose (NO BRAND SPECIFIED) test strip    blood glucose calibration (NO BRAND SPECIFIED) solution    blood glucose monitoring (NO BRAND SPECIFIED) meter device kit    CONTOUR NEXT TEST test strip    DoxePIN (SINEQUAN) 75 MG capsule    escitalopram (LEXAPRO) 20 MG tablet    fluocinonide (LIDEX) 0.05 % external solution    insulin glargine (LANTUS SOLOSTAR) 100 UNIT/ML pen    LANsoprazole (PREVACID) 30 MG DR capsule    lisinopril (ZESTRIL) 5 MG tablet    metoprolol succinate ER (TOPROL XL) 25 MG 24 hr tablet    Microlet Lancets MISC    mupirocin (BACTROBAN) 2 % external ointment    nitroGLYcerin (NITROSTAT) 0.4 MG sublingual tablet    nystatin (MYCOSTATIN) 756074 UNIT/GM external cream    prochlorperazine (COMPAZINE) 5 MG tablet    ULTICARE MICRO 32G X 4 MM insulin pen needle    Wheat Dextrin (BENEFIBER PO)     No current facility-administered medications for this visit.       Food Allergies:  NKFA    MALNUTRITION:  % Weight Loss:  No significant weight loss  % Intake:  No decreased intake noted  Subcutaneous Fat Loss:  None observed  Muscle Loss:  None observed  Fluid Retention:  None noted    Malnutrition Diagnosis: Patient does not meet two of the above criteria necessary for diagnosing malnutrition  In Context of:  Chronic illness or disease    Nutrition Prescription: Nutrition Education     Nutrition Intervention      Provided diet education for gastroparesis, dysphagia, diabetes.    Answered patient's questions. Patient verbalized understanding of education provided. See Goals below.     Educational Materials Provided:  NCM: gastroparesis nutrition therapy and [facility] gastroparesis handout    Goals:    1. Eat 4-6 smaller meals per day that are lower in fat and lower in fiber.    For low fiber diet:  -Remove skins and peels from  fruits and vegetables and cook vegetables very well before eating/soft cooked vegetables.    -Canned fruit in its own juice is lower in fiber, as well as ripe bananas, ripe melon, unsweetened applesauce.    -Avoid whole grain breads/crackers (choose white bread, low fiber crackers, white rice).    -Avoid whole nuts and seeds.    -Okay for creamy nut butters. Avoid chunky nut butters.    For low fat diet:  -Choose lean proteins/lean cuts of meats. Remove skins from chicken and turkey breast, fish (not breaded and not fried), lean cuts of pork (tenderloin or lean pork chop), limit red meat and if having any occasionally choose lean cut or 93% lean ground beef. Can substitute with ground turkey or ground chicken for beef.  -Use lower fat cooking methods such as baking, broiling, grilling.  -Limit using a lot of fats/oil/high fat sauces/dressing/butter when preparing foods and limit the amount used on foods.  -Choose skim or 1% based dairy products (such as lowfat yogurts, skim/1% milk, non fat/1% cottage cheese, lowfat pudding).  -Do not eat large amounts of nuts, seeds, nut butters, avocado.    2. Can have protein drink as a small meal/snack if better tolerated and/or instead of skipping a meal. These drinks count towards those 4-6 smaller meals.  -some examples include using a protein powder of your choice and mixing with water/skim or lowfat milk or lowfat milk substitute, or premier protein, ensure max, ensure high protein, boost high protein, lowfat lower sugar carnation instant breakfast okay as well (if you tolerate dairy products).    If you are having a lot of nausea a clear liquid protein drink/protein water may be better tolerated such as Premier clear, Protein 2o, Isopure. (Boost Breeze and Ensure clear are clear liquid drinks but are higher in added sugars so may not be as well tolerated with issues of loose stools).    3. Chew food very well before swallowing.    4. Limit fluids with meals (only take  small sips as needed) and drink the rest/ majority of your fluids in between meals.    5. Drink at least 6-8 cups (48 oz-64 oz) water per day (mostly in between meals).    6. Wait at least 3 hours after eating before lying down and going to sleep.    7. Continue to limit carbonated beverages.    Nutrition Monitoring and Evaluation: Will monitor adherence to nutrition recommendations at future RD visits.     Further Medical Nutrition Therapy:  Follow-up in 2-3 months    Patient was encouraged to contact RD with any further questions.    Mag Villa MS, RD, LD

## 2023-06-21 NOTE — PATIENT INSTRUCTIONS
It was nice meeting you today. Below are the nutrition recommendations we discussed at your visit.    Please let me know if you have any additional questions.    Nutrition Recommendations    1. Eat 4-6 smaller meals per day that are lower in fat and lower in fiber.    For low fiber diet:  -Remove skins and peels from fruits and vegetables and cook vegetables very well before eating/soft cooked vegetables.    -Canned fruit in its own juice is lower in fiber, as well as ripe bananas, ripe melon, unsweetened applesauce.    -Avoid whole grain breads/crackers (choose white bread, low fiber crackers, white rice).    -Avoid whole nuts and seeds.    -Okay for creamy nut butters. Avoid chunky nut butters.    For low fat diet:  -Choose lean proteins/lean cuts of meats. Remove skins from chicken and turkey breast, fish (not breaded and not fried), lean cuts of pork (tenderloin or lean pork chop), limit red meat and if having any occasionally choose lean cut or 93% lean ground beef. Can substitute with ground turkey or ground chicken for beef.  -Use lower fat cooking methods such as baking, broiling, grilling.  -Limit using a lot of fats/oil/high fat sauces/dressing/butter when preparing foods and limit the amount used on foods.  -Choose skim or 1% based dairy products (such as lowfat yogurts, skim/1% milk, non fat/1% cottage cheese, lowfat pudding).  -Do not eat large amounts of nuts, seeds, nut butters, avocado.    2. Can have protein drink as a small meal/snack if better tolerated and/or instead of skipping a meal. These drinks count towards those 4-6 smaller meals.  -some examples include using a protein powder of your choice and mixing with water/skim or lowfat milk or lowfat milk substitute, or premier protein, ensure max, ensure high protein, boost high protein, lowfat lower sugar carnation instant breakfast okay as well (if you tolerate dairy products).    If you are having a lot of nausea a clear liquid protein  drink/protein water may be better tolerated such as Premier clear, Protein 2o, Isopure. (Boost Breeze and Ensure clear are clear liquid drinks but are higher in added sugars so may not be as well tolerated with issues of loose stools).    3. Chew food very well before swallowing.    4. Limit fluids with meals (only take small sips as needed) and drink the rest/ majority of your fluids in between meals.    5. Drink at least 6-8 cups (48 oz-64 oz) water per day (mostly in between meals).    6. Wait at least 3 hours after eating before lying down and going to sleep.    Follow up in 2-3 months.    For follow up appointment, please call [phone].    Thank you,    Mag Villa, MS, RD, LD

## 2023-06-21 NOTE — PROGRESS NOTES
"Virtual Visit Details    Type of service:  Video Visit   Video Start Time: 1:33 PM  Video End Time:2:37 PM    Originating Location (pt. Location): Home    Distant Location (provider location):  Off-site    Platform used for Video Visit: [facility] Outpatient Medical Nutrition Therapy      Time Spent:  64 minutes  Session Type:  Initial   Referring Physician:  Jadon WANG MD  Reason for RD Visit:   Gastroparesis, type 2 diabetes, esophageal dysphagia     Nutrition Assessment:  Patient is a 73 year old female with history that includes type 2 diabetes, dysphagia, regurgitation of food, and recent GES showed significant delayed gastric emptying (gastroparesis), small bowel obstruction, coronary artery disease, fibromyalgia, ADD, diverticulosis and diverticulitis, RAVI, GERD.     She stated that 6 years ago, symptoms started. Had \"bad case\" of diverticulitis and was taking care of bowels and took antibiotics and had some improvement. For the initial 3 years when she first started having symptoms, couldn't leave the house, had to wear depends and missed family events due to could not be away from the bathroom. She stated that her family was/is not very supportive of her or believe that she had symptoms. She also stated that she had a heart attack in 2011, and also has hx of fibromyalgia. Has not been craving anything that would increase her gastroparesis symptoms such as raw vegs but did crave licorice recently and heard that licorice may help. She has a lot of bloating and looks 9 months pregnant. Sometimes has a lot of belching and sometimes a lot of vomiting. She has lower left sided pain/soreness. She feels like her reflux symptoms may be a little better and not currently having issues overnight. Likes diet coke or diet 7-up but trying to decrease and at the same time increasing water intake. She feels that the diet soda sometimes helps with swallowing. Drinks 3-4 cups water per day. She is " making some changes for example sitting up after eating whereas before would lie down right away after eating. She is trying to relax with eating as well. In general she does not eat big meals and likes to eat very small amount more often and has done this most of her life. She has not been eating a lot of meats. Does not tolerate beef. Likes salmon and tolerates boiled chicken, but hasn't been eating lately. Her recent GES study showed significant delayed gastric emptying with 50% in stomach after 4 hours.     Patient Active Problem List   Diagnosis     Attention deficit disorder     NAFL (nonalcoholic fatty liver)     Moderate episode of recurrent major depressive disorder (H)     Seasonal allergies     Anal fissure     Menopausal syndrome (hot flashes)     Hyperlipidemia LDL goal <70     HPV (human papilloma virus) anogenital infection     Coronary artery disease involving native coronary artery of native heart without angina pectoris     Diverticulosis     Advanced directives, counseling/discussion     Colon polyp     Chronic rhinitis     Benign essential hypertension     Irritable bowel syndrome with diarrhea     Type 2 diabetes mellitus with microalbuminuria, without long-term current use of insulin (H)     Gastroesophageal reflux disease without esophagitis     Osteopenia of both hips     History of diverticulitis     Abdominal adhesions     Hip pain, right     Fibromyalgia     RAVI (generalized anxiety disorder)     Gastroparesis     Height:   Ht Readings from Last 1 Encounters:   06/15/23 1.524 m (5')     Weight:  Wt Readings from Last 10 Encounters:   06/21/23 71.7 kg (158 lb)   06/15/23 74.2 kg (163 lb 9.6 oz)   06/14/23 73.4 kg (161 lb 14.4 oz)   03/22/23 74.2 kg (163 lb 8 oz)   02/24/23 72.6 kg (160 lb)   11/07/22 72.6 kg (160 lb)   07/18/22 72.6 kg (160 lb)   06/23/22 72.6 kg (160 lb)   06/15/22 72.6 kg (160 lb)   04/13/21 76.7 kg (169 lb)     BMI: Estimated body mass index is 30.86 kg/m  as calculated  from the following:    Height as of 6/15/23: 1.524 m (5').    Weight as of this encounter: 71.7 kg (158 lb).    Diet Recall:  (some usual/recent meals/snacks/beverages): Reported addiction to diet coke and trying to decrease and also likes almonds but didn't tolerate so discontinued. Previously could tolerated some salads with spinach and chicken or tuna, but symptoms started to get worse and worse.   Meal Food    Breakfast Activa yogurt (freezes it) and pack kaur doone cookies   Lunch 2 pc WW toast lightly toast with very small amount seedless plum jelly and string cheese   Dinner Bakery bread with earth balance margarine melted with 4 greek olives or prev was eating shredded wheat or cheerios   Snacks bed time snack frozen activa yogurt or sometimes has one All fruit popsicle at night   Beverages Coffee with cream, 1 x 12 oz or less caffeine free diet coke/day or will have diet 7-up and diet cherry 7-up, trying to get more water, drinks 24-32 oz water   Alcohol Intake None. Can't handle alcohol     Frequency of eating/taking out meals: not usually. Rarely DQ 1x every 3 months.     Labs:    Last Comprehensive Metabolic Panel:  Sodium   Date Value Ref Range Status   02/24/2023 142 136 - 145 mmol/L Final   04/13/2021 138 133 - 144 mmol/L Final     Potassium   Date Value Ref Range Status   02/24/2023 4.2 3.4 - 5.3 mmol/L Final     Comment:     Specimen slightly hemolyzed, potassium may be falsely elevated.   03/15/2022 4.1 3.4 - 5.3 mmol/L Final   04/13/2021 3.9 3.4 - 5.3 mmol/L Final     Chloride   Date Value Ref Range Status   02/24/2023 108 (H) 98 - 107 mmol/L Final   03/15/2022 108 94 - 109 mmol/L Final   04/13/2021 106 94 - 109 mmol/L Final     Carbon Dioxide   Date Value Ref Range Status   04/13/2021 27 20 - 32 mmol/L Final     Carbon Dioxide (CO2)   Date Value Ref Range Status   02/24/2023 21 (L) 22 - 29 mmol/L Final   03/15/2022 31 20 - 32 mmol/L Final     Anion Gap   Date Value Ref Range Status   02/24/2023  13 7 - 15 mmol/L Final   03/15/2022 3 3 - 14 mmol/L Final   04/13/2021 5 3 - 14 mmol/L Final     Glucose   Date Value Ref Range Status   02/24/2023 134 (H) 70 - 99 mg/dL Final   03/15/2022 112 (H) 70 - 99 mg/dL Final   04/13/2021 148 (H) 70 - 99 mg/dL Final     Comment:     Fasting specimen     GLUCOSE BY METER POCT   Date Value Ref Range Status   11/07/2022 76 70 - 99 mg/dL Final     Urea Nitrogen   Date Value Ref Range Status   02/24/2023 17.3 8.0 - 23.0 mg/dL Final   03/15/2022 10 7 - 30 mg/dL Final   04/13/2021 11 7 - 30 mg/dL Final     Creatinine   Date Value Ref Range Status   02/24/2023 0.73 0.51 - 0.95 mg/dL Final   04/13/2021 0.80 0.52 - 1.04 mg/dL Final     GFR Estimate   Date Value Ref Range Status   02/24/2023 87 >60 mL/min/1.73m2 Final     Comment:     eGFR calculated using 2021 CKD-EPI equation.   04/13/2021 74 >60 mL/min/[1.73_m2] Final     Comment:     Non  GFR Calc  Starting 12/18/2018, serum creatinine based estimated GFR (eGFR) will be   calculated using the Chronic Kidney Disease Epidemiology Collaboration   (CKD-EPI) equation.       Calcium   Date Value Ref Range Status   02/24/2023 8.7 (L) 8.8 - 10.2 mg/dL Final   04/13/2021 9.2 8.5 - 10.1 mg/dL Final     CBC RESULTS:   Recent Labs   Lab Test 02/24/23  1142   WBC 13.2*   RBC 4.46   HGB 14.0   HCT 42.3   MCV 95   MCH 31.4   MCHC 33.1   RDW 12.6          Pertinent Medications/vitamin and mineral supplements:      Current Outpatient Medications   Medication     acetaminophen (TYLENOL) 500 MG tablet     acetylcysteine (N-ACETYL CYSTEINE) 600 MG CAPS capsule     alcohol swab prep pads     Alcohol Swabs (B-D SINGLE USE SWABS REGULAR) PADS     ALPRAZolam (XANAX PO)     amphetamine-dextroamphetamine (ADDERALL XR) 20 MG per capsule     aspirin 81 MG EC tablet     atorvastatin (LIPITOR) 80 MG tablet     blood glucose (NO BRAND SPECIFIED) lancets standard     blood glucose (NO BRAND SPECIFIED) test strip     blood glucose  calibration (NO BRAND SPECIFIED) solution     blood glucose monitoring (NO BRAND SPECIFIED) meter device kit     CONTOUR NEXT TEST test strip     DoxePIN (SINEQUAN) 75 MG capsule     escitalopram (LEXAPRO) 20 MG tablet     fluocinonide (LIDEX) 0.05 % external solution     insulin glargine (LANTUS SOLOSTAR) 100 UNIT/ML pen     LANsoprazole (PREVACID) 30 MG DR capsule     lisinopril (ZESTRIL) 5 MG tablet     metoprolol succinate ER (TOPROL XL) 25 MG 24 hr tablet     Microlet Lancets MISC     mupirocin (BACTROBAN) 2 % external ointment     nitroGLYcerin (NITROSTAT) 0.4 MG sublingual tablet     nystatin (MYCOSTATIN) 671671 UNIT/GM external cream     prochlorperazine (COMPAZINE) 5 MG tablet     ULTICARE MICRO 32G X 4 MM insulin pen needle     Wheat Dextrin (BENEFIBER PO)     No current facility-administered medications for this visit.       Food Allergies:  NKFA    MALNUTRITION:  % Weight Loss:  No significant weight loss  % Intake:  No decreased intake noted  Subcutaneous Fat Loss:  None observed  Muscle Loss:  None observed  Fluid Retention:  None noted    Malnutrition Diagnosis: Patient does not meet two of the above criteria necessary for diagnosing malnutrition  In Context of:  Chronic illness or disease    Nutrition Prescription: Nutrition Education     Nutrition Intervention      Provided diet education for gastroparesis, dysphagia, diabetes.    Answered patient's questions. Patient verbalized understanding of education provided. See Goals below.     Educational Materials Provided:  NCM: gastroparesis nutrition therapy and [facility] gastroparesis handout    Goals:    1. Eat 4-6 smaller meals per day that are lower in fat and lower in fiber.    For low fiber diet:  -Remove skins and peels from fruits and vegetables and cook vegetables very well before eating/soft cooked vegetables.    -Canned fruit in its own juice is lower in fiber, as well as ripe bananas, ripe melon, unsweetened applesauce.    -Avoid whole grain  breads/crackers (choose white bread, low fiber crackers, white rice).    -Avoid whole nuts and seeds.    -Okay for creamy nut butters. Avoid chunky nut butters.    For low fat diet:  -Choose lean proteins/lean cuts of meats. Remove skins from chicken and turkey breast, fish (not breaded and not fried), lean cuts of pork (tenderloin or lean pork chop), limit red meat and if having any occasionally choose lean cut or 93% lean ground beef. Can substitute with ground turkey or ground chicken for beef.  -Use lower fat cooking methods such as baking, broiling, grilling.  -Limit using a lot of fats/oil/high fat sauces/dressing/butter when preparing foods and limit the amount used on foods.  -Choose skim or 1% based dairy products (such as lowfat yogurts, skim/1% milk, non fat/1% cottage cheese, lowfat pudding).  -Do not eat large amounts of nuts, seeds, nut butters, avocado.    2. Can have protein drink as a small meal/snack if better tolerated and/or instead of skipping a meal. These drinks count towards those 4-6 smaller meals.  -some examples include using a protein powder of your choice and mixing with water/skim or lowfat milk or lowfat milk substitute, or premier protein, ensure max, ensure high protein, boost high protein, lowfat lower sugar carnation instant breakfast okay as well (if you tolerate dairy products).    If you are having a lot of nausea a clear liquid protein drink/protein water may be better tolerated such as Premier clear, Protein 2o, Isopure. (Boost Breeze and Ensure clear are clear liquid drinks but are higher in added sugars so may not be as well tolerated with issues of loose stools).    3. Chew food very well before swallowing.    4. Limit fluids with meals (only take small sips as needed) and drink the rest/ majority of your fluids in between meals.    5. Drink at least 6-8 cups (48 oz-64 oz) water per day (mostly in between meals).    6. Wait at least 3 hours after eating before lying down  and going to sleep.    7. Continue to limit carbonated beverages.    Nutrition Monitoring and Evaluation: Will monitor adherence to nutrition recommendations at future RD visits.     Further Medical Nutrition Therapy:  Follow-up in 2-3 months    Patient was encouraged to contact RD with any further questions.    Mag Villa, MS, RD, LD

## 2023-06-21 NOTE — NURSING NOTE
Is the patient currently in the state of [place]? YES    Visit mode:VIDEO    If the visit is dropped, the patient can be reconnected by: VIDEO VISIT:  Send e-mail to at [email or web address]    Will anyone else be joining the visit? No  (If patient encounters technical issues they should call [phone])    How would you like to obtain your AVS? MyChart    Are changes needed to the allergy or medication list? YES meds were just reviewed on 6/15 and Narcisa stated no changes since then other than the already added compazine 5 mg    Rooming Documentation: Assigned questionnaire(s) completed .    Reason for visit: Consult     CARMEN Noriega

## 2023-06-22 ENCOUNTER — TELEPHONE (OUTPATIENT)
Dept: GASTROENTEROLOGY | Facility: CLINIC | Age: 73
End: 2023-06-22
Payer: COMMERCIAL

## 2023-06-22 NOTE — TELEPHONE ENCOUNTER
Rec'd call from pt with questions about upcoming HBT and diet as well as medication questions. Addressed pt's questions and went over follow up information with provider.

## 2023-06-23 ENCOUNTER — OFFICE VISIT (OUTPATIENT)
Dept: GASTROENTEROLOGY | Facility: CLINIC | Age: 73
End: 2023-06-23
Payer: COMMERCIAL

## 2023-06-23 VITALS
HEART RATE: 74 BPM | RESPIRATION RATE: 14 BRPM | SYSTOLIC BLOOD PRESSURE: 116 MMHG | OXYGEN SATURATION: 96 % | DIASTOLIC BLOOD PRESSURE: 74 MMHG

## 2023-06-23 DIAGNOSIS — R14.0 BLOATING: Primary | ICD-10-CM

## 2023-06-23 DIAGNOSIS — K58.0 IRRITABLE BOWEL SYNDROME WITH DIARRHEA: ICD-10-CM

## 2023-06-23 PROCEDURE — 91065 BREATH HYDROGEN/METHANE TEST: CPT

## 2023-06-23 ASSESSMENT — PAIN SCALES - GENERAL: PAINLEVEL: NO PAIN (0)

## 2023-06-23 NOTE — PROGRESS NOTES
Non-Invasive GI Procedure Visit    Ml Evans is a 73 year old female with history of    Bloating  Irritable bowel syndrome with diarrhea.   Patient stated reason for procedure: Bloating and diarrhea, nausea, vomiting    COVID-19 PCR Results         No data to display            COVID-19 Antibody Results, Testing for Immunity         No data to display                Pre-Procedure Assessment  Patient presents to clinic today for Hydrogen Breath Test    Referring Provider: Dr. Jadon Tovar    Previous HBT: No  Is patient a smoker: No    Does patient report having a colonoscopy, barium study, barium enema or taking antibiotics within the last 2 weeks? Yes / No: No.  Does patient report taking a stool softener, fiber supplement, laxative or anti-diarrheal in the last 3 - 4 days? Yes / No: No.  Does the patient report taking a probiotic in the last 1 - 2 days? Yes / No: No.  Does the patient report taking any medications today? Yes    Does the patient report following the pre-procedure bland diet? Yes  Does patient report being NPO for a minimum of 12 hours before the test? Yes.     Patient reported symptoms:Nausea, Vomiting, Diarrhea and Bloating  How long has patient had these symptoms? Years    Patient Hx  Patient's history, medications and allergies were reviewed.     Height: Data Unavailable   Weight: 0 lbs 0 oz    Patient Active Problem List    Diagnosis Date Noted     Gastroparesis 06/14/2023     Priority: Medium     Fibromyalgia 08/16/2022     Priority: Medium     RAVI (generalized anxiety disorder) 08/16/2022     Priority: Medium     Hip pain, right 07/07/2022     Priority: Medium     Osteopenia of both hips 03/29/2022     Priority: Medium     3/2022.  Follow-up in 3 years       Gastroesophageal reflux disease without esophagitis 01/28/2020     Priority: Medium     Chronic rhinitis 03/09/2017     Priority: Medium     Benign essential hypertension 03/09/2017     Priority: Medium     Resting HR 50s        Irritable bowel syndrome with diarrhea 03/09/2017     Priority: Medium     Dicyclomine helped but caused dizziness.       Type 2 diabetes mellitus with microalbuminuria, without long-term current use of insulin (H) 03/09/2017     Priority: Medium     Diarrhea on metformin, stopped 6/2019.  Recurrent vaginal infections on Jardiance; trulicity stopped 6/2023 due to gastroparesis seen on gastric emptying study       Colon polyp 05/21/2015     Priority: Medium     Tubular adenoma polyp       Advanced directives, counseling/discussion 01/14/2013     Priority: Medium     Patient states has Advance Directive and will bring in a copy to clinic. 1/14/2013          Diverticulosis 06/14/2012     Priority: Medium     Coronary artery disease involving native coronary artery of native heart without angina pectoris 03/21/2011     Priority: Medium     Two right coronary stents 2011  She has a prior history of CAD with STEMI in 2011 with PCI to the RCA, since then has had another angiogram in 2015 which has shown patent RCA stents and otherwise minimal disease elsewhere. Her EF and stress tests since then have been negative         HPV (human papilloma virus) anogenital infection 02/22/2011     Priority: Medium     Perianal condyloma  Biopsy done 2011  FINAL DIAGNOSIS:  Skin, perianal, lesion, excision:  - High grade squamous intraepithelial lesion (moderate to severe  dysplasia) (see comment)    COMMENT:  The lesion is arising on top of condyloma. The base of the polypoid  lesion appears free of dysplasia in the planes examined. Follow up is  recommended as clinically deemed appropriate.       Hyperlipidemia LDL goal <70 10/31/2010     Priority: Medium     Menopausal syndrome (hot flashes) 05/18/2010     Priority: Medium     Wants to take premarin  Understands breast cancer risk       Anal fissure 01/03/2007     Priority: Medium     Moderate episode of recurrent major depressive disorder (H) 09/28/2006     Priority: Medium      Follows with Psychiatry Dorian Prince.  Rx for Alprazolam 1 mg #120/month and Adderall 20 mg daily       Seasonal allergies 09/28/2006     Priority: Medium     seasonal  Problem list name updated by automated process. Provider to review       Attention deficit disorder 05/17/2005     Priority: Medium     Treated by psychiatry         NAFL (nonalcoholic fatty liver) 05/17/2005     Priority: Medium     fatty liver       History of diverticulitis 12/10/2000     Priority: Medium     Abdominal adhesions 01/01/1990     Priority: Medium      Prior to Admission medications    Medication Sig Start Date End Date Taking? Authorizing Provider   acetaminophen (TYLENOL) 500 MG tablet Take 500-1,000 mg by mouth every 4 hours as needed for mild pain    Reported, Patient   acetylcysteine (N-ACETYL CYSTEINE) 600 MG CAPS capsule Take 3,000 mg by mouth daily    Reported, Patient   alcohol swab prep pads Use to swab area of injection/bethel as directed. 3/29/22   Shwetha Robison,    Alcohol Swabs (B-D SINGLE USE SWABS REGULAR) PADS USE TO SWAB AREA OF INJECTION/BETHEL AS DIRECTED. 12/3/21   Shwetha Robison DO   ALPRAZolam (XANAX PO) Take 0.5-1 mg by mouth 4 times daily as needed 1/2 tab in am, 1 tab in pm, then 1 tab midday prn and 1/2 tab throughout the day if needed    Unknown, Entered By History   amphetamine-dextroamphetamine (ADDERALL XR) 20 MG per capsule Take 20 mg by mouth daily  11/6/15   Reported, Patient   aspirin 81 MG EC tablet Take 81 mg by mouth daily   Patient not taking: Reported on 6/15/2023    Reported, Patient   atorvastatin (LIPITOR) 80 MG tablet Take 1 tablet (80 mg) by mouth daily 3/22/23   Shwetha Robison DO   blood glucose (NO BRAND SPECIFIED) lancets standard Use to test blood sugar 3 times daily or as directed. 12/17/20   Shwetha Robison DO   blood glucose (NO BRAND SPECIFIED) test strip Use to test blood sugar 1 times daily or as directed. To accompany: Blood Glucose Monitor Brands:  per insurance. 3/29/22   Shwetha Robison DO   blood glucose calibration (NO BRAND SPECIFIED) solution To accompany: Blood Glucose Monitor Brands: per insurance. 3/29/22   Shwetha Robison DO   blood glucose monitoring (NO BRAND SPECIFIED) meter device kit Use to test blood sugar 1 times daily or as directed. Preferred blood glucose meter OR supplies to accompany: Blood Glucose Monitor Brands: per insurance. 3/29/22   Shwetha Robison DO   CONTOUR NEXT TEST test strip USE TO TEST BLOOD SUGAR THREE TIMES A DAY 3/3/22   Swhetha Robison DO   DoxePIN (SINEQUAN) 75 MG capsule Take 75 mg by mouth At Bedtime     Reported, Patient   escitalopram (LEXAPRO) 20 MG tablet Take 20 mg by mouth daily    Reported, Patient   fluocinonide (LIDEX) 0.05 % external solution Apply to itchy areas scalp BID x 1-2 weeks PRN. Need follow up appointment in Derm. 1/25/23   Magdalena Selby PA-C   insulin glargine (LANTUS SOLOSTAR) 100 UNIT/ML pen Inject 20 Units Subcutaneous At Bedtime 6/14/23   Shwetha Robison DO   LANsoprazole (PREVACID) 30 MG DR capsule Take 1 capsule (30 mg) by mouth 2 times daily for 30 days, THEN 1 capsule (30 mg) every morning (before breakfast) for 360 days. 30-60 minutes before a meal.  Patient taking differently: Take 1 capsule (30 mg) by mouth 2 times daily for 30 days, THEN 1 capsule (30 mg) every morning (before breakfast) for 360 days. 30-60 minutes before a meal.    Taking one time daily 11/2/22 11/27/23  Shwetha Robison DO   lisinopril (ZESTRIL) 5 MG tablet Take 1 tablet (5 mg) by mouth daily 3/22/23   Shwetha Robison DO   metoprolol succinate ER (TOPROL XL) 25 MG 24 hr tablet Take 1 tablet (25 mg) by mouth daily 3/22/23   Shwetha Robison DO   Microlet Lancets MISC USE TO TEST BLOOD SUGAR 3 TIMES DAILY OR AS DIRECTED. 3/3/22   Robison, Shwetha Skye, DO   mupirocin (BACTROBAN) 2 % external ointment Apply to open areas on the scalp BID until healed. Need follow up  appointment in Derm. 1/25/23   Magdalena Selby PA-C   nitroGLYcerin (NITROSTAT) 0.4 MG sublingual tablet Place 1 tablet (0.4 mg) under the tongue every 5 minutes as needed for chest pain 6/15/23   Martell Biggs MD   nystatin (MYCOSTATIN) 235665 UNIT/GM external cream Apply topically 2 times daily 3/22/23   Shwetha Robison DO   prochlorperazine (COMPAZINE) 5 MG tablet Take 1 tablet (5 mg) by mouth every 8 hours as needed for nausea or vomiting 6/14/23   Shwetha Robison DO   ULTICARE MICRO 32G X 4 MM insulin pen needle USE 1 PEN NEEDLE DAILY OR AS DIRECTED. 6/7/23   Shwetha Robison DO   Wheat Dextrin (BENEFIBER PO) Powder, every other night    Reported, Patient     Allergies   Allergen Reactions     Hydrocodone Anaphylaxis     Flexeril [Cyclobenzaprine] Rash     Cipro [Ciprofloxacin] Other (See Comments)     Abd pain , proteinuria , microscopic hematuria  Possibly related to cipro     Codeine Camsylate Nausea     Influenza Virus Vaccine      Large lumpy, itchy welts on torso and face after a flu shot 10 to 15 years ago.     Pcn [Penicillins] Difficulty breathing     Amoxicillin Itching and Rash     Sulfa Antibiotics Itching and Rash     Tetracycline Itching and Rash     Past Medical History:   Diagnosis Date     Attention deficit disorder without mention of hyperactivity      Benign essential hypertension 3/9/2017     Coronary artery disease march 15,2011    Two stents placed, angioplasty     Diabetes mellitus (H)     A1C 5.7-6.4, controlled with diet     Dysthymic disorder      GERD (gastroesophageal reflux disease) 1/20/2011     Glycogenosis (H)      History of blood transfusion 1981    euptured ectopic pg     History of ST elevation myocardial infarction (STEMI) 1/23/2019     Malignant neoplasm (H)     squamous cell carcinoma many years ago     Other and unspecified hyperlipidemia      Squamous cell carcinoma      Past Surgical History:   Procedure Laterality Date     ABDOMEN  SURGERY  ,,     APPENDECTOMY       BIOPSY      nose, during surgery     COLONOSCOPY       COLONOSCOPY N/A 2015    Procedure: COMBINED COLONOSCOPY, SINGLE OR MULTIPLE BIOPSY/POLYPECTOMY BY BIOPSY;  Surgeon: Ponce Christine MD;  Location: [facility]     COLONOSCOPY N/A 2022    Procedure: COLONOSCOPY, FLEXIBLE, WITH LESION REMOVAL USING SNARE;  Surgeon: Maximilian Cobian MD;  Location: [facility]     ECTOPIC PREGNANCY SURGERY       ENDOSCOPIC RELEASE CARPAL TUNNEL  2013    Procedure: ENDOSCOPIC RELEASE CARPAL TUNNEL;  Right endoscopic carpal tunnel release;  Surgeon: Jonah Dela Cruz MD;  Location: [facility]     ENT SURGERY      nose- suspected basal cell- was benign     ESOPHAGOSCOPY, GASTROSCOPY, DUODENOSCOPY (EGD), COMBINED  2014    Procedure: COMBINED ESOPHAGOSCOPY, GASTROSCOPY, DUODENOSCOPY (EGD), BIOPSY SINGLE OR MULTIPLE;  Surgeon: Anibal Sebastian MD;  Location: [facility]     ESOPHAGOSCOPY, GASTROSCOPY, DUODENOSCOPY (EGD), COMBINED N/A 2022    Procedure: ESOPHAGOGASTRODUODENOSCOPY, WITH BIOPSY;  Surgeon: Maximilian Cobian MD;  Location: [facility]     GENITOURINARY SURGERY  1991     HYSTERECTOMY, ELIECER      total hysterectomy. Unsure if ELIECER or vaginal     SURGICAL HISTORY OF -       appy     SURGICAL HISTORY OF -       T&A     VASCULAR SURGERY      angioplasty- 2 stents implanted     Family History   Problem Relation Age of Onset     Cancer Mother         uterine     Lipids Mother      Neurologic Disorder Mother         polymyalgia     Hypertension Mother      Other Cancer Mother         endometrial     Depression/Anxiety Mother      Other - See Comments Mother         Heart Arrythmia      Atrial fibrillation Mother      Macular Degeneration Mother      Cardiovascular Father         CHF     Depression Father      C.A.D. Father         bypass x2 starting at age 55-  in his 70's     Diabetes Father         type 2     Coronary Artery Disease Father           from - age 75     Depression/Anxiety Father      Cerebrovascular Disease Father         from angioplasty      C.A.D. Maternal Grandfather      Coronary Artery Disease Maternal Grandfather          from- age 61     C.A.D. Paternal Grandfather      Diabetes Brother      Depression Son      Psychotic Disorder Son         adhd     Diabetes Brother         type 1     Depression/Anxiety Brother      Depression/Anxiety Maternal Grandmother      Depression/Anxiety Son      Asthma Son         childhood asthma-out grown now as an adult     Coronary Artery Disease Brother         stent-MI     Melanoma No family hx of      Social History     Tobacco Use     Smoking status: Former     Packs/day: 1.00     Years: 30.00     Pack years: 30.00     Types: Cigarettes     Start date: 1968     Quit date: 3/15/2011     Years since quittin.2     Smokeless tobacco: Never     Tobacco comments:     occasional/daily   Substance Use Topics     Alcohol use: No     Alcohol/week: 0.0 standard drinks of alcohol        Pre-Procedure Education & Consent  Procedure education was provided to: Patient  Teaching method: Explanation  Barriers to learning: No Barrier    Patient indicated understanding of pre-procedure instruction and appropriate consent was obtained and documented.    ____________________________________________________________________    Post-Procedure Documentation: Hydrogen Breath Test     Baseline breath obtained prior to patient drinking Lactulose.     Patient tolerated test with no complaints.    HBT Results    Sample Clock Times Ppm H2 Ppm CH4 CO2% Comments   Baseline 0940 3 1 3.9     Challenge Dose Given 0945 - - - -   #1 1005 7 2 4.9     #2 1025 17 3 3.9     #3 1045 25 5 3.5     #4 1105 37 5 3.3     #5 1125 59 7 3.7     #6 1145 89 9 3.2     #7 1205 71 7 3.7     #8 1225 111 9 3.6     #9 1245 95 8 4.0       #  B   Patient given discharge instructions.    Notification of pending test results sent to provider for  interpretation. Please reference scanned document for final interpretation of results. Patient will follow up with referring provider for test results.    Moni Mcdaniel RN on 6/23/2023 at 9:37 AM

## 2023-06-23 NOTE — PATIENT INSTRUCTIONS
Hydrogen Breath Test  1. You may experience diarrhea for the next 12-24 hours.  2. Resume a regular diet.  3. Follow-up with your referring doctor in clinic.  4. If you have questions call [phone] from 7:00am-5:00pm.

## 2023-06-28 ENCOUNTER — TELEPHONE (OUTPATIENT)
Dept: GASTROENTEROLOGY | Facility: CLINIC | Age: 73
End: 2023-06-28
Payer: COMMERCIAL

## 2023-06-28 NOTE — TELEPHONE ENCOUNTER
LVM (second attempt, FITiSTt already sent) to schedule a follow up with Mag Villa in 3 months (sometime in September). Return [facility], in person or virtual. Left call center #

## 2023-07-11 ENCOUNTER — VIRTUAL VISIT (OUTPATIENT)
Dept: GASTROENTEROLOGY | Facility: CLINIC | Age: 73
End: 2023-07-11
Payer: COMMERCIAL

## 2023-07-11 VITALS — HEIGHT: 60 IN | WEIGHT: 160 LBS | BODY MASS INDEX: 31.41 KG/M2

## 2023-07-11 DIAGNOSIS — R13.19 ESOPHAGEAL DYSPHAGIA: Primary | ICD-10-CM

## 2023-07-11 DIAGNOSIS — K63.8219 SMALL INTESTINAL BACTERIAL OVERGROWTH (SIBO): ICD-10-CM

## 2023-07-11 DIAGNOSIS — K31.84 GASTROPARESIS: ICD-10-CM

## 2023-07-11 PROCEDURE — 99417 PROLNG OP E/M EACH 15 MIN: CPT | Mod: VID | Performed by: PHYSICIAN ASSISTANT

## 2023-07-11 PROCEDURE — 99215 OFFICE O/P EST HI 40 MIN: CPT | Mod: VID | Performed by: PHYSICIAN ASSISTANT

## 2023-07-11 ASSESSMENT — PAIN SCALES - GENERAL: PAINLEVEL: NO PAIN (0)

## 2023-07-11 ASSESSMENT — PATIENT HEALTH QUESTIONNAIRE - PHQ9: SUM OF ALL RESPONSES TO PHQ QUESTIONS 1-9: 14

## 2023-07-11 NOTE — NURSING NOTE
Is the patient currently in the state of [place]? YES    Visit mode:VIDEO    If the visit is dropped, the patient can be reconnected by: VIDEO VISIT: Send to e-mail at: [email or web address]    Will anyone else be joining the visit? NO      How would you like to obtain your AVS? MyChart    Are changes needed to the allergy or medication list? NO    Reason for visit: RECHECK

## 2023-07-11 NOTE — PROGRESS NOTES
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       acetylcysteine (N-ACETYL CYSTEINE) 600 MG CAPS capsule Take 3,000 mg by mouth daily       alcohol swab prep pads Use to swab area of injection/bethel as directed. 100 each 3     Alcohol Swabs (B-D SINGLE USE SWABS REGULAR) PADS USE TO SWAB AREA OF INJECTION/BETHEL AS DIRECTED. 100 each 3     ALPRAZolam (XANAX PO) Take 0.5-1 mg by mouth 4 times daily as needed 1/2 tab in am, 1 tab in pm, then 1 tab midday prn and 1/2 tab throughout the day if needed       amphetamine-dextroamphetamine (ADDERALL XR) 20 MG per capsule Take 20 mg by mouth daily        aspirin 81 MG EC tablet Take 81 mg by mouth daily  (Patient not taking: Reported on 6/15/2023)       atorvastatin (LIPITOR) 80 MG tablet Take 1 tablet (80 mg) by mouth daily 90 tablet 3     blood glucose (NO BRAND SPECIFIED) lancets standard Use to test blood sugar 3 times daily or as directed. 100 each 1     blood glucose (NO BRAND SPECIFIED) test strip Use to test blood sugar 1 times daily or as directed. To accompany: Blood Glucose Monitor Brands: per insurance. 100 strip 6     blood glucose calibration (NO BRAND SPECIFIED) solution To accompany: Blood  Glucose Monitor Brands: per insurance. 1 each 1     blood glucose monitoring (NO BRAND SPECIFIED) meter device kit Use to test blood sugar 1 times daily or as directed. Preferred blood glucose meter OR supplies to accompany: Blood Glucose Monitor Brands: per insurance. 1 kit 0     CONTOUR NEXT TEST test strip USE TO TEST BLOOD SUGAR THREE TIMES A  strip 0     DoxePIN (SINEQUAN) 75 MG capsule Take 75 mg by mouth At Bedtime        escitalopram (LEXAPRO) 20 MG tablet Take 20 mg by mouth daily       fluocinonide (LIDEX) 0.05 % external solution Apply to itchy areas scalp BID x 1-2 weeks PRN. Need follow up appointment in Derm. 50 mL 0     insulin glargine (LANTUS SOLOSTAR) 100 UNIT/ML pen Inject 20 Units Subcutaneous At Bedtime 30 mL 3     LANsoprazole (PREVACID) 30 MG DR capsule Take 1 capsule (30 mg) by mouth 2 times daily for 30 days, THEN 1 capsule (30 mg) every morning (before breakfast) for 360 days. 30-60 minutes before a meal. (Patient taking differently: Take 1 capsule (30 mg) by mouth 2 times daily for 30 days, THEN 1 capsule (30 mg) every morning (before breakfast) for 360 days. 30-60 minutes before a meal.    Taking one time daily) 90 capsule 3     lisinopril (ZESTRIL) 5 MG tablet Take 1 tablet (5 mg) by mouth daily 90 tablet 3     metoprolol succinate ER (TOPROL XL) 25 MG 24 hr tablet Take 1 tablet (25 mg) by mouth daily 90 tablet 3     Microlet Lancets MISC USE TO TEST BLOOD SUGAR 3 TIMES DAILY OR AS DIRECTED. 100 each 0     mupirocin (BACTROBAN) 2 % external ointment Apply to open areas on the scalp BID until healed. Need follow up appointment in Derm. 30 g 0     nitroGLYcerin (NITROSTAT) 0.4 MG sublingual tablet Place 1 tablet (0.4 mg) under the tongue every 5 minutes as needed for chest pain 25 tablet 1     nystatin (MYCOSTATIN) 816827 UNIT/GM external cream Apply topically 2 times daily 30 g 11     prochlorperazine (COMPAZINE) 5 MG tablet Take 1 tablet (5 mg) by mouth every 8 hours as needed for  nausea or vomiting 30 tablet 1     ULTICARE MICRO 32G X 4 MM insulin pen needle USE 1 PEN NEEDLE DAILY OR AS DIRECTED. 100 each 1     Wheat Dextrin (BENEFIBER PO) Powder, every other night       No facility-administered encounter medications on file as of 7/11/2023.        Allergies   Allergen Reactions     Hydrocodone Anaphylaxis     Flexeril [Cyclobenzaprine] Rash     Cipro [Ciprofloxacin] Other (See Comments)     Abd pain , proteinuria , microscopic hematuria  Possibly related to cipro     Codeine Camsylate Nausea     Influenza Virus Vaccine      Large lumpy, itchy welts on torso and face after a flu shot 10 to 15 years ago.     Pcn [Penicillins] Difficulty breathing     Amoxicillin Itching and Rash     Sulfa Antibiotics Itching and Rash     Tetracycline Itching and Rash        Review of systems:  A full 10 point review of systems was obtained and was negative except for the pertinent positives and negatives stated within the HPI.    Objective Findings:   Physical Exam:    Constitutional: LMP  (LMP Unknown)   General: Alert, cooperative, no distress, well-appearing  Head: Atraumatic, normocephalic, no obvious abnormalities   Eyes: Sclera anicteric, no obvious conjunctival hemorrhage   Nose: Nares normal, no obvious malformation, no obvious rhinorrhea   Skin: No jaundice, no obvious rash  Neurologic: AAOx3, no obvious neurologic abnormality  Psychiatric: Normal Affect, appropriate mood  Extremities: No obvious edema, no obvious malformation     Labs, Radiology, Pathology     Lab Results   Component Value Date    WBC 13.2 (H) 02/24/2023    WBC 8.0 03/15/2022    WBC 7.7 09/18/2019    HGB 14.0 02/24/2023    HGB 15.0 03/15/2022    HGB 10.0 (L) 09/18/2019     02/24/2023     03/15/2022     09/18/2019    CHOL 150 03/22/2023    CHOL 150 03/15/2022    CHOL 158 04/13/2021    TRIG 145 03/22/2023    TRIG 123 03/15/2022    TRIG 219 (H) 04/13/2021    HDL 58 03/22/2023    HDL 53 03/15/2022    HDL 55  04/13/2021    ALT 18 02/24/2023    ALT 53 (H) 09/18/2019    ALT 31 01/14/2019    AST 25 02/24/2023    AST 41 09/18/2019    AST 29 01/14/2019     02/24/2023     03/15/2022     04/13/2021    BUN 17.3 02/24/2023    BUN 10 03/15/2022    BUN 11 04/13/2021    CO2 21 (L) 02/24/2023    CO2 31 03/15/2022    CO2 27 04/13/2021    TSH 2.35 01/28/2020    TSH 2.13 09/18/2017    TSH 1.90 03/03/2017    INR 0.94 05/09/2014    INR 0.95 03/15/2011    INR 1.03 07/11/2006        Liver Function Studies -   Recent Labs   Lab Test 02/24/23  1142   PROTTOTAL 6.4   ALBUMIN 4.0   BILITOTAL 0.8   ALKPHOS 94   AST 25   ALT 18          Patient Active Problem List    Diagnosis Date Noted     Gastroparesis 06/14/2023     Priority: Medium     Fibromyalgia 08/16/2022     Priority: Medium     RAVI (generalized anxiety disorder) 08/16/2022     Priority: Medium     Hip pain, right 07/07/2022     Priority: Medium     Osteopenia of both hips 03/29/2022     Priority: Medium     3/2022.  Follow-up in 3 years       Gastroesophageal reflux disease without esophagitis 01/28/2020     Priority: Medium     Chronic rhinitis 03/09/2017     Priority: Medium     Benign essential hypertension 03/09/2017     Priority: Medium     Resting HR 50s       Irritable bowel syndrome with diarrhea 03/09/2017     Priority: Medium     Dicyclomine helped but caused dizziness.       Type 2 diabetes mellitus with microalbuminuria, without long-term current use of insulin (H) 03/09/2017     Priority: Medium     Diarrhea on metformin, stopped 6/2019.  Recurrent vaginal infections on Jardiance; trulicity stopped 6/2023 due to gastroparesis seen on gastric emptying study       Colon polyp 05/21/2015     Priority: Medium     Tubular adenoma polyp       Advanced directives, counseling/discussion 01/14/2013     Priority: Medium     Patient states has Advance Directive and will bring in a copy to clinic. 1/14/2013          Diverticulosis 06/14/2012     Priority: Medium      Coronary artery disease involving native coronary artery of native heart without angina pectoris 03/21/2011     Priority: Medium     Two right coronary stents 2011  She has a prior history of CAD with STEMI in 2011 with PCI to the RCA, since then has had another angiogram in 2015 which has shown patent RCA stents and otherwise minimal disease elsewhere. Her EF and stress tests since then have been negative         HPV (human papilloma virus) anogenital infection 02/22/2011     Priority: Medium     Perianal condyloma  Biopsy done 2011  FINAL DIAGNOSIS:  Skin, perianal, lesion, excision:  - High grade squamous intraepithelial lesion (moderate to severe  dysplasia) (see comment)    COMMENT:  The lesion is arising on top of condyloma. The base of the polypoid  lesion appears free of dysplasia in the planes examined. Follow up is  recommended as clinically deemed appropriate.       Hyperlipidemia LDL goal <70 10/31/2010     Priority: Medium     Menopausal syndrome (hot flashes) 05/18/2010     Priority: Medium     Wants to take premarin  Understands breast cancer risk       Anal fissure 01/03/2007     Priority: Medium     Moderate episode of recurrent major depressive disorder (H) 09/28/2006     Priority: Medium     Follows with Psychiatry Dorian Prnice.  Rx for Alprazolam 1 mg #120/month and Adderall 20 mg daily       Seasonal allergies 09/28/2006     Priority: Medium     seasonal  Problem list name updated by automated process. Provider to review       Attention deficit disorder 05/17/2005     Priority: Medium     Treated by psychiatry         NAFL (nonalcoholic fatty liver) 05/17/2005     Priority: Medium     fatty liver       History of diverticulitis 12/10/2000     Priority: Medium     Abdominal adhesions 01/01/1990     Priority: Medium      Assessment and Plan   Assessment/Plan:    Ml Evans is a 73 year old female with significant past medical history pertinent for fibromyalgia, ADD, anxiety, HTN, HLD,  CAD with STEMI in 2011 with PCI to the RCA, DM II insulin dependent, GERD, history of diverticulitis who is presenting as a follow up patient with multiple GI concerns.     Prior Evaluation Includes:     EGD 11/7/2022 with flap valve Hill grade 3 and small hiatal hernia.  No structural abnormalities were noted of the esophagus.  There were mucosal changes consistent with gastritis and a few gastric polyps.  Biopsies were obtained of the stomach which were grossly unremarkable negative for H. pylori.  No esophageal biopsies were obtained.    5/9/2023 gastric emptying study with 50% retention at 4 hours    6/15/2023 high-resolution manometry study with findings that are most consistent with manometric EGJ 00.  Median IRP in the supine posture was 20.4 and the upright median IRP was 13.3.  Rapid drink challenge with IRP of 11.  Peristalsis was noted in all swallows. 8 out of 10 swallows had intra bolus pressurization 2 out of 10 swallows with ineffective esophageal motility    6/15/2023 hydrogen breath testing positive for small intestinal bacterial overgrowth.    #Dysphagia  #Regurgitation   Narcisa again reports today that she continues to struggle with solid food dysphagia. Noted onset approximately 10+ years prior with acute exacerbation in the past 1 to 2 years.  It should be of note however that she has made significant lifestyle modifications including taking small bites, eating slowly and sitting up straight while eating.  In addition she eats a liquid to semisolid diet.  Noting that if she were to eat a regular diet symptoms would be occurring on a daily basis.  Denies odynophagia/weight loss    As for her heartburn/regurgitation symptoms this is adequately controlled with current acid suppressive regimen of lansoprazole 30 mg twice daily and lifestyle modifications.    - EGD with Endoflip for additional evaluation of findings of manometric EGJOO  - Lansoprazole 30mg twice daily to be taken on an empty stomach at  least 30 to 60 minutes prior to meals  - Reflux friendly lifestyle modifications as directed in AVS    #Gastroparesis - DM  Patient was previously prescribed Trulicity which has since been discontinued with improvement in symptoms. In addition she has met with on staff dietician with plans to implement a gastroparesis diet to which additional link was provided with an AVS.  Emphasis was placed on tight glycemic control.  Discussed medical management including Mirtazapine, Erythromycin and Reglan.  After at length conversation patient would like to avoid the use of Erythromycin/Reglan secondary to potential side effects.  Patient most interested in the use of mirtazapine however with the concomitant use of additional TCA/SSRI and benzodiazepines would need to discuss with prescribing providers first as there is an increased risk of serotonin syndrome with simultaneous use.  Patient inquired if she could discontinue doxepin and provider recommended multiple times that she needs to follow-up with her primary provider/psychiatrist in regards to her medication management.     - Consultation with Dr. Wheeler to discuss surgical options for gastroparesis   - Adhere to a strict gastroparesis diet, additional link provided below  - Strict glycemic control  - Avoidance of opioids and other medications that are known to decrease GI motility    #Fluctuations in Bowel Habits   #SIBO  In setting of chronic diabetes and gastroparesis.  Positive breath testing 6/23/2023.  Additional concern for pelvic floor dysfunction.    - Xifaxan 550 mg three times daily for 14 days  - Low FODMAPs diet  - Complete alcohol cessation   - Avoid simple sugars as well as artifical sweeteners including sorbital, truvia, maltitol, mannitol, xylitol, glycerol, lactilol. Education link provided within the AVS  - Consider trial of probiotic Culturelle, Florastar or VSL - 3. Prebiotics such as inulin should be avoided  - Initiation of nongel forming fiber  which includes Benefiber/cristian powder as noted improvement in stool consistency/regularity with its use.  Please start with 1 teaspoon daily which can be increased to 2 to 3 teaspoons daily as tolerated.  Please caution that gastroparesis diet does in fact suggest a low fiber diet which is why we will use low doses of fiber for your deprecatory concerns  - Consultation to the pelvic floor center    #Colorectal Cancer Screening   Colonoscopy November 2022 with a 4 mm adenomatous a sending colonic polyp.  Previous colonoscopy 2013 with one 5 mm adenomatous cecal polyp.  Previous recommendation for recall 5 years, 2027. Future recommendations will be given per the most recent update by the [place] Multi-Society Task Force on Colorectal Cancer.       Follow up plan:   Return to clinic 2 months and as needed.    The risks and benefits of my recommendations, as well as other treatment options were discussed with the patient and any available family today. All questions were answered.     o Follow up: As planned above. Today, I personally spent 95 minutes in direct face to face time with the patient, of which greater than 50% of the time was spent in patient education and counseling as described above. Approximately 22 minutes were spent on indirect care associated with the patient's consultation including but not limited to review of: patient medical records to date, clinic visits, hospital records, lab results, imaging studies, procedural documentation, and coordinating care with other providers. The findings from this review are summarized in the above note. All of the above accounted for a cumulative time of 107minutes and was performed on the date of service.     The patient verbalized understanding of the plan and was appreciative for the time spent and information provided during the office visit.         Magdalena Thomas PA-C  Division of Gastroenterology, Hepatology, and Nutrition  [facility]  [place]       Documentation assisted by voice recognition and documentation system.

## 2023-07-11 NOTE — LETTER
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 of injection/samantha as directed. 100 each 3    Alcohol Swabs (B-D SINGLE USE SWABS REGULAR) PADS USE TO SWAB AREA OF INJECTION/SAMANTHA AS DIRECTED. 100 each 3    ALPRAZolam (XANAX PO) Take 0.5-1 mg by mouth 4 times daily as needed 1/2 tab in am, 1 tab in pm, then 1 tab midday prn and 1/2 tab throughout the day if needed      amphetamine-dextroamphetamine (ADDERALL XR) 20 MG per capsule Take 20 mg by mouth daily       aspirin 81 MG EC tablet Take 81 mg by mouth daily  (Patient not taking: Reported on 6/15/2023)      atorvastatin (LIPITOR) 80 MG tablet Take 1 tablet (80 mg) by mouth daily 90 tablet 3    blood glucose (NO BRAND SPECIFIED) lancets standard Use to test blood sugar 3 times daily or as directed. 100 each 1    blood glucose (NO BRAND SPECIFIED) test strip Use to test blood sugar 1 times daily or as directed. To accompany: Blood Glucose Monitor Brands: per insurance. 100 strip 6    blood glucose calibration (NO BRAND SPECIFIED) solution To accompany: Blood Glucose Monitor Brands: per insurance. 1 each 1    blood glucose  monitoring (NO BRAND SPECIFIED) meter device kit Use to test blood sugar 1 times daily or as directed. Preferred blood glucose meter OR supplies to accompany: Blood Glucose Monitor Brands: per insurance. 1 kit 0    CONTOUR NEXT TEST test strip USE TO TEST BLOOD SUGAR THREE TIMES A  strip 0    DoxePIN (SINEQUAN) 75 MG capsule Take 75 mg by mouth At Bedtime       escitalopram (LEXAPRO) 20 MG tablet Take 20 mg by mouth daily      fluocinonide (LIDEX) 0.05 % external solution Apply to itchy areas scalp BID x 1-2 weeks PRN. Need follow up appointment in Derm. 50 mL 0    insulin glargine (LANTUS SOLOSTAR) 100 UNIT/ML pen Inject 20 Units Subcutaneous At Bedtime 30 mL 3    LANsoprazole (PREVACID) 30 MG DR capsule Take 1 capsule (30 mg) by mouth 2 times daily for 30 days, THEN 1 capsule (30 mg) every morning (before breakfast) for 360 days. 30-60 minutes before a meal. (Patient taking differently: Take 1 capsule (30 mg) by mouth 2 times daily for 30 days, THEN 1 capsule (30 mg) every morning (before breakfast) for 360 days. 30-60 minutes before a meal.    Taking one time daily) 90 capsule 3    lisinopril (ZESTRIL) 5 MG tablet Take 1 tablet (5 mg) by mouth daily 90 tablet 3    metoprolol succinate ER (TOPROL XL) 25 MG 24 hr tablet Take 1 tablet (25 mg) by mouth daily 90 tablet 3    Microlet Lancets MISC USE TO TEST BLOOD SUGAR 3 TIMES DAILY OR AS DIRECTED. 100 each 0    mupirocin (BACTROBAN) 2 % external ointment Apply to open areas on the scalp BID until healed. Need follow up appointment in Derm. 30 g 0    nitroGLYcerin (NITROSTAT) 0.4 MG sublingual tablet Place 1 tablet (0.4 mg) under the tongue every 5 minutes as needed for chest pain 25 tablet 1    nystatin (MYCOSTATIN) 702391 UNIT/GM external cream Apply topically 2 times daily 30 g 11    prochlorperazine (COMPAZINE) 5 MG tablet Take 1 tablet (5 mg) by mouth every 8 hours as needed for nausea or vomiting 30 tablet 1    ULTICARE MICRO 32G X 4 MM insulin pen needle  USE 1 PEN NEEDLE DAILY OR AS DIRECTED. 100 each 1    Wheat Dextrin (BENEFIBER PO) Powder, every other night       No facility-administered encounter medications on file as of 7/11/2023.        Allergies   Allergen Reactions    Hydrocodone Anaphylaxis    Flexeril [Cyclobenzaprine] Rash    Cipro [Ciprofloxacin] Other (See Comments)     Abd pain , proteinuria , microscopic hematuria  Possibly related to cipro    Codeine Camsylate Nausea    Influenza Virus Vaccine      Large lumpy, itchy welts on torso and face after a flu shot 10 to 15 years ago.    Pcn [Penicillins] Difficulty breathing    Amoxicillin Itching and Rash    Sulfa Antibiotics Itching and Rash    Tetracycline Itching and Rash        Review of systems:  A full 10 point review of systems was obtained and was negative except for the pertinent positives and negatives stated within the HPI.    Objective Findings:   Physical Exam:    Constitutional: LMP  (LMP Unknown)   General: Alert, cooperative, no distress, well-appearing  Head: Atraumatic, normocephalic, no obvious abnormalities   Eyes: Sclera anicteric, no obvious conjunctival hemorrhage   Nose: Nares normal, no obvious malformation, no obvious rhinorrhea   Skin: No jaundice, no obvious rash  Neurologic: AAOx3, no obvious neurologic abnormality  Psychiatric: Normal Affect, appropriate mood  Extremities: No obvious edema, no obvious malformation     Labs, Radiology, Pathology     Lab Results   Component Value Date    WBC 13.2 (H) 02/24/2023    WBC 8.0 03/15/2022    WBC 7.7 09/18/2019    HGB 14.0 02/24/2023    HGB 15.0 03/15/2022    HGB 10.0 (L) 09/18/2019     02/24/2023     03/15/2022     09/18/2019    CHOL 150 03/22/2023    CHOL 150 03/15/2022    CHOL 158 04/13/2021    TRIG 145 03/22/2023    TRIG 123 03/15/2022    TRIG 219 (H) 04/13/2021    HDL 58 03/22/2023    HDL 53 03/15/2022    HDL 55 04/13/2021    ALT 18 02/24/2023    ALT 53 (H) 09/18/2019    ALT 31 01/14/2019    AST 25 02/24/2023     AST 41 09/18/2019    AST 29 01/14/2019     02/24/2023     03/15/2022     04/13/2021    BUN 17.3 02/24/2023    BUN 10 03/15/2022    BUN 11 04/13/2021    CO2 21 (L) 02/24/2023    CO2 31 03/15/2022    CO2 27 04/13/2021    TSH 2.35 01/28/2020    TSH 2.13 09/18/2017    TSH 1.90 03/03/2017    INR 0.94 05/09/2014    INR 0.95 03/15/2011    INR 1.03 07/11/2006        Liver Function Studies -   Recent Labs   Lab Test 02/24/23  1142   PROTTOTAL 6.4   ALBUMIN 4.0   BILITOTAL 0.8   ALKPHOS 94   AST 25   ALT 18          Patient Active Problem List    Diagnosis Date Noted    Gastroparesis 06/14/2023     Priority: Medium    Fibromyalgia 08/16/2022     Priority: Medium    RAVI (generalized anxiety disorder) 08/16/2022     Priority: Medium    Hip pain, right 07/07/2022     Priority: Medium    Osteopenia of both hips 03/29/2022     Priority: Medium     3/2022.  Follow-up in 3 years      Gastroesophageal reflux disease without esophagitis 01/28/2020     Priority: Medium    Chronic rhinitis 03/09/2017     Priority: Medium    Benign essential hypertension 03/09/2017     Priority: Medium     Resting HR 50s      Irritable bowel syndrome with diarrhea 03/09/2017     Priority: Medium     Dicyclomine helped but caused dizziness.      Type 2 diabetes mellitus with microalbuminuria, without long-term current use of insulin (H) 03/09/2017     Priority: Medium     Diarrhea on metformin, stopped 6/2019.  Recurrent vaginal infections on Jardiance; trulicity stopped 6/2023 due to gastroparesis seen on gastric emptying study      Colon polyp 05/21/2015     Priority: Medium     Tubular adenoma polyp      Advanced directives, counseling/discussion 01/14/2013     Priority: Medium     Patient states has Advance Directive and will bring in a copy to clinic. 1/14/2013         Diverticulosis 06/14/2012     Priority: Medium    Coronary artery disease involving native coronary artery of native heart without angina pectoris 03/21/2011      Priority: Medium     Two right coronary stents 2011  She has a prior history of CAD with STEMI in 2011 with PCI to the RCA, since then has had another angiogram in 2015 which has shown patent RCA stents and otherwise minimal disease elsewhere. Her EF and stress tests since then have been negative        HPV (human papilloma virus) anogenital infection 02/22/2011     Priority: Medium     Perianal condyloma  Biopsy done 2011  FINAL DIAGNOSIS:  Skin, perianal, lesion, excision:  - High grade squamous intraepithelial lesion (moderate to severe  dysplasia) (see comment)    COMMENT:  The lesion is arising on top of condyloma. The base of the polypoid  lesion appears free of dysplasia in the planes examined. Follow up is  recommended as clinically deemed appropriate.      Hyperlipidemia LDL goal <70 10/31/2010     Priority: Medium    Menopausal syndrome (hot flashes) 05/18/2010     Priority: Medium     Wants to take premarin  Understands breast cancer risk      Anal fissure 01/03/2007     Priority: Medium    Moderate episode of recurrent major depressive disorder (H) 09/28/2006     Priority: Medium     Follows with Psychiatry Dorian Prince.  Rx for Alprazolam 1 mg #120/month and Adderall 20 mg daily      Seasonal allergies 09/28/2006     Priority: Medium     seasonal  Problem list name updated by automated process. Provider to review      Attention deficit disorder 05/17/2005     Priority: Medium     Treated by psychiatry        NAFL (nonalcoholic fatty liver) 05/17/2005     Priority: Medium     fatty liver      History of diverticulitis 12/10/2000     Priority: Medium    Abdominal adhesions 01/01/1990     Priority: Medium      Assessment and Plan   Assessment/Plan:    Ml Evans is a 73 year old female with significant past medical history pertinent for fibromyalgia, ADD, anxiety, HTN, HLD, CAD with STEMI in 2011 with PCI to the RCA, DM II insulin dependent, GERD, history of diverticulitis who is presenting as  a follow up patient with multiple GI concerns.     Prior Evaluation Includes:     EGD 11/7/2022 with flap valve Hill grade 3 and small hiatal hernia.  No structural abnormalities were noted of the esophagus.  There were mucosal changes consistent with gastritis and a few gastric polyps.  Biopsies were obtained of the stomach which were grossly unremarkable negative for H. pylori.  No esophageal biopsies were obtained.    5/9/2023 gastric emptying study with 50% retention at 4 hours    6/15/2023 high-resolution manometry study with findings that are most consistent with manometric EGJ 00.  Median IRP in the supine posture was 20.4 and the upright median IRP was 13.3.  Rapid drink challenge with IRP of 11.  Peristalsis was noted in all swallows. 8 out of 10 swallows had intra bolus pressurization 2 out of 10 swallows with ineffective esophageal motility    6/15/2023 hydrogen breath testing positive for small intestinal bacterial overgrowth.    #Dysphagia  #Regurgitation   Narcisa again reports today that she continues to struggle with solid food dysphagia. Noted onset approximately 10+ years prior with acute exacerbation in the past 1 to 2 years.  It should be of note however that she has made significant lifestyle modifications including taking small bites, eating slowly and sitting up straight while eating.  In addition she eats a liquid to semisolid diet.  Noting that if she were to eat a regular diet symptoms would be occurring on a daily basis.  Denies odynophagia/weight loss    As for her heartburn/regurgitation symptoms this is adequately controlled with current acid suppressive regimen of lansoprazole 30 mg twice daily and lifestyle modifications.    - EGD with Endoflip for additional evaluation of findings of manometric EGJOO  - Lansoprazole 30mg twice daily to be taken on an empty stomach at least 30 to 60 minutes prior to meals  - Reflux friendly lifestyle modifications as directed in AVS    #Gastroparesis -  DM  Patient was previously prescribed Trulicity which has since been discontinued with improvement in symptoms. In addition she has met with on staff dietician with plans to implement a gastroparesis diet to which additional link was provided with an AVS.  Emphasis was placed on tight glycemic control.  Discussed medical management including Mirtazapine, Erythromycin and Reglan.  After at length conversation patient would like to avoid the use of Erythromycin/Reglan secondary to potential side effects.  Patient most interested in the use of mirtazapine however with the concomitant use of additional TCA/SSRI and benzodiazepines would need to discuss with prescribing providers first as there is an increased risk of serotonin syndrome with simultaneous use.  Patient inquired if she could discontinue doxepin and provider recommended multiple times that she needs to follow-up with her primary provider/psychiatrist in regards to her medication management.     - Consultation with Dr. Wheeler to discuss surgical options for gastroparesis   - Adhere to a strict gastroparesis diet, additional link provided below  - Strict glycemic control  - Avoidance of opioids and other medications that are known to decrease GI motility    #Fluctuations in Bowel Habits   #SIBO  In setting of chronic diabetes and gastroparesis.  Positive breath testing 6/23/2023.  Additional concern for pelvic floor dysfunction.    - Xifaxan 550 mg three times daily for 14 days  - Low FODMAPs diet  - Complete alcohol cessation   - Avoid simple sugars as well as artifical sweeteners including sorbital, truvia, maltitol, mannitol, xylitol, glycerol, lactilol. Education link provided within the AVS  - Consider trial of probiotic Culturelle, Florastar or VSL - 3. Prebiotics such as inulin should be avoided  - Initiation of nongel forming fiber which includes Benefiber/cristian powder as noted improvement in stool consistency/regularity with its use.  Please start  with 1 teaspoon daily which can be increased to 2 to 3 teaspoons daily as tolerated.  Please caution that gastroparesis diet does in fact suggest a low fiber diet which is why we will use low doses of fiber for your deprecatory concerns  - Consultation to the pelvic floor center    #Colorectal Cancer Screening   Colonoscopy November 2022 with a 4 mm adenomatous a sending colonic polyp.  Previous colonoscopy 2013 with one 5 mm adenomatous cecal polyp.  Previous recommendation for recall 5 years, 2027. Future recommendations will be given per the most recent update by the [place] Multi-Society Task Force on Colorectal Cancer.       Follow up plan:   Return to clinic 2 months and as needed.    The risks and benefits of my recommendations, as well as other treatment options were discussed with the patient and any available family today. All questions were answered.     Follow up: As planned above. Today, I personally spent 95 minutes in direct face to face time with the patient, of which greater than 50% of the time was spent in patient education and counseling as described above. Approximately 22 minutes were spent on indirect care associated with the patient's consultation including but not limited to review of: patient medical records to date, clinic visits, hospital records, lab results, imaging studies, procedural documentation, and coordinating care with other providers. The findings from this review are summarized in the above note. All of the above accounted for a cumulative time of 107minutes and was performed on the date of service.     The patient verbalized understanding of the plan and was appreciative for the time spent and information provided during the office visit.       Documentation assisted by voice recognition and documentation system.              Again, thank you for allowing me to participate in the care of your patient.      Sincerely,    Magdalena Thomas PA-C

## 2023-07-12 RX ORDER — RIFAXIMIN 550 MG/1
550 TABLET ORAL 3 TIMES DAILY
Qty: 42 TABLET | Refills: 0 | Status: SHIPPED | OUTPATIENT
Start: 2023-07-12 | End: 2023-07-26

## 2023-07-12 NOTE — PATIENT INSTRUCTIONS
It was a pleasure taking care of you today.  I've included a brief summary of our discussion and care plan from today's visit below.  Please review this information with your primary care provider.  _______________________________________________________________________    My recommendations are summarized as follows:    Swallowing Difficulties/Reflux:  - EGD with Endoflip for additional evaluation of findings of manometric EGJOO  - Continue Lansoprazole 30mg twice daily to be taken on an empty stomach at least 30 to 60 minutes prior to meals   - Reflux friendly lifestyle modifications as directed in AVS    Gastroesophageal Reflux Disease (GERD) Lifestyle Modifications:   If taking acid suppression therapy (PPI ie Pantoprazole, Lansoprazole, Omeprazole, Esomeprazole, Rabeprazole, Dexlansoprazole) it should be taken 30 - 60 minutes prior to meals on an empty stomach to have maximum effect  Avoid triggers for reflux such as coffee, chocolate, carbonated beverages, spicy foods, acidic foods (tomato based/citrus and foods with high fat content   Abstinence from alcohol and cessation of all tobacco products is recommended   Studies have shown that weight loss, exercise and maintaining a healthy BMI significantly reduce GERD symptoms   Remain upright while eating and immediately after meals  Do not eat or drink at least 3 hours prior to laying down supine/laying down for bed   Avoid late night/middle of the night snacking    Consider obtaining a wedge pillow or elevating the head end of the bed while sleeping   Avoid sleeping right side down as this can place the lower part of the esophagus/lower esophageal sphincter in a dependent position that favors reflux   Attempting to eat smaller more frequent meals may improve symptoms       Stooling Concerns:  - Treatment for small intestinal bacterial overgrowth, Xifaxan 550 mg 3 times daily for 14 days  - Low FODMAPs diet  - Complete alcohol cessation   - Avoid simple sugars as  well as artifical sweeteners including sorbital, truvia, maltitol, mannitol, xylitol, glycerol, lactilol. Education link provided within the AVS  - Consider trial of probiotic Culturelle, Florastar or VSL - 3. Prebiotics such as inulin should be avoided  - Initiation of nongel forming fiber which includes Benefiber/cristian powder as noted improvement in stool consistency/regularity with its use.  Please start with 1 teaspoon daily which can be increased to 2 to 3 teaspoons daily as tolerated.  Please caution that gastroparesis diet does in fact suggest a low fiber diet which is why we will use low doses of fiber for your deprecatory concerns  - Consultation to the pelvic floor center    [email or web address]     Gastroparesis:   - Consultation with Dr. Wheeler to discuss surgical options for gastroparesis   - Adhere to a strict gastroparesis diet, additional link provided below  - Strict glycemic control  - Avoidance of opioids and other medications that are known to decrease GI motility    Please review the below link to the [facility] Gastroparesis diet:     [email or web address]      To schedule endoscopic procedures you may call: [phone]  To schedule radiology (imaging) tests you may call: [phone]  To schedule an ENT appointment you may call: [phone]    Please call my nurse Crystal ([phone])Moni ([phone]) with any questions or concerns.      Return to GI Clinic in 2 months to review your progress.    _______________________________________________________________________    Who do I call with any questions after my visit?  Please be in touch if there are any further questions that arise following today's visit.  There are multiple ways to contact your gastroenterology care team.      During business  hours, you may reach a Gastroenterology nurse at [phone] and choose option 3.       To schedule or reschedule an appointment, please call [phone].     You can always send a secure message through [employer].  [employer] messages are answered by your nurse or doctor typically within 24 hours.  Please allow extra time on weekends and holidays.      For urgent/emergent questions after business hours, you may reach the on-call GI Fellow by contacting the [facility]  at [phone].     How will I get the results of any tests ordered?    You will receive all of your results.  If you have signed up for [employer], any tests ordered at your visit will be available to you after your physician reviews them.  Typically this takes 1-2 weeks.  If there are urgent results that require a change in your care plan, your physician or nurse will call you to discuss the next steps.      What is [employer]?  [employer] is a secure way for you to access all of your healthcare records from the [facility].  It is a web based computer program, so you can sign on to it from any location.  It also allows you to send secure messages to your care team.  I recommend signing up for [employer] access if you have not already done so and are comfortable with using a computer.      How to I schedule a follow-up visit?  If you did not schedule a follow-up visit today, please call [phone] to schedule a follow-up office visit.      If you feel you received exceptional care and are interested in supporting the clinical and research goals of Magdalena Thomas PA-C or the Division of Gastroenterology, Hepatology, and Nutrition please contact [email or web address] from the [facility] to discuss opportunities to donate.    Sincerely,    Magdalena Thomas PA-C  Division of Gastroenterology, Hepatology, and Nutrition  [facility]

## 2023-07-17 ENCOUNTER — OFFICE VISIT (OUTPATIENT)
Dept: DERMATOLOGY | Facility: CLINIC | Age: 73
End: 2023-07-17
Payer: COMMERCIAL

## 2023-07-17 DIAGNOSIS — L65.0 TELOGEN EFFLUVIUM: ICD-10-CM

## 2023-07-17 DIAGNOSIS — L98.1 NEUROTIC EXCORIATIONS: Primary | ICD-10-CM

## 2023-07-17 PROCEDURE — 99213 OFFICE O/P EST LOW 20 MIN: CPT | Performed by: PHYSICIAN ASSISTANT

## 2023-07-17 ASSESSMENT — PAIN SCALES - GENERAL: PAINLEVEL: NO PAIN (0)

## 2023-07-17 NOTE — PROGRESS NOTES
HPI:   Chief complaints: Ml Evans is a pleasant 73 year old female who presents for recheck neurotic excoriations/ itchy scalp. She reports considerable improvement today. She does not have any sores today. If she gets an itchy spot she will treat with fluocinonide which helped. She also reports considerable hair loss which happened about 1 year ago where she had a lot of shedding. She brings in a bag of hair today which all came out within 4 washings. The shedding has since stopped and she is regrowing hair.       PHYSICAL EXAM:    LMP  (LMP Unknown)   Skin exam performed as follows: Type 2 skin. Mood appropriate  Alert and Oriented X 3. Well developed, well nourished in no distress.  General appearance: Normal  Head including face: Normal  Eyes: conjunctiva and lids: Normal  Mouth: Lips, teeth, gums: Normal  Neck: Normal  Cardiovascular: Exam of peripheral vascular system by observation for swelling, varicosities, edema: Normal  Right upper extremity: Normal  Left upper extremity: Normal  Right lower extremity: Normal  Left lower extremity: Normal  Skin: Scalp and body hair: See below    Scalp clear!     ASSESSMENT/PLAN:     Neurotic excoriations, pyoderma, scalp itch - Resolved today.     --Lidex to new itchy areas BID and try to not scratch or pick at the areas      2. Favor resolved telogen effluvium - no treatment needed.           Follow-up: yearly/PRN sooner  Scribed By: Magdalena Selby, MS, PADaniaC

## 2023-07-17 NOTE — LETTER
7/17/2023         RE: Ml Evans  [address] [facility] [address]        Dear Colleague,    Thank you for referring your patient, Ml Evans, to the [facility]. Please see a copy of my visit note below.    HPI:   Chief complaints: Ml Evans is a pleasant 73 year old female who presents for recheck neurotic excoriations/ itchy scalp. She reports considerable improvement today. She does not have any sores today. If she gets an itchy spot she will treat with fluocinonide which helped. She also reports considerable hair loss which happened about 1 year ago where she had a lot of shedding. She brings in a bag of hair today which all came out within 4 washings. The shedding has since stopped and she is regrowing hair.       PHYSICAL EXAM:    LMP  (LMP Unknown)   Skin exam performed as follows: Type 2 skin. Mood appropriate  Alert and Oriented X 3. Well developed, well nourished in no distress.  General appearance: Normal  Head including face: Normal  Eyes: conjunctiva and lids: Normal  Mouth: Lips, teeth, gums: Normal  Neck: Normal  Cardiovascular: Exam of peripheral vascular system by observation for swelling, varicosities, edema: Normal  Right upper extremity: Normal  Left upper extremity: Normal  Right lower extremity: Normal  Left lower extremity: Normal  Skin: Scalp and body hair: See below    Scalp clear!     ASSESSMENT/PLAN:     Neurotic excoriations, pyoderma, scalp itch - Resolved today.     --Lidex to new itchy areas BID and try to not scratch or pick at the areas      2. Favor resolved telogen effluvium - no treatment needed.           Follow-up: yearly/PRN sooner  Scribed By: Magdalena Selby, MS, PADaniaC      Again, thank you for allowing me to participate in the care of your patient.        Sincerely,        Magdalena Selby PA-C

## 2023-07-18 ENCOUNTER — DOCUMENTATION ONLY (OUTPATIENT)
Dept: GASTROENTEROLOGY | Facility: CLINIC | Age: 73
End: 2023-07-18
Payer: COMMERCIAL

## 2023-07-18 NOTE — PROGRESS NOTES
[facility] order, demographics, last office note was faxed to the [facility] at [phone]. Order scanned into chart.     [facility] reached out to patient to schedule and she wished not to schedule with their office.    Malinda Brannon LPN

## 2023-08-15 ENCOUNTER — MYC MEDICAL ADVICE (OUTPATIENT)
Dept: GASTROENTEROLOGY | Facility: CLINIC | Age: 73
End: 2023-08-15
Payer: COMMERCIAL

## 2023-08-15 DIAGNOSIS — K31.84 GASTROPARESIS: Primary | ICD-10-CM

## 2023-08-18 NOTE — TELEPHONE ENCOUNTER
Reached out to pt repeat recommendations per provider and let pt know that referral to advanced endoscopy team for evaluation for potential procedure    'Lets get her to see advanced Endo as they had previously recommended G POEM. However G POEM has been found to be less efficacious in patients who experience predominately abdominal pain.    - Consultation with Dr. Wheeler to discuss surgical options for gastroparesis   - Adhere to a strict gastroparesis diet, additional link provided below: pt working with RD  - Strict glycemic control: pt aware of this recommendation  - Avoidance of opioids and other medications that are known to decrease GI motility'

## 2023-08-23 ENCOUNTER — TELEPHONE (OUTPATIENT)
Dept: GASTROENTEROLOGY | Facility: CLINIC | Age: 73
End: 2023-08-23
Payer: COMMERCIAL

## 2023-08-23 NOTE — TELEPHONE ENCOUNTER
[facility] Call Center    Phone Message    May a detailed message be left on voicemail: yes     Reason for Call: Other: pt is confused why she is getting an order for a test, Upper Endo, pt stated she is frustrated with the tests the are being ordered. Please call pt to discuss     Action Taken: Message routed to:  [facility] GI    Travel Screening: Not Applicable

## 2023-08-28 ENCOUNTER — MYC MEDICAL ADVICE (OUTPATIENT)
Dept: FAMILY MEDICINE | Facility: CLINIC | Age: 73
End: 2023-08-28
Payer: COMMERCIAL

## 2023-08-29 NOTE — TELEPHONE ENCOUNTER
"Covering for PCP.  Patient has a complex GI history.  The \"conference call\" she was referring to appears to be between the team of Gi providers to discuss her case and to come up with a plan for her. Patients typically are not part of those discussions. They will then reach out to patient.  Reviewed her labs. Her last lab was for her A1c which was stable. So not sure what she was referring to.    "

## 2023-08-29 NOTE — TELEPHONE ENCOUNTER
Returned pt call to discuss GI care plan.  Left detailed vm with call back number to use as needed.

## 2023-08-29 NOTE — TELEPHONE ENCOUNTER
Dr. Robison,    There are 2 my chart messages on this.  Please see both and advise. Jil RIVERA RN

## 2023-08-30 NOTE — TELEPHONE ENCOUNTER
I closed the other encounter.  I would not be part of a conference call with GI.  She should follow-up with them directly as to her questions about next step.  Otherwise, recommend non-urgent follow-up with me to review test results, etc.  But, GI is directing her care on these issues

## 2023-09-01 ENCOUNTER — TELEPHONE (OUTPATIENT)
Dept: GASTROENTEROLOGY | Facility: CLINIC | Age: 73
End: 2023-09-01
Payer: COMMERCIAL

## 2023-09-01 ENCOUNTER — HOSPITAL ENCOUNTER (OUTPATIENT)
Facility: CLINIC | Age: 73
End: 2023-09-01
Attending: INTERNAL MEDICINE | Admitting: INTERNAL MEDICINE
Payer: COMMERCIAL

## 2023-09-01 NOTE — TELEPHONE ENCOUNTER
"Endoscopy Scheduling Screen    Have you had a positive Covid test in the last 14 days?  No    Are you active on MyChart?   Yes    What insurance is in the chart?  Other:  [employer]    Ordering/Referring Provider: William   (If ordering provider performs procedure, schedule with ordering provider unless otherwise instructed. )    BMI: Estimated body mass index is 31.25 kg/m  as calculated from the following:    Height as of 7/11/23: 1.524 m (5').    Weight as of 7/11/23: 72.6 kg (160 lb).     Sedation Ordered  MAC/deep sedation.   BMI<= 45 45 < BMI <= 48 48 < BMI < = 50  BMI > 50   No Restrictions No [facility]  No [facility]  [facility] with exceptions Hospital Only OR Only       Are you taking any prescription medications for pain 3 or more times per week?   No    Do you have a history of malignant hyperthermia or adverse reaction to anesthesia?  No    (Females) Are you currently pregnant?   No     Have you been diagnosed or told you have pulmonary hypertension?   No    Do you have an LVAD?  No    Have you been told you have moderate to severe sleep apnea?  No    Have you been told you have COPD, asthma, or any other lung disease?  No    Do you have any heart conditions?  Yes     In the past 6 months, have you had any hospitalizations for heart related issues including cardiomyopathy, heart attack, or stent placement?  No    Do you have any implantable devices in your body (pacemaker, ICD)?  Other Stents    Do you take nitroglycerine?  No    Have you ever had an organ transplant?   No    Have you ever had or are you awaiting a heart or lung transplant?   No    Have you had a stroke or transient ischemic attack (TIA aka \"mini stroke\" in the last 6 months?   No    Have you been diagnosed with or been told you have cirrhosis of the liver?   No    Are you currently on dialysis?   No    Do you need assistance transferring?   No    BMI: Estimated body mass index is 31.25 kg/m  as calculated from the following:    Height as of " 7/11/23: 1.524 m (5').    Weight as of 7/11/23: 72.6 kg (160 lb).     Is patients BMI > 40 and scheduling location [facility]?  No    Do you take an injectable medication for weight loss or diabetes (excluding insulin)?  No    Do you take the medication Naltrexone?  No    Do you take blood thinners?  No       Prep   Are you currently on dialysis or do you have chronic kidney disease?  No    Do you have a diagnosis of diabetes?  Yes (Golytely Prep)    Do you have a diagnosis of cystic fibrosis (CF)?  No    On a regular basis do you go 3 -5 days between bowel movements?  No    BMI > 40?  No    Preferred Pharmacy:    [facility]  [address] [facility] [address]  Phone: [phone] Fax: [phone]    [facility] [place], [place] - [address] [place] [address]  [address] [place] [address]  Phone: [phone] Fax: [phone]    [facility] [place] [address] [facility]  [address] [facility] [address]  Phone: [phone] Fax: [phone] Alternate Fax: [phone], [phone]      Final Scheduling Details   Colonoscopy prep sent?  N/A    Procedure scheduled  Upper endoscopy with Endoflip    Surgeon:  Venkatesh     Date of procedure:  11/28     Pre-OP / PAC:   No - Not required for this site.    Location  [facility] - Per exclusion criteria.    Sedation   MAC/Deep Sedation - Per order.      Patient Reminders:   You will receive a call from a Nurse to review instructions and health history.  This assessment must be completed prior to your procedure.  Failure to complete the Nurse assessment may result in the procedure being cancelled.      On the day of your procedure, please designate an adult(s) who can drive you home stay with you for the next 24 hours. The medicines used in the exam will make you sleepy. You will not be able to drive.      You cannot take public transportation, ride share services, or non-medical taxi service without a responsible caregiver.   Medical transport services are allowed with the requirement that a responsible caregiver will receive you at your destination.  We require that drivers and caregivers are confirmed prior to your procedure.

## 2023-09-15 ENCOUNTER — TELEPHONE (OUTPATIENT)
Dept: GASTROENTEROLOGY | Facility: CLINIC | Age: 73
End: 2023-09-15
Payer: COMMERCIAL

## 2023-09-15 NOTE — TELEPHONE ENCOUNTER
[facility] Call Center    Phone Message    May a detailed message be left on voicemail: yes     Reason for Call: Other: Pt is requesting a call back to discuss the need of a esophageal balloon provocation study before other avenues of investigation. Pt has other concerns to discuss in regards to COVID safety..       Action Taken: Message routed to:  [facility]    Travel Screening: Not Applicable

## 2023-09-20 NOTE — TELEPHONE ENCOUNTER
Spoke with pt regarding concerns about endoFLIP and gastroparesis questions. Discussed with pt in depth about her questions. Pt reports she is taking lansoprazole, following gastroparesis diet. Encouraged pt to discuss her questions with Dr. Riddle on Monday.     Pt was very emotional at the end of our call due to multiple health concerns for herself and her brother.

## 2023-10-16 ENCOUNTER — TELEPHONE (OUTPATIENT)
Dept: GASTROENTEROLOGY | Facility: CLINIC | Age: 73
End: 2023-10-16
Payer: COMMERCIAL

## 2023-10-16 ENCOUNTER — TELEPHONE (OUTPATIENT)
Dept: FAMILY MEDICINE | Facility: CLINIC | Age: 73
End: 2023-10-16
Payer: COMMERCIAL

## 2023-10-16 DIAGNOSIS — K21.9 GASTROESOPHAGEAL REFLUX DISEASE WITHOUT ESOPHAGITIS: ICD-10-CM

## 2023-10-16 NOTE — TELEPHONE ENCOUNTER
Medication Question or Refill    Contacts         Type Contact Phone/Fax    10/16/2023 12:47 PM CDT Interface (Incoming) [facility], [place] - [address] [place] [address] [phone]          Pending Prescriptions:                       Disp   Refills    LANsoprazole (PREVACID) 30 MG DR capsule         *Pt states the prescription is written wrong and should only state once per day *      Preferred Pharmacy:     [facility], [place] [id number] [place] [address]  Phone: [phone] Fax: [phone]      Controlled Substance Agreement on file:   CSA -- Patient Level:    CSA: None found at the patient level.       Who prescribed the medication?: Ricki    Do you need a refill? Yes    When did you use the medication last? 10/15    Patient offered an appointment? No    Do you have any questions or concerns?  Yes: prescription not accurate, should state once per day versus t for 30 days      Could we [facility] this [facility] or would you prefer to receive a phone call?:   Patient would prefer a phone call   Okay to leave a detailed message?: Yes at Home number on file [phone] (home)

## 2023-10-16 NOTE — TELEPHONE ENCOUNTER
"I called the patient back and she wants the Prevacid reordered. Refill request is in progress.    Pt complains of bloating and distended abdomen.   She is passing gas and has a BM every other day.  BM is normal size and brown but like marbles.  She said her abdomen is uncomfortable but can't describe it as pain.  She is only able to eat rice and potatoes. This is not new.    She has appt coming up in November with 2 different GI specialists.  She is taking the Compazine prn per Dr Robison's orders and it helps she said.    She will  call her GI specialist now to see if she can do something for comfort until her GI appointments.      She will go to ER if abdominal pain, not passing gas or bloody stools    I offered her an appt with Dr Robison but she declined.    Pt will send glucose readings to her My Chart.  Pt will discuss with GI \"if she should have surgery\" .  "

## 2023-10-16 NOTE — TELEPHONE ENCOUNTER
[facility] Call Center    Phone Message    May a detailed message be left on voicemail: yes     Reason for Call: Other: Has questions about gastroparesis treatments. She is extremely bloated and wondering if he needs any labs or ultrasounds before her upcoming visit. Has questions about the urgency that all these surgeries are needed since COVID is back on the rise. Please call back or mychart     Action Taken: Message routed to:  [facility] abel kent    Travel Screening: Not Applicable

## 2023-10-17 RX ORDER — LANSOPRAZOLE 30 MG/1
30 CAPSULE, DELAYED RELEASE ORAL
Qty: 90 CAPSULE | Refills: 3 | Status: SHIPPED | OUTPATIENT
Start: 2023-10-17 | End: 2024-10-09

## 2023-10-26 NOTE — TELEPHONE ENCOUNTER
Left message for pt that provider still recommending keeping procedure as scheduled. Provided the writer's phone for pt to call back with any further questions or concerns.

## 2023-11-06 ENCOUNTER — VIRTUAL VISIT (OUTPATIENT)
Dept: GASTROENTEROLOGY | Facility: CLINIC | Age: 73
End: 2023-11-06
Payer: COMMERCIAL

## 2023-11-06 DIAGNOSIS — K31.84 GASTROPARESIS: ICD-10-CM

## 2023-11-06 DIAGNOSIS — R80.9 TYPE 2 DIABETES MELLITUS WITH MICROALBUMINURIA, WITHOUT LONG-TERM CURRENT USE OF INSULIN (H): ICD-10-CM

## 2023-11-06 DIAGNOSIS — E11.29 TYPE 2 DIABETES MELLITUS WITH MICROALBUMINURIA, WITHOUT LONG-TERM CURRENT USE OF INSULIN (H): ICD-10-CM

## 2023-11-06 DIAGNOSIS — K63.8219 SMALL INTESTINAL BACTERIAL OVERGROWTH (SIBO): ICD-10-CM

## 2023-11-06 DIAGNOSIS — R13.19 ESOPHAGEAL DYSPHAGIA: ICD-10-CM

## 2023-11-06 DIAGNOSIS — F41.1 GAD (GENERALIZED ANXIETY DISORDER): ICD-10-CM

## 2023-11-06 DIAGNOSIS — M79.7 FIBROMYALGIA: Primary | ICD-10-CM

## 2023-11-06 PROCEDURE — 99214 OFFICE O/P EST MOD 30 MIN: CPT | Mod: 95 | Performed by: INTERNAL MEDICINE

## 2023-11-06 NOTE — PROGRESS NOTES
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 tab in am, 1 tab in pm, then 1 tab midday prn and 1/2 tab throughout the day if needed      amphetamine-dextroamphetamine (ADDERALL XR) 20 MG per capsule Take 20 mg by mouth daily       aspirin 81 MG EC tablet Take 81 mg by mouth daily  (Patient not taking: Reported on 6/15/2023)      atorvastatin (LIPITOR) 80 MG tablet Take 1 tablet (80 mg) by mouth daily 90 tablet 3    blood glucose (NO BRAND SPECIFIED) lancets standard Use to test blood sugar 3 times daily or as directed. 100 each 1    blood glucose (NO BRAND SPECIFIED) test strip Use to test blood sugar 1 times daily or as directed. To accompany: Blood Glucose Monitor Brands: per insurance. 100 strip 6    blood glucose calibration (NO BRAND SPECIFIED) solution To accompany: Blood Glucose Monitor Brands: per insurance. 1 each 1    blood glucose monitoring (NO BRAND SPECIFIED) meter device kit Use to test blood sugar 1 times daily or as directed. Preferred blood glucose meter OR supplies to accompany: Blood Glucose Monitor Brands: per insurance. 1 kit 0    CONTOUR NEXT TEST test strip USE TO TEST BLOOD SUGAR THREE TIMES A   strip 0    DoxePIN (SINEQUAN) 75 MG capsule Take 75 mg by mouth At Bedtime       escitalopram (LEXAPRO) 20 MG tablet Take 20 mg by mouth daily      fluocinonide (LIDEX) 0.05 % external solution Apply to itchy areas scalp BID x 1-2 weeks PRN. Need follow up appointment in Derm. 50 mL 0    insulin glargine (LANTUS SOLOSTAR) 100 UNIT/ML pen Inject 20 Units Subcutaneous At Bedtime 30 mL 3    LANsoprazole (PREVACID) 30 MG DR capsule Take 1 capsule (30 mg) by mouth every morning (before breakfast) 30-60 minutes before a meal. 90 capsule 3    lisinopril (ZESTRIL) 5 MG tablet Take 1 tablet (5 mg) by mouth daily 90 tablet 3    metoprolol succinate ER (TOPROL XL) 25 MG 24 hr tablet Take 1 tablet (25 mg) by mouth daily 90 tablet 3    Microlet Lancets MISC USE TO TEST BLOOD SUGAR 3 TIMES DAILY OR AS DIRECTED. 100 each 0    mupirocin (BACTROBAN) 2 % external ointment Apply to open areas on the scalp BID until healed. Need follow up appointment in Derm. 30 g 0    nitroGLYcerin (NITROSTAT) 0.4 MG sublingual tablet Place 1 tablet (0.4 mg) under the tongue every 5 minutes as needed for chest pain 25 tablet 1    nystatin (MYCOSTATIN) 195768 UNIT/GM external cream Apply topically 2 times daily 30 g 11    prochlorperazine (COMPAZINE) 5 MG tablet Take 1 tablet (5 mg) by mouth every 8 hours as needed for nausea or vomiting 30 tablet 1    ULTICARE MICRO 32G X 4 MM insulin pen needle USE 1 PEN NEEDLE DAILY OR AS DIRECTED. 100 each 1    Wheat Dextrin (BENEFIBER PO) Powder, every other night       No facility-administered encounter medications on file as of 11/6/2023.        Allergies   Allergen Reactions    Hydrocodone Anaphylaxis    Flexeril [Cyclobenzaprine] Rash    Cipro [Ciprofloxacin] Other (See Comments)     Abd pain , proteinuria , microscopic hematuria  Possibly related to cipro    Codeine Camsylate Nausea    Influenza Virus Vaccine      Large lumpy, itchy welts on torso and face after a flu shot 10 to 15 years ago.    Pcn  [Penicillins] Difficulty breathing    Amoxicillin Itching and Rash    Sulfa Antibiotics Itching and Rash    Tetracycline Itching and Rash        Review of systems:  A full 10 point review of systems was obtained and was negative except for the pertinent positives and negatives stated within the HPI.    Objective Findings:   Physical Exam:    Constitutional: LMP  (LMP Unknown)   General: Alert, cooperative, no distress, well-appearing  Head: Atraumatic, normocephalic, no obvious abnormalities   Eyes: EOMI, Sclera anicteric, no obvious conjunctival hemorrhage   Nose: Nares normal, no obvious malformation, no obvious rhinorrhea   Respiratory: normal appearing, no SOB  Musculoskeletal: Range of motion intact, no obvious strength deficit  Skin: No jaundice, no obvious rash  Neurologic: AAOx3, no obvious neurologic abnormality  Psychiatric: Normal Affect, appropriate mood  Extremities: No obvious edema, no obvious malformation     Labs, Radiology, Pathology     Lab Results   Component Value Date    WBC 13.2 (H) 02/24/2023    WBC 8.0 03/15/2022    WBC 7.7 09/18/2019    HGB 14.0 02/24/2023    HGB 15.0 03/15/2022    HGB 10.0 (L) 09/18/2019     02/24/2023     03/15/2022     09/18/2019    CHOL 150 03/22/2023    CHOL 150 03/15/2022    CHOL 158 04/13/2021    TRIG 145 03/22/2023    TRIG 123 03/15/2022    TRIG 219 (H) 04/13/2021    HDL 58 03/22/2023    HDL 53 03/15/2022    HDL 55 04/13/2021    ALT 18 02/24/2023    ALT 53 (H) 09/18/2019    ALT 31 01/14/2019    AST 25 02/24/2023    AST 41 09/18/2019    AST 29 01/14/2019     02/24/2023     03/15/2022     04/13/2021    BUN 17.3 02/24/2023    BUN 10 03/15/2022    BUN 11 04/13/2021    CO2 21 (L) 02/24/2023    CO2 31 03/15/2022    CO2 27 04/13/2021    TSH 2.35 01/28/2020    TSH 2.13 09/18/2017    TSH 1.90 03/03/2017    INR 0.94 05/09/2014    INR 0.95 03/15/2011    INR 1.03 07/11/2006        Liver Function Studies -   Recent Labs   Lab Test  02/24/23  1142   PROTTOTAL 6.4   ALBUMIN 4.0   BILITOTAL 0.8   ALKPHOS 94   AST 25   ALT 18          Patient Active Problem List    Diagnosis Date Noted    Esophageal dysphagia 11/06/2023     Priority: Medium    Small intestinal bacterial overgrowth (SIBO) 11/06/2023     Priority: Medium    Gastroparesis 06/14/2023     Priority: Medium    Fibromyalgia 08/16/2022     Priority: Medium    RAVI (generalized anxiety disorder) 08/16/2022     Priority: Medium    Hip pain, right 07/07/2022     Priority: Medium    Osteopenia of both hips 03/29/2022     Priority: Medium     3/2022.  Follow-up in 3 years      Gastroesophageal reflux disease without esophagitis 01/28/2020     Priority: Medium    Chronic rhinitis 03/09/2017     Priority: Medium    Benign essential hypertension 03/09/2017     Priority: Medium     Resting HR 50s      Irritable bowel syndrome with diarrhea 03/09/2017     Priority: Medium     Dicyclomine helped but caused dizziness.      Type 2 diabetes mellitus with microalbuminuria, without long-term current use of insulin (H) 03/09/2017     Priority: Medium     Diarrhea on metformin, stopped 6/2019.  Recurrent vaginal infections on Jardiance; trulicity stopped 6/2023 due to gastroparesis seen on gastric emptying study      Colon polyp 05/21/2015     Priority: Medium     Tubular adenoma polyp      Advanced directives, counseling/discussion 01/14/2013     Priority: Medium     Patient states has Advance Directive and will bring in a copy to clinic. 1/14/2013         Diverticulosis 06/14/2012     Priority: Medium    Coronary artery disease involving native coronary artery of native heart without angina pectoris 03/21/2011     Priority: Medium     Two right coronary stents 2011  She has a prior history of CAD with STEMI in 2011 with PCI to the RCA, since then has had another angiogram in 2015 which has shown patent RCA stents and otherwise minimal disease elsewhere. Her EF and stress tests since then have been  negative        HPV (human papilloma virus) anogenital infection 02/22/2011     Priority: Medium     Perianal condyloma  Biopsy done 2011  FINAL DIAGNOSIS:  Skin, perianal, lesion, excision:  - High grade squamous intraepithelial lesion (moderate to severe  dysplasia) (see comment)    COMMENT:  The lesion is arising on top of condyloma. The base of the polypoid  lesion appears free of dysplasia in the planes examined. Follow up is  recommended as clinically deemed appropriate.      Hyperlipidemia LDL goal <70 10/31/2010     Priority: Medium    Menopausal syndrome (hot flashes) 05/18/2010     Priority: Medium     Wants to take premarin  Understands breast cancer risk      Anal fissure 01/03/2007     Priority: Medium    Moderate episode of recurrent major depressive disorder (H) 09/28/2006     Priority: Medium     Follows with Psychiatry Dorian Prince.  Rx for Alprazolam 1 mg #120/month and Adderall 20 mg daily      Seasonal allergies 09/28/2006     Priority: Medium     seasonal  Problem list name updated by automated process. Provider to review      Attention deficit disorder 05/17/2005     Priority: Medium     Treated by psychiatry        NAFL (nonalcoholic fatty liver) 05/17/2005     Priority: Medium     fatty liver      History of diverticulitis 12/10/2000     Priority: Medium    Abdominal adhesions 01/01/1990     Priority: Medium      Assessment and Plan   Assessment:    Ml Evans is a 73 year old female with  medical history pertinent for fibromyalgia, ADD, anxiety, HTN, HLD, CAD with STEMI in 2011 with PCI to the RCA, DM II insulin dependent, GERD, history of diverticulitis who is presenting as a follow up patient with multiple GI concerns.      Th patient is currently doing well from a GI standpoint. She no longer is having significant nausea, vomiting, reflux. She states that she never had significant dysphagia. She is scheduled for EGD with endoflip based on manometric findings of possible  ANGIE. I counseled the patient to seek  with the dietitian regarding supplements as this was one of her concerns.     Will look into rifaximin for the patient for bacterial overgrowth. Patient has concerns over antibiotics due to multiple allergies.     The patient spent most of the encounter discussing her fibromyalgia and other medical issues.     1. Fibromyalgia    2. Esophageal dysphagia    3. Small intestinal bacterial overgrowth (SIBO)    4. Gastroparesis    5. RAVI (generalized anxiety disorder)    6. Type 2 diabetes mellitus with microalbuminuria, without long-term current use of insulin (H)       No orders of the defined types were placed in this encounter.    Plan:    Please follow up with dietitian to discuss supplements.   Continue with gastroparesis diet  Follow up with scheduled procedures to evaluate manometry findings  Follow up plan:   Return to clinic 6 months and as needed.    The risks and benefits of my recommendations, as well as other treatment options were discussed with the patient and any available family today. All questions were answered.     Follow up: As planned above. Today, I personally spent 35 minutes in direct face to face time with the patient, of which greater than 50% of the time was spent in patient education and counseling as described above. Approximately 5 minutes were spent on indirect care associated with the patient's consultation including but not limited to review of: patient medical records to date, clinic visits, hospital records, lab results, imaging studies, procedural documentation, and coordinating care with other providers. The findings from this review are summarized in the above note. All of the above accounted for a cumulative time of 40 minutes and was performed on the date of service.     The patient verbalized understanding of the plan and was appreciative for the time spent and information provided during the office visit.     Author:   Monico Riddle,  DO   of Medicine  Director, Esophageal Disorders Program  Division of Gastroenterology, Hepatology, and Nutrition  [facility]    Dr. Riddle speaks for [employer] regarding dupilumab and has participated in advisory boards for the medication. He receives income for these activities. When discussing the medication, patients were informed of Dr. Riddle's role and potential conflict of interest. All questions and/or concerns were answered during the encounter.     Documentation assisted by voice recognition and documentation system.

## 2023-11-06 NOTE — LETTER
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 injection/bethel as directed. 100 each 3    Alcohol Swabs (B-D SINGLE USE SWABS REGULAR) PADS USE TO SWAB AREA OF INJECTION/BETHEL AS DIRECTED. 100 each 3    ALPRAZolam (XANAX PO) Take 0.5-1 mg by mouth 4 times daily as needed 1/2 tab in am, 1 tab in pm, then 1 tab midday prn and 1/2 tab throughout the day if needed      amphetamine-dextroamphetamine (ADDERALL XR) 20 MG per capsule Take 20 mg by mouth daily       aspirin 81 MG EC tablet Take 81 mg by mouth daily  (Patient not taking: Reported on 6/15/2023)      atorvastatin (LIPITOR) 80 MG tablet Take 1 tablet (80 mg) by mouth daily 90 tablet 3    blood glucose (NO BRAND SPECIFIED) lancets standard Use to test blood sugar 3 times daily or as directed. 100 each 1    blood glucose (NO BRAND SPECIFIED) test strip Use to test blood sugar 1 times daily or as directed. To accompany: Blood Glucose Monitor Brands: per insurance. 100 strip 6    blood glucose calibration (NO BRAND SPECIFIED) solution To accompany: Blood Glucose Monitor Brands: per insurance. 1 each 1    blood glucose monitoring (NO  BRAND SPECIFIED) meter device kit Use to test blood sugar 1 times daily or as directed. Preferred blood glucose meter OR supplies to accompany: Blood Glucose Monitor Brands: per insurance. 1 kit 0    CONTOUR NEXT TEST test strip USE TO TEST BLOOD SUGAR THREE TIMES A  strip 0    DoxePIN (SINEQUAN) 75 MG capsule Take 75 mg by mouth At Bedtime       escitalopram (LEXAPRO) 20 MG tablet Take 20 mg by mouth daily      fluocinonide (LIDEX) 0.05 % external solution Apply to itchy areas scalp BID x 1-2 weeks PRN. Need follow up appointment in Derm. 50 mL 0    insulin glargine (LANTUS SOLOSTAR) 100 UNIT/ML pen Inject 20 Units Subcutaneous At Bedtime 30 mL 3    LANsoprazole (PREVACID) 30 MG DR capsule Take 1 capsule (30 mg) by mouth every morning (before breakfast) 30-60 minutes before a meal. 90 capsule 3    lisinopril (ZESTRIL) 5 MG tablet Take 1 tablet (5 mg) by mouth daily 90 tablet 3    metoprolol succinate ER (TOPROL XL) 25 MG 24 hr tablet Take 1 tablet (25 mg) by mouth daily 90 tablet 3    Microlet Lancets MISC USE TO TEST BLOOD SUGAR 3 TIMES DAILY OR AS DIRECTED. 100 each 0    mupirocin (BACTROBAN) 2 % external ointment Apply to open areas on the scalp BID until healed. Need follow up appointment in Derm. 30 g 0    nitroGLYcerin (NITROSTAT) 0.4 MG sublingual tablet Place 1 tablet (0.4 mg) under the tongue every 5 minutes as needed for chest pain 25 tablet 1    nystatin (MYCOSTATIN) 722373 UNIT/GM external cream Apply topically 2 times daily 30 g 11    prochlorperazine (COMPAZINE) 5 MG tablet Take 1 tablet (5 mg) by mouth every 8 hours as needed for nausea or vomiting 30 tablet 1    ULTICARE MICRO 32G X 4 MM insulin pen needle USE 1 PEN NEEDLE DAILY OR AS DIRECTED. 100 each 1    Wheat Dextrin (BENEFIBER PO) Powder, every other night       No facility-administered encounter medications on file as of 11/6/2023.        Allergies   Allergen Reactions    Hydrocodone Anaphylaxis    Flexeril [Cyclobenzaprine] Rash     Cipro [Ciprofloxacin] Other (See Comments)     Abd pain , proteinuria , microscopic hematuria  Possibly related to cipro    Codeine Camsylate Nausea    Influenza Virus Vaccine      Large lumpy, itchy welts on torso and face after a flu shot 10 to 15 years ago.    Pcn [Penicillins] Difficulty breathing    Amoxicillin Itching and Rash    Sulfa Antibiotics Itching and Rash    Tetracycline Itching and Rash        Review of systems:  A full 10 point review of systems was obtained and was negative except for the pertinent positives and negatives stated within the HPI.    Objective Findings:   Physical Exam:    Constitutional: LMP  (LMP Unknown)   General: Alert, cooperative, no distress, well-appearing  Head: Atraumatic, normocephalic, no obvious abnormalities   Eyes: EOMI, Sclera anicteric, no obvious conjunctival hemorrhage   Nose: Nares normal, no obvious malformation, no obvious rhinorrhea   Respiratory: normal appearing, no SOB  Musculoskeletal: Range of motion intact, no obvious strength deficit  Skin: No jaundice, no obvious rash  Neurologic: AAOx3, no obvious neurologic abnormality  Psychiatric: Normal Affect, appropriate mood  Extremities: No obvious edema, no obvious malformation     Labs, Radiology, Pathology     Lab Results   Component Value Date    WBC 13.2 (H) 02/24/2023    WBC 8.0 03/15/2022    WBC 7.7 09/18/2019    HGB 14.0 02/24/2023    HGB 15.0 03/15/2022    HGB 10.0 (L) 09/18/2019     02/24/2023     03/15/2022     09/18/2019    CHOL 150 03/22/2023    CHOL 150 03/15/2022    CHOL 158 04/13/2021    TRIG 145 03/22/2023    TRIG 123 03/15/2022    TRIG 219 (H) 04/13/2021    HDL 58 03/22/2023    HDL 53 03/15/2022    HDL 55 04/13/2021    ALT 18 02/24/2023    ALT 53 (H) 09/18/2019    ALT 31 01/14/2019    AST 25 02/24/2023    AST 41 09/18/2019    AST 29 01/14/2019     02/24/2023     03/15/2022     04/13/2021    BUN 17.3 02/24/2023    BUN 10 03/15/2022    BUN 11 04/13/2021     CO2 21 (L) 02/24/2023    CO2 31 03/15/2022    CO2 27 04/13/2021    TSH 2.35 01/28/2020    TSH 2.13 09/18/2017    TSH 1.90 03/03/2017    INR 0.94 05/09/2014    INR 0.95 03/15/2011    INR 1.03 07/11/2006        Liver Function Studies -   Recent Labs   Lab Test 02/24/23  1142   PROTTOTAL 6.4   ALBUMIN 4.0   BILITOTAL 0.8   ALKPHOS 94   AST 25   ALT 18          Patient Active Problem List    Diagnosis Date Noted    Esophageal dysphagia 11/06/2023     Priority: Medium    Small intestinal bacterial overgrowth (SIBO) 11/06/2023     Priority: Medium    Gastroparesis 06/14/2023     Priority: Medium    Fibromyalgia 08/16/2022     Priority: Medium    RAVI (generalized anxiety disorder) 08/16/2022     Priority: Medium    Hip pain, right 07/07/2022     Priority: Medium    Osteopenia of both hips 03/29/2022     Priority: Medium     3/2022.  Follow-up in 3 years      Gastroesophageal reflux disease without esophagitis 01/28/2020     Priority: Medium    Chronic rhinitis 03/09/2017     Priority: Medium    Benign essential hypertension 03/09/2017     Priority: Medium     Resting HR 50s      Irritable bowel syndrome with diarrhea 03/09/2017     Priority: Medium     Dicyclomine helped but caused dizziness.      Type 2 diabetes mellitus with microalbuminuria, without long-term current use of insulin (H) 03/09/2017     Priority: Medium     Diarrhea on metformin, stopped 6/2019.  Recurrent vaginal infections on Jardiance; trulicity stopped 6/2023 due to gastroparesis seen on gastric emptying study      Colon polyp 05/21/2015     Priority: Medium     Tubular adenoma polyp      Advanced directives, counseling/discussion 01/14/2013     Priority: Medium     Patient states has Advance Directive and will bring in a copy to clinic. 1/14/2013         Diverticulosis 06/14/2012     Priority: Medium    Coronary artery disease involving native coronary artery of native heart without angina pectoris 03/21/2011     Priority: Medium     Two right  coronary stents 2011  She has a prior history of CAD with STEMI in 2011 with PCI to the RCA, since then has had another angiogram in 2015 which has shown patent RCA stents and otherwise minimal disease elsewhere. Her EF and stress tests since then have been negative        HPV (human papilloma virus) anogenital infection 02/22/2011     Priority: Medium     Perianal condyloma  Biopsy done 2011  FINAL DIAGNOSIS:  Skin, perianal, lesion, excision:  - High grade squamous intraepithelial lesion (moderate to severe  dysplasia) (see comment)    COMMENT:  The lesion is arising on top of condyloma. The base of the polypoid  lesion appears free of dysplasia in the planes examined. Follow up is  recommended as clinically deemed appropriate.      Hyperlipidemia LDL goal <70 10/31/2010     Priority: Medium    Menopausal syndrome (hot flashes) 05/18/2010     Priority: Medium     Wants to take premarin  Understands breast cancer risk      Anal fissure 01/03/2007     Priority: Medium    Moderate episode of recurrent major depressive disorder (H) 09/28/2006     Priority: Medium     Follows with Psychiatry Dorian Prince.  Rx for Alprazolam 1 mg #120/month and Adderall 20 mg daily      Seasonal allergies 09/28/2006     Priority: Medium     seasonal  Problem list name updated by automated process. Provider to review      Attention deficit disorder 05/17/2005     Priority: Medium     Treated by psychiatry        NAFL (nonalcoholic fatty liver) 05/17/2005     Priority: Medium     fatty liver      History of diverticulitis 12/10/2000     Priority: Medium    Abdominal adhesions 01/01/1990     Priority: Medium      Assessment and Plan   Assessment:    Ml Evans is a 73 year old female with  medical history pertinent for fibromyalgia, ADD, anxiety, HTN, HLD, CAD with STEMI in 2011 with PCI to the RCA, DM II insulin dependent, GERD, history of diverticulitis who is presenting as a follow up patient with multiple GI concerns.       Th patient is currently doing well from a GI standpoint. She no longer is having significant nausea, vomiting, reflux. She states that she never had significant dysphagia. She is scheduled for EGD with endoflip based on manometric findings of possible EGJOO. I counseled the patient to seek  with the dietitian regarding supplements as this was one of her concerns.     Will look into rifaximin for the patient for bacterial overgrowth. Patient has concerns over antibiotics due to multiple allergies.     The patient spent most of the encounter discussing her fibromyalgia and other medical issues.     1. Fibromyalgia    2. Esophageal dysphagia    3. Small intestinal bacterial overgrowth (SIBO)    4. Gastroparesis    5. RAVI (generalized anxiety disorder)    6. Type 2 diabetes mellitus with microalbuminuria, without long-term current use of insulin (H)       No orders of the defined types were placed in this encounter.    Plan:    Please follow up with dietitian to discuss supplements.   Continue with gastroparesis diet  Follow up with scheduled procedures to evaluate manometry findings  Follow up plan:   Return to clinic 6 months and as needed.    The risks and benefits of my recommendations, as well as other treatment options were discussed with the patient and any available family today. All questions were answered.     Follow up: As planned above. Today, I personally spent 35 minutes in direct face to face time with the patient, of which greater than 50% of the time was spent in patient education and counseling as described above. Approximately 5 minutes were spent on indirect care associated with the patient's consultation including but not limited to review of: patient medical records to date, clinic visits, hospital records, lab results, imaging studies, procedural documentation, and coordinating care with other providers. The findings from this review are summarized in the above note. All of the above  accounted for a cumulative time of 40 minutes and was performed on the date of service.     The patient verbalized understanding of the plan and was appreciative for the time spent and information provided during the office visit.     Author:   Monico Riddle DO   of Medicine  Director, Esophageal Disorders Program  Division of Gastroenterology, Hepatology, and Nutrition  [facility]    Dr. Riddle speaks for [employer] regarding dupilumab and has participated in advisory boards for the medication. He receives income for these activities. When discussing the medication, patients were informed of Dr. Riddle's role and potential conflict of interest. All questions and/or concerns were answered during the encounter.     Documentation assisted by voice recognition and documentation system.      Again, thank you for allowing me to participate in the care of your patient.      Sincerely,    Monico Riddle DO

## 2023-11-06 NOTE — PATIENT INSTRUCTIONS
It was a pleasure taking care of you today.  I've included a brief summary of our discussion and care plan from today's visit below.  Please review this information with your primary care provider.  _______________________________________________________________________    My recommendations are summarized as follows:    Please follow up with dietitian to discuss supplements.   Continue with gastroparesis diet  Follow up with scheduled procedures to evaluate manometry findings. Please try to be on a clear liquid diet for the 24 hours prior to your endoscopy.     To schedule endoscopic procedures you may call: [phone]  To schedule radiology tests you may call: [phone]  To schedule an ENT appointment you may call: [phone]    Please call my nurse care coordinator Moni ([phone]) or Crystal ([phone]), with any questions or concerns.  If you were seen through the [facility] please feel free to reach out to Lorraine at [phone]   --  Return to GI Clinic in 6 months to review your progress.    _______________________________________________________________________    Who do I call with any questions after my visit?  Please be in touch if there are any further questions that arise following today's visit.  There are multiple ways to contact your gastroenterology care team.      During business hours, you may reach a Gastroenterology nurse at [phone] and choose option 3.       To schedule or reschedule an appointment, please call [phone].     You can always send a secure message through [employer].  [employer] messages are answered by your nurse or doctor typically within 24 hours.  Please allow extra time on weekends and holidays.      For urgent/emergent questions after business hours, you may reach the on-call GI Fellow by contacting the [facility]  at [phone].     How will I get the results of any tests ordered?    You will receive all of your results.  If you  have signed up for [employer], any tests ordered at your visit will be available to you after your physician reviews them.  Typically this takes 1-2 weeks.  If there are urgent results that require a change in your care plan, your physician or nurse will call you to discuss the next steps.      What is [employer]?  [employer] is a secure way for you to access all of your healthcare records from the [facility].  It is a web based computer program, so you can sign on to it from any location.  It also allows you to send secure messages to your care team.  I recommend signing up for [employer] access if you have not already done so and are comfortable with using a computer.      How to I schedule a follow-up visit?  If you did not schedule a follow-up visit today, please call [phone] to schedule a follow-up office visit.      If you feel you received exceptional care and are interested in supporting the clinical and research goals of Dr. Riddle or the Division of Gastroenterology, Hepatology, and Nutrition please contact him directly through [employer]  to discuss opportunities to donate.    Sincerely,    Monico Riddle DO   of Medicine  Director, Esophageal Disorders Program  Division of Gastroenterology, Hepatology, and Nutrition  [facility]

## 2023-11-06 NOTE — NURSING NOTE
Is the patient currently in the state of [place]? YES    Visit mode:VIDEO    If the visit is dropped, the patient can be reconnected by: VIDEO VISIT: Send to e-mail at: [email or web address]    Will anyone else be joining the visit? YES: How would they like to receive their invitation? Text to cell phone:  present  (If patient encounters technical issues they should call [phone])    How would you like to obtain your AVS? MyChart    Are changes needed to the allergy or medication list? No    Reason for visit: STEVE MORALES

## 2023-11-08 ENCOUNTER — TELEPHONE (OUTPATIENT)
Dept: GASTROENTEROLOGY | Facility: CLINIC | Age: 73
End: 2023-11-08
Payer: COMMERCIAL

## 2023-11-08 NOTE — TELEPHONE ENCOUNTER
LVM and sent MyC regarding the following:     RET ESO, 6 mo follow-up ( around 05/06/2024 ) with ESO ROXIE  Left CC number

## 2023-11-09 ENCOUNTER — PATIENT OUTREACH (OUTPATIENT)
Dept: GASTROENTEROLOGY | Facility: CLINIC | Age: 73
End: 2023-11-09
Payer: COMMERCIAL

## 2023-11-09 ENCOUNTER — TELEPHONE (OUTPATIENT)
Dept: GASTROENTEROLOGY | Facility: CLINIC | Age: 73
End: 2023-11-09
Payer: COMMERCIAL

## 2023-11-09 ENCOUNTER — PATIENT OUTREACH (OUTPATIENT)
Dept: CARE COORDINATION | Facility: CLINIC | Age: 73
End: 2023-11-09
Payer: COMMERCIAL

## 2023-11-09 NOTE — TELEPHONE ENCOUNTER
[facility] Call Center    Phone Message    May a detailed message be left on voicemail: yes     Reason for Call: The patient called with many questions regarding her 11/28/23 procedure, and also questions regarding previous testing she's had done. She is wondering if the upcoming procedure is necessary or can be pushed out? Please contact patient. Thank you.    Action Taken: Message routed to:  [facility] ([facility] GI    Travel Screening: Not Applicable

## 2023-11-09 NOTE — TELEPHONE ENCOUNTER
Pre visit planning completed.      Procedure details:    Patient scheduled for Upper endoscopy (EGD) with BRAVO capsule placement on 11/28/2023.     Arrival time: 0915. Procedure time 1045    Pre op exam needed? N/A    Facility location: [facility]; [address], [address], [place] [address]    Sedation type: MAC    Indication for procedure: Esophageal dysphagia [R13.19]       Chart review:     Electronic implanted devices? No    Recent diagnosis of diverticulitis within the last 6 weeks? N/A    Diabetic? Yes. See medication holding recommendations     Diabetic medication HOLDING recommendations: (if applicable)  Oral diabetic medications: No  Diabetic injectables: No  Insulin: Yes. Consult with managing provider       Medication review:    Anticoagulants? No    NSAIDS? No NSAID medications per patient's medication list.  RN will verify with pre-assessment call.    Other medication HOLDING recommendations:  N/A      Prep for procedure:     Bowel prep recommendation: N/A     Prep instructions sent via [facility]anch[facility] RN  Endoscopy Procedure Pre Assessment RN  [phone] option 4

## 2023-11-09 NOTE — TELEPHONE ENCOUNTER
Called pt to discuss clinic consultation with Dr Wheeler on 11/29. Pt currently followed by Dr Riddle, plan for Endoflip procedure on 11/28. Will offer pt clinic consultation one week later on 12/6 if she prefers d/t having the procedure the day prior to clinic. Left VM.  Gastroparesis questionnaire sent via [employer] a few weeks ago, pt did not yet complete.    Iman Jones, RN, BSN,   [facility]  Care coordinator

## 2023-11-10 ENCOUNTER — TELEPHONE (OUTPATIENT)
Dept: GASTROENTEROLOGY | Facility: CLINIC | Age: 73
End: 2023-11-10
Payer: COMMERCIAL

## 2023-11-10 NOTE — TELEPHONE ENCOUNTER
"Spoke with pt regarding her gastroparesis diet and upcoming procedures. Pt reports no vomiting or GERD symptoms. Starting to regulate her BMs. Pt has questions whether the EGD with endoFLIP is necessary. Pt reports doing very well with the gastroparesis/GERD friendly diet.     Patient covered a multitude of subjects including SIBO testing to her \"brain fog\" with fibromyalgia, difficulty controlling her blood sugars, her cancer risk, what types of bacteria are in SIBO. Patient reported her GI symptoms are the one thing that is going well at this point.     Patient also is having significant stress in her life as her mother recently had a stroke and both her mother and brother are in the hospital currently.     Discussed the option of pushing out EGD with endoFLIP as she is managing her symptoms at home and with her current home life stress and issues with fibromyalgia and blood sugars to re-evaluate EGD with endoFLIP in the spring. Pt is in agreement to not proceed with EGD and endoFLIP and check in spring time 2024 for further evaluation.     Encouraged pt to keep her currently scheduled appointment with Dr. Wheeler for further discussion.    Discussion with patient was well over 40 minutes.   "

## 2023-11-10 NOTE — TELEPHONE ENCOUNTER
Caller: Narcisa  Reason for Reschedule/Cancellation (please be detailed, any staff messages or encounters to note?): Patient at this time would like to wait on procedure per inbasket message.       Prior to reschedule please review:  Ordering Provider:     Magdalena Thomas PA-C in [facility]     Sedation per order: MAC  Does patient have any ASC Exclusions, please identify?: n      Notes on Cancelled Procedure:  Procedure: Upper Endoscopy [EGD]/ENDO FLIP  Date: 11/28/2023  Location: [facility]; [address] [place] [address], [address] Floor, [place] [address]  Surgeon: Venkatesh      Rescheduled: No

## 2023-11-14 NOTE — TELEPHONE ENCOUNTER
Action 11/14/23 MMT   Action Taken  Internal referral, all records available in [facility].

## 2023-11-15 ENCOUNTER — PATIENT OUTREACH (OUTPATIENT)
Dept: GASTROENTEROLOGY | Facility: CLINIC | Age: 73
End: 2023-11-15
Payer: COMMERCIAL

## 2023-11-15 ENCOUNTER — PRE VISIT (OUTPATIENT)
Dept: GASTROENTEROLOGY | Facility: CLINIC | Age: 73
End: 2023-11-15
Payer: COMMERCIAL

## 2023-11-15 NOTE — TELEPHONE ENCOUNTER
Called pt as follow up to no show to clinic with Dr Wheeler, left . Offered reschedule, provided GI clinic scheduling line to reschedule. Update sent to referring provider, Magdalena Jones, RN, BSN,   [facility]  Care coordinator

## 2023-11-28 ENCOUNTER — TELEPHONE (OUTPATIENT)
Dept: GASTROENTEROLOGY | Facility: CLINIC | Age: 73
End: 2023-11-28
Payer: COMMERCIAL

## 2023-11-28 NOTE — TELEPHONE ENCOUNTER
Contacted pt to schedule appointment with Dr. Brown Lvmx1, sent myc        *Please call pt to schedule clinic with Dr Wheeler, next available spot. Virtual visit would be preferred if this appt is before 9am for Dr Wheeler. Referred for gastroparesis.

## 2023-11-30 ENCOUNTER — TELEPHONE (OUTPATIENT)
Dept: GASTROENTEROLOGY | Facility: CLINIC | Age: 73
End: 2023-11-30
Payer: COMMERCIAL

## 2023-12-04 NOTE — TELEPHONE ENCOUNTER
Received call from patient asking if she needs supplements with following gastroparesis diet. Also she reported does not like vegetables and certain lower fiber fruits. Called her back and got VM. Left message and also sent her a my chart message with more information.    Mag Villa, MS, RD, LD

## 2023-12-07 ENCOUNTER — PATIENT OUTREACH (OUTPATIENT)
Dept: CARE COORDINATION | Facility: CLINIC | Age: 73
End: 2023-12-07
Payer: COMMERCIAL

## 2023-12-14 ENCOUNTER — TELEPHONE (OUTPATIENT)
Dept: GASTROENTEROLOGY | Facility: CLINIC | Age: 73
End: 2023-12-14
Payer: COMMERCIAL

## 2023-12-18 ENCOUNTER — TELEPHONE (OUTPATIENT)
Dept: GASTROENTEROLOGY | Facility: CLINIC | Age: 73
End: 2023-12-18
Payer: COMMERCIAL

## 2023-12-18 DIAGNOSIS — E11.9 TYPE 2 DIABETES MELLITUS WITHOUT COMPLICATION, WITHOUT LONG-TERM CURRENT USE OF INSULIN (H): ICD-10-CM

## 2023-12-18 RX ORDER — PEN NEEDLE, DIABETIC 32GX 5/32"
NEEDLE, DISPOSABLE MISCELLANEOUS
Qty: 100 EACH | Refills: 0 | Status: SHIPPED | OUTPATIENT
Start: 2023-12-18 | End: 2024-03-25

## 2023-12-18 NOTE — TELEPHONE ENCOUNTER
Medication is being filled for 1 time refill only due to:  Patient needs to be seen because it has been more than one year since last visit. Has an appointment scheduled.   Ashley Clark RN on 12/18/2023 at 1:30 PM

## 2023-12-18 NOTE — TELEPHONE ENCOUNTER
Contacted pt to schedule appointment with Lvmx2, sent letter      *Hello C[facility],     Could you please call this pt to get her scheduled for a follow up with me. I believe I had a couple of cancellations on Weds otherwise any appt that works for her.     Thanks,   Mag

## 2023-12-21 DIAGNOSIS — E11.9 TYPE 2 DIABETES MELLITUS WITHOUT COMPLICATION, WITHOUT LONG-TERM CURRENT USE OF INSULIN (H): ICD-10-CM

## 2023-12-21 RX ORDER — PEN NEEDLE, DIABETIC 32GX 5/32"
NEEDLE, DISPOSABLE MISCELLANEOUS
Refills: 0 | OUTPATIENT
Start: 2023-12-21

## 2024-01-03 ENCOUNTER — VIRTUAL VISIT (OUTPATIENT)
Dept: GASTROENTEROLOGY | Facility: CLINIC | Age: 74
End: 2024-01-03
Payer: COMMERCIAL

## 2024-01-03 VITALS — WEIGHT: 164 LBS | BODY MASS INDEX: 32.03 KG/M2

## 2024-01-03 DIAGNOSIS — K31.84 GASTROPARESIS: ICD-10-CM

## 2024-01-03 DIAGNOSIS — E11.9 TYPE 2 DIABETES MELLITUS (H): ICD-10-CM

## 2024-01-03 DIAGNOSIS — K21.9 GASTROESOPHAGEAL REFLUX DISEASE WITHOUT ESOPHAGITIS: ICD-10-CM

## 2024-01-03 DIAGNOSIS — R14.0 BLOATING: ICD-10-CM

## 2024-01-03 DIAGNOSIS — R13.19 ESOPHAGEAL DYSPHAGIA: ICD-10-CM

## 2024-01-03 PROCEDURE — 99207 PR NO CHARGE LOS: CPT | Mod: 95 | Performed by: DIETITIAN, REGISTERED

## 2024-01-03 PROCEDURE — 97803 MED NUTRITION INDIV SUBSEQ: CPT | Mod: 95 | Performed by: DIETITIAN, REGISTERED

## 2024-01-03 ASSESSMENT — PAIN SCALES - GENERAL: PAINLEVEL: NO PAIN (0)

## 2024-01-03 NOTE — NURSING NOTE
Is the patient currently in the state of [place]? YES    Visit mode:VIDEO    If the visit is dropped, the patient can be reconnected by: VIDEO VISIT: Text to cell phone:   Telephone Information:   Mobile [phone]       Will anyone else be joining the visit? NO  (If patient encounters technical issues they should call [phone]    How would you like to obtain your AVS? MyChart    Are changes needed to the allergy or medication list? No    Reason for visit: Video Visit (Recheck)    Khadijah MORALES

## 2024-01-03 NOTE — PATIENT INSTRUCTIONS
It was nice speaking with you today. Below are the nutrition recommendations we discussed at your visit.    Please let me know if you have any additional questions.    Nutrition Recommendations    Continue eating multiple smaller meals per day such as 4-6 smaller meals/smaller meals and snacks per day that are lower in fiber and lower in fat.     2. Include some lean/lowfat protein along with each meal and re-try some very soft cooked vegetables.    3. Re-try some lower fiber and lower fats foods that you have been avoiding.     Here are some ideas we discussed today of foods to re-try and reincorporate into your diet as tolerated:  --eggs (okay to add some lowfat dhillon to them).  --salmon (baked or broiled/lower fat cooking method recommended)  --tuna (okay to add some lowfat dhillon to it)  --okay for some creamy nut butters (such as creamy almond butter or creamy peanut butter.)  --low fat cottage cheese (is a good source of protein. We didn't discuss this at our visit but wanted to include since it is a softer source of protein).  --soft cooked vegetables such as mashed cauliflower or cooked carrots  --can have some canned fruit in its own juice such as canned peaches or canned pears and unsweetened applesauce.   --can peel and slice an apple and cook it so it is very well cooked/soft cooked. (Can try this with fresh peaches in the summer. Peels them, slice and cook well).  --can have sugar free pudding    4. Can have a nutrition/protein drink as a small meal/snack if better tolerated and/or instead of skipping a meal. These drinks count towards those 4-6 smaller meals.  -some examples include using a protein powder of your choice and mixing with water/skim or lowfat milk or lowfat milk substitute, or Premier protein, Ensure max, Ensure high protein, Boost high protein max or Boost glucose control. If you'd like a plant based protein drink, then some examples are Ripple protein drinks, Orgain Vegan drinks.    5.  Continue to chew food very well before swallowing (to more of mashed potato consistency or applesauce consistency before swallowing).    6. Drink at least 6-8 cups (48 oz-64 oz) per day (and continue to drink most of your fluids in between meals as you are currently doing).    Follow up in 4 months.    If you would like to schedule a follow up appointment with Mag Villa, Registered Dietitian, please call [phone].    Thank you,    Mag Villa, MS, RD, LD          .

## 2024-01-03 NOTE — LETTER
"    1/3/2024         RE: Ml Evans  [id number] [facility] [address]        Dear Colleague,    Thank you for referring your patient, Ml Evans, to the [facility] [place]. Please see a copy of my visit note below.    [facility] Outpatient Medical Nutrition Therapy      Time Spent:  65 minutes  Session Type:  follow up  Referring Physician:  Monico Riddle DO  Reason for RD Visit:   Gastroparesis, type 2 diabetes, esophageal dysphagia     Nutrition Assessment:  Patient is a 73 year old female with history that includes type 2 diabetes, dysphagia, regurgitation of food, and recent GES showed significant delayed gastric emptying (gastroparesis), small bowel obstruction, coronary artery disease, fibromyalgia, ADD, diverticulosis and diverticulitis, RAVI, GERD.     At initial visit, she stated that 6 years ago, symptoms started. Had \"bad case\" of diverticulitis and was taking care of bowels and took antibiotics and had some improvement. For the initial 3 years when she first started having symptoms, couldn't leave the house, had to wear depends and missed family events due to could not be away from the bathroom. She stated that her family was/is not very supportive of her or believe that she had symptoms. She also stated that she had a heart attack in 2011, and also has hx of fibromyalgia. Has not been craving anything that would increase her gastroparesis symptoms such as raw vegs but did crave licorice recently and heard that licorice may help. She has a lot of bloating and looks 9 months pregnant. Sometimes has a lot of belching and sometimes a lot of vomiting. She has lower left sided pain/soreness. She feels like her reflux symptoms may be a little better and not currently having issues overnight. Likes diet coke or diet 7-up but trying to decrease and at the same time increasing water intake. She feels that the diet soda sometimes helps with " swallowing. Drinks 3-4 cups water per day. She is making some changes for example sitting up after eating whereas before would lie down right away after eating. She is trying to relax with eating as well. In general she does not eat big meals and likes to eat very small amount more often and has done this most of her life. She has not been eating a lot of meats. Does not tolerate beef. Likes salmon and tolerates boiled chicken, but hasn't been eating lately. Her recent GES study showed significant delayed gastric emptying with 50% in stomach after 4 hours.     At follow up visit today 1/3/24, she stated that her stomach issues have improved a lot with following a gastroparesis diet. She is now having regular and formed BMs. She is no longer having a lot of stomachaches or vomiting. She will have some nausea but feels this might be more related to when she is hungry and hasn't eaten. She is concerned about her blood sugar levels and has pain related to her fibromyalgia. She has been eating the same foods at meals pretty consistently every day which she feels is helpful, but is not eating fruits or vegetables, a little but not a lot of protein and mostly eating CHO foods. She is interested in ideas and tips for managing blood sugars, getting variety to eat more of a healthy diet in spite of eating a lower fiber, lower fat diet. She messaged with writer via my chart with some questions previously as well, wondering about supplements and getting more nutrients with limit diet. She also asked fruit and vegetables supplements (such as balance of nature and those options). In the past she tried chocolate nutrition drinks but these caused diarrhea. She tolerated vanilla ones though. Not currently drinking any. She has been using small plates at meals and also has small spoons. She has been eating slowly and chewing food very well before swallowing. She used to eat quickly and drink a lot of water to wash down food, but now  limiting the fluids with meal. She has some carbonation daily. Will take some sips and put back in the refrigerator. It takes her 3 days to get through a 12 oz diet soda (diet 7-up or diet coke). She recently has been eating 1 pc of licorice and this is tolerated and does not have a lot of sugar and is ~30 calories for piece.    Patient Active Problem List   Diagnosis    Attention deficit disorder    NAFL (nonalcoholic fatty liver)    Moderate episode of recurrent major depressive disorder (H)    Seasonal allergies    Anal fissure    Menopausal syndrome (hot flashes)    Hyperlipidemia LDL goal <70    HPV (human papilloma virus) anogenital infection    Coronary artery disease involving native coronary artery of native heart without angina pectoris    Diverticulosis    Advanced directives, counseling/discussion    Colon polyp    Chronic rhinitis    Benign essential hypertension    Irritable bowel syndrome with diarrhea    Type 2 diabetes mellitus with microalbuminuria, without long-term current use of insulin (H)    Gastroesophageal reflux disease without esophagitis    Osteopenia of both hips    History of diverticulitis    Abdominal adhesions    Hip pain, right    Fibromyalgia    RAVI (generalized anxiety disorder)    Gastroparesis    Esophageal dysphagia    Small intestinal bacterial overgrowth (SIBO)     Height:   Ht Readings from Last 1 Encounters:   07/11/23 1.524 m (5')     Weight:  Wt Readings from Last 10 Encounters:   01/03/24 74.4 kg (164 lb)   07/11/23 72.6 kg (160 lb)   06/21/23 71.7 kg (158 lb)   06/15/23 74.2 kg (163 lb 9.6 oz)   06/14/23 73.4 kg (161 lb 14.4 oz)   03/22/23 74.2 kg (163 lb 8 oz)   02/24/23 72.6 kg (160 lb)   11/07/22 72.6 kg (160 lb)   07/18/22 72.6 kg (160 lb)   06/23/22 72.6 kg (160 lb)     BMI: Estimated body mass index is 32.03 kg/m  as calculated from the following:    Height as of 7/11/23: 1.524 m (5').    Weight as of this encounter: 74.4 kg (164 lb).    Diet Recall:  (some  usual/recent meals/snacks/beverages):  eating slowly and chewing to mashed potato consistency.  Meal Food    Breakfast 11 AM: Activa yogurt (freezes it) and pack kaur doone cookies, 2 pc SF dark chocolate   Lunch 1-2 pc WW toast lightly toast with very small amount seedless plum jelly and string cheese and 2 olives    Dinner Armenian bakery bread (garlic) with earth vegan margarine spread or cheese stuffed ravioli with shredded mozzarella or rice with margarine and salt.   Snacks Small scoop of SF frozen yogurt, sometimes a pc of string cheese   Beverages 1/2 cup at most of Coffee with little bit cream, 24 oz water, has some carbonation daily such as diet 7-up, occas diet coke, (a 12 oz bottle can last 3 days).   Alcohol Intake None. Can't handle alcohol     Frequency of eating/taking out meals: not usually.     Labs:    Last Comprehensive Metabolic Panel:  Sodium   Date Value Ref Range Status   02/24/2023 142 136 - 145 mmol/L Final   04/13/2021 138 133 - 144 mmol/L Final     Potassium   Date Value Ref Range Status   02/24/2023 4.2 3.4 - 5.3 mmol/L Final     Comment:     Specimen slightly hemolyzed, potassium may be falsely elevated.   03/15/2022 4.1 3.4 - 5.3 mmol/L Final   04/13/2021 3.9 3.4 - 5.3 mmol/L Final     Chloride   Date Value Ref Range Status   02/24/2023 108 (H) 98 - 107 mmol/L Final   03/15/2022 108 94 - 109 mmol/L Final   04/13/2021 106 94 - 109 mmol/L Final     Carbon Dioxide   Date Value Ref Range Status   04/13/2021 27 20 - 32 mmol/L Final     Carbon Dioxide (CO2)   Date Value Ref Range Status   02/24/2023 21 (L) 22 - 29 mmol/L Final   03/15/2022 31 20 - 32 mmol/L Final     Anion Gap   Date Value Ref Range Status   02/24/2023 13 7 - 15 mmol/L Final   03/15/2022 3 3 - 14 mmol/L Final   04/13/2021 5 3 - 14 mmol/L Final     Glucose   Date Value Ref Range Status   02/24/2023 134 (H) 70 - 99 mg/dL Final   03/15/2022 112 (H) 70 - 99 mg/dL Final   04/13/2021 148 (H) 70 - 99 mg/dL Final     Comment:      Fasting specimen     GLUCOSE BY METER POCT   Date Value Ref Range Status   11/07/2022 76 70 - 99 mg/dL Final     Urea Nitrogen   Date Value Ref Range Status   02/24/2023 17.3 8.0 - 23.0 mg/dL Final   03/15/2022 10 7 - 30 mg/dL Final   04/13/2021 11 7 - 30 mg/dL Final     Creatinine   Date Value Ref Range Status   02/24/2023 0.73 0.51 - 0.95 mg/dL Final   04/13/2021 0.80 0.52 - 1.04 mg/dL Final     GFR Estimate   Date Value Ref Range Status   02/24/2023 87 >60 mL/min/1.73m2 Final     Comment:     eGFR calculated using 2021 CKD-EPI equation.   04/13/2021 74 >60 mL/min/[1.73_m2] Final     Comment:     Non  GFR Calc  Starting 12/18/2018, serum creatinine based estimated GFR (eGFR) will be   calculated using the Chronic Kidney Disease Epidemiology Collaboration   (CKD-EPI) equation.       Calcium   Date Value Ref Range Status   02/24/2023 8.7 (L) 8.8 - 10.2 mg/dL Final   04/13/2021 9.2 8.5 - 10.1 mg/dL Final     CBC RESULTS:   Recent Labs   Lab Test 02/24/23  1142   WBC 13.2*   RBC 4.46   HGB 14.0   HCT 42.3   MCV 95   MCH 31.4   MCHC 33.1   RDW 12.6        Pertinent Medications/vitamin and mineral supplements:    was taking a hair, skin, nail supplement as was noticing some hair loss.   Current Outpatient Medications   Medication    acetaminophen (TYLENOL) 500 MG tablet    acetylcysteine (N-ACETYL CYSTEINE) 600 MG CAPS capsule    alcohol swab prep pads    Alcohol Swabs (B-D SINGLE USE SWABS REGULAR) PADS    ALPRAZolam (XANAX PO)    amphetamine-dextroamphetamine (ADDERALL XR) 20 MG per capsule    atorvastatin (LIPITOR) 80 MG tablet    blood glucose (NO BRAND SPECIFIED) lancets standard    blood glucose (NO BRAND SPECIFIED) test strip    blood glucose calibration (NO BRAND SPECIFIED) solution    blood glucose monitoring (NO BRAND SPECIFIED) meter device kit    CONTOUR NEXT TEST test strip    DoxePIN (SINEQUAN) 75 MG capsule    escitalopram (LEXAPRO) 20 MG tablet    fluocinonide (LIDEX) 0.05 %  external solution    insulin glargine (LANTUS SOLOSTAR) 100 UNIT/ML pen    insulin pen needle (ULTICARE MICRO) 32G X 4 MM miscellaneous    LANsoprazole (PREVACID) 30 MG DR capsule    lisinopril (ZESTRIL) 5 MG tablet    metoprolol succinate ER (TOPROL XL) 25 MG 24 hr tablet    Microlet Lancets MISC    mupirocin (BACTROBAN) 2 % external ointment    nitroGLYcerin (NITROSTAT) 0.4 MG sublingual tablet    nystatin (MYCOSTATIN) 175383 UNIT/GM external cream    prochlorperazine (COMPAZINE) 5 MG tablet    Wheat Dextrin (BENEFIBER PO)    aspirin 81 MG EC tablet     No current facility-administered medications for this visit.     Food Allergies:  NKFA    MALNUTRITION:  % Weight Loss:  No significant weight loss  % Intake:  decreased intake does not meet criteria   Subcutaneous Fat Loss:  None observed  Muscle Loss:  None observed  Fluid Retention:  None noted    Malnutrition Diagnosis: Patient does not meet two of the above criteria necessary for diagnosing malnutrition  In Context of:  Chronic illness or disease    Nutrition Prescription: Nutrition Education     Nutrition Intervention      Provided diet education review for gastroparesis, dysphagia, diabetes. Answered questions about supplements and oral nutrition drink supplements. Encouraged her to aim for increasing the variety in her diet based on gastroparesis guidelines and discussed some ideas based on her food preferences.     Answered patient's questions. Patient verbalized understanding of education provided. See Goals below.     Educational Materials Provided:  NCM: gastroparesis nutrition therapy and [facility] gastroparesis handout    Goals:    Continue eating multiple smaller meals per day such as 4-6 smaller meals/smaller meals and snacks per day that are lower in fiber and lower in fat.     2. Include some lean/lowfat protein along with each meal and re-try some very soft cooked vegetables.    3. Re-try some lower fiber and lower fats foods that you have been  avoiding.     Here are some ideas we discussed today of foods to re-try and reincorporate into your diet as tolerated:  --eggs (okay to add some lowfat dhillon to them).  --salmon (baked or broiled/lower fat cooking method recommended)  --tuna (okay to add some lowfat dhillon to it)  --okay for some creamy nut butters (such as creamy almond butter or creamy peanut butter.)  --low fat cottage cheese (is a good source of protein. We didn't discuss this at our visit but wanted to include since it is a softer source of protein).  --soft cooked vegetables such as mashed cauliflower or cooked carrots  --can have some canned fruit in its own juice such as canned peaches or canned pears and unsweetened applesauce.   --can peel and slice an apple and cook it so it is very well cooked/soft cooked. (Can try this with fresh peaches in the summer. Peels them, slice and cook well).  --can have sugar free pudding    4. Can have a nutrition/protein drink as a small meal/snack if better tolerated and/or instead of skipping a meal. These drinks count towards those 4-6 smaller meals.  -some examples include using a protein powder of your choice and mixing with water/skim or lowfat milk or lowfat milk substitute, or Premier protein, Ensure max, Ensure high protein, Boost high protein max or Boost glucose control. If you'd like a plant based protein drink, then some examples are Ripple protein drinks, Orgain Vegan drinks.    5. Continue to chew food very well before swallowing (to more of mashed potato consistency or applesauce consistency before swallowing).    6. Drink at least 6-8 cups (48 oz-64 oz) per day (and continue to drink most of your fluids in between meals as you are currently doing).    Nutrition Monitoring and Evaluation: Will monitor adherence to nutrition recommendations at future RD visits.     Further Medical Nutrition Therapy:  Follow-up in 4 months    Patient was encouraged to contact RD with any further  questions.          Again, thank you for allowing me to participate in the care of your patient.      Sincerely,    Mag Villa RD

## 2024-01-09 NOTE — TELEPHONE ENCOUNTER
General Call      Reason for Call:  Pt called stating she will be sending in her glucose readings 10/16. Pt would also like a call back regarding plan of care for GI issues. Pt wants to know if she should have surgery. Wants to know what PCP plans will be.      Could we send this information to you in [employer] or would you prefer to receive a phone call?:   Patient would prefer a phone call   Okay to leave a detailed message?: Yes at Cell number on file:    Telephone Information:   Mobile [phone]        Have Your Skin Lesions Been Treated?: not been treated Is This A New Presentation, Or A Follow-Up?: Skin Lesion

## 2024-02-03 ENCOUNTER — HEALTH MAINTENANCE LETTER (OUTPATIENT)
Age: 74
End: 2024-02-03

## 2024-03-04 ENCOUNTER — TRANSFERRED RECORDS (OUTPATIENT)
Dept: HEALTH INFORMATION MANAGEMENT | Facility: CLINIC | Age: 74
End: 2024-03-04
Payer: COMMERCIAL

## 2024-03-04 LAB — RETINOPATHY: NEGATIVE

## 2024-03-05 ENCOUNTER — PATIENT OUTREACH (OUTPATIENT)
Dept: GASTROENTEROLOGY | Facility: CLINIC | Age: 74
End: 2024-03-05
Payer: COMMERCIAL

## 2024-03-05 DIAGNOSIS — R14.0 BLOATING: ICD-10-CM

## 2024-03-05 DIAGNOSIS — K31.84 GASTROPARESIS: Primary | ICD-10-CM

## 2024-03-05 NOTE — TELEPHONE ENCOUNTER
"Called PT and left a voicemail.     Called to remind patient of their upcoming appointment with our GI clinic, on 3/6/24 at 10:20am with Dr. Wheeler. This appointment is scheduled as a virtual appt. Please arrive 15 minutes early to check in for your appointment. To reschedule or cancel patient to call [phone]  Called pt as follow up to clinic and left VM. Per Dr Wheeler \"repeat gastric emptying study? She was on Trulicity when the last one was done and then went off it shortly after and maybe her symptoms are better from a gastroparesis standpoint, but it's unclear. She mainly has bloating but not nausea/vomiting\"    Provided imaging scheduling number and will send mychart with communication as well. Order placed for imaging.      Iman Jones, RN, BSN,   [facility]  Care coordinator       "

## 2024-03-06 ENCOUNTER — VIRTUAL VISIT (OUTPATIENT)
Dept: GASTROENTEROLOGY | Facility: CLINIC | Age: 74
End: 2024-03-06
Attending: PHYSICIAN ASSISTANT
Payer: COMMERCIAL

## 2024-03-06 VITALS — BODY MASS INDEX: 32.03 KG/M2 | HEIGHT: 60 IN

## 2024-03-06 DIAGNOSIS — K31.84 GASTROPARESIS: ICD-10-CM

## 2024-03-06 PROCEDURE — 99214 OFFICE O/P EST MOD 30 MIN: CPT | Mod: 95 | Performed by: INTERNAL MEDICINE

## 2024-03-06 ASSESSMENT — PAIN SCALES - GENERAL: PAINLEVEL: NO PAIN (0)
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
## 2024-03-06 NOTE — PATIENT INSTRUCTIONS
You will find a brief summary of your discussion and care plan from today's visit below.  Dr Wheeler has outlined the following steps after your recent clinic visit:      RECOMMENDATIONS:  - gastric emptying study, ordered in your chart. Please call imaging scheduling at [phone] to make this appointment.       Please call with any questions or concerns regarding your clinic visit today.     It is a pleasure being involved in your health care.     Contacts post-consultation depending on your need:     Schedule Clinic Appointments                        [phone], option 1    nAia Jones RN Care Coordinator           [phone]     Chitra Bryant OR                           [phone]     GI Procedure Scheduling                               [phone], option 2     For urgent/emergent questions after business hours, you may reach the on-call GI Fellow by contacting the [facility]  at [phone].    How to I schedule a follow-up visit?  If you did not schedule a follow-up visit today, please call [phone] option #1 to schedule a follow-up office visit.       How do I schedule labs, imaging studies, or procedures that were ordered in clinic today?      Labs: To schedule lab appointment at the [facility], use my chart or call [phone]. If you have a [facility] lab closer to home where you are regularly seen you can give them a call.      Procedures: If a colonoscopy, upper endoscopy, breath test, esophageal manometry, or pH impedence was ordered today, our endoscopy team will call you to schedule this. If you have not heard from our endoscopy team within a week, please call [phone] to schedule.      Imaging Studies: If you were scheduled for a CT scan, X-ray, MRI, ultrasound, HIDA scan or other imaging study, please call [phone] to have this scheduled.      Referral: If a referral to another specialty was ordered, expect a phone call or  follow instructions above. If you have not heard from anyone regarding your referral in a week, please call our clinic to check the status.     I recommend signing up for [facility] access if you have not already done so and are comfortable with using a computer.  This allows for online access to your lab results and also helps you communicate efficiently with the clinic should any questions arise in your care.
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
## 2024-03-06 NOTE — NURSING NOTE
Is the patient currently in the state of [place]? YES    Visit mode:VIDEO    If the visit is dropped, the patient can be reconnected by: VIDEO VISIT: Send to e-mail at: [email or web address]    Will anyone else be joining the visit? NO  (If patient encounters technical issues they should call [phone]    How would you like to obtain your AVS? MyChart    Are changes needed to the allergy or medication list? Pt stated no changes to allergies, Pt stated no med changes, and patient states she does not take Lexapro due to having a Serotonin reaction on February 22, 2024.     Reason for visit: Consult    Lali MORALES       Warm/Dry

## 2024-03-20 DIAGNOSIS — E78.5 HYPERLIPIDEMIA LDL GOAL <100: ICD-10-CM

## 2024-03-20 RX ORDER — ATORVASTATIN CALCIUM 80 MG/1
80 TABLET, FILM COATED ORAL DAILY
Qty: 90 TABLET | Refills: 0 | Status: SHIPPED | OUTPATIENT
Start: 2024-03-20 | End: 2024-06-18

## 2024-03-25 ENCOUNTER — DOCUMENTATION ONLY (OUTPATIENT)
Dept: BEHAVIORAL HEALTH | Facility: CLINIC | Age: 74
End: 2024-03-25
Payer: COMMERCIAL

## 2024-03-25 DIAGNOSIS — E11.9 TYPE 2 DIABETES MELLITUS WITHOUT COMPLICATION, WITHOUT LONG-TERM CURRENT USE OF INSULIN (H): ICD-10-CM

## 2024-03-25 RX ORDER — PEN NEEDLE, DIABETIC 32GX 5/32"
NEEDLE, DISPOSABLE MISCELLANEOUS
Qty: 100 EACH | Refills: 1 | Status: SHIPPED | OUTPATIENT
Start: 2024-03-25 | End: 2024-10-04

## 2024-03-25 NOTE — TELEPHONE ENCOUNTER
"  Requested Prescriptions   Pending Prescriptions Disp Refills    ULTICARE MICRO 32G X 4 MM insulin pen needle [Pharmacy Med Name: ULTICARE MICRO PEN  32G X 4 MM MISC] 100 each 1     Sig: USE 1 PEN NEEDLE DAILY OR AS DIRECTED.       Diabetic Supplies Protocol Passed - 3/25/2024  5:02 AM        Passed - Medication is active on med list        Passed - Medication indicated for associated diagnosis        Passed - Patient is 18 years of age or older        Passed - Recent (6 mo) or future (30 days) visit within the authorizing provider's specialty     Patient had office visit in the last 6 months or has a visit in the next 30 days with authorizing provider.  See \"Patient Info\" tab in inbasket, or \"Choose Columns\" in Meds & Orders section of the refill encounter.                     Jeremiah Pacheco RN 03/25/24 2:44 PM   "

## 2024-03-25 NOTE — PROGRESS NOTES
Conducted chart review.  Coordinated with PCP regarding patient's PHQ-9 scores.  Offered [facility] involvement; PCP declined [facility] involvement due to another therapist

## 2024-03-26 DIAGNOSIS — E11.9 TYPE 2 DIABETES MELLITUS WITHOUT COMPLICATION, WITHOUT LONG-TERM CURRENT USE OF INSULIN (H): ICD-10-CM

## 2024-03-26 RX ORDER — PEN NEEDLE, DIABETIC 32GX 5/32"
NEEDLE, DISPOSABLE MISCELLANEOUS
Refills: 0 | OUTPATIENT
Start: 2024-03-26

## 2024-03-27 DIAGNOSIS — E78.5 HYPERLIPIDEMIA LDL GOAL <100: ICD-10-CM

## 2024-03-27 DIAGNOSIS — E11.9 TYPE 2 DIABETES MELLITUS WITHOUT COMPLICATION, WITHOUT LONG-TERM CURRENT USE OF INSULIN (H): ICD-10-CM

## 2024-03-27 RX ORDER — ATORVASTATIN CALCIUM 80 MG/1
80 TABLET, FILM COATED ORAL DAILY
Qty: 90 TABLET | Refills: 3 | OUTPATIENT
Start: 2024-03-27

## 2024-03-27 RX ORDER — PEN NEEDLE, DIABETIC 32GX 5/32"
NEEDLE, DISPOSABLE MISCELLANEOUS
Refills: 0 | OUTPATIENT
Start: 2024-03-27

## 2024-03-27 NOTE — TELEPHONE ENCOUNTER
Medication Question or Refill        What medication are you calling about (include dose and sig)?: atorvastatin (LIPITOR) 80 MG tablet     Preferred Pharmacy:     [address] [address]  Phone: [phone] Fax: [phone]        Controlled Substance Agreement on file:   CSA -- Patient Level:    CSA: None found at the patient level.       Who prescribed the medication?: Dr. Robison     Do you need a refill? Yes    When did you use the medication last? 3/26    Patient offered an appointment? No    Do you have any questions or concerns?  No      Could we send this information to you in [facility] or would you prefer to receive a phone call?:   Patient would prefer a phone call   Okay to leave a detailed message?: Yes at Cell number on file:    Telephone Information:   Mobile [phone]

## 2024-03-28 DIAGNOSIS — E11.9 TYPE 2 DIABETES MELLITUS WITHOUT COMPLICATION, WITHOUT LONG-TERM CURRENT USE OF INSULIN (H): ICD-10-CM

## 2024-03-28 DIAGNOSIS — E78.5 HYPERLIPIDEMIA LDL GOAL <100: ICD-10-CM

## 2024-03-28 RX ORDER — ATORVASTATIN CALCIUM 80 MG/1
80 TABLET, FILM COATED ORAL DAILY
Qty: 90 TABLET | Refills: 0 | OUTPATIENT
Start: 2024-03-28

## 2024-03-28 RX ORDER — ATORVASTATIN CALCIUM 80 MG/1
80 TABLET, FILM COATED ORAL DAILY
Qty: 90 TABLET | Refills: 3 | OUTPATIENT
Start: 2024-03-28

## 2024-03-28 RX ORDER — PEN NEEDLE, DIABETIC 32GX 5/32"
NEEDLE, DISPOSABLE MISCELLANEOUS
Refills: 0 | OUTPATIENT
Start: 2024-03-28

## 2024-03-28 NOTE — TELEPHONE ENCOUNTER
90 day supply sent last week please call pt and see if there was an issue with that prescription  Buffy Godinez MSN, RN

## 2024-04-01 ENCOUNTER — TELEPHONE (OUTPATIENT)
Dept: GASTROENTEROLOGY | Facility: CLINIC | Age: 74
End: 2024-04-01
Payer: COMMERCIAL

## 2024-04-01 NOTE — TELEPHONE ENCOUNTER
Left Voicemail (1st Attempt) for the patient to call back and schedule the following:    Appointment type:  gastric emptying  Provider: imaging/ per Dr. Wheeler  Return date: next available  Specialty phone number: [phone]  Additional appointment(s) needed:   Additonal Notes:

## 2024-04-02 ENCOUNTER — MYC MEDICAL ADVICE (OUTPATIENT)
Dept: FAMILY MEDICINE | Facility: CLINIC | Age: 74
End: 2024-04-02

## 2024-04-02 ENCOUNTER — VIRTUAL VISIT (OUTPATIENT)
Dept: FAMILY MEDICINE | Facility: CLINIC | Age: 74
End: 2024-04-02
Payer: COMMERCIAL

## 2024-04-02 DIAGNOSIS — R80.9 TYPE 2 DIABETES MELLITUS WITH MICROALBUMINURIA, WITHOUT LONG-TERM CURRENT USE OF INSULIN (H): ICD-10-CM

## 2024-04-02 DIAGNOSIS — E11.29 TYPE 2 DIABETES MELLITUS WITH MICROALBUMINURIA, WITHOUT LONG-TERM CURRENT USE OF INSULIN (H): Primary | ICD-10-CM

## 2024-04-02 DIAGNOSIS — R80.9 TYPE 2 DIABETES MELLITUS WITH MICROALBUMINURIA, WITHOUT LONG-TERM CURRENT USE OF INSULIN (H): Primary | ICD-10-CM

## 2024-04-02 DIAGNOSIS — M25.551 HIP PAIN, RIGHT: ICD-10-CM

## 2024-04-02 DIAGNOSIS — M79.7 FIBROMYALGIA: Primary | ICD-10-CM

## 2024-04-02 DIAGNOSIS — R26.89 BALANCE PROBLEMS: ICD-10-CM

## 2024-04-02 DIAGNOSIS — N39.45 CONTINUOUS LEAKAGE OF URINE: ICD-10-CM

## 2024-04-02 DIAGNOSIS — E11.29 TYPE 2 DIABETES MELLITUS WITH MICROALBUMINURIA, WITHOUT LONG-TERM CURRENT USE OF INSULIN (H): ICD-10-CM

## 2024-04-02 DIAGNOSIS — F33.1 MODERATE EPISODE OF RECURRENT MAJOR DEPRESSIVE DISORDER (H): ICD-10-CM

## 2024-04-02 PROCEDURE — 99442 PR PHYSICIAN TELEPHONE EVALUATION 11-20 MIN: CPT | Mod: 93 | Performed by: INTERNAL MEDICINE

## 2024-04-02 RX ORDER — ESCITALOPRAM OXALATE 20 MG/1
20 TABLET ORAL DAILY
COMMUNITY

## 2024-04-02 NOTE — PATIENT INSTRUCTIONS
Gastroparesis  Continue with diet changes and other changes as outlined by your GI team and nutritionist   Can look into Fairlife Milk - higher protein milk  Other high protein sources would be cottage cheese, eggs, Greek Yogurt, meat, lentils  Nutrafol would be the only over the counter hair/skin/nail supplement I would recommend - may not have significant benefit    Fibromyalgia  Consider warm water pool therapy - [place]  Referral placed   Will order a walker - can go to medical supply store to get the walker  Will place referral to [facility] for fibromyalgia care.      Incontinence  Recommend pelvic floor therapy- referral placed
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
## 2024-04-03 ENCOUNTER — TELEPHONE (OUTPATIENT)
Dept: GASTROENTEROLOGY | Facility: CLINIC | Age: 74
End: 2024-04-03
Payer: COMMERCIAL

## 2024-04-03 NOTE — TELEPHONE ENCOUNTER
Left Voicemail (2nd Attempt) for the patient to call back and schedule the following:    Appointment type: gastric emptying  Provider: image department   Return date: next available   Specialty phone number: [phone]  Additional appointment(s) needed:   Additonal Notes:       * Hi team     Recommendations from Dr Wheeler's clinic a few weeks ago was that patient repeat the gastric emptying study. This is ordered but not yet scheduled. Would you try reaching this patient to schedule this follow up?     Thanks

## 2024-04-10 DIAGNOSIS — I10 BENIGN ESSENTIAL HYPERTENSION: ICD-10-CM

## 2024-04-10 DIAGNOSIS — E11.9 TYPE 2 DIABETES MELLITUS WITHOUT COMPLICATION, WITHOUT LONG-TERM CURRENT USE OF INSULIN (H): ICD-10-CM

## 2024-04-10 NOTE — TELEPHONE ENCOUNTER
Pending Prescriptions:                       Disp   Refills    lisinopril (ZESTRIL) 5 MG tablet [Pharmac*90 tab*3            Sig: TAKE ONE TABLET BY MOUTH ONCE DAILY    Routing refill request to provider for review/approval because:  GFR Estimate   Date Value Ref Range Status   02/24/2023 87 >60 mL/min/1.73m2 Final     Comment:     eGFR calculated using 2021 CKD-EPI equation.   04/13/2021 74 >60 mL/min/[1.73_m2] Final     Comment:     Non  GFR Calc  Starting 12/18/2018, serum creatinine based estimated GFR (eGFR) will be   calculated using the Chronic Kidney Disease Epidemiology Collaboration   (CKD-EPI) equation.       GFR Estimate If Black   Date Value Ref Range Status   04/13/2021 86 >60 mL/min/[1.73_m2] Final     Comment:      GFR Calc  Starting 12/18/2018, serum creatinine based estimated GFR (eGFR) will be   calculated using the Chronic Kidney Disease Epidemiology Collaboration   (CKD-EPI) equation.       Creatinine   Date Value Ref Range Status   02/24/2023 0.73 0.51 - 0.95 mg/dL Final   04/13/2021 0.80 0.52 - 1.04 mg/dL Final     Potassium   Date Value Ref Range Status   02/24/2023 4.2 3.4 - 5.3 mmol/L Final     Comment:     Specimen slightly hemolyzed, potassium may be falsely elevated.   03/15/2022 4.1 3.4 - 5.3 mmol/L Final   04/13/2021 3.9 3.4 - 5.3 mmol/L Final     Camilo Benitez RN

## 2024-04-11 ENCOUNTER — TELEPHONE (OUTPATIENT)
Dept: CARDIOLOGY | Facility: CLINIC | Age: 74
End: 2024-04-11
Payer: COMMERCIAL

## 2024-04-11 RX ORDER — LISINOPRIL 5 MG/1
5 TABLET ORAL DAILY
Qty: 90 TABLET | Refills: 3 | Status: SHIPPED | OUTPATIENT
Start: 2024-04-11

## 2024-04-11 NOTE — TELEPHONE ENCOUNTER
Left Voicemail (1st Attempt) for the patient to call back and schedule the following:    Appointment type: return cardio  Provider: gila  Return date: 06/14/24  Specialty phone number: [phone] opt 1  Additional appointment(s) needed: n/a   Additonal Notes: n/a

## 2024-04-12 DIAGNOSIS — I10 BENIGN ESSENTIAL HYPERTENSION: ICD-10-CM

## 2024-04-12 DIAGNOSIS — E11.9 TYPE 2 DIABETES MELLITUS WITHOUT COMPLICATION, WITHOUT LONG-TERM CURRENT USE OF INSULIN (H): ICD-10-CM

## 2024-04-12 RX ORDER — METOPROLOL SUCCINATE 25 MG/1
25 TABLET, EXTENDED RELEASE ORAL DAILY
Qty: 90 TABLET | Refills: 2 | Status: SHIPPED | OUTPATIENT
Start: 2024-04-12

## 2024-04-13 ENCOUNTER — HEALTH MAINTENANCE LETTER (OUTPATIENT)
Age: 74
End: 2024-04-13

## 2024-04-27 ENCOUNTER — LAB (OUTPATIENT)
Dept: LAB | Facility: CLINIC | Age: 74
End: 2024-04-27
Payer: COMMERCIAL

## 2024-04-27 DIAGNOSIS — E11.9 TYPE 2 DIABETES MELLITUS WITHOUT COMPLICATION, WITHOUT LONG-TERM CURRENT USE OF INSULIN (H): ICD-10-CM

## 2024-04-27 DIAGNOSIS — I10 BENIGN ESSENTIAL HYPERTENSION: ICD-10-CM

## 2024-04-27 DIAGNOSIS — R80.9 TYPE 2 DIABETES MELLITUS WITH MICROALBUMINURIA, WITHOUT LONG-TERM CURRENT USE OF INSULIN (H): ICD-10-CM

## 2024-04-27 DIAGNOSIS — E11.29 TYPE 2 DIABETES MELLITUS WITH MICROALBUMINURIA, WITHOUT LONG-TERM CURRENT USE OF INSULIN (H): ICD-10-CM

## 2024-04-27 LAB
ANION GAP SERPL CALCULATED.3IONS-SCNC: 10 MMOL/L (ref 7–15)
BUN SERPL-MCNC: 5.8 MG/DL (ref 8–23)
CALCIUM SERPL-MCNC: 9 MG/DL (ref 8.8–10.2)
CHLORIDE SERPL-SCNC: 104 MMOL/L (ref 98–107)
CHOLEST SERPL-MCNC: 134 MG/DL
CREAT SERPL-MCNC: 0.86 MG/DL (ref 0.51–0.95)
CREAT UR-MCNC: 284.9 MG/DL
DEPRECATED HCO3 PLAS-SCNC: 27 MMOL/L (ref 22–29)
EGFRCR SERPLBLD CKD-EPI 2021: 71 ML/MIN/1.73M2
ERYTHROCYTE [DISTWIDTH] IN BLOOD BY AUTOMATED COUNT: 12.6 % (ref 10–15)
FASTING STATUS PATIENT QL REPORTED: YES
GLUCOSE SERPL-MCNC: 111 MG/DL (ref 70–99)
HBA1C MFR BLD: 6.5 % (ref 0–5.6)
HCT VFR BLD AUTO: 41.4 % (ref 35–47)
HDLC SERPL-MCNC: 44 MG/DL
HGB BLD-MCNC: 13.9 G/DL (ref 11.7–15.7)
LDLC SERPL CALC-MCNC: 64 MG/DL
MCH RBC QN AUTO: 31.1 PG (ref 26.5–33)
MCHC RBC AUTO-ENTMCNC: 33.6 G/DL (ref 31.5–36.5)
MCV RBC AUTO: 93 FL (ref 78–100)
MICROALBUMIN UR-MCNC: 82.9 MG/L
MICROALBUMIN/CREAT UR: 29.1 MG/G CR (ref 0–25)
NONHDLC SERPL-MCNC: 90 MG/DL
PLATELET # BLD AUTO: 193 10E3/UL (ref 150–450)
POTASSIUM SERPL-SCNC: 4 MMOL/L (ref 3.4–5.3)
RBC # BLD AUTO: 4.47 10E6/UL (ref 3.8–5.2)
SODIUM SERPL-SCNC: 141 MMOL/L (ref 135–145)
TRIGL SERPL-MCNC: 132 MG/DL
WBC # BLD AUTO: 4.7 10E3/UL (ref 4–11)

## 2024-04-27 PROCEDURE — 82570 ASSAY OF URINE CREATININE: CPT

## 2024-04-27 PROCEDURE — 36415 COLL VENOUS BLD VENIPUNCTURE: CPT

## 2024-04-27 PROCEDURE — 83036 HEMOGLOBIN GLYCOSYLATED A1C: CPT

## 2024-04-27 PROCEDURE — 80048 BASIC METABOLIC PNL TOTAL CA: CPT

## 2024-04-27 PROCEDURE — 82043 UR ALBUMIN QUANTITATIVE: CPT

## 2024-04-27 PROCEDURE — 80061 LIPID PANEL: CPT

## 2024-04-27 PROCEDURE — 85027 COMPLETE CBC AUTOMATED: CPT

## 2024-04-29 NOTE — RESULT ENCOUNTER NOTE
There is a trace amount of protein in the urine, a sign of diabetic kidney disease.  This has been seen in previous years. The lisinopril helps with blood pressure but also with long term kidney health.  Cholesterol is improved from 1 year ago. Kidney function, blood sugar, blood counts, electrolytes are normal. A1c remains very well controlled.

## 2024-05-28 DIAGNOSIS — L30.4 INTERTRIGO: ICD-10-CM

## 2024-05-30 RX ORDER — NYSTATIN 100000 U/G
CREAM TOPICAL 2 TIMES DAILY
Qty: 30 G | Refills: 11 | Status: SHIPPED | OUTPATIENT
Start: 2024-05-30

## 2024-06-05 NOTE — RESULT ENCOUNTER NOTE
The bone density test shows osteopenia    You have Osteopenia - low bone density that can lead to osteoporosis, or brittle bones.  It is recommend to have 1200 mg of calcium per day, and at least 800 units of Vitamin D per day.  You can achieve this with a combination pill of Calcium 600 mg+ Vitamin D  400 units twice daily.  You should walk 30 minutes per day and consider light weight lifting.  We should recheck the bone density test in 3 years.  
Yes

## 2024-06-06 ENCOUNTER — PATIENT OUTREACH (OUTPATIENT)
Dept: CARE COORDINATION | Facility: CLINIC | Age: 74
End: 2024-06-06
Payer: COMMERCIAL

## 2024-06-17 DIAGNOSIS — E78.5 HYPERLIPIDEMIA LDL GOAL <100: ICD-10-CM

## 2024-06-18 RX ORDER — ATORVASTATIN CALCIUM 80 MG/1
80 TABLET, FILM COATED ORAL DAILY
Qty: 90 TABLET | Refills: 3 | Status: SHIPPED | OUTPATIENT
Start: 2024-06-18

## 2024-06-20 DIAGNOSIS — E78.5 HYPERLIPIDEMIA LDL GOAL <100: ICD-10-CM

## 2024-06-20 RX ORDER — ATORVASTATIN CALCIUM 80 MG/1
80 TABLET, FILM COATED ORAL DAILY
Qty: 90 TABLET | Refills: 0 | OUTPATIENT
Start: 2024-06-20

## 2024-06-21 DIAGNOSIS — E78.5 HYPERLIPIDEMIA LDL GOAL <100: ICD-10-CM

## 2024-06-21 RX ORDER — ATORVASTATIN CALCIUM 80 MG/1
80 TABLET, FILM COATED ORAL DAILY
Qty: 90 TABLET | Refills: 0 | OUTPATIENT
Start: 2024-06-21

## 2024-06-22 ENCOUNTER — HEALTH MAINTENANCE LETTER (OUTPATIENT)
Age: 74
End: 2024-06-22

## 2024-06-22 DIAGNOSIS — E78.5 HYPERLIPIDEMIA LDL GOAL <100: ICD-10-CM

## 2024-06-24 RX ORDER — ATORVASTATIN CALCIUM 80 MG/1
80 TABLET, FILM COATED ORAL DAILY
Qty: 90 TABLET | Refills: 0 | OUTPATIENT
Start: 2024-06-24

## 2024-07-10 ENCOUNTER — OFFICE VISIT (OUTPATIENT)
Dept: FAMILY MEDICINE | Facility: CLINIC | Age: 74
End: 2024-07-10
Payer: COMMERCIAL

## 2024-07-10 VITALS
HEIGHT: 60 IN | BODY MASS INDEX: 32.31 KG/M2 | TEMPERATURE: 98.6 F | DIASTOLIC BLOOD PRESSURE: 72 MMHG | SYSTOLIC BLOOD PRESSURE: 104 MMHG | RESPIRATION RATE: 12 BRPM | OXYGEN SATURATION: 96 % | WEIGHT: 164.6 LBS | HEART RATE: 53 BPM

## 2024-07-10 DIAGNOSIS — R80.9 TYPE 2 DIABETES MELLITUS WITH MICROALBUMINURIA, WITHOUT LONG-TERM CURRENT USE OF INSULIN (H): ICD-10-CM

## 2024-07-10 DIAGNOSIS — E11.29 TYPE 2 DIABETES MELLITUS WITH MICROALBUMINURIA, WITHOUT LONG-TERM CURRENT USE OF INSULIN (H): ICD-10-CM

## 2024-07-10 DIAGNOSIS — I25.10 CORONARY ARTERY DISEASE INVOLVING NATIVE HEART WITHOUT ANGINA PECTORIS, UNSPECIFIED VESSEL OR LESION TYPE: ICD-10-CM

## 2024-07-10 DIAGNOSIS — M79.7 FIBROMYALGIA: ICD-10-CM

## 2024-07-10 DIAGNOSIS — Z00.00 ENCOUNTER FOR MEDICARE ANNUAL WELLNESS EXAM: Primary | ICD-10-CM

## 2024-07-10 PROCEDURE — G0439 PPPS, SUBSEQ VISIT: HCPCS | Performed by: INTERNAL MEDICINE

## 2024-07-10 PROCEDURE — 99214 OFFICE O/P EST MOD 30 MIN: CPT | Mod: 25 | Performed by: INTERNAL MEDICINE

## 2024-07-10 RX ORDER — ASPIRIN 81 MG/1
81 TABLET ORAL DAILY
COMMUNITY
Start: 2024-07-10

## 2024-07-10 RX ORDER — INSULIN GLARGINE 100 [IU]/ML
20 INJECTION, SOLUTION SUBCUTANEOUS AT BEDTIME
Qty: 30 ML | Refills: 3 | Status: SHIPPED | OUTPATIENT
Start: 2024-07-10

## 2024-07-10 RX ORDER — NITROGLYCERIN 0.4 MG/1
0.4 TABLET SUBLINGUAL EVERY 5 MIN PRN
Qty: 25 TABLET | Refills: 1 | Status: SHIPPED | OUTPATIENT
Start: 2024-07-10

## 2024-07-10 ASSESSMENT — PATIENT HEALTH QUESTIONNAIRE - PHQ9
SUM OF ALL RESPONSES TO PHQ QUESTIONS 1-9: 14
5. POOR APPETITE OR OVEREATING: SEVERAL DAYS
10. IF YOU CHECKED OFF ANY PROBLEMS, HOW DIFFICULT HAVE THESE PROBLEMS MADE IT FOR YOU TO DO YOUR WORK, TAKE CARE OF THINGS AT HOME, OR GET ALONG WITH OTHER PEOPLE: SOMEWHAT DIFFICULT
SUM OF ALL RESPONSES TO PHQ QUESTIONS 1-9: 14

## 2024-07-10 ASSESSMENT — PAIN SCALES - GENERAL: PAINLEVEL: MODERATE PAIN (4)

## 2024-07-10 ASSESSMENT — ANXIETY QUESTIONNAIRES
1. FEELING NERVOUS, ANXIOUS, OR ON EDGE: SEVERAL DAYS
3. WORRYING TOO MUCH ABOUT DIFFERENT THINGS: MORE THAN HALF THE DAYS
GAD7 TOTAL SCORE: 8
IF YOU CHECKED OFF ANY PROBLEMS ON THIS QUESTIONNAIRE, HOW DIFFICULT HAVE THESE PROBLEMS MADE IT FOR YOU TO DO YOUR WORK, TAKE CARE OF THINGS AT HOME, OR GET ALONG WITH OTHER PEOPLE: SOMEWHAT DIFFICULT
6. BECOMING EASILY ANNOYED OR IRRITABLE: MORE THAN HALF THE DAYS
7. FEELING AFRAID AS IF SOMETHING AWFUL MIGHT HAPPEN: NOT AT ALL
2. NOT BEING ABLE TO STOP OR CONTROL WORRYING: MORE THAN HALF THE DAYS
5. BEING SO RESTLESS THAT IT IS HARD TO SIT STILL: NOT AT ALL
GAD7 TOTAL SCORE: 8

## 2024-07-10 ASSESSMENT — SOCIAL DETERMINANTS OF HEALTH (SDOH): HOW OFTEN DO YOU GET TOGETHER WITH FRIENDS OR RELATIVES?: ONCE A WEEK

## 2024-07-10 NOTE — PROGRESS NOTES
Preventive Care Visit  [facility]  Shwetha Robison DO, Internal Medicine  Jul 10, 2024      Assessment & Plan   Problem List Items Addressed This Visit       Fibromyalgia    Relevant Orders    Walker Order    OFFICE/OUTPT VISIT,EST,LEVL IV (Completed)    Physical Therapy  Referral    Type 2 diabetes mellitus with microalbuminuria, without long-term current use of insulin (H)    Relevant Medications    insulin glargine (LANTUS SOLOSTAR) 100 UNIT/ML pen    Other Relevant Orders    Walker Order    OFFICE/OUTPT VISIT,EST,LEVL IV (Completed)    Hemoglobin A1c     Other Visit Diagnoses       Encounter for Medicare annual wellness exam    -  Primary    Coronary artery disease involving native heart without angina pectoris, unspecified vessel or lesion type        Relevant Medications    nitroGLYcerin (NITROSTAT) 0.4 MG sublingual tablet    Other Relevant Orders    Walker Order    OFFICE/OUTPT VISIT,EST,LEVL IV (Completed)             Patient has been advised of split billing requirements and indicates understanding: Yes       BMI  Estimated body mass index is 32.15 kg/m  as calculated from the following:    Height as of this encounter: 1.524 m (5').    Weight as of this encounter: 74.7 kg (164 lb 9.6 oz).   Weight management plan: Discussed healthy diet and exercise guidelines    Counseling  Appropriate preventive services were discussed with this patient, including applicable screening as appropriate for fall prevention, nutrition, physical activity, Tobacco-use cessation, weight loss and cognition.  Checklist reviewing preventive services available has been given to the patient.  Reviewed patient's diet, addressing concerns and/or questions.   The patient was instructed to see the dentist every 6 months.   Discussed possible causes of fatigue. Updated plan of care.  Patient reported difficulty with activities of daily living were addressed today.Patient reported safety concerns  were addressed today.The patient was provided with written information regarding signs of hearing loss.   Information on urinary incontinence and treatment options given to patient.   The patient's PHQ-9 score is consistent with moderate depression. She was provided with information regarding depression.           Fibromyalgia  Balance/walking  Order for walker - take order to [employer] store  Consider formal physical therapy - order placed    History of heart attack  Recommend to restart aspirin 81 mg daily lifelong    Diabetes  Follow-up in 6 months for diabetes check    Gastroparesis  GI wanted gastric emptying study repeated because you were on Trulicity during the last gastric emptying study (Trulicity can cause gastroparesis)  Ok for short term use of the Alix supplement, but would not recommend it for longer than 1 month due to risk of over supplementation from the vitamins     Health Care Maintenance  Recommend pneumonia, COVID vaccine  You are due for a mammogram.  Please call [phone] to schedule.    Subjective   Narcisa is a 74 year old, presenting for the following:  AWV, Hypertension, Diabetes, Lipids, Anxiety, Depression, A.D.H.D, and Health Maintenance (Due for pcv 20 ,shingles, TD has to get at pharmacy, covid )        7/10/2024     1:32 PM   Additional Questions   Roomed by antonio   Accompanied by self         7/10/2024     1:32 PM   Patient Reported Additional Medications   Patient reports taking the following new medications none         Health Care Directive  Patient has a Health Care Directive on file  Advance care planning document is on file and is current.    HPI    CAD  --ASA is not on her med list  --has never used nitroglycerin, but would like a     Fibromyalgia  --she needs Rx for walker for fibromyalgia, dizziness.  Uses a cane primarily but some days would feel more stable with a walker with a seat    Gastroparesis  --follows with GI.  Is taking a supplement called Alix to help  with gut health.  It has a bunch of vitamins and herbal meds - berberine, garlic among others    Diabetes Follow-up    How often are you checking your blood sugar? A few times a month  What time of day are you checking your blood sugars (select all that apply)?  Before and after meals  Have you had any blood sugars above 200?  No  Have you had any blood sugars below 70?  No  What symptoms do you notice when your blood sugar is low?  Shaky, Dizzy, Weak, Lethargy, Blurred vision, and Confusion  What concerns do you have today about your diabetes? Other: GI issues    Do you have any of these symptoms? (Select all that apply)  Excessive thirst and Blurry vision          Hyperlipidemia Follow-Up    Are you regularly taking any medication or supplement to lower your cholesterol?   Yes- PM  Are you having muscle aches or other side effects that you think could be caused by your cholesterol lowering medication?  No    Hypertension Follow-up    Do you check your blood pressure regularly outside of the clinic? No   Are you following a low salt diet? Yes  Are your blood pressures ever more than 140 on the top number (systolic) OR more than 90 on the bottom number (diastolic), for example 140/90? No    BP Readings from Last 2 Encounters:   07/10/24 104/72   06/23/23 116/74     Hemoglobin A1C (%)   Date Value   04/27/2024 6.5 (H)   06/13/2023 6.3 (H)   04/13/2021 7.1 (H)   01/28/2020 7.2 (H)     LDL Cholesterol Calculated (mg/dL)   Date Value   04/27/2024 64   03/22/2023 63   04/13/2021 59   05/06/2019 55         Depression and Anxiety   How are you doing with your depression since your last visit? Improved got a dog  How are you doing with your anxiety since your last visit?  Improved got a dog  Are you having other symptoms that might be associated with depression or anxiety? Yes:  panic attacks  Have you had a significant life event? Grief or Loss   Do you have any concerns with your use of alcohol or other drugs? No  She  follows with psychiatry for all her mental health meds    Social History     Tobacco Use    Smoking status: Former     Current packs/day: 0.00     Average packs/day: 1 pack/day for 43.2 years (43.2 ttl pk-yrs)     Types: Cigarettes     Start date: 1968     Quit date: 3/15/2011     Years since quittin.3    Smokeless tobacco: Never    Tobacco comments:     occasional/daily   Vaping Use    Vaping status: Never Used   Substance Use Topics    Alcohol use: No     Alcohol/week: 0.0 standard drinks of alcohol    Drug use: No         2023     1:22 PM 2023    12:41 PM 7/10/2024    12:39 PM   PHQ   PHQ-9 Total Score 15 14 14   Q9: Thoughts of better off dead/self-harm past 2 weeks Not at all Not at all Not at all         10/29/2019     1:49 PM 2020     2:15 PM 3/22/2023     4:38 PM   RAVI-7 SCORE   Total Score 7 (mild anxiety)     Total Score 7 10 8         7/10/2024    12:39 PM   Last PHQ-9   1.  Little interest or pleasure in doing things 2   2.  Feeling down, depressed, or hopeless 1   3.  Trouble falling or staying asleep, or sleeping too much 0   4.  Feeling tired or having little energy 3   5.  Poor appetite or overeating 1   6.  Feeling bad about yourself 1   7.  Trouble concentrating 3   8.  Moving slowly or restless 3   Q9: Thoughts of better off dead/self-harm past 2 weeks 0   PHQ-9 Total Score 14         3/22/2023     4:38 PM   RAVI-7    1. Feeling nervous, anxious, or on edge 2   2. Not being able to stop or control worrying 1   3. Worrying too much about different things 1   4. Trouble relaxing 1   5. Being so restless that it is hard to sit still 0   6. Becoming easily annoyed or irritable 2   7. Feeling afraid, as if something awful might happen 1   RAVI-7 Total Score 8   If you checked any problems, how difficult have they made it for you to do your work, take care of things at home, or get along with other people? Very difficult       Suicide Assessment Five-step Evaluation and Treatment  (SAFE-T)          7/10/2024   General Health   How would you rate your overall physical health? (!) DECLINE   Feel stress (tense, anxious, or unable to sleep) Very much      (!) STRESS CONCERN      7/10/2024   Nutrition   Diet: Diabetic    Other   If other, please elaborate: gastroparesis       Multiple values from one day are sorted in reverse-chronological order          No data to display                  7/10/2024   Social Factors   Frequency of gathering with friends or relatives Once a week   Worry food won't last until get money to buy more No   Food not last or not have enough money for food? No   Do you have housing? (Housing is defined as stable permanent housing and does not include staying ouside in a car, in a tent, in an abandoned building, in an overnight shelter, or couch-surfing.) Yes   Are you worried about losing your housing? No   Lack of transportation? No   Unable to get utilities (heat,electricity)? No            7/10/2024   Fall Risk   Fallen 2 or more times in the past year? Yes   Trouble with walking or balance? Yes   Gait Speed Test (Document in seconds) 9.84   Gait Speed Test Interpretation Greater than 5.01 seconds - ABNORMAL             7/10/2024   Activities of Daily Living- Home Safety   Needs help with the following daily activites Transportation    Preparing meals    Housework    Laundry    Medication administration    Money management   Safety concerns in the home No grab bars in the bathroom       Multiple values from one day are sorted in reverse-chronological order         7/10/2024   Dental   Dentist two times every year? (!) NO            7/10/2024   Hearing Screening   Hearing concerns? (!) I FEEL THAT PEOPLE ARE MUMBLING OR NOT SPEAKING CLEARLY.            7/10/2024   Driving Risk Screening   Patient/family members have concerns about driving No            7/10/2024   General Alertness/Fatigue Screening   Have you been more tired than usual lately? (!) YES             7/10/2024   Urinary Incontinence Screening   Bothered by leaking urine in past 6 months Yes            7/10/2024   TB Screening   Were you born outside of the [place]? No          Today's PHQ-9 Score:       7/10/2024    12:39 PM   PHQ-9 SCORE   PHQ-9 Total Score MyChart 14 (Moderate depression)   PHQ-9 Total Score 14         7/10/2024   Substance Use   Alcohol more than 3/day or more than 7/wk Not Applicable   Do you have a current opioid prescription? No   How severe/bad is pain from 1 to 10? 5/10   Do you use any other substances recreationally? No        Social History     Tobacco Use    Smoking status: Former     Current packs/day: 0.00     Average packs/day: 1 pack/day for 43.2 years (43.2 ttl pk-yrs)     Types: Cigarettes     Start date: 1968     Quit date: 3/15/2011     Years since quittin.3    Smokeless tobacco: Never    Tobacco comments:     occasional/daily   Vaping Use    Vaping status: Never Used   Substance Use Topics    Alcohol use: No     Alcohol/week: 0.0 standard drinks of alcohol    Drug use: No           2021   LAST FHS-7 RESULTS   1st degree relative breast or ovarian cancer No   Any relative bilateral breast cancer No   Any male have breast cancer No   Any ONE woman have BOTH breast AND ovarian cancer No   Any woman with breast cancer before 50yrs No   2 or more relatives with breast AND/OR ovarian cancer No   2 or more relatives with breast AND/OR bowel cancer No           Mammogram Screening - Mammogram every 1-2 years updated in Health Maintenance based on mutual decision making    ASCVD Risk   The ASCVD Risk score (Brad SARGENT, et al., 2019) failed to calculate for the following reasons:    The patient has a prior MI or stroke diagnosis            Reviewed and updated as needed this visit by Provider                    Current Outpatient Medications   Medication Sig Dispense Refill    acetaminophen (TYLENOL) 500 MG tablet Take 500-1,000 mg by mouth every 4 hours as needed for  mild pain      acetylcysteine (N-ACETYL CYSTEINE) 600 MG CAPS capsule Take 3,000 mg by mouth daily      alcohol swab prep pads Use to swab area of injection/bethel as directed. 100 each 3    Alcohol Swabs (B-D SINGLE USE SWABS REGULAR) PADS USE TO SWAB AREA OF INJECTION/BETHEL AS DIRECTED. 100 each 3    ALPRAZolam (XANAX PO) Take 0.5-1 mg by mouth 4 times daily as needed 1/2 tab in am, 1 tab in pm, then 1 tab midday prn and 1/2 tab throughout the day if needed      amphetamine-dextroamphetamine (ADDERALL XR) 20 MG per capsule Take 20 mg by mouth daily       atorvastatin (LIPITOR) 80 MG tablet TAKE ONE TABLET BY MOUTH ONCE DAILY 90 tablet 3    blood glucose (NO BRAND SPECIFIED) lancets standard Use to test blood sugar 3 times daily or as directed. 100 each 1    blood glucose (NO BRAND SPECIFIED) test strip Use to test blood sugar 1 times daily or as directed. To accompany: Blood Glucose Monitor Brands: per insurance. 100 strip 6    blood glucose calibration (NO BRAND SPECIFIED) solution To accompany: Blood Glucose Monitor Brands: per insurance. 1 each 1    blood glucose monitoring (NO BRAND SPECIFIED) meter device kit Use to test blood sugar 1 times daily or as directed. Preferred blood glucose meter OR supplies to accompany: Blood Glucose Monitor Brands: per insurance. 1 kit 0    CONTOUR NEXT TEST test strip USE TO TEST BLOOD SUGAR THREE TIMES A  strip 0    DoxePIN (SINEQUAN) 75 MG capsule Take 75 mg by mouth At Bedtime       escitalopram (LEXAPRO) 20 MG tablet Take 20 mg by mouth daily      fluocinonide (LIDEX) 0.05 % external solution Apply to itchy areas scalp BID x 1-2 weeks PRN. Need follow up appointment in Derm. 50 mL 0    insulin glargine (LANTUS SOLOSTAR) 100 UNIT/ML pen Inject 20 Units Subcutaneous At Bedtime 30 mL 3    insulin pen needle (ULTICARE MICRO) 32G X 4 MM miscellaneous USE 1 PEN NEEDLE DAILY OR AS DIRECTED. 100 each 1    LANsoprazole (PREVACID) 30 MG DR capsule Take 1 capsule (30 mg) by  mouth every morning (before breakfast) 30-60 minutes before a meal. 90 capsule 3    lisinopril (ZESTRIL) 5 MG tablet TAKE ONE TABLET BY MOUTH ONCE DAILY 90 tablet 3    metoprolol succinate ER (TOPROL XL) 25 MG 24 hr tablet TAKE ONE TABLET BY MOUTH ONCE DAILY 90 tablet 2    Microlet Lancets MISC USE TO TEST BLOOD SUGAR 3 TIMES DAILY OR AS DIRECTED. 100 each 0    mupirocin (BACTROBAN) 2 % external ointment Apply to open areas on the scalp BID until healed. Need follow up appointment in Derm. 30 g 0    nitroGLYcerin (NITROSTAT) 0.4 MG sublingual tablet Place 1 tablet (0.4 mg) under the tongue every 5 minutes as needed for chest pain 25 tablet 1    nystatin (MYCOSTATIN) 009290 UNIT/GM external cream Apply topically 2 times daily 30 g 11    Wheat Dextrin (BENEFIBER PO) Powder, every other night       Current providers sharing in care for this patient include:  Patient Care Team:  Shwetha Robison DO as PCP - General (Internal Medicine)  Taniya Agustin, RN as Nurse Coordinator (Cardiology)  Talya Reina PA-C (Inactive) as Physician Assistant (Physician Assistant)  Shwetha Robison DO as Assigned PCP  Martell Biggs MD as MD (Interventional Cardiology)  Magdalena Selby PA-C as Assigned Surgical Provider  Mag Villa RD as Registered Dietitian (Dietitian, Registered)  Janine Correia RN as Specialty Care Coordinator (Cardiology)  Martell Biggs MD as Assigned Heart and Vascular Provider  Shan Wheeler MD as MD (Gastroenterology)  Shan Wheeler MD as Assigned Gastroenterology Provider  Martell Biggs MD as MD (Cardiovascular Disease)    The following health maintenance items are reviewed in Epic and correct as of today:  Health Maintenance   Topic Date Due    Pneumococcal Vaccine: 65+ Years (1 of 2 - PCV) Never done    LUNG CANCER SCREENING  Never done    DIABETIC FOOT EXAM  04/13/2022    COVID-19 Vaccine (4 - 2023-24 season) 09/01/2023     MAMMO SCREENING  12/09/2023    MEDICARE ANNUAL WELLNESS VISIT  03/22/2024    RSV VACCINE (Pregnancy & 60+) (1 - 1-dose 60+ series) 07/10/2025 (Originally 4/10/2011)    ZOSTER IMMUNIZATION (1 of 2) 08/01/2025 (Originally 4/10/2001)    DTAP/TDAP/TD IMMUNIZATION (2 - Td or Tdap) 08/01/2025 (Originally 4/10/2023)    INFLUENZA VACCINE (1) 09/01/2024    A1C  10/27/2024    PHQ-9  01/10/2025    EYE EXAM  03/04/2025    DEXA  03/28/2025    ANNUAL REVIEW OF HM ORDERS  04/02/2025    BMP  04/27/2025    LIPID  04/27/2025    MICROALBUMIN  04/27/2025    FALL RISK ASSESSMENT  07/10/2025    COLORECTAL CANCER SCREENING  11/07/2027    ADVANCE CARE PLANNING  03/22/2028    HEPATITIS C SCREENING  Completed    DEPRESSION ACTION PLAN  Completed    IPV IMMUNIZATION  Aged Out    HPV IMMUNIZATION  Aged Out    MENINGITIS IMMUNIZATION  Aged Out    RSV MONOCLONAL ANTIBODY  Aged Out         Review of Systems  Constitutional, neuro, ENT, endocrine, pulmonary, cardiac, gastrointestinal, genitourinary, musculoskeletal, integument and psychiatric systems are negative, except as otherwise noted.     Objective    Exam  /72 (BP Location: Right arm, Patient Position: Sitting, Cuff Size: Adult Regular)   Pulse 53   Temp 98.6  F (37  C) (Tympanic)   Resp 12   Ht 1.524 m (5')   Wt 74.7 kg (164 lb 9.6 oz)   LMP  (LMP Unknown)   SpO2 96%   BMI 32.15 kg/m     Estimated body mass index is 32.15 kg/m  as calculated from the following:    Height as of this encounter: 1.524 m (5').    Weight as of this encounter: 74.7 kg (164 lb 9.6 oz).    Physical Exam  GENERAL: alert and no distress  EYES: Eyes grossly normal to inspection, PERRL and conjunctivae and sclerae normal  HENT: ear canals and TM's normal, nose and mouth without ulcers or lesions  NECK: no adenopathy, no asymmetry, masses, or scars  RESP: lungs clear to auscultation - no rales, rhonchi or wheezes  CV: regular rate and rhythm, normal S1 S2, no S3 or S4, no murmur, click or rub, no  peripheral edema  ABDOMEN: soft, nontender, no hepatosplenomegaly, no masses and bowel sounds normal  MS: no gross musculoskeletal defects noted, no edema  SKIN: no suspicious lesions or rashes  NEURO: Normal strength and tone, mentation intact and speech normal  PSYCH: mentation appears normal and anxious         7/10/2024   Mini Cog   Clock Draw Score 0 Abnormal   3 Item Recall 2 objects recalled   Mini Cog Total Score 2                 Signed Electronically by: Shwetha Robison,   DME (Durable Medical Equipment) Orders and Documentation  No orders of the defined types were placed in this encounter.     The patient was assessed and it was determined the patient is in need of the following listed DME Supplies/Equipment. Please complete supporting documentation below to demonstrate medical necessity.

## 2024-07-24 ENCOUNTER — HOSPITAL ENCOUNTER (OUTPATIENT)
Dept: MAMMOGRAPHY | Facility: CLINIC | Age: 74
Discharge: HOME | End: 2024-07-24
Attending: INTERNAL MEDICINE | Admitting: INTERNAL MEDICINE
Payer: COMMERCIAL

## 2024-07-24 DIAGNOSIS — Z12.31 VISIT FOR SCREENING MAMMOGRAM: ICD-10-CM

## 2024-07-24 PROCEDURE — 77063 BREAST TOMOSYNTHESIS BI: CPT

## 2024-08-14 ENCOUNTER — MYC MEDICAL ADVICE (OUTPATIENT)
Dept: FAMILY MEDICINE | Facility: CLINIC | Age: 74
End: 2024-08-14
Payer: COMMERCIAL

## 2024-08-14 DIAGNOSIS — K31.84 GASTROPARESIS: ICD-10-CM

## 2024-08-14 NOTE — TELEPHONE ENCOUNTER
Please see [employer] message.  The medication is pended for review. The patient was seen recently on 7/10/24.  Would you like her to schedule another appointment?    Thank you    Argentina WHITLEY RN

## 2024-08-15 RX ORDER — PROCHLORPERAZINE MALEATE 5 MG
5 TABLET ORAL EVERY 8 HOURS PRN
Qty: 30 TABLET | Refills: 1 | Status: SHIPPED | OUTPATIENT
Start: 2024-08-15

## 2024-10-04 DIAGNOSIS — E11.9 TYPE 2 DIABETES MELLITUS WITHOUT COMPLICATION, WITHOUT LONG-TERM CURRENT USE OF INSULIN (H): ICD-10-CM

## 2024-10-04 RX ORDER — PEN NEEDLE, DIABETIC 32GX 5/32"
NEEDLE, DISPOSABLE MISCELLANEOUS
Qty: 100 EACH | Refills: 0 | Status: SHIPPED | OUTPATIENT
Start: 2024-10-04 | End: 2024-10-09

## 2024-10-08 DIAGNOSIS — K21.9 GASTROESOPHAGEAL REFLUX DISEASE WITHOUT ESOPHAGITIS: ICD-10-CM

## 2024-10-08 DIAGNOSIS — E11.9 TYPE 2 DIABETES MELLITUS WITHOUT COMPLICATION, WITHOUT LONG-TERM CURRENT USE OF INSULIN (H): ICD-10-CM

## 2024-10-09 RX ORDER — PEN NEEDLE, DIABETIC 32GX 5/32"
NEEDLE, DISPOSABLE MISCELLANEOUS
Qty: 100 EACH | Refills: 0 | Status: SHIPPED | OUTPATIENT
Start: 2024-10-09

## 2024-10-09 RX ORDER — LANSOPRAZOLE 30 MG/1
30 CAPSULE, DELAYED RELEASE ORAL
Qty: 90 CAPSULE | Refills: 3 | Status: SHIPPED | OUTPATIENT
Start: 2024-10-09

## 2024-10-09 NOTE — TELEPHONE ENCOUNTER
Needs blood work for further refills.   Prescription approved per [facility] Refill Protocol.  Julie Behrendt RN

## 2024-11-09 ENCOUNTER — HEALTH MAINTENANCE LETTER (OUTPATIENT)
Age: 74
End: 2024-11-09

## 2024-11-14 ENCOUNTER — OFFICE VISIT (OUTPATIENT)
Dept: CARDIOLOGY | Facility: CLINIC | Age: 74
End: 2024-11-14
Payer: COMMERCIAL

## 2024-11-14 VITALS
HEART RATE: 54 BPM | WEIGHT: 166 LBS | BODY MASS INDEX: 32.42 KG/M2 | OXYGEN SATURATION: 92 % | DIASTOLIC BLOOD PRESSURE: 76 MMHG | SYSTOLIC BLOOD PRESSURE: 138 MMHG

## 2024-11-14 DIAGNOSIS — R94.39 ABNORMAL RESULT OF OTHER CARDIOVASCULAR FUNCTION STUDY: ICD-10-CM

## 2024-11-14 DIAGNOSIS — R00.1 BRADYCARDIA: Primary | ICD-10-CM

## 2024-11-14 PROCEDURE — G0463 HOSPITAL OUTPT CLINIC VISIT: HCPCS | Performed by: INTERNAL MEDICINE

## 2024-11-14 PROCEDURE — 99214 OFFICE O/P EST MOD 30 MIN: CPT | Performed by: INTERNAL MEDICINE

## 2024-11-14 ASSESSMENT — PAIN SCALES - GENERAL: PAINLEVEL_OUTOF10: NO PAIN (0)

## 2024-11-14 NOTE — NURSING NOTE
Ml Evans arrived here on 11/14/2024 10:50 AM for 8-14 Days  Zio monitor placement per ordering provider Dr. Biggs for the diagnosis bradycardia.  Dr. Riley is the supervising MD. Patient s skin was prepped per protocol.  Zio monitor was placed.  Instructions were reviewed with and given to the patient.  Patient verbalized understanding of wear, troubleshooting and monitor return instructions.

## 2024-11-14 NOTE — NURSING NOTE
Chief Complaint   Patient presents with    Follow Up     annual cardiology visit, coronary artery disease involving native vessel       Vitals were take, medications reconciled     Song Johnson, EMT    9:34 AM

## 2024-11-14 NOTE — PATIENT INSTRUCTIONS
Patient Instructions:  It was a pleasure to see you in the cardiology clinic today.      If you have any questions, call  Lizzy Correia RN, at [phone].   [facility].  To schedule an appointment or to leave a message for your Care Team Press #1  If you are a physician calling for another physician Press #2  For Billing Press #3  For Medical Records Press #4  We are encouraging the use of Nebula to communicate with your HealthCare Provider    Note the new medications: none  Stop the following medications: METOPROLOL    The results from today include: pending Ziopatch results and coronary CTA (schedule at least 2 weeks out)  Please follow up pending results      If you have an urgent need after hours (8:00 am to 4:30 pm) please call [phone] and ask for the cardiology fellow on call.
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
## 2024-12-14 LAB — CV ZIO PRELIM RESULTS: NORMAL

## 2024-12-16 DIAGNOSIS — I25.10 CORONARY ARTERY DISEASE INVOLVING NATIVE CORONARY ARTERY OF NATIVE HEART WITHOUT ANGINA PECTORIS: ICD-10-CM

## 2024-12-16 DIAGNOSIS — E78.5 HYPERLIPIDEMIA LDL GOAL <70: Primary | ICD-10-CM

## 2024-12-16 DIAGNOSIS — I10 BENIGN ESSENTIAL HYPERTENSION: ICD-10-CM

## 2024-12-30 ENCOUNTER — TELEPHONE (OUTPATIENT)
Dept: CARDIOLOGY | Facility: CLINIC | Age: 74
End: 2024-12-30
Payer: COMMERCIAL

## 2024-12-30 NOTE — TELEPHONE ENCOUNTER
[facility] Call Center    Phone Message    May a detailed message be left on voicemail: yes     Reason for Call: Other: Narcisa called requesting to speak with her care team about her results from her ZioPatch monitor stating she thinks they look good and then has some questions about why she is having her CTA Coronary on Thursday. Please reach out to Narcisa to discuss. Thank you!     Action Taken: Other: Cardiology    Travel Screening: Not Applicable    Thank you!  Specialty Access Center       Date of Service:

## 2024-12-30 NOTE — TELEPHONE ENCOUNTER
S-(situation): having a bout of fibromyalgia, was recently exposed to norovirus. She would like to cancel her CTA.   I told her that is okay and we can reschedule.

## 2024-12-30 NOTE — TELEPHONE ENCOUNTER
[facility] Call Center    Phone Message    May a detailed message be left on voicemail: yes     Reason for Call: Other: The patient returned a call and she said that she is having issues with her phone and please just leave a voice mail or send her a my chart message with a call back number.     Action Taken: Other: Cardiology    Travel Screening: Not Applicable    Thank you!  Specialty Access Center       Date of Service:

## 2025-01-03 ENCOUNTER — TELEPHONE (OUTPATIENT)
Dept: FAMILY MEDICINE | Facility: CLINIC | Age: 75
End: 2025-01-03
Payer: COMMERCIAL

## 2025-01-03 NOTE — TELEPHONE ENCOUNTER
Symptoms    Describe your symptoms: Pt has GERD for the last week.  Has appt on 1/10 but pt is just miserable.  Wonders if there is any way Dr Robison would prescribe the Prochlorperazine Maleate 5 mgs?  She will try not to take them but its the only way she can get any relief. She does not take it as preventative.  It comes up into her mouth.  Please call and advise.      How long have you been having symptoms: about 1 week - 10 days      Preferred Pharmacy:     [facility] [place] [address]  Phone: [phone] Fax: [phone]        Could we send this information to you in [employer] or would you prefer to receive a phone call?:   Patient would prefer a phone call   Okay to leave a detailed message?: Yes at Cell number on file:    Telephone Information:   Mobile [phone]     Christiana Simms on 1/3/2025 at 12:43 PM

## 2025-01-07 NOTE — TELEPHONE ENCOUNTER
Patient returned call, I checked with pharmacy and she has 1 refill remaining on file with [facility] and they will get this ready for patient.     Patient will keep her appointment on Friday as she wants to discuss her fibromyalgia symptoms as well. Patient is wanting a referral to [facility] as they have a new fibromyalgia clinic she would like to see, patient will talk about this with Shwetha Robison DO.    Julie Behrendt RN

## 2025-01-10 ENCOUNTER — MYC MEDICAL ADVICE (OUTPATIENT)
Dept: FAMILY MEDICINE | Facility: CLINIC | Age: 75
End: 2025-01-10

## 2025-01-10 DIAGNOSIS — K58.0 IRRITABLE BOWEL SYNDROME WITH DIARRHEA: ICD-10-CM

## 2025-01-10 DIAGNOSIS — R80.9 TYPE 2 DIABETES MELLITUS WITH MICROALBUMINURIA, WITHOUT LONG-TERM CURRENT USE OF INSULIN (H): ICD-10-CM

## 2025-01-10 DIAGNOSIS — E11.29 TYPE 2 DIABETES MELLITUS WITH MICROALBUMINURIA, WITHOUT LONG-TERM CURRENT USE OF INSULIN (H): ICD-10-CM

## 2025-01-10 DIAGNOSIS — K31.84 GASTROPARESIS: Primary | ICD-10-CM

## 2025-01-10 DIAGNOSIS — M79.7 FIBROMYALGIA: ICD-10-CM

## 2025-01-10 DIAGNOSIS — K63.8219 SMALL INTESTINAL BACTERIAL OVERGROWTH (SIBO): ICD-10-CM

## 2025-01-10 PROBLEM — E11.9 TYPE 2 DIABETES MELLITUS WITHOUT COMPLICATION, WITHOUT LONG-TERM CURRENT USE OF INSULIN (H): Status: ACTIVE | Noted: 2025-01-10

## 2025-01-13 NOTE — TELEPHONE ENCOUNTER
Forwarding [facility] request regarding referral to PCP for review please.   Patient seen virtually with a referral to [facility] on 1/10/25 for Fibromyalgia.   Patient was also seen for GERD/Gastroparesis and DMII, and appears she's asking for these items to be added to the referral as well.   Forwarding for consideration, then can be routed to [facility] to fax referral to [facility].    Perla Calle RN  [facility]

## 2025-01-19 DIAGNOSIS — E11.9 TYPE 2 DIABETES MELLITUS WITHOUT COMPLICATION, WITHOUT LONG-TERM CURRENT USE OF INSULIN (H): ICD-10-CM

## 2025-01-20 RX ORDER — PEN NEEDLE, DIABETIC 32GX 5/32"
NEEDLE, DISPOSABLE MISCELLANEOUS
Qty: 100 EACH | Refills: 3 | Status: SHIPPED | OUTPATIENT
Start: 2025-01-20

## 2025-02-03 DIAGNOSIS — E11.9 TYPE 2 DIABETES MELLITUS WITHOUT COMPLICATION, WITHOUT LONG-TERM CURRENT USE OF INSULIN (H): ICD-10-CM

## 2025-02-03 DIAGNOSIS — K21.9 GASTROESOPHAGEAL REFLUX DISEASE WITHOUT ESOPHAGITIS: ICD-10-CM

## 2025-02-03 DIAGNOSIS — K31.84 GASTROPARESIS: ICD-10-CM

## 2025-02-03 DIAGNOSIS — I10 BENIGN ESSENTIAL HYPERTENSION: ICD-10-CM

## 2025-02-03 RX ORDER — LISINOPRIL 5 MG/1
5 TABLET ORAL DAILY
Qty: 90 TABLET | Refills: 3 | OUTPATIENT
Start: 2025-02-03

## 2025-02-03 RX ORDER — PROCHLORPERAZINE MALEATE 5 MG/1
5 TABLET ORAL EVERY 8 HOURS PRN
Qty: 30 TABLET | Refills: 1 | Status: SHIPPED | OUTPATIENT
Start: 2025-02-03

## 2025-02-03 NOTE — TELEPHONE ENCOUNTER
"  Medication Question or Refill    Contacts       Contact Date/Time Type Contact Phone/Fax    02/03/2025 05:04 AM CST Interface (Incoming) [facility], [address] (Pharmacy) [phone]            What medication are you calling about (include dose and sig)?: Pending Prescriptions:                       Disp   Refills    famotidine (PEPCID) 40 MG tablet [Pharmac*30 tab*0            Sig: TAKE ONE TABLET BY MOUTH ONCE DAILY    lisinopril (ZESTRIL) 5 MG tablet [Pharmac*90 tab*3            Sig: TAKE ONE TABLET BY MOUTH ONCE DAILY    prochlorperazine (COMPAZINE) 5 MG tablet  30 tab*1            Sig: Take 1 tablet (5 mg) by mouth every 8 hours as           needed for nausea or vomiting.        Preferred Pharmacy:     [address] [address]  Phone: [phone] Fax: [phone]      Controlled Substance Agreement on file:   CSA -- Patient Level:    CSA: None found at the patient level.       Who prescribed the medication?: Ricki    Do you need a refill? Yes    Patient offered an appointment? No    Do you have any questions or concerns?  Yes: pt states the famotidine is \"working miracles\" and has had zero symptoms, pt requesting to continue medication. Pt also wanted to let PCP know that [place] has not accepted pt as a new case.       Could we send this information to you in [facility] or would you prefer to receive a phone call?:   Patient would prefer a phone call   Okay to leave a detailed message?: Yes at Home number on file [phone] (home)    "

## 2025-02-04 RX ORDER — FAMOTIDINE 40 MG/1
40 TABLET, FILM COATED ORAL DAILY
Qty: 30 TABLET | Refills: 0 | Status: SHIPPED | OUTPATIENT
Start: 2025-02-04

## 2025-03-12 ENCOUNTER — TELEPHONE (OUTPATIENT)
Dept: CARDIOLOGY | Facility: CLINIC | Age: 75
End: 2025-03-12
Payer: COMMERCIAL

## 2025-03-12 NOTE — TELEPHONE ENCOUNTER
[facility] Call Center    Phone Message    May a detailed message be left on voicemail: yes     Reason for Call: Other: Pt is calling to inform that she has Gastroparesis/records are in epic.  Pt is requesting a call back to discuss how this will affect the CTA ANGIOGRAM CORONARY ARTERY     Action Taken: Other: cardio    Travel Screening: Not Applicable    Thank you!  Specialty Access Center       Date of Service:

## 2025-03-12 NOTE — TELEPHONE ENCOUNTER
Patient calling in with concerns because of  gastroparesis, diabetes and fibromyalgia.  She is wondering if the contrast dye she receives during the CTA will affect her gastroparesis. I told her that it should not affect her bowel motility.

## 2025-03-20 ENCOUNTER — TELEPHONE (OUTPATIENT)
Dept: FAMILY MEDICINE | Facility: CLINIC | Age: 75
End: 2025-03-20
Payer: COMMERCIAL

## 2025-03-25 ENCOUNTER — HOSPITAL ENCOUNTER (OUTPATIENT)
Dept: CT IMAGING | Facility: CLINIC | Age: 75
Discharge: HOME | End: 2025-03-25
Attending: INTERNAL MEDICINE | Admitting: INTERNAL MEDICINE
Payer: COMMERCIAL

## 2025-03-25 VITALS — HEART RATE: 53 BPM | RESPIRATION RATE: 20 BRPM | SYSTOLIC BLOOD PRESSURE: 111 MMHG | DIASTOLIC BLOOD PRESSURE: 65 MMHG

## 2025-03-25 DIAGNOSIS — R00.1 BRADYCARDIA: ICD-10-CM

## 2025-03-25 DIAGNOSIS — R94.39 ABNORMAL RESULT OF OTHER CARDIOVASCULAR FUNCTION STUDY: ICD-10-CM

## 2025-03-25 LAB
CREAT BLD-MCNC: 0.9 MG/DL (ref 0.5–1)
EGFRCR SERPLBLD CKD-EPI 2021: >60 ML/MIN/1.73M2

## 2025-03-25 PROCEDURE — 75574 CT ANGIO HRT W/3D IMAGE: CPT | Mod: 26 | Performed by: STUDENT IN AN ORGANIZED HEALTH CARE EDUCATION/TRAINING PROGRAM

## 2025-03-25 PROCEDURE — 250N000013 HC RX MED GY IP 250 OP 250 PS 637: Performed by: STUDENT IN AN ORGANIZED HEALTH CARE EDUCATION/TRAINING PROGRAM

## 2025-03-25 PROCEDURE — 82565 ASSAY OF CREATININE: CPT

## 2025-03-25 PROCEDURE — 75574 CT ANGIO HRT W/3D IMAGE: CPT

## 2025-03-25 PROCEDURE — 250N000011 HC RX IP 250 OP 636: Performed by: INTERNAL MEDICINE

## 2025-03-25 RX ORDER — IVABRADINE 5 MG/1
5-15 TABLET, FILM COATED ORAL
Status: DISCONTINUED | OUTPATIENT
Start: 2025-03-25 | End: 2025-03-26 | Stop reason: HOSPADM

## 2025-03-25 RX ORDER — METOPROLOL TARTRATE 1 MG/ML
5-20 INJECTION, SOLUTION INTRAVENOUS
Status: DISCONTINUED | OUTPATIENT
Start: 2025-03-25 | End: 2025-03-26 | Stop reason: HOSPADM

## 2025-03-25 RX ORDER — LIDOCAINE 40 MG/G
CREAM TOPICAL
Status: DISCONTINUED | OUTPATIENT
Start: 2025-03-25 | End: 2025-03-26 | Stop reason: HOSPADM

## 2025-03-25 RX ORDER — NITROGLYCERIN 0.4 MG/1
.4-.8 TABLET SUBLINGUAL
Status: DISCONTINUED | OUTPATIENT
Start: 2025-03-25 | End: 2025-03-26 | Stop reason: HOSPADM

## 2025-03-25 RX ORDER — METOPROLOL TARTRATE 25 MG/1
25-100 TABLET, FILM COATED ORAL
Status: COMPLETED | OUTPATIENT
Start: 2025-03-25 | End: 2025-03-25

## 2025-03-25 RX ORDER — IOPAMIDOL 755 MG/ML
110 INJECTION, SOLUTION INTRAVASCULAR ONCE
Status: COMPLETED | OUTPATIENT
Start: 2025-03-25 | End: 2025-03-25

## 2025-03-25 RX ADMIN — IOPAMIDOL 110 ML: 755 INJECTION, SOLUTION INTRAVENOUS at 11:12

## 2025-03-25 RX ADMIN — METOPROLOL TARTRATE 50 MG: 50 TABLET, FILM COATED ORAL at 10:40

## 2025-03-25 RX ADMIN — NITROGLYCERIN 0.8 MG: 0.4 TABLET SUBLINGUAL at 11:14

## 2025-03-25 NOTE — PROGRESS NOTES
Pt arrived for Coronary CT angiogram. Test, meds and side effects reviewed with pt. Resting HR 58-63 bpm. Given 50 mg PO Metoprolol per protocol. Administered 0.8 mg SL nitro on CTA table per order. CTA completed. Patient tolerated procedure well and denies symptoms of allergic reaction. Post monitoring completed and VSS. D/C instructions reviewed with pt whom verbalized understanding of need to increase PO fluids today. Escorted in a wheelchair out to the [facility] [place] accompanied by staff.

## 2025-03-29 DIAGNOSIS — I10 BENIGN ESSENTIAL HYPERTENSION: ICD-10-CM

## 2025-03-29 DIAGNOSIS — E11.9 TYPE 2 DIABETES MELLITUS WITHOUT COMPLICATION, WITHOUT LONG-TERM CURRENT USE OF INSULIN (H): ICD-10-CM

## 2025-03-31 DIAGNOSIS — K21.9 GASTROESOPHAGEAL REFLUX DISEASE WITHOUT ESOPHAGITIS: ICD-10-CM

## 2025-03-31 RX ORDER — LISINOPRIL 5 MG/1
5 TABLET ORAL DAILY
Qty: 90 TABLET | Refills: 0 | Status: SHIPPED | OUTPATIENT
Start: 2025-03-31

## 2025-03-31 NOTE — TELEPHONE ENCOUNTER
GFR Estimate   Date Value Ref Range Status   04/13/2021 74 >60 mL/min/[1.73_m2] Final     Comment:     Non  GFR Calc  Starting 12/18/2018, serum creatinine based estimated GFR (eGFR) will be   calculated using the Chronic Kidney Disease Epidemiology Collaboration   (CKD-EPI) equation.       GFR, ESTIMATED POCT   Date Value Ref Range Status   03/25/2025 >60 >60 mL/min/1.73m2 Final

## 2025-04-01 RX ORDER — FAMOTIDINE 40 MG/1
40 TABLET, FILM COATED ORAL DAILY
Qty: 90 TABLET | Refills: 1 | Status: SHIPPED | OUTPATIENT
Start: 2025-04-01

## 2025-04-21 NOTE — PROGRESS NOTES
"Virtual Visit Details    Type of service:  Video Visit   Video Start Time:  2:35 PM  Video End Time: 3:40 PM    Originating Location (pt. Location): Home  Distant Location (provider location):  Off-site  Platform used for Video Visit: [facility]      Time Spent:  65 minutes  Session Type:  follow up  Referring Physician:  Monico Riddle DO  Reason for RD Visit:   Gastroparesis, type 2 diabetes, esophageal dysphagia     Nutrition Assessment:  Patient is a 73 year old female with history that includes type 2 diabetes, dysphagia, regurgitation of food, and recent GES showed significant delayed gastric emptying (gastroparesis), small bowel obstruction, coronary artery disease, fibromyalgia, ADD, diverticulosis and diverticulitis, RAVI, GERD.     At initial visit, she stated that 6 years ago, symptoms started. Had \"bad case\" of diverticulitis and was taking care of bowels and took antibiotics and had some improvement. For the initial 3 years when she first started having symptoms, couldn't leave the house, had to wear depends and missed family events due to could not be away from the bathroom. She stated that her family was/is not very supportive of her or believe that she had symptoms. She also stated that she had a heart attack in 2011, and also has hx of fibromyalgia. Has not been craving anything that would increase her gastroparesis symptoms such as raw vegs but did crave licorice recently and heard that licorice may help. She has a lot of bloating and looks 9 months pregnant. Sometimes has a lot of belching and sometimes a lot of vomiting. She has lower left sided pain/soreness. She feels like her reflux symptoms may be a little better and not currently having issues overnight. Likes diet coke or diet 7-up but trying to decrease and at the same time increasing water intake. She feels that the diet soda sometimes helps with swallowing. Drinks 3-4 cups water per " day. She is making some changes for example sitting up after eating whereas before would lie down right away after eating. She is trying to relax with eating as well. In general she does not eat big meals and likes to eat very small amount more often and has done this most of her life. She has not been eating a lot of meats. Does not tolerate beef. Likes salmon and tolerates boiled chicken, but hasn't been eating lately. Her recent GES study showed significant delayed gastric emptying with 50% in stomach after 4 hours.     At follow up visit today 1/3/24, she stated that her stomach issues have improved a lot with following a gastroparesis diet. She is now having regular and formed BMs. She is no longer having a lot of stomachaches or vomiting. She will have some nausea but feels this might be more related to when she is hungry and hasn't eaten. She is concerned about her blood sugar levels and has pain related to her fibromyalgia. She has been eating the same foods at meals pretty consistently every day which she feels is helpful, but is not eating fruits or vegetables, a little but not a lot of protein and mostly eating CHO foods. She is interested in ideas and tips for managing blood sugars, getting variety to eat more of a healthy diet in spite of eating a lower fiber, lower fat diet. She messaged with writer via my chart with some questions previously as well, wondering about supplements and getting more nutrients with limit diet. She also asked fruit and vegetables supplements (such as balance of nature and those options). In the past she tried chocolate nutrition drinks but these caused diarrhea. She tolerated vanilla ones though. Not currently drinking any. She has been using small plates at meals and also has small spoons. She has been eating slowly and chewing food very well before swallowing. She used to eat quickly and drink a lot of water to wash down food, but now limiting the fluids with meal. She has  some carbonation daily. Will take some sips and put back in the refrigerator. It takes her 3 days to get through a 12 oz diet soda (diet 7-up or diet coke). She recently has been eating 1 pc of licorice and this is tolerated and does not have a lot of sugar and is ~30 calories for piece.    Patient Active Problem List   Diagnosis    Attention deficit disorder    NAFL (nonalcoholic fatty liver)    Moderate episode of recurrent major depressive disorder (H)    Seasonal allergies    Anal fissure    Menopausal syndrome (hot flashes)    Hyperlipidemia LDL goal <70    HPV (human papilloma virus) anogenital infection    Coronary artery disease involving native coronary artery of native heart without angina pectoris    Diverticulosis    Advanced directives, counseling/discussion    Colon polyp    Chronic rhinitis    Benign essential hypertension    Irritable bowel syndrome with diarrhea    Type 2 diabetes mellitus with microalbuminuria, without long-term current use of insulin (H)    Gastroesophageal reflux disease without esophagitis    Osteopenia of both hips    History of diverticulitis    Abdominal adhesions    Hip pain, right    Fibromyalgia    RAVI (generalized anxiety disorder)    Gastroparesis    Esophageal dysphagia    Small intestinal bacterial overgrowth (SIBO)     Height:   Ht Readings from Last 1 Encounters:   07/11/23 1.524 m (5')     Weight:  Wt Readings from Last 10 Encounters:   01/03/24 74.4 kg (164 lb)   07/11/23 72.6 kg (160 lb)   06/21/23 71.7 kg (158 lb)   06/15/23 74.2 kg (163 lb 9.6 oz)   06/14/23 73.4 kg (161 lb 14.4 oz)   03/22/23 74.2 kg (163 lb 8 oz)   02/24/23 72.6 kg (160 lb)   11/07/22 72.6 kg (160 lb)   07/18/22 72.6 kg (160 lb)   06/23/22 72.6 kg (160 lb)     BMI: Estimated body mass index is 32.03 kg/m  as calculated from the following:    Height as of 7/11/23: 1.524 m (5').    Weight as of this encounter: 74.4 kg (164 lb).    Diet Recall:  (some usual/recent meals/snacks/beverages):  eating  slowly and chewing to mashed potato consistency.  Meal Food    Breakfast 11 AM: Activa yogurt (freezes it) and pack kaur doone cookies, 2 pc SF dark chocolate   Lunch 1-2 pc WW toast lightly toast with very small amount seedless plum jelly and string cheese and 2 olives    Dinner Tamazight bakery bread (garlic) with earth vegan margarine spread or cheese stuffed ravioli with shredded mozzarella or rice with margarine and salt.   Snacks Small scoop of SF frozen yogurt, sometimes a pc of string cheese   Beverages 1/2 cup at most of Coffee with little bit cream, 24 oz water, has some carbonation daily such as diet 7-up, occas diet coke, (a 12 oz bottle can last 3 days).   Alcohol Intake None. Can't handle alcohol     Frequency of eating/taking out meals: not usually.     Labs:    Last Comprehensive Metabolic Panel:  Sodium   Date Value Ref Range Status   02/24/2023 142 136 - 145 mmol/L Final   04/13/2021 138 133 - 144 mmol/L Final     Potassium   Date Value Ref Range Status   02/24/2023 4.2 3.4 - 5.3 mmol/L Final     Comment:     Specimen slightly hemolyzed, potassium may be falsely elevated.   03/15/2022 4.1 3.4 - 5.3 mmol/L Final   04/13/2021 3.9 3.4 - 5.3 mmol/L Final     Chloride   Date Value Ref Range Status   02/24/2023 108 (H) 98 - 107 mmol/L Final   03/15/2022 108 94 - 109 mmol/L Final   04/13/2021 106 94 - 109 mmol/L Final     Carbon Dioxide   Date Value Ref Range Status   04/13/2021 27 20 - 32 mmol/L Final     Carbon Dioxide (CO2)   Date Value Ref Range Status   02/24/2023 21 (L) 22 - 29 mmol/L Final   03/15/2022 31 20 - 32 mmol/L Final     Anion Gap   Date Value Ref Range Status   02/24/2023 13 7 - 15 mmol/L Final   03/15/2022 3 3 - 14 mmol/L Final   04/13/2021 5 3 - 14 mmol/L Final     Glucose   Date Value Ref Range Status   02/24/2023 134 (H) 70 - 99 mg/dL Final   03/15/2022 112 (H) 70 - 99 mg/dL Final   04/13/2021 148 (H) 70 - 99 mg/dL Final     Comment:     Fasting specimen     GLUCOSE BY METER POCT   Date  Value Ref Range Status   11/07/2022 76 70 - 99 mg/dL Final     Urea Nitrogen   Date Value Ref Range Status   02/24/2023 17.3 8.0 - 23.0 mg/dL Final   03/15/2022 10 7 - 30 mg/dL Final   04/13/2021 11 7 - 30 mg/dL Final     Creatinine   Date Value Ref Range Status   02/24/2023 0.73 0.51 - 0.95 mg/dL Final   04/13/2021 0.80 0.52 - 1.04 mg/dL Final     GFR Estimate   Date Value Ref Range Status   02/24/2023 87 >60 mL/min/1.73m2 Final     Comment:     eGFR calculated using 2021 CKD-EPI equation.   04/13/2021 74 >60 mL/min/[1.73_m2] Final     Comment:     Non  GFR Calc  Starting 12/18/2018, serum creatinine based estimated GFR (eGFR) will be   calculated using the Chronic Kidney Disease Epidemiology Collaboration   (CKD-EPI) equation.       Calcium   Date Value Ref Range Status   02/24/2023 8.7 (L) 8.8 - 10.2 mg/dL Final   04/13/2021 9.2 8.5 - 10.1 mg/dL Final     CBC RESULTS:   Recent Labs   Lab Test 02/24/23  1142   WBC 13.2*   RBC 4.46   HGB 14.0   HCT 42.3   MCV 95   MCH 31.4   MCHC 33.1   RDW 12.6        Pertinent Medications/vitamin and mineral supplements:    was taking a hair, skin, nail supplement as was noticing some hair loss.   Current Outpatient Medications   Medication    acetaminophen (TYLENOL) 500 MG tablet    acetylcysteine (N-ACETYL CYSTEINE) 600 MG CAPS capsule    alcohol swab prep pads    Alcohol Swabs (B-D SINGLE USE SWABS REGULAR) PADS    ALPRAZolam (XANAX PO)    amphetamine-dextroamphetamine (ADDERALL XR) 20 MG per capsule    atorvastatin (LIPITOR) 80 MG tablet    blood glucose (NO BRAND SPECIFIED) lancets standard    blood glucose (NO BRAND SPECIFIED) test strip    blood glucose calibration (NO BRAND SPECIFIED) solution    blood glucose monitoring (NO BRAND SPECIFIED) meter device kit    CONTOUR NEXT TEST test strip    DoxePIN (SINEQUAN) 75 MG capsule    escitalopram (LEXAPRO) 20 MG tablet    fluocinonide (LIDEX) 0.05 % external solution    insulin glargine (LANTUS SOLOSTAR)  100 UNIT/ML pen    insulin pen needle (ULTICARE MICRO) 32G X 4 MM miscellaneous    LANsoprazole (PREVACID) 30 MG DR capsule    lisinopril (ZESTRIL) 5 MG tablet    metoprolol succinate ER (TOPROL XL) 25 MG 24 hr tablet    Microlet Lancets MISC    mupirocin (BACTROBAN) 2 % external ointment    nitroGLYcerin (NITROSTAT) 0.4 MG sublingual tablet    nystatin (MYCOSTATIN) 549851 UNIT/GM external cream    prochlorperazine (COMPAZINE) 5 MG tablet    Wheat Dextrin (BENEFIBER PO)    aspirin 81 MG EC tablet     No current facility-administered medications for this visit.     Food Allergies:  NKFA    MALNUTRITION:  % Weight Loss:  No significant weight loss  % Intake:  decreased intake does not meet criteria   Subcutaneous Fat Loss:  None observed  Muscle Loss:  None observed  Fluid Retention:  None noted    Malnutrition Diagnosis: Patient does not meet two of the above criteria necessary for diagnosing malnutrition  In Context of:  Chronic illness or disease    Nutrition Prescription: Nutrition Education     Nutrition Intervention      Provided diet education review for gastroparesis, dysphagia, diabetes. Answered questions about supplements and oral nutrition drink supplements. Encouraged her to aim for increasing the variety in her diet based on gastroparesis guidelines and discussed some ideas based on her food preferences.     Answered patient's questions. Patient verbalized understanding of education provided. See Goals below.     Educational Materials Provided:  NCM: gastroparesis nutrition therapy and [facility] gastroparesis handout    Goals:    Continue eating multiple smaller meals per day such as 4-6 smaller meals/smaller meals and snacks per day that are lower in fiber and lower in fat.     2. Include some lean/lowfat protein along with each meal and re-try some very soft cooked vegetables.    3. Re-try some lower fiber and lower fats foods that you have been avoiding.     Here are some ideas we discussed today of foods to  re-try and reincorporate into your diet as tolerated:  --eggs (okay to add some lowfat dhillon to them).  --salmon (baked or broiled/lower fat cooking method recommended)  --tuna (okay to add some lowfat dhillon to it)  --okay for some creamy nut butters (such as creamy almond butter or creamy peanut butter.)  --low fat cottage cheese (is a good source of protein. We didn't discuss this at our visit but wanted to include since it is a softer source of protein).  --soft cooked vegetables such as mashed cauliflower or cooked carrots  --can have some canned fruit in its own juice such as canned peaches or canned pears and unsweetened applesauce.   --can peel and slice an apple and cook it so it is very well cooked/soft cooked. (Can try this with fresh peaches in the summer. Peels them, slice and cook well).  --can have sugar free pudding    4. Can have a nutrition/protein drink as a small meal/snack if better tolerated and/or instead of skipping a meal. These drinks count towards those 4-6 smaller meals.  -some examples include using a protein powder of your choice and mixing with water/skim or lowfat milk or lowfat milk substitute, or Premier protein, Ensure max, Ensure high protein, Boost high protein max or Boost glucose control. If you'd like a plant based protein drink, then some examples are Ripple protein drinks, Orgain Vegan drinks.    5. Continue to chew food very well before swallowing (to more of mashed potato consistency or applesauce consistency before swallowing).    6. Drink at least 6-8 cups (48 oz-64 oz) per day (and continue to drink most of your fluids in between meals as you are currently doing).    Nutrition Monitoring and Evaluation: Will monitor adherence to nutrition recommendations at future RD visits.     Further Medical Nutrition Therapy:  Follow-up in 4 months    Patient was encouraged to contact RD with any further questions.    Mag Villa, MS, RD, LD       hard copy, drawn during this pregnancy

## 2025-04-30 ENCOUNTER — VIRTUAL VISIT (OUTPATIENT)
Dept: FAMILY MEDICINE | Facility: CLINIC | Age: 75
End: 2025-04-30
Payer: COMMERCIAL

## 2025-04-30 ENCOUNTER — TELEPHONE (OUTPATIENT)
Dept: FAMILY MEDICINE | Facility: CLINIC | Age: 75
End: 2025-04-30

## 2025-04-30 DIAGNOSIS — E11.29 TYPE 2 DIABETES MELLITUS WITH DIABETIC MICROALBUMINURIA, WITH LONG-TERM CURRENT USE OF INSULIN (H): Primary | ICD-10-CM

## 2025-04-30 DIAGNOSIS — K31.84 GASTROPARESIS: ICD-10-CM

## 2025-04-30 DIAGNOSIS — A63.0 HPV (HUMAN PAPILLOMA VIRUS) ANOGENITAL INFECTION: ICD-10-CM

## 2025-04-30 DIAGNOSIS — K58.0 IRRITABLE BOWEL SYNDROME WITH DIARRHEA: ICD-10-CM

## 2025-04-30 DIAGNOSIS — M85.851 OSTEOPENIA OF BOTH HIPS: ICD-10-CM

## 2025-04-30 DIAGNOSIS — N18.31 STAGE 3A CHRONIC KIDNEY DISEASE (H): ICD-10-CM

## 2025-04-30 DIAGNOSIS — C44.529 SQUAMOUS CELL CANCER OF SKIN OF BUTTOCK: ICD-10-CM

## 2025-04-30 DIAGNOSIS — Z79.4 TYPE 2 DIABETES MELLITUS WITH DIABETIC MICROALBUMINURIA, WITH LONG-TERM CURRENT USE OF INSULIN (H): Primary | ICD-10-CM

## 2025-04-30 DIAGNOSIS — F33.1 MODERATE EPISODE OF RECURRENT MAJOR DEPRESSIVE DISORDER (H): ICD-10-CM

## 2025-04-30 DIAGNOSIS — R80.9 TYPE 2 DIABETES MELLITUS WITH DIABETIC MICROALBUMINURIA, WITH LONG-TERM CURRENT USE OF INSULIN (H): Primary | ICD-10-CM

## 2025-04-30 DIAGNOSIS — M79.7 FIBROMYALGIA: ICD-10-CM

## 2025-04-30 DIAGNOSIS — M85.852 OSTEOPENIA OF BOTH HIPS: ICD-10-CM

## 2025-04-30 DIAGNOSIS — K21.9 GASTROESOPHAGEAL REFLUX DISEASE WITHOUT ESOPHAGITIS: ICD-10-CM

## 2025-04-30 PROBLEM — E11.9 TYPE 2 DIABETES MELLITUS WITHOUT COMPLICATION, WITHOUT LONG-TERM CURRENT USE OF INSULIN (H): Status: RESOLVED | Noted: 2025-01-10 | Resolved: 2025-04-30

## 2025-04-30 RX ORDER — METHOCARBAMOL 500 MG/1
500 TABLET, FILM COATED ORAL 4 TIMES DAILY PRN
Qty: 30 TABLET | Refills: 3 | Status: SHIPPED | OUTPATIENT
Start: 2025-04-30

## 2025-04-30 ASSESSMENT — PATIENT HEALTH QUESTIONNAIRE - PHQ9: SUM OF ALL RESPONSES TO PHQ QUESTIONS 1-9: 14

## 2025-04-30 NOTE — PATIENT INSTRUCTIONS
Diabetes  You are due for dilated eye exam.  Have the report sent to Dr. Robison's office.  Please schedule fasting blood work  Next option would be mealtime insulin.  This would be initiated with diabetes educator     GI  Meet with Nutritionist - referral placed  I worry about micronutrient deficiencies with your restrictive diet  Will check vitamin levels  Recommend to start a Prenatal Vitamin  Cannabis may help with fibromyalgia pain, but it has potential to worsen gastroparesis   I would recommend follow-up with GI - either [facility] or [facility] that you have already seen    Health Care Maintenance  Recommend pneumonia vaccine  Recommend RSV, shingles, tetanus vaccine-get at retail pharmacy  You are due for bone density test. Radiology test was ordered.  Please call [phone] to schedule.    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  Can make you drowsy  Regular use of a back brace usually worsens back pain over time.  Warm water pool therapy is the exercise that helps fibromyalgia pain the most  However, any regular exercise program can be helpful  Start small, go slowly, increase duration  Can start with simple stretches or a 5 min walk at a slow pace, etc  Chair yoga is also a good place to start    Exercise Resources:    For Seniors or Those with Pain/Mobility Issues:  Silver Sneakers  https://tools.[employer]/  [employer]itness, [employer] Fitness, [facility], [facility]  2. mobilePeoples Stretch (airs on [employer])  https://Tempeest.[employer]/  3. Your [employer] - small group classes (in person or online) that help you stay active. Free or low cost  Yourjuniper.org  4. Yoga for Seniors - free, online  [email or web address]/content/[email or web address]-seniors  5. Sit and Be Fit https://www.sitandbefit.org/  6. Rex Chi  Rex Chi is a  great option for balance.  Consider Rex Chi or Fany Lagos: moving for Better Balance  [facility] - videos for balance

## 2025-04-30 NOTE — TELEPHONE ENCOUNTER
Patient calling due to having to cancel an appointment today with Dr. Robison because she is having increased symptoms of gastroparesis and fibromyalgia.    Blood sugars have been elevated intermittently for the past 2 weeks with a few days her blood sugar was in the low 200s. The highest blood sugar was 220. Patient is not sure why her blood sugars have been more elevated and has not changed her diet. Patient reports increased pain with walking for the past month and has not been walking as much.     Patient takes Lantus 20 units once daily     Patient rescheduled her appointment with Dr. Robison for 5/14/25.    Ayleen Lee RN on 4/30/2025 at 10:38 AM

## 2025-04-30 NOTE — PROGRESS NOTES
Narcisa is a 75 year old who is being evaluated via a billable video visit.    How would you like to obtain your AVS? MyChart  If the video visit is dropped, the invitation should be resent by: Send to e-mail at: [email or web address]  Will anyone else be joining your video visit? No      Assessment & Plan   Problem List Items Addressed This Visit       Fibromyalgia    Relevant Medications    methocarbamol (ROBAXIN) 500 MG tablet    Gastroesophageal reflux disease without esophagitis    Relevant Orders    Adult GI  Referral - Consult Only    Gastroparesis    Relevant Orders    Adult GI  Referral - Consult Only    HPV (human papilloma virus) anogenital infection    Irritable bowel syndrome with diarrhea    Relevant Orders    Vitamin B12    Adult GI  Referral - Consult Only    Moderate episode of recurrent major depressive disorder (H)    Osteopenia of both hips    Relevant Medications    methocarbamol (ROBAXIN) 500 MG tablet    Other Relevant Orders    DEXA HIP/PELVIS/SPINE - Future    Type 2 diabetes mellitus with microalbuminuria, without long-term current use of insulin (H) - Primary     Other Visit Diagnoses       Stage 3a chronic kidney disease (H)        Relevant Orders    Adult Nutrition  Referral    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  Have the report sent to Dr. Robison's office.  Please schedule fasting blood work  Next option would be mealtime insulin.  This would be initiated with diabetes educator     GI  Meet with Nutritionist - referral placed  I worry about micronutrient deficiencies with your restrictive diet  Will check vitamin levels  Recommend to start a Prenatal Vitamin  Cannabis may help with fibromyalgia pain, but it has potential to worsen gastroparesis   I would recommend  follow-up with GI - either [facility] or [facility] that you have already seen    Health Care Maintenance  Recommend pneumonia vaccine  Recommend RSV, shingles, tetanus vaccine-get at retail pharmacy  You are due for bone density test. Radiology test was ordered.  Please call [phone] to schedule.    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  Can make you drowsy  Regular use of a back brace usually worsens back pain over time.  Warm water pool therapy is the exercise that helps fibromyalgia pain the most  However, any regular exercise program can be helpful  Start small, go slowly, increase duration  Can start with simple stretches or a 5 min walk at a slow pace, etc  Chair yoga is also a good place to start    Exercise Resources:    For Seniors or Those with Pain/Mobility Issues:  Silver Sneakers  https://tools.[employer]/  [employer], [employer], [facility], [facility]  2. Folica Stretch (airs on PBS)  https://Resonergy/  3. Your [employer] - small group classes (in person or online) that help you stay active. Free or low cost  [employer]Tempo[employer]  4. Yoga for Seniors - free, online  [email or web address].WibiData/[email or web address]/[email or web address]-seniors  5. Sit and Be Fit https://www.sitandbefit.org/  6. Rex Chi  Rex Chi is a great option for balance.  Consider Rex Chi or Fany Donohue Demond: moving for Better Balance  [facility] - videos for balance               Subjective   Narcisa is a 75 year old, presenting for the following health issues:  Hypertension, Lipids, and Diabetes        4/30/2025     1:21 PM   Additional Questions   Roomed by antonio   Accompanied by self         4/30/2025     1:21 PM   Patient Reported Additional Medications   Patient reports taking the following  new medications na       Video Start Time: 2:16 PM    History of Present Illness       Diabetes:   She presents for follow up of diabetes.  She is checking home blood glucose a few times a month.   She checks blood glucose before and after meals and at bedtime.  Blood glucose is sometimes over 200 and never under 70.    She is concerned about blood sugar frequently over 200.   She is having blurry vision and weight gain.  The patient has not had a diabetic eye exam in the last 12 months.          Reason for visit:  Diabetes2, Fibromyalgia, consult on gastrointestinal issues    She eats 0-1 servings of fruits and vegetables daily.She consumes 0 sweetened beverage(s) daily.She exercises with enough effort to increase her heart rate 10 to 19 minutes per day.  She exercises with enough effort to increase her heart rate 4 days per week.   She is taking medications regularly.        Chief Complaint   Patient presents with    Hypertension    Lipids    Diabetes   She feels diabetes, gastroparesis and fibromyalgia symptoms are all interrelated     GI - gastroparesis, IBS, reflux  --she asked for referral to [place] for gastroparesis  - they declined the referral  -- Last visit in January added famotidine  --she eats the same things every day to help w/her gastroparesis  --she has been following the diet below for 2+ years  --she strongly prefers not to have vagus nerve procedure that was proposed by [facility]    Breakfast - activia yogurt and kaur dune cookie  Lunch- olives, toast, string cheese, kaur dune cookie  Dinner - cheese ravioli, cheese sauce.  Dinner is late 8 pm  HS - frozen yogurt - 10 pm  Bedtime - midnight    Fibromyalgia   --she asked for referral to [place] fibromyalgia clinic  -- She and  have noted that her fibromyalgia seems to be worse with exercise.  She wondered if acupuncture would help.  She wondered if a seated foot peddler would help.  At her last visit in January I spent an extensive amount of time  discussing the benefits of exercise especially for fibromyalgia pain.  I gave her resources and discussed ways to slowly incorporate exercise  --she is hesitant to take more medications for fibromyalgia   --she reports having tick bite 10+ years ago, treated with erythromycin  --wonders if cannabis would help w/pain  --wonders about lumbar belt  --there are days where she can get incapacitated by doing 1 load of laundry    Anal fissure  Perineal Condyloma  --diagnosed in 2011  --she reports non-healing lesion superior to this    Diabetes Follow-up    How often are you checking your blood sugar? A few times a month  What time of day are you checking your blood sugars (select all that apply)?  Before and after meals and At bedtime  Have you had any blood sugars above 200?  Yes daily  Have you had any blood sugars below 70?  No  What symptoms do you notice when your blood sugar is low?  None  What concerns do you have today about your diabetes? Blood sugar is often over 200   Do you have any of these symptoms? (Select all that apply)  Blurry vision and Weight gain  Have you had a diabetic eye exam in the last 12 months? No  He last several months she has gotten 'lazy' and not checking blood sugar  The last few days she wasn't feeling well and starting checking blood sugar more often  Thinks sugars are running higher for her;  more often 160-220. Fasting blood sugar are good but post-prandial  Lantus 20 units HS  No hypoglycemia  Vision is blurry and having shimmers in her vision            Hyperlipidemia Follow-Up    Are you regularly taking any medication or supplement to lower your cholesterol?   Yes-    Are you having muscle aches or other side effects that you think could be caused by your cholesterol lowering medication?  No    Hypertension Follow-up    Do you check your blood pressure regularly outside of the clinic? No   Are you following a low salt diet? Yes  Are your blood pressures ever more than 140 on the  top number (systolic) OR more than 90 on the bottom number (diastolic), for example 140/90? No    BP Readings from Last 2 Encounters:   03/25/25 111/65   11/14/24 138/76     Hemoglobin A1C (%)   Date Value   04/27/2024 6.5 (H)   06/13/2023 6.3 (H)   04/13/2021 7.1 (H)   01/28/2020 7.2 (H)     LDL Cholesterol Calculated (mg/dL)   Date Value   04/27/2024 64   03/22/2023 63   04/13/2021 59   05/06/2019 55     How many servings of fruits and vegetables do you eat daily?  0-1  On average, how many sweetened beverages do you drink each day (Examples: soda, juice, sweet tea, etc.  Do NOT count diet or artificially sweetened beverages)?   0  How many days per week do you exercise enough to make your heart beat faster? 4  How many minutes a day do you exercise enough to make your heart beat faster? 10 - 19  How many days per week do you miss taking your medication? 0        Current Outpatient Medications   Medication Sig Dispense Refill    acetaminophen (TYLENOL) 500 MG tablet Take 500-1,000 mg by mouth every 4 hours as needed for mild pain      alcohol swab prep pads Use to swab area of injection/bethel as directed. 100 each 3    Alcohol Swabs (B-D SINGLE USE SWABS REGULAR) PADS USE TO SWAB AREA OF INJECTION/BETHEL AS DIRECTED. 100 each 3    ALPRAZolam (XANAX PO) Take 0.5-1 mg by mouth 4 times daily as needed 1/2 tab in am, 1 tab in pm, then 1 tab midday prn and 1/2 tab throughout the day if needed      amphetamine-dextroamphetamine (ADDERALL XR) 20 MG per capsule Take 20 mg by mouth daily       aspirin 81 MG EC tablet Take 81 mg by mouth daily.      atorvastatin (LIPITOR) 80 MG tablet TAKE ONE TABLET BY MOUTH ONCE DAILY 90 tablet 3    blood glucose (NO BRAND SPECIFIED) lancets standard Use to test blood sugar 3 times daily or as directed. 100 each 1    blood glucose (NO BRAND SPECIFIED) test strip Use to test blood sugar 1 times daily or as directed. To accompany: Blood Glucose Monitor Brands: per insurance. 100 strip 6     blood glucose calibration (NO BRAND SPECIFIED) solution To accompany: Blood Glucose Monitor Brands: per insurance. 1 each 1    blood glucose monitoring (NO BRAND SPECIFIED) meter device kit Use to test blood sugar 1 times daily or as directed. Preferred blood glucose meter OR supplies to accompany: Blood Glucose Monitor Brands: per insurance. 1 kit 0    CONTOUR NEXT TEST test strip USE TO TEST BLOOD SUGAR THREE TIMES A  strip 0    DoxePIN (SINEQUAN) 75 MG capsule Take 75 mg by mouth At Bedtime       escitalopram (LEXAPRO) 20 MG tablet Take 20 mg by mouth daily      famotidine (PEPCID) 40 MG tablet TAKE ONE TABLET BY MOUTH ONCE DAILY 90 tablet 1    insulin glargine (LANTUS SOLOSTAR) 100 UNIT/ML pen Inject 20 Units subcutaneously at bedtime 30 mL 3    insulin pen needle (ULTICARE MICRO) 32G X 4 MM miscellaneous USE 1 PEN NEEDLE DAILY OR AS DIRECTED. NEEDS BLOOD WORK FOR FUTHER REFILLS 100 each 3    LANsoprazole (PREVACID) 30 MG DR capsule TAKE 1 CAPSULE (30 MG) BY MOUTH EVERY MORNING (BEFORE BREAKFAST) 30-60 MINUTES BEFORE A MEAL. 90 capsule 3    lisinopril (ZESTRIL) 5 MG tablet TAKE ONE TABLET BY MOUTH ONCE DAILY 90 tablet 0    Microlet Lancets MISC USE TO TEST BLOOD SUGAR 3 TIMES DAILY OR AS DIRECTED. 100 each 0    mupirocin (BACTROBAN) 2 % external ointment Apply to open areas on the scalp BID until healed. Need follow up appointment in Derm. 30 g 0    nitroGLYcerin (NITROSTAT) 0.4 MG sublingual tablet Place 1 tablet (0.4 mg) under the tongue every 5 minutes as needed for chest pain 25 tablet 1    nystatin (MYCOSTATIN) 130468 UNIT/GM external cream Apply topically 2 times daily 30 g 11    prochlorperazine (COMPAZINE) 5 MG tablet Take 1 tablet (5 mg) by mouth every 8 hours as needed for nausea or vomiting. 30 tablet 1           Review of Systems  Constitutional, neuro, ENT, endocrine, pulmonary, cardiac, gastrointestinal, genitourinary, musculoskeletal, integument and psychiatric systems are negative, except  as otherwise noted.      Objective    Vitals - Patient Reported  Pain Score: Severe Pain (7)  Pain Loc: Abdomen      Vitals:  No vitals were obtained today due to virtual visit.    Physical Exam   GENERAL: no distress, frail, and elderly  EYES: Eyes grossly normal to inspection.  No discharge or erythema, or obvious scleral/conjunctival abnormalities.  RESP: No audible wheeze, cough, or visible cyanosis.    SKIN: Visible skin clear. No significant rash, abnormal pigmentation or lesions.  NEURO: Cranial nerves grossly intact.  Mentation and speech appropriate for age.  PSYCH: Appropriate affect, tone, and pace of words          Video-Visit Details    Type of service:  Video Visit   Video End Time:3:18 PM  Originating Location (pt. Location): Home    Distant Location (provider location):  On-site  Platform used for Video Visit: Dina  Signed Electronically by: Shwetha Robison DO

## 2025-05-01 ENCOUNTER — PATIENT OUTREACH (OUTPATIENT)
Dept: CARE COORDINATION | Facility: CLINIC | Age: 75
End: 2025-05-01
Payer: COMMERCIAL

## 2025-05-05 ENCOUNTER — PATIENT OUTREACH (OUTPATIENT)
Dept: CARE COORDINATION | Facility: CLINIC | Age: 75
End: 2025-05-05
Payer: COMMERCIAL

## 2025-05-10 ENCOUNTER — HEALTH MAINTENANCE LETTER (OUTPATIENT)
Age: 75
End: 2025-05-10

## 2025-05-12 ENCOUNTER — LAB (OUTPATIENT)
Dept: LAB | Facility: CLINIC | Age: 75
End: 2025-05-12
Payer: COMMERCIAL

## 2025-05-12 DIAGNOSIS — R80.9 TYPE 2 DIABETES MELLITUS WITH DIABETIC MICROALBUMINURIA, WITH LONG-TERM CURRENT USE OF INSULIN (H): ICD-10-CM

## 2025-05-12 DIAGNOSIS — R80.9 TYPE 2 DIABETES MELLITUS WITH MICROALBUMINURIA, WITHOUT LONG-TERM CURRENT USE OF INSULIN (H): ICD-10-CM

## 2025-05-12 DIAGNOSIS — Z79.4 TYPE 2 DIABETES MELLITUS WITH DIABETIC MICROALBUMINURIA, WITH LONG-TERM CURRENT USE OF INSULIN (H): ICD-10-CM

## 2025-05-12 DIAGNOSIS — E11.29 TYPE 2 DIABETES MELLITUS WITH MICROALBUMINURIA, WITHOUT LONG-TERM CURRENT USE OF INSULIN (H): ICD-10-CM

## 2025-05-12 DIAGNOSIS — E11.29 TYPE 2 DIABETES MELLITUS WITH DIABETIC MICROALBUMINURIA, WITH LONG-TERM CURRENT USE OF INSULIN (H): ICD-10-CM

## 2025-05-12 DIAGNOSIS — K58.0 IRRITABLE BOWEL SYNDROME WITH DIARRHEA: ICD-10-CM

## 2025-05-12 LAB
ALBUMIN SERPL BCG-MCNC: 4.1 G/DL (ref 3.5–5.2)
ALP SERPL-CCNC: 122 U/L (ref 40–150)
ALT SERPL W P-5'-P-CCNC: 14 U/L (ref 0–50)
ANION GAP SERPL CALCULATED.3IONS-SCNC: 12 MMOL/L (ref 7–15)
AST SERPL W P-5'-P-CCNC: 24 U/L (ref 0–45)
BILIRUB SERPL-MCNC: 0.6 MG/DL
BUN SERPL-MCNC: 12.9 MG/DL (ref 8–23)
CALCIUM SERPL-MCNC: 9.6 MG/DL (ref 8.8–10.4)
CHLORIDE SERPL-SCNC: 100 MMOL/L (ref 98–107)
CHOLEST SERPL-MCNC: 146 MG/DL
CREAT SERPL-MCNC: 0.9 MG/DL (ref 0.51–0.95)
CREAT UR-MCNC: 213.6 MG/DL
EGFRCR SERPLBLD CKD-EPI 2021: 67 ML/MIN/1.73M2
ERYTHROCYTE [DISTWIDTH] IN BLOOD BY AUTOMATED COUNT: 12.8 % (ref 10–15)
EST. AVERAGE GLUCOSE BLD GHB EST-MCNC: 174 MG/DL
FASTING STATUS PATIENT QL REPORTED: YES
FASTING STATUS PATIENT QL REPORTED: YES
FERRITIN SERPL-MCNC: 26 NG/ML (ref 11–328)
GLUCOSE SERPL-MCNC: 149 MG/DL (ref 70–99)
HBA1C MFR BLD: 7.7 % (ref 0–5.6)
HCO3 SERPL-SCNC: 25 MMOL/L (ref 22–29)
HCT VFR BLD AUTO: 40.6 % (ref 35–47)
HDLC SERPL-MCNC: 52 MG/DL
HGB BLD-MCNC: 13.4 G/DL (ref 11.7–15.7)
IRON SERPL-MCNC: 65 UG/DL (ref 37–145)
LDLC SERPL CALC-MCNC: 70 MG/DL
MAGNESIUM SERPL-MCNC: 1.6 MG/DL (ref 1.7–2.3)
MCH RBC QN AUTO: 29.8 PG (ref 26.5–33)
MCHC RBC AUTO-ENTMCNC: 33 G/DL (ref 31.5–36.5)
MCV RBC AUTO: 90 FL (ref 78–100)
MICROALBUMIN UR-MCNC: 77.1 MG/L
MICROALBUMIN/CREAT UR: 36.1 MG/G CR (ref 0–25)
NONHDLC SERPL-MCNC: 94 MG/DL
PHOSPHATE SERPL-MCNC: 4.2 MG/DL (ref 2.5–4.5)
PLATELET # BLD AUTO: 213 10E3/UL (ref 150–450)
POTASSIUM SERPL-SCNC: 3.5 MMOL/L (ref 3.4–5.3)
PROT SERPL-MCNC: 6.4 G/DL (ref 6.4–8.3)
RBC # BLD AUTO: 4.5 10E6/UL (ref 3.8–5.2)
SODIUM SERPL-SCNC: 137 MMOL/L (ref 135–145)
TRIGL SERPL-MCNC: 118 MG/DL
VIT B12 SERPL-MCNC: 538 PG/ML (ref 232–1245)
WBC # BLD AUTO: 6.8 10E3/UL (ref 4–11)

## 2025-05-12 PROCEDURE — 80061 LIPID PANEL: CPT

## 2025-05-12 PROCEDURE — 82570 ASSAY OF URINE CREATININE: CPT

## 2025-05-12 PROCEDURE — 36415 COLL VENOUS BLD VENIPUNCTURE: CPT

## 2025-05-12 PROCEDURE — 83036 HEMOGLOBIN GLYCOSYLATED A1C: CPT

## 2025-05-12 PROCEDURE — 84100 ASSAY OF PHOSPHORUS: CPT

## 2025-05-12 PROCEDURE — 85027 COMPLETE CBC AUTOMATED: CPT

## 2025-05-12 PROCEDURE — 82728 ASSAY OF FERRITIN: CPT

## 2025-05-12 PROCEDURE — 83540 ASSAY OF IRON: CPT

## 2025-05-12 PROCEDURE — 82607 VITAMIN B-12: CPT

## 2025-05-12 PROCEDURE — 83735 ASSAY OF MAGNESIUM: CPT

## 2025-05-12 PROCEDURE — 80053 COMPREHEN METABOLIC PANEL: CPT

## 2025-05-12 PROCEDURE — 82043 UR ALBUMIN QUANTITATIVE: CPT

## 2025-05-13 ENCOUNTER — RESULTS FOLLOW-UP (OUTPATIENT)
Dept: FAMILY MEDICINE | Facility: CLINIC | Age: 75
End: 2025-05-13

## 2025-05-13 ENCOUNTER — TELEPHONE (OUTPATIENT)
Dept: FAMILY MEDICINE | Facility: CLINIC | Age: 75
End: 2025-05-13

## 2025-05-13 NOTE — TELEPHONE ENCOUNTER
Patient has follow up appointment to discuss test results with Shwetha Robison DO, states her visit is a virtual visit but is having some changes in her skin integrity and is wanting to change this visit to an in person visit.     Appointment changed to an in person clinic visit 5/14/25 arrival time of 410 PM with Shwetha Robison DO.    Julie Behrendt RN

## 2025-05-14 ENCOUNTER — OFFICE VISIT (OUTPATIENT)
Dept: FAMILY MEDICINE | Facility: CLINIC | Age: 75
End: 2025-05-14
Payer: COMMERCIAL

## 2025-05-14 VITALS
DIASTOLIC BLOOD PRESSURE: 70 MMHG | HEIGHT: 60 IN | WEIGHT: 163.7 LBS | HEART RATE: 65 BPM | OXYGEN SATURATION: 95 % | TEMPERATURE: 98 F | BODY MASS INDEX: 32.14 KG/M2 | RESPIRATION RATE: 16 BRPM | SYSTOLIC BLOOD PRESSURE: 110 MMHG

## 2025-05-14 DIAGNOSIS — Z85.828 HISTORY OF SKIN CANCER: ICD-10-CM

## 2025-05-14 DIAGNOSIS — L98.1 NEUROTIC EXCORIATIONS: ICD-10-CM

## 2025-05-14 DIAGNOSIS — E11.29 TYPE 2 DIABETES MELLITUS WITH MICROALBUMINURIA, WITHOUT LONG-TERM CURRENT USE OF INSULIN (H): Primary | ICD-10-CM

## 2025-05-14 DIAGNOSIS — R80.9 TYPE 2 DIABETES MELLITUS WITH MICROALBUMINURIA, WITHOUT LONG-TERM CURRENT USE OF INSULIN (H): Primary | ICD-10-CM

## 2025-05-14 DIAGNOSIS — L30.4 INTERTRIGO: ICD-10-CM

## 2025-05-14 RX ORDER — FLUOCINONIDE TOPICAL SOLUTION USP, 0.05% 0.5 MG/ML
SOLUTION TOPICAL 2 TIMES DAILY
Qty: 60 ML | Refills: 11 | Status: SHIPPED | OUTPATIENT
Start: 2025-05-14

## 2025-05-14 RX ORDER — NYSTATIN 100000 U/G
CREAM TOPICAL 2 TIMES DAILY
Qty: 30 G | Refills: 11 | Status: SHIPPED | OUTPATIENT
Start: 2025-05-14

## 2025-05-14 RX ORDER — HYDROCHLOROTHIAZIDE 12.5 MG/1
CAPSULE ORAL
Qty: 6 EACH | Refills: 3 | Status: SHIPPED | OUTPATIENT
Start: 2025-05-14

## 2025-05-14 RX ORDER — KETOROLAC TROMETHAMINE 30 MG/ML
1 INJECTION, SOLUTION INTRAMUSCULAR; INTRAVENOUS ONCE
Qty: 1 EACH | Refills: 0 | Status: SHIPPED | OUTPATIENT
Start: 2025-05-14 | End: 2025-05-14

## 2025-05-14 RX ORDER — ASPIRIN 81 MG/1
81 TABLET ORAL DAILY
COMMUNITY
Start: 2025-05-14

## 2025-05-14 ASSESSMENT — PAIN SCALES - GENERAL: PAINLEVEL_OUTOF10: SEVERE PAIN (8)

## 2025-05-14 NOTE — PROGRESS NOTES
Assessment & Plan     Type 2 diabetes mellitus with microalbuminuria, without long-term current use of insulin (H)  Blood sugar running higher.  Due to concern for gastroparesis on previous GLP-1 and ongoing GI symptoms, recommend to start mealtime insulin.  Started at low dose and referred to diabetes educator for further adjustment.  Recommend follow-up with nutritionist at you who specializes in gastroparesis  - Adult Diabetes Education  Referral; Future  - insulin aspart (NOVOLOG PEN) 100 UNIT/ML pen; Inject 5 Units subcutaneously 3 times daily (with meals).  - insulin pen needle (32G X 6 MM) 32G X 6 MM miscellaneous; Use 3 pen needles daily or as directed.  - Continuous Glucose  (FREESTYLE DENEEN 3 READER) JULIANN; 1 each once for 1 dose. Use to read blood sugars per 's instructions.  - Continuous Glucose Sensor (FREESTYLE DENEEN 3 PLUS SENSOR) MISC; Use 1 sensor every 15 days. Use to read blood sugars per 's instructions.    Intertrigo  Continue med  - nystatin (MYCOSTATIN) 081556 UNIT/GM external cream; Apply topically 2 times daily.    Neurotic excoriations  Continue med  - fluocinonide (LIDEX) 0.05 % external solution; Apply topically 2 times daily. Apply to itchy areas scalp BID x 1-2 weeks PRN    History of skin cancer  Due for screening  - Adult Dermatology  Referral; Future        Kidney health  We will monitor yearly with blood and urine test  No need to see kidney doctor  Best treatment is blood sugar control    Diabetes  You are having primarily elevated blood sugar after a meal  The safest option would be to start mealtime insulin  Start Aspart (Novolog) 5 units 3 x day before a meal  Make follow-up appointment with diabetes educator Lizzy Jones [phone] to further adjust the insulin  Check blood sugar 3 x day before a meal  You are due for dilated eye exam.  Have the report sent to Dr. Robison's office.  You should continue aspirin 81 mg once  daily due to history of stents  Order for Stephon    GI  Recommend to schedule follow-up with Dietician  Try to use more Greek Yogurt  I would recommend follow-up with Gastro as we discussed previously    Fibromyalgia  Continue with methocarbamol    Derm  Follow-up with Derm for skin cancer review        Subjective   Narcisa is a 75 year old, presenting for the following health issues:  Derm Problem and Follow Up (Follow up lung nodules)        5/14/2025     4:23 PM   Additional Questions   Roomed by antonio   Accompanied by Roland Evans (Spouse)         5/14/2025     4:23 PM   Patient Reported Additional Medications   Patient reports taking the following new medications na     History of Present Illness       Diabetes:   She presents for follow up of diabetes.  She is checking home blood glucose a few times a month.   She checks blood glucose before and after meals and at bedtime.  Blood glucose is sometimes over 200 and never under 70.    She is concerned about blood sugar frequently over 200.   She is having blurry vision and weight gain.  The patient has not had a diabetic eye exam in the last 12 months.          Reason for visit:  Diabetes2, Fibromyalgia, consult on gastrointestinal issues    She eats 0-1 servings of fruits and vegetables daily.She consumes 0 sweetened beverage(s) daily.She exercises with enough effort to increase her heart rate 10 to 19 minutes per day.  She exercises with enough effort to increase her heart rate 4 days per week.   She is taking medications regularly.        Chief Complaint   Patient presents with    Derm Problem    Follow Up     Follow up lung nodules           Rash  Onset/Duration: chronic  Description  Location: lower stomach   Character: burning, red  Itching: severe  Intensity:  severe  Progression of Symptoms:  worsening  Accompanying signs and symptoms:   Fever: No  Body aches or joint pain: YES  Sore throat symptoms: No  Recent cold symptoms: No  History:            Previous episodes of similar rash: chronic  New exposures:  None  Recent travel: No  Exposure to similar rash: No  Precipitating or alleviating factors: red , bleeding, itches,   Therapies tried and outcome:     nystatin (MYCOSTATIN) 274414 UNIT/GM external cream    This is helping       Diabetes  -- A1c is running higher.  She is currently taking 20 units of Lantus at bedtime.  She has gastroparesis so a GLP-1 would be contraindicated.  -fasting blood sugar is well controlled      Breakfast 11 am- activia yogurt and kaur dune cookie and sugar free dark chocolate   Lunch- olives, toast, string cheese, kaur dune cookie, sugar free dark chocolate   Dinner - 9 cheese ravioli, ricotta cheese sauce.  Dinner is late - 8 pm  HS - frozen yogurt - 10 pm  Bedtime - midnight    GI  -- She has a very restrictive diet due to chronic GI symptoms.  I recommended she follow-up with gastroenterology and start a prenatal vitamin.  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  Is not daily  --she is very worried about her kidney health    Current Outpatient Medications   Medication Sig Dispense Refill    acetaminophen (TYLENOL) 500 MG tablet Take 500-1,000 mg by mouth every 4 hours as needed for mild pain      alcohol swab prep pads Use to swab area of injection/bethel as directed. 100 each 3    Alcohol Swabs (B-D SINGLE USE SWABS REGULAR) PADS USE TO SWAB AREA OF INJECTION/BETHEL AS DIRECTED. 100 each 3    ALPRAZolam (XANAX PO) Take 0.5-1 mg by  mouth 4 times daily as needed 1/2 tab in am, 1 tab in pm, then 1 tab midday prn and 1/2 tab throughout the day if needed      amphetamine-dextroamphetamine (ADDERALL XR) 20 MG per capsule Take 20 mg by mouth daily       atorvastatin (LIPITOR) 80 MG tablet TAKE ONE TABLET BY MOUTH ONCE DAILY 90 tablet 3    blood glucose (NO BRAND SPECIFIED) lancets standard Use to test blood sugar 3 times daily or as directed. 100 each 1    blood glucose (NO BRAND SPECIFIED) test strip Use to test blood sugar 1 times daily or as directed. To accompany: Blood Glucose Monitor Brands: per insurance. 100 strip 6    blood glucose calibration (NO BRAND SPECIFIED) solution To accompany: Blood Glucose Monitor Brands: per insurance. 1 each 1    blood glucose monitoring (NO BRAND SPECIFIED) meter device kit Use to test blood sugar 1 times daily or as directed. Preferred blood glucose meter OR supplies to accompany: Blood Glucose Monitor Brands: per insurance. 1 kit 0    CONTOUR NEXT TEST test strip USE TO TEST BLOOD SUGAR THREE TIMES A  strip 0    DoxePIN (SINEQUAN) 75 MG capsule Take 75 mg by mouth At Bedtime       escitalopram (LEXAPRO) 20 MG tablet Take 20 mg by mouth daily      famotidine (PEPCID) 40 MG tablet TAKE ONE TABLET BY MOUTH ONCE DAILY 90 tablet 1    insulin glargine (LANTUS SOLOSTAR) 100 UNIT/ML pen Inject 20 Units subcutaneously at bedtime 30 mL 3    insulin pen needle (ULTICARE MICRO) 32G X 4 MM miscellaneous USE 1 PEN NEEDLE DAILY OR AS DIRECTED. NEEDS BLOOD WORK FOR FUTHER REFILLS 100 each 3    LANsoprazole (PREVACID) 30 MG DR capsule TAKE 1 CAPSULE (30 MG) BY MOUTH EVERY MORNING (BEFORE BREAKFAST) 30-60 MINUTES BEFORE A MEAL. 90 capsule 3    lisinopril (ZESTRIL) 5 MG tablet TAKE ONE TABLET BY MOUTH ONCE DAILY 90 tablet 0    methocarbamol (ROBAXIN) 500 MG tablet Take 1 tablet (500 mg) by mouth 4 times daily as needed for muscle spasms. 30 tablet 3    Microlet Lancets MISC USE TO TEST BLOOD SUGAR 3 TIMES DAILY OR AS  DIRECTED. 100 each 0    mupirocin (BACTROBAN) 2 % external ointment Apply to open areas on the scalp BID until healed. Need follow up appointment in Derm. 30 g 0    nitroGLYcerin (NITROSTAT) 0.4 MG sublingual tablet Place 1 tablet (0.4 mg) under the tongue every 5 minutes as needed for chest pain 25 tablet 1    nystatin (MYCOSTATIN) 920504 UNIT/GM external cream Apply topically 2 times daily 30 g 11    prochlorperazine (COMPAZINE) 5 MG tablet Take 1 tablet (5 mg) by mouth every 8 hours as needed for nausea or vomiting. 30 tablet 1    aspirin 81 MG EC tablet Take 81 mg by mouth daily.           Review of Systems  Constitutional, neuro, ENT, endocrine, pulmonary, cardiac, gastrointestinal, genitourinary, musculoskeletal, integument and psychiatric systems are negative, except as otherwise noted.      Objective    /70 (BP Location: Left arm, Patient Position: Sitting, Cuff Size: Adult Regular)   Pulse 65   Temp 98  F (36.7  C) (Tympanic)   Resp 16   Ht 1.524 m (5')   Wt 74.3 kg (163 lb 11.2 oz)   LMP  (LMP Unknown)   SpO2 95%   BMI 31.97 kg/m    Body mass index is 31.97 kg/m .  Physical Exam   GENERAL: alert and no distress  SKIN: Intertrigo and abdominal skin folds.  Firm flesh-colored nodules along the lumbar spinous processes, nontender    Lab on 05/12/2025   Component Date Value Ref Range Status    Estimated Average Glucose 05/12/2025 174 (H)  <117 mg/dL Final    Hemoglobin A1C 05/12/2025 7.7 (H)  0.0 - 5.6 % Final    Normal <5.7%   Prediabetes 5.7-6.4%    Diabetes 6.5% or higher     Note: Adopted from ADA consensus guidelines.    Cholesterol 05/12/2025 146  <200 mg/dL Final    Triglycerides 05/12/2025 118  <150 mg/dL Final    Direct Measure HDL 05/12/2025 52  >=50 mg/dL Final    LDL Cholesterol Calculated 05/12/2025 70  <100 mg/dL Final    Non HDL Cholesterol 05/12/2025 94  <130 mg/dL Final    Patient Fasting > 8hrs? 05/12/2025 Yes   Final    Creatinine Urine mg/dL 05/12/2025 213.6  mg/dL Final     The reference ranges have not been established in urine creatinine. The results should be integrated into the clinical context for interpretation.    Albumin Urine mg/L 05/12/2025 77.1  mg/L Final    The reference ranges have not been established in urine albumin. The results should be integrated into the clinical context for interpretation.    Albumin Urine mg/g Cr 05/12/2025 36.10 (H)  0.00 - 25.00 mg/g Cr Final    Microalbuminuria is defined as an albumin:creatinine ratio of 17 to 299 for males and 25 to 299 for females. A ratio of albumin:creatinine of 300 or higher is indicative of overt proteinuria.  Due to biologic variability, positive results should be confirmed by a second, first-morning random or 24-hour timed urine specimen. If there is discrepancy, a third specimen is recommended. When 2 out of 3 results are in the microalbuminuria range, this is evidence for incipient nephropathy and warrants increased efforts at glucose control, blood pressure control, and institution of therapy with an angiotensin-converting-enzyme (ACE) inhibitor (if the patient can tolerate it).      Vitamin B12 05/12/2025 538  232 - 1,245 pg/mL Final    Ferritin 05/12/2025 26  11 - 328 ng/mL Final    WBC Count 05/12/2025 6.8  4.0 - 11.0 10e3/uL Final    RBC Count 05/12/2025 4.50  3.80 - 5.20 10e6/uL Final    Hemoglobin 05/12/2025 13.4  11.7 - 15.7 g/dL Final    Hematocrit 05/12/2025 40.6  35.0 - 47.0 % Final    MCV 05/12/2025 90  78 - 100 fL Final    MCH 05/12/2025 29.8  26.5 - 33.0 pg Final    MCHC 05/12/2025 33.0  31.5 - 36.5 g/dL Final    RDW 05/12/2025 12.8  10.0 - 15.0 % Final    Platelet Count 05/12/2025 213  150 - 450 10e3/uL Final    Iron 05/12/2025 65  37 - 145 ug/dL Final    Sodium 05/12/2025 137  135 - 145 mmol/L Final    Potassium 05/12/2025 3.5  3.4 - 5.3 mmol/L Final    Carbon Dioxide (CO2) 05/12/2025 25  22 - 29 mmol/L Final    Anion Gap 05/12/2025 12  7 - 15 mmol/L Final    Urea Nitrogen 05/12/2025 12.9  8.0 - 23.0  mg/dL Final    Creatinine 05/12/2025 0.90  0.51 - 0.95 mg/dL Final    GFR Estimate 05/12/2025 67  >60 mL/min/1.73m2 Final    eGFR calculated using 2021 CKD-EPI equation.    Calcium 05/12/2025 9.6  8.8 - 10.4 mg/dL Final    Chloride 05/12/2025 100  98 - 107 mmol/L Final    Glucose 05/12/2025 149 (H)  70 - 99 mg/dL Final    Alkaline Phosphatase 05/12/2025 122  40 - 150 U/L Final    AST 05/12/2025 24  0 - 45 U/L Final    ALT 05/12/2025 14  0 - 50 U/L Final    Protein Total 05/12/2025 6.4  6.4 - 8.3 g/dL Final    Albumin 05/12/2025 4.1  3.5 - 5.2 g/dL Final    Bilirubin Total 05/12/2025 0.6  <=1.2 mg/dL Final    Patient Fasting > 8hrs? 05/12/2025 Yes   Final    Phosphorus 05/12/2025 4.2  2.5 - 4.5 mg/dL Final    Magnesium 05/12/2025 1.6 (L)  1.7 - 2.3 mg/dL Final           Signed Electronically by: Shwetha Robison DO

## 2025-05-14 NOTE — PATIENT INSTRUCTIONS
"Kidney health  We will monitor yearly with blood and urine test  No need to see kidney doctor  Best treatment is blood sugar control    Diabetes  You are having primarily elevated blood sugar after a meal  The safest option would be to start mealtime insulin  Start Aspart (Novolog) 5 units 3 x day before a meal  Make follow-up appointment with diabetes educator Lizzy Jones [phone] to further adjust the insulin  Check blood sugar 3 x day before a meal  You are due for dilated eye exam.  Have the report sent to Dr. Robison's office.  You should continue aspirin 81 mg once daily due to history of stents  Order for Stephon    GI  Recommend to schedule follow-up with Dietician  Try to use more Greek Yogurt  I would recommend follow-up with Gastro as we discussed previously    Fibromyalgia  Continue with methocarbamol    Derm  Follow-up with Derm for skin cancer review        Instructions:  1.  Software and instructional videos:  (how to upload data, how to insert sensor etc)   Freestyle Stephon -can find under \"support tab\"              [email or web address]    2. The first 1 hour after you place the sensor is the warm up period (black out period).  You will need to carry your blood glucose meter and check blood glucose during this time.    3. Check blood sugar if feeling symptoms of hypoglycemia, but reader is not displaying a low number.  There is a lag time between blood sugar and subcutaneous (Skin) sugar readings.  When in doubt always use a finger stick blood sugar to make treatment decisions.     4. Change sensor every 14 days.    5.  Read/scan blood sugars every 8 hours to ensure entirety of data is available.   The sensor can only hold 8 hours of data.  The reader can hold 90 days of data.  Upload to [facility] (sign up for a free account) to keep all data.    6. Watch trend arrows- will let you know if blood sugars are rising, falling or stable at the time you check.    7. It is okay to shower, bathe, and " "swim (up to 3 feet deep for 30 minutes) with sensor. Do not get reader wet.    8. Remove the sensor if you need to have an MRI, CT scan or Xray.  You cannot go through airport scanner - request a pat down in security.  It pulls off like a bandaid, but then cannot be reinserted.      9. Do not cover the sensor with extra adhesive (the small hole in the center of the sensor must remain uncovered).  If you have trouble with sensor falling off, these are approved products to help with sensor adhesion: Torbot Skin Tac, SKIN-PREP Protective Barrier Wipe and Mastisol Liquid Adhesive.      10.  The reader works as a glucometer as well.  The test strips are called  FreeStyle Precision Jerel  and these can be bought over the counter (no prescription needed).                    Discount card (if not covered by insurance / cash pay):  1.  Go to singlecare.com and sign up for discount card.  This is free.  2.  Search under prescriptions for \"Freestyle System.\"  3. Enter zip code to location you want to go  4.  Choose which pharmacy your prescription was requested ([facility], [facility] or [facility], etc)   5. Choose how you would like to receive the savings card (email, text or print).  It will ask for your name and email address/phone number.  6. Take this card to the pharmacy and show it to your pharmacist when you  your prescription.     Cash price for 1 month of sensors is about $75.  The reader is $40-80      "

## 2025-05-15 ENCOUNTER — PATIENT OUTREACH (OUTPATIENT)
Dept: CARE COORDINATION | Facility: CLINIC | Age: 75
End: 2025-05-15
Payer: COMMERCIAL

## 2025-05-19 ENCOUNTER — PATIENT OUTREACH (OUTPATIENT)
Dept: CARE COORDINATION | Facility: CLINIC | Age: 75
End: 2025-05-19
Payer: COMMERCIAL

## 2025-06-10 ENCOUNTER — PATIENT OUTREACH (OUTPATIENT)
Dept: CARE COORDINATION | Facility: CLINIC | Age: 75
End: 2025-06-10
Payer: COMMERCIAL

## 2025-06-13 ENCOUNTER — TELEPHONE (OUTPATIENT)
Dept: DERMATOLOGY | Facility: CLINIC | Age: 75
End: 2025-06-13
Payer: COMMERCIAL

## 2025-06-16 NOTE — TELEPHONE ENCOUNTER
Patient Contact    Attempt # 1    Was call answered?  No, left message.    Patient can be seen in a NURIA spot, Chaya has availability to see her on 7/3 at 3, this has been saved for the patient    Leaving message on MyChart for patient as well  Jennifer Miller RN        [facility]   [phone]

## 2025-06-17 NOTE — TELEPHONE ENCOUNTER
Patient Contact    Attempt # 2    Was call answered?  No    Called patient. No answer. Left message to call back. Clinic number was provided.     Ashley Mann LPN   [facility]   [phone]

## 2025-06-18 NOTE — TELEPHONE ENCOUNTER
Patient Contact    Attempt # 3    Was call answered?  No    Called patient. No answer. Left message to call back. Clinic number was provided.  My Chart message has not been read yet as well.  Closing encounter since max attempts have been made.     Ashley Mann LPN   [facility]   [phone]

## 2025-06-19 DIAGNOSIS — E78.5 HYPERLIPIDEMIA LDL GOAL <100: ICD-10-CM

## 2025-06-19 RX ORDER — ATORVASTATIN CALCIUM 80 MG/1
80 TABLET, FILM COATED ORAL DAILY
Qty: 90 TABLET | Refills: 2 | Status: SHIPPED | OUTPATIENT
Start: 2025-06-19

## 2025-06-24 ENCOUNTER — PATIENT OUTREACH (OUTPATIENT)
Dept: CARE COORDINATION | Facility: CLINIC | Age: 75
End: 2025-06-24
Payer: COMMERCIAL

## 2025-06-26 ENCOUNTER — HOSPITAL ENCOUNTER (EMERGENCY)
Facility: CLINIC | Age: 75
Discharge: HOME | End: 2025-06-26
Attending: EMERGENCY MEDICINE
Payer: COMMERCIAL

## 2025-06-26 ENCOUNTER — TELEPHONE (OUTPATIENT)
Dept: FAMILY MEDICINE | Facility: CLINIC | Age: 75
End: 2025-06-26
Payer: COMMERCIAL

## 2025-06-26 ENCOUNTER — APPOINTMENT (OUTPATIENT)
Dept: CT IMAGING | Facility: CLINIC | Age: 75
End: 2025-06-26
Attending: EMERGENCY MEDICINE
Payer: COMMERCIAL

## 2025-06-26 VITALS
HEART RATE: 60 BPM | TEMPERATURE: 98.2 F | SYSTOLIC BLOOD PRESSURE: 132 MMHG | DIASTOLIC BLOOD PRESSURE: 78 MMHG | WEIGHT: 164 LBS | BODY MASS INDEX: 30.96 KG/M2 | RESPIRATION RATE: 16 BRPM | HEIGHT: 61 IN | OXYGEN SATURATION: 95 %

## 2025-06-26 DIAGNOSIS — N32.89 MASS OF BLADDER: ICD-10-CM

## 2025-06-26 DIAGNOSIS — K57.92 ACUTE DIVERTICULITIS: ICD-10-CM

## 2025-06-26 LAB
ANION GAP SERPL CALCULATED.3IONS-SCNC: 13 MMOL/L (ref 7–15)
BASOPHILS # BLD AUTO: 0 10E3/UL (ref 0–0.2)
BASOPHILS NFR BLD AUTO: 0 %
BUN SERPL-MCNC: 10.4 MG/DL (ref 8–23)
CALCIUM SERPL-MCNC: 9.2 MG/DL (ref 8.8–10.4)
CHLORIDE SERPL-SCNC: 105 MMOL/L (ref 98–107)
CREAT SERPL-MCNC: 0.76 MG/DL (ref 0.51–0.95)
EGFRCR SERPLBLD CKD-EPI 2021: 82 ML/MIN/1.73M2
EOSINOPHIL # BLD AUTO: 0.5 10E3/UL (ref 0–0.7)
EOSINOPHIL NFR BLD AUTO: 6 %
ERYTHROCYTE [DISTWIDTH] IN BLOOD BY AUTOMATED COUNT: 13.6 % (ref 10–15)
GLUCOSE SERPL-MCNC: 159 MG/DL (ref 70–99)
HCO3 SERPL-SCNC: 23 MMOL/L (ref 22–29)
HCT VFR BLD AUTO: 39.5 % (ref 35–47)
HGB BLD-MCNC: 13.1 G/DL (ref 11.7–15.7)
IMM GRANULOCYTES # BLD: 0 10E3/UL
IMM GRANULOCYTES NFR BLD: 0 %
LYMPHOCYTES # BLD AUTO: 1.9 10E3/UL (ref 0.8–5.3)
LYMPHOCYTES NFR BLD AUTO: 19 %
MCH RBC QN AUTO: 30 PG (ref 26.5–33)
MCHC RBC AUTO-ENTMCNC: 33.2 G/DL (ref 31.5–36.5)
MCV RBC AUTO: 90 FL (ref 78–100)
MONOCYTES # BLD AUTO: 0.9 10E3/UL (ref 0–1.3)
MONOCYTES NFR BLD AUTO: 9 %
NEUTROPHILS # BLD AUTO: 6.3 10E3/UL (ref 1.6–8.3)
NEUTROPHILS NFR BLD AUTO: 66 %
NRBC # BLD AUTO: 0 10E3/UL
NRBC BLD AUTO-RTO: 0 /100
PLATELET # BLD AUTO: 183 10E3/UL (ref 150–450)
POTASSIUM SERPL-SCNC: 3.7 MMOL/L (ref 3.4–5.3)
RBC # BLD AUTO: 4.37 10E6/UL (ref 3.8–5.2)
SODIUM SERPL-SCNC: 141 MMOL/L (ref 135–145)
WBC # BLD AUTO: 9.6 10E3/UL (ref 4–11)

## 2025-06-26 PROCEDURE — 250N000011 HC RX IP 250 OP 636: Performed by: EMERGENCY MEDICINE

## 2025-06-26 PROCEDURE — 36415 COLL VENOUS BLD VENIPUNCTURE: CPT | Performed by: EMERGENCY MEDICINE

## 2025-06-26 PROCEDURE — 99285 EMERGENCY DEPT VISIT HI MDM: CPT | Performed by: EMERGENCY MEDICINE

## 2025-06-26 PROCEDURE — 99285 EMERGENCY DEPT VISIT HI MDM: CPT | Mod: 25

## 2025-06-26 PROCEDURE — 74177 CT ABD & PELVIS W/CONTRAST: CPT

## 2025-06-26 PROCEDURE — 250N000009 HC RX 250: Performed by: EMERGENCY MEDICINE

## 2025-06-26 PROCEDURE — 85004 AUTOMATED DIFF WBC COUNT: CPT | Performed by: EMERGENCY MEDICINE

## 2025-06-26 PROCEDURE — 250N000011 HC RX IP 250 OP 636: Mod: JZ | Performed by: EMERGENCY MEDICINE

## 2025-06-26 PROCEDURE — 96374 THER/PROPH/DIAG INJ IV PUSH: CPT | Mod: 59

## 2025-06-26 PROCEDURE — 80048 BASIC METABOLIC PNL TOTAL CA: CPT | Performed by: EMERGENCY MEDICINE

## 2025-06-26 RX ORDER — CEFDINIR 300 MG/1
300 CAPSULE ORAL 2 TIMES DAILY
Qty: 14 CAPSULE | Refills: 0 | Status: SHIPPED | OUTPATIENT
Start: 2025-06-26 | End: 2025-07-03

## 2025-06-26 RX ORDER — IOPAMIDOL 755 MG/ML
80 INJECTION, SOLUTION INTRAVASCULAR ONCE
Status: COMPLETED | OUTPATIENT
Start: 2025-06-26 | End: 2025-06-26

## 2025-06-26 RX ORDER — METRONIDAZOLE 500 MG/1
500 TABLET ORAL 2 TIMES DAILY
Qty: 14 TABLET | Refills: 0 | Status: SHIPPED | OUTPATIENT
Start: 2025-06-26 | End: 2025-07-03

## 2025-06-26 RX ORDER — ONDANSETRON 4 MG/1
4 TABLET, ORALLY DISINTEGRATING ORAL EVERY 8 HOURS PRN
Qty: 10 TABLET | Refills: 0 | Status: SHIPPED | OUTPATIENT
Start: 2025-06-26

## 2025-06-26 RX ORDER — HYDROMORPHONE HYDROCHLORIDE 1 MG/ML
0.5 INJECTION, SOLUTION INTRAMUSCULAR; INTRAVENOUS; SUBCUTANEOUS
Refills: 0 | Status: DISCONTINUED | OUTPATIENT
Start: 2025-06-26 | End: 2025-06-26 | Stop reason: HOSPADM

## 2025-06-26 RX ORDER — OXYCODONE HYDROCHLORIDE 5 MG/1
5 TABLET ORAL EVERY 6 HOURS PRN
Qty: 10 TABLET | Refills: 0 | Status: SHIPPED | OUTPATIENT
Start: 2025-06-26

## 2025-06-26 RX ADMIN — HYDROMORPHONE HYDROCHLORIDE 0.5 MG: 1 INJECTION, SOLUTION INTRAMUSCULAR; INTRAVENOUS; SUBCUTANEOUS at 18:30

## 2025-06-26 RX ADMIN — SODIUM CHLORIDE 60 ML: 9 INJECTION, SOLUTION INTRAVENOUS at 19:26

## 2025-06-26 RX ADMIN — IOPAMIDOL 80 ML: 755 INJECTION, SOLUTION INTRAVENOUS at 19:25

## 2025-06-26 ASSESSMENT — ACTIVITIES OF DAILY LIVING (ADL)
ADLS_ACUITY_SCORE: 42

## 2025-06-26 ASSESSMENT — COLUMBIA-SUICIDE SEVERITY RATING SCALE - C-SSRS
1. IN THE PAST MONTH, HAVE YOU WISHED YOU WERE DEAD OR WISHED YOU COULD GO TO SLEEP AND NOT WAKE UP?: NO
6. HAVE YOU EVER DONE ANYTHING, STARTED TO DO ANYTHING, OR PREPARED TO DO ANYTHING TO END YOUR LIFE?: NO
2. HAVE YOU ACTUALLY HAD ANY THOUGHTS OF KILLING YOURSELF IN THE PAST MONTH?: NO

## 2025-06-26 NOTE — ED PROVIDER NOTES
History     Chief Complaint   Patient presents with    Abdominal Pain     HPI  Ml Evans is a 75 year old female who presents for left lower quadrant abdominal pain.  The patient reports chronic abdominal pain and this was worse this morning, getting worse throughout the day today.  Pain in the left lower quadrant radiates throughout her abdomen.  She has had some mild nausea but no vomiting.  No diarrhea.  No dysuria or urinary frequency.  She reports that this feels similar to when she had an abscess from diverticulitis in the past.    Allergies:  Allergies   Allergen Reactions    Hydrocodone Anaphylaxis    Flexeril [Cyclobenzaprine] Rash    Cipro [Ciprofloxacin] Other (See Comments)     Abd pain , proteinuria , microscopic hematuria  Possibly related to cipro    Codeine Camsylate Nausea    Influenza Virus Vaccine      Large lumpy, itchy welts on torso and face after a flu shot 10 to 15 years ago.    Pcn [Penicillins] Difficulty breathing    Amoxicillin Itching and Rash    Sulfa Antibiotics Itching and Rash    Tetracycline Itching and Rash       Problem List:    Patient Active Problem List    Diagnosis Date Noted    Esophageal dysphagia 11/06/2023     Priority: Medium    Small intestinal bacterial overgrowth (SIBO) 11/06/2023     Priority: Medium    Gastroparesis 06/14/2023     Priority: Medium    Fibromyalgia 08/16/2022     Priority: Medium    RAVI (generalized anxiety disorder) 08/16/2022     Priority: Medium    Hip pain, right 07/07/2022     Priority: Medium    Osteopenia of both hips 03/29/2022     Priority: Medium     3/2022.  Follow-up in 3 years      Gastroesophageal reflux disease without esophagitis 01/28/2020     Priority: Medium    Chronic rhinitis 03/09/2017     Priority: Medium    Benign essential hypertension 03/09/2017     Priority: Medium     Resting HR 50s      Irritable bowel syndrome with diarrhea 03/09/2017     Priority: Medium     Dicyclomine helped but caused dizziness.      Type 2  diabetes mellitus with microalbuminuria, without long-term current use of insulin (H) 03/09/2017     Priority: Medium     Diarrhea on metformin, stopped 6/2019.  Recurrent vaginal infections on Jardiance; trulicity stopped 6/2023 due to gastroparesis seen on gastric emptying study      Colon polyp 05/21/2015     Priority: Medium     Tubular adenoma polyp      Diverticulosis 06/14/2012     Priority: Medium    Coronary artery disease involving native coronary artery of native heart without angina pectoris 03/21/2011     Priority: Medium     Two right coronary stents 2011  She has a prior history of CAD with STEMI in 2011 with PCI to the RCA, since then has had another angiogram in 2015 which has shown patent RCA stents and otherwise minimal disease elsewhere. Her EF and stress tests since then have been negative        HPV (human papilloma virus) anogenital infection 02/22/2011     Priority: Medium     Perianal condyloma  Biopsy done 2011  FINAL DIAGNOSIS:  Skin, perianal, lesion, excision:  - High grade squamous intraepithelial lesion (moderate to severe  dysplasia) (see comment)    COMMENT:  The lesion is arising on top of condyloma. The base of the polypoid  lesion appears free of dysplasia in the planes examined. Follow up is  recommended as clinically deemed appropriate.      Hyperlipidemia LDL goal <70 10/31/2010     Priority: Medium    Menopausal syndrome (hot flashes) 05/18/2010     Priority: Medium     Wants to take premarin  Understands breast cancer risk      Anal fissure 01/03/2007     Priority: Medium    Moderate episode of recurrent major depressive disorder (H) 09/28/2006     Priority: Medium     Follows with Psychiatry Dorian Prince.  Rx for Alprazolam 1 mg #120/month and Adderall 20 mg daily      Seasonal allergies 09/28/2006     Priority: Medium     seasonal  Problem list name updated by automated process. Provider to review      Attention deficit disorder 05/17/2005     Priority: Medium      Treated by psychiatry        NAFL (nonalcoholic fatty liver) 05/17/2005     Priority: Medium     fatty liver      History of diverticulitis 12/10/2000     Priority: Medium    Abdominal adhesions 01/01/1990     Priority: Medium        Past Medical History:    Past Medical History:   Diagnosis Date    Attention deficit disorder without mention of hyperactivity     Benign essential hypertension 03/09/2017    Coronary artery disease 03/15/2011    Depressive disorder 1991    Diabetes mellitus (H)     Dysthymic disorder     GERD (gastroesophageal reflux disease) 01/20/2011    Glycogenosis (H)     History of angina 2-2015    History of blood transfusion 1981    History of ST elevation myocardial infarction (STEMI) 01/23/2019    Malignant neoplasm (H)     Myocardial infarction (H) 3-15-11    Obese     Other and unspecified hyperlipidemia     Shortness of breath     Squamous cell carcinoma        Past Surgical History:    Past Surgical History:   Procedure Laterality Date    ABDOMEN SURGERY  1981,1991,1993    APPENDECTOMY  01/01/1993    BIOPSY  01/01/2003    nose, during surgery    COLONOSCOPY  01/01/2005    COLONOSCOPY N/A 05/14/2015    Procedure: COMBINED COLONOSCOPY, SINGLE OR MULTIPLE BIOPSY/POLYPECTOMY BY BIOPSY;  Surgeon: Ponce Christine MD;  Location: [facility]    COLONOSCOPY N/A 11/07/2022    Procedure: COLONOSCOPY, FLEXIBLE, WITH LESION REMOVAL USING SNARE;  Surgeon: Maximilian Cobian MD;  Location: [facility]    CORONARY ANGIOGRAPHY ADULT ORDER  3-15, 12-31-15    most recent showed no blockages, false+ on stress echo    ECTOPIC PREGNANCY SURGERY  01/01/1981    ENDOSCOPIC RELEASE CARPAL TUNNEL  04/16/2013    Procedure: ENDOSCOPIC RELEASE CARPAL TUNNEL;  Right endoscopic carpal tunnel release;  Surgeon: Jonah Dela Cruz MD;  Location: [facility]    ENT SURGERY  01/01/2003    nose- suspected basal cell- was benign    ESOPHAGOSCOPY, GASTROSCOPY, DUODENOSCOPY (EGD), COMBINED  08/18/2014    Procedure: COMBINED ESOPHAGOSCOPY,  GASTROSCOPY, DUODENOSCOPY (EGD), BIOPSY SINGLE OR MULTIPLE;  Surgeon: Anibal Sebastian MD;  Location: [facility]    ESOPHAGOSCOPY, GASTROSCOPY, DUODENOSCOPY (EGD), COMBINED N/A 2022    Procedure: ESOPHAGOGASTRODUODENOSCOPY, WITH BIOPSY;  Surgeon: Maximilian Cobian MD;  Location: [facility]    GENITOURINARY SURGERY  1991    HEART CATH, ANGIOPLASTY  3-15-11    HYSTERECTOMY, ELIECER      total hysterectomy. Unsure if ELIECER or vaginal    SURGICAL HISTORY OF -       appy    SURGICAL HISTORY OF -       T&A    VASCULAR SURGERY  2011    angioplasty- 2 stents implanted       Family History:    Family History   Problem Relation Age of Onset    Cancer Mother         uterine    Lipids Mother     Neurologic Disorder Mother         polymyalgia    Hypertension Mother         On meds    Other Cancer Mother         endometrial    Depression/Anxiety Mother     Other - See Comments Mother         Heart Arrythmia     Atrial fibrillation Mother     Macular Degeneration Mother     Coronary Artery Disease Mother         atrial fibrillation    Depression Mother     Osteoporosis Mother         age related    Cardiovascular Father         CHF    Depression Father     C.A.D. Father         bypass x2 starting at age 55-  in his 70's    Diabetes Father         type 2    Coronary Artery Disease Father          from - age 75    Depression/Anxiety Father     Cerebrovascular Disease Father         from angioplasty     C.A.D. Maternal Grandfather     Coronary Artery Disease Maternal Grandfather          from- age 61    C.A.D. Paternal Grandfather     Diabetes Brother     Depression Son     Psychotic Disorder Son         adhd    Diabetes Brother         type 1    Depression/Anxiety Brother     Depression/Anxiety Maternal Grandmother     Depression/Anxiety Son     Asthma Son         childhood asthma-out grown now as an adult    Coronary Artery Disease Brother         stent-MI    Diabetes Brother         type 1    Depression Brother      Substance Abuse Brother         alcohol & is diabetic 1    Depression/Anxiety Brother     Depression Son     Depression/Anxiety Son     Depression Brother     Coronary Artery Disease Brother         2017 MI & stents    Depression Brother     Melanoma No family hx of        Social History:  Marital Status:   [2]  Social History     Tobacco Use    Smoking status: Former     Current packs/day: 0.00     Average packs/day: 1 pack/day for 43.2 years (43.2 ttl pk-yrs)     Types: Cigarettes     Start date: 1968     Quit date: 3/15/2011     Years since quittin.2    Smokeless tobacco: Never    Tobacco comments:     occasional/daily   Vaping Use    Vaping status: Never Used   Substance Use Topics    Alcohol use: No     Alcohol/week: 0.0 standard drinks of alcohol    Drug use: No        Medications:    cefdinir (OMNICEF) 300 MG capsule  metroNIDAZOLE (FLAGYL) 500 MG tablet  ondansetron (ZOFRAN ODT) 4 MG ODT tab  oxyCODONE (ROXICODONE) 5 MG tablet  acetaminophen (TYLENOL) 500 MG tablet  alcohol swab prep pads  Alcohol Swabs (B-D SINGLE USE SWABS REGULAR) PADS  ALPRAZolam (XANAX PO)  amphetamine-dextroamphetamine (ADDERALL XR) 20 MG per capsule  aspirin 81 MG EC tablet  atorvastatin (LIPITOR) 80 MG tablet  blood glucose (NO BRAND SPECIFIED) lancets standard  blood glucose calibration (NO BRAND SPECIFIED) solution  blood glucose monitoring (NO BRAND SPECIFIED) meter device kit  Continuous Glucose Sensor (FREESTYLE DENEEN 3 PLUS SENSOR) MISC  CONTOUR NEXT TEST test strip  DoxePIN (SINEQUAN) 75 MG capsule  escitalopram (LEXAPRO) 20 MG tablet  famotidine (PEPCID) 40 MG tablet  fluocinonide (LIDEX) 0.05 % external solution  insulin aspart (NOVOLOG PEN) 100 UNIT/ML pen  insulin glargine (LANTUS SOLOSTAR) 100 UNIT/ML pen  insulin pen needle (32G X 6 MM) 32G X 6 MM miscellaneous  insulin pen needle (ULTICARE MICRO) 32G X 4 MM miscellaneous  LANsoprazole (PREVACID) 30 MG DR capsule  lisinopril (ZESTRIL) 5 MG  "tablet  methocarbamol (ROBAXIN) 500 MG tablet  Microlet Lancets MISC  mupirocin (BACTROBAN) 2 % external ointment  nitroGLYcerin (NITROSTAT) 0.4 MG sublingual tablet  nystatin (MYCOSTATIN) 964136 UNIT/GM external cream  prochlorperazine (COMPAZINE) 5 MG tablet          Review of Systems    Physical Exam   BP: (!) 140/72  Pulse: 53  Temp: 98.2  F (36.8  C)  Resp: 20  Height: 154.9 cm (5' 1\")  Weight: 74.4 kg (164 lb)  SpO2: 93 %      Physical Exam  Constitutional:       General: She is in acute distress.      Appearance: She is well-developed.   HENT:      Head: Normocephalic and atraumatic.   Cardiovascular:      Rate and Rhythm: Normal rate.   Pulmonary:      Effort: No respiratory distress.      Breath sounds: No stridor.   Abdominal:      Tenderness: There is abdominal tenderness in the left lower quadrant. There is guarding.   Skin:     General: Skin is warm and dry.   Neurological:      Mental Status: She is alert.         ED Course        Procedures              Critical Care time:  none     None         Recent Results (from the past 24 hours)   CBC with Platelets & Differential    Narrative    The following orders were created for panel order CBC with Platelets & Differential.  Procedure                               Abnormality         Status                     ---------                               -----------         ------                     CBC with platelets and ...[[id number]]                      Final result                 Please view results for these tests on the individual orders.   Basic metabolic panel   Result Value Ref Range    Sodium 141 135 - 145 mmol/L    Potassium 3.7 3.4 - 5.3 mmol/L    Chloride 105 98 - 107 mmol/L    Carbon Dioxide (CO2) 23 22 - 29 mmol/L    Anion Gap 13 7 - 15 mmol/L    Urea Nitrogen 10.4 8.0 - 23.0 mg/dL    Creatinine 0.76 0.51 - 0.95 mg/dL    GFR Estimate 82 >60 mL/min/1.73m2    Calcium 9.2 8.8 - 10.4 mg/dL    Glucose 159 (H) 70 - 99 mg/dL   CBC with platelets and " differential   Result Value Ref Range    WBC Count 9.6 4.0 - 11.0 10e3/uL    RBC Count 4.37 3.80 - 5.20 10e6/uL    Hemoglobin 13.1 11.7 - 15.7 g/dL    Hematocrit 39.5 35.0 - 47.0 %    MCV 90 78 - 100 fL    MCH 30.0 26.5 - 33.0 pg    MCHC 33.2 31.5 - 36.5 g/dL    RDW 13.6 10.0 - 15.0 %    Platelet Count 183 150 - 450 10e3/uL    % Neutrophils 66 %    % Lymphocytes 19 %    % Monocytes 9 %    % Eosinophils 6 %    % Basophils 0 %    % Immature Granulocytes 0 %    NRBCs per 100 WBC 0 <1 /100    Absolute Neutrophils 6.3 1.6 - 8.3 10e3/uL    Absolute Lymphocytes 1.9 0.8 - 5.3 10e3/uL    Absolute Monocytes 0.9 0.0 - 1.3 10e3/uL    Absolute Eosinophils 0.5 0.0 - 0.7 10e3/uL    Absolute Basophils 0.0 0.0 - 0.2 10e3/uL    Absolute Immature Granulocytes 0.0 <=0.4 10e3/uL    Absolute NRBCs 0.0 10e3/uL       Medications   HYDROmorphone (PF) (DILAUDID) injection 0.5 mg (0.5 mg Intravenous $Given 6/26/25 1830)   iopamidol (ISOVUE-370) solution 80 mL (80 mLs Intravenous $Given 6/26/25 1925)   sodium chloride 0.9 % bag for CT scan flush (60 mLs Intravenous $Given 6/26/25 1926)       Assessments & Plan (with Medical Decision Making)   75-year-old female presents with left lower quadrant abdominal pain with nausea.  High risk presentation concerning for surgical process within the abdomen such as obstruction, diverticulitis, or intra-abdominal abscess.  She is given IV hydromorphone for the pain.  White blood cell count is 9.6 and hemoglobin is 13.1.  Electrolytes are within normal limits.  CT of the abdomen and pelvis obtained, images interpreted independently, looks like simple diverticulitis without abscess or perforation.  Awaiting radiology read.  If this is reassuring she will be safe to discharge with cefdinir, metronidazole, ondansetron, and oxycodone.  She is signed out at change of shift pending these results.  I have updated the patient and she is in agreement with the above plan.    I have reviewed the nursing notes.    I  have reviewed the findings, diagnosis, plan and need for follow up with the patient.           New Prescriptions    CEFDINIR (OMNICEF) 300 MG CAPSULE    Take 1 capsule (300 mg) by mouth 2 times daily for 7 days.    METRONIDAZOLE (FLAGYL) 500 MG TABLET    Take 1 tablet (500 mg) by mouth 2 times daily for 7 days.    ONDANSETRON (ZOFRAN ODT) 4 MG ODT TAB    Take 1 tablet (4 mg) by mouth every 8 hours as needed for nausea.    OXYCODONE (ROXICODONE) 5 MG TABLET    Take 1 tablet (5 mg) by mouth every 6 hours as needed for severe pain.       Final diagnoses:   Acute diverticulitis       6/26/2025   [facility] EMERGENCY DEPT       Dominic Barrientos MD  06/26/25 2052

## 2025-06-26 NOTE — TELEPHONE ENCOUNTER
Patient called states she woke up with LLQ ABD pain today, describes it as severe and sharp pain rated at 8-9/10 per numeric pain scale.     H/O diverticulosis, diverticulitis, IBS, gastroparesis and constipation.     Denies any fever or chills, denies any black/bloody or tarry stools, has chronic nausea denies any emesis, denies any UTI sx's, states she had a small BM today that was hard to pass and normal size BM on 6/25/25.    Patient denies any recent injury or trauma. Has fibromyalgia with chronic pain.     States she eats a very bizarre diet due to all her GI problems.     Advised patient to be seen in the ED today for evaluation due to severe LLQ pain and patient in agreement with this plan.     Update sent to PCP as FERNANDO.    Julie Behrendt RN

## 2025-06-26 NOTE — ED TRIAGE NOTES
LLQ pain - states it feels like when she had an abscess from diverticulitis in the past. Started 0700 this morning. Sharp, constant pain.     Triage Assessment (Adult)       Row Name 06/26/25 6305          Triage Assessment    Airway WDL WDL        Respiratory WDL    Respiratory WDL WDL        Skin Circulation/Temperature WDL    Skin Circulation/Temperature WDL WDL        Cardiac WDL    Cardiac WDL WDL        Peripheral/Neurovascular WDL    Peripheral Neurovascular WDL WDL        Cognitive/Neuro/Behavioral WDL    Cognitive/Neuro/Behavioral WDL WDL

## 2025-06-27 NOTE — ED PROVIDER NOTES
Patient was signed out pending CT scan.  Noted bladder nodule that was present on CT scan.  I did discuss these findings with patient, and placed outpatient urology referral.     Lior Hammer MD  06/26/25 4545

## 2025-06-27 NOTE — DISCHARGE INSTRUCTIONS
There was a bladder nodule that was seen.  Referral to urology was placed.  Otherwise findings of diverticulitis, and antibiotics have been prescribed.    Drink plenty of fluids to maintain hydration, I recommend fluids such as Pedialyte and soup broth.  Take the antibiotics as prescribed until they are gone.  Use ondansetron as needed for nausea.  Return for worsening symptoms, repeated vomiting, fevers, or other concerns.  Otherwise follow-up in clinic if not feeling better over the next week.    Use ibuprofen and acetaminophen for your symptoms.  Use oxycodone as needed for pain that is not controlled by the prior two medications.    Oxycodone is an addictive opioid medication, please only take it when the pain is more than can be controlled by acetaminophen or ibuprofen alone. It will also make you lightheaded, nauseated, and constipated.  Do not drive, operate heavy machinery, or take care of young children while taking this medication. Do not mix it with other medications or drugs that will make you sleepy, such as alcohol.     Repeated studies have shown that the longer you use opioid pain medications, the longer it is until you return to normal function. It is our recommendation that you taper off opioids as quickly as possible with the goal of returning to normal function or near normal function. Long term use of opioids quickly results in growing tolerance to the medication (less of the benefits) and increased dependence (more of the bad side effects).     Pain is very difficult to treat and we can very rarely take away pain completely. If you are having difficulty with pain over several weeks after an injury, you may need to start different medications and therapies (such as physical therapy, graded exercise, massage, and acupuncture). Please talk about this with your regular doctor.     If you are interested in seeking free, confidential treatment referral and information service for individuals and  families facing mental health and/or substance use disorders please call [phone] ([phone])

## 2025-06-30 ENCOUNTER — PATIENT OUTREACH (OUTPATIENT)
Dept: CARE COORDINATION | Facility: CLINIC | Age: 75
End: 2025-06-30
Payer: COMMERCIAL

## 2025-06-30 DIAGNOSIS — E11.9 TYPE 2 DIABETES MELLITUS WITHOUT COMPLICATION, WITHOUT LONG-TERM CURRENT USE OF INSULIN (H): ICD-10-CM

## 2025-06-30 DIAGNOSIS — I10 BENIGN ESSENTIAL HYPERTENSION: ICD-10-CM

## 2025-07-01 RX ORDER — LISINOPRIL 5 MG/1
5 TABLET ORAL DAILY
Qty: 90 TABLET | Refills: 3 | Status: SHIPPED | OUTPATIENT
Start: 2025-07-01

## 2025-07-17 ENCOUNTER — PRE VISIT (OUTPATIENT)
Dept: UROLOGY | Facility: CLINIC | Age: 75
End: 2025-07-17
Payer: COMMERCIAL

## 2025-07-17 NOTE — TELEPHONE ENCOUNTER
Reason for visit: cystoscopy    Dx/Hx/Sx: bladder nodule found on recent CT      Records/imaging/labs/orders:   -CT from 6/26 available in PACS  -BMP and CBC available Epic    At rooming:   -consent form  -luer lock + urine cup available for sample    --  Sarthak Jang on 7/17/2025 at 1:06 PM

## 2025-07-21 ENCOUNTER — TELEPHONE (OUTPATIENT)
Dept: UROLOGY | Facility: CLINIC | Age: 75
End: 2025-07-21
Payer: COMMERCIAL

## 2025-07-21 NOTE — TELEPHONE ENCOUNTER
[facility] Call Center    Phone Message    May a detailed message be left on voicemail: yes     Reason for Call: Other: pt calling has many questions about procedure please reach out to her    Action Taken: Other: urology    Travel Screening: Not Applicable     Date of Service:

## 2025-07-22 NOTE — TELEPHONE ENCOUNTER
Pt scheduled for cysto tomorrow. Called and LVM. Provided direct number for her to call with questions.

## 2025-07-23 ENCOUNTER — OFFICE VISIT (OUTPATIENT)
Dept: UROLOGY | Facility: CLINIC | Age: 75
End: 2025-07-23
Payer: COMMERCIAL

## 2025-07-23 VITALS
SYSTOLIC BLOOD PRESSURE: 130 MMHG | OXYGEN SATURATION: 98 % | BODY MASS INDEX: 30.96 KG/M2 | DIASTOLIC BLOOD PRESSURE: 75 MMHG | HEIGHT: 61 IN | HEART RATE: 61 BPM | WEIGHT: 164 LBS

## 2025-07-23 DIAGNOSIS — N32.89 MASS OF BLADDER: ICD-10-CM

## 2025-07-23 PROCEDURE — 52000 CYSTOURETHROSCOPY: CPT | Performed by: UROLOGY

## 2025-07-23 PROCEDURE — 99204 OFFICE O/P NEW MOD 45 MIN: CPT | Mod: 25 | Performed by: UROLOGY

## 2025-07-23 PROCEDURE — 3078F DIAST BP <80 MM HG: CPT | Performed by: UROLOGY

## 2025-07-23 PROCEDURE — 3075F SYST BP GE 130 - 139MM HG: CPT | Performed by: UROLOGY

## 2025-07-23 NOTE — NURSING NOTE
"Chief Complaint   Patient presents with    Cystoscopy       Blood pressure 130/75, pulse 61, height 1.549 m (5' 1\"), weight 74.4 kg (164 lb), SpO2 98%, not currently breastfeeding. Body mass index is 30.99 kg/m .    Patient Active Problem List   Diagnosis    Attention deficit disorder    NAFL (nonalcoholic fatty liver)    Moderate episode of recurrent major depressive disorder (H)    Seasonal allergies    Anal fissure    Menopausal syndrome (hot flashes)    Hyperlipidemia LDL goal <70    HPV (human papilloma virus) anogenital infection    Coronary artery disease involving native coronary artery of native heart without angina pectoris    Diverticulosis    Colon polyp    Chronic rhinitis    Benign essential hypertension    Irritable bowel syndrome with diarrhea    Type 2 diabetes mellitus with microalbuminuria, without long-term current use of insulin (H)    Gastroesophageal reflux disease without esophagitis    Osteopenia of both hips    History of diverticulitis    Abdominal adhesions    Hip pain, right    Fibromyalgia    RAVI (generalized anxiety disorder)    Gastroparesis    Esophageal dysphagia    Small intestinal bacterial overgrowth (SIBO)    Mass of bladder       Allergies   Allergen Reactions    Hydrocodone Anaphylaxis    Flexeril [Cyclobenzaprine] Rash    Cipro [Ciprofloxacin] Other (See Comments)     Abd pain , proteinuria , microscopic hematuria  Possibly related to cipro    Codeine Camsylate Nausea    Influenza Virus Vaccine      Large lumpy, itchy welts on torso and face after a flu shot 10 to 15 years ago.    Pcn [Penicillins] Difficulty breathing    Amoxicillin Itching and Rash    Sulfa Antibiotics Itching and Rash    Tetracycline Itching and Rash       Current Outpatient Medications   Medication Sig Dispense Refill    acetaminophen (TYLENOL) 500 MG tablet Take 500-1,000 mg by mouth every 4 hours as needed for mild pain      alcohol swab prep pads Use to swab area of injection/samantha as directed. 100 " each 3    Alcohol Swabs (B-D SINGLE USE SWABS REGULAR) PADS USE TO SWAB AREA OF INJECTION/BETHEL AS DIRECTED. 100 each 3    ALPRAZolam (XANAX PO) Take 0.5-1 mg by mouth 4 times daily as needed 1/2 tab in am, 1 tab in pm, then 1 tab midday prn and 1/2 tab throughout the day if needed      amphetamine-dextroamphetamine (ADDERALL XR) 20 MG per capsule Take 20 mg by mouth daily       aspirin 81 MG EC tablet Take 1 tablet (81 mg) by mouth daily.      atorvastatin (LIPITOR) 80 MG tablet TAKE ONE TABLET BY MOUTH ONCE DAILY 90 tablet 2    blood glucose (NO BRAND SPECIFIED) lancets standard Use to test blood sugar 3 times daily or as directed. 100 each 1    blood glucose calibration (NO BRAND SPECIFIED) solution To accompany: Blood Glucose Monitor Brands: per insurance. 1 each 1    blood glucose monitoring (NO BRAND SPECIFIED) meter device kit Use to test blood sugar 1 times daily or as directed. Preferred blood glucose meter OR supplies to accompany: Blood Glucose Monitor Brands: per insurance. 1 kit 0    Continuous Glucose Sensor (FREESTYLE DENEEN 3 PLUS SENSOR) MISC Use 1 sensor every 15 days. Use to read blood sugars per 's instructions. 6 each 3    CONTOUR NEXT TEST test strip USE TO TEST BLOOD SUGAR THREE TIMES A  strip 0    DoxePIN (SINEQUAN) 75 MG capsule Take 75 mg by mouth At Bedtime       escitalopram (LEXAPRO) 20 MG tablet Take 20 mg by mouth daily      famotidine (PEPCID) 40 MG tablet TAKE ONE TABLET BY MOUTH ONCE DAILY 90 tablet 1    fluocinonide (LIDEX) 0.05 % external solution Apply topically 2 times daily. Apply to itchy areas scalp BID x 1-2 weeks PRN 60 mL 11    insulin aspart (NOVOLOG PEN) 100 UNIT/ML pen Inject 5 Units subcutaneously 3 times daily (with meals). 15 mL 1    insulin glargine (LANTUS SOLOSTAR) 100 UNIT/ML pen Inject 20 Units subcutaneously at bedtime 30 mL 3    insulin pen needle (32G X 6 MM) 32G X 6 MM miscellaneous Use 3 pen needles daily or as directed. 100 each 3     insulin pen needle (ULTICARE MICRO) 32G X 4 MM miscellaneous USE 1 PEN NEEDLE DAILY OR AS DIRECTED. NEEDS BLOOD WORK FOR FUTHER REFILLS 100 each 3    LANsoprazole (PREVACID) 30 MG DR capsule TAKE 1 CAPSULE (30 MG) BY MOUTH EVERY MORNING (BEFORE BREAKFAST) 30-60 MINUTES BEFORE A MEAL. 90 capsule 3    lisinopril (ZESTRIL) 5 MG tablet TAKE ONE TABLET BY MOUTH ONCE DAILY 90 tablet 3    methocarbamol (ROBAXIN) 500 MG tablet Take 1 tablet (500 mg) by mouth 4 times daily as needed for muscle spasms. 30 tablet 3    Microlet Lancets MISC USE TO TEST BLOOD SUGAR 3 TIMES DAILY OR AS DIRECTED. 100 each 0    mupirocin (BACTROBAN) 2 % external ointment Apply to open areas on the scalp BID until healed. Need follow up appointment in Derm. 30 g 0    nitroGLYcerin (NITROSTAT) 0.4 MG sublingual tablet Place 1 tablet (0.4 mg) under the tongue every 5 minutes as needed for chest pain 25 tablet 1    nystatin (MYCOSTATIN) 362448 UNIT/GM external cream Apply topically 2 times daily. 30 g 11    ondansetron (ZOFRAN ODT) 4 MG ODT tab Take 1 tablet (4 mg) by mouth every 8 hours as needed for nausea. 10 tablet 0    prochlorperazine (COMPAZINE) 5 MG tablet Take 1 tablet (5 mg) by mouth every 8 hours as needed for nausea or vomiting. 30 tablet 1       Social History     Tobacco Use    Smoking status: Former     Current packs/day: 0.00     Average packs/day: 1 pack/day for 43.2 years (43.2 ttl pk-yrs)     Types: Cigarettes     Start date: 1968     Quit date: 3/15/2011     Years since quittin.3    Smokeless tobacco: Never    Tobacco comments:     occasional/daily   Vaping Use    Vaping status: Never Used   Substance Use Topics    Alcohol use: No     Alcohol/week: 0.0 standard drinks of alcohol    Drug use: No       Invasive Procedure Safety Checklist:    Procedure: Cystoscopy    Action: Complete sections and checkboxes as appropriate.    Pre-procedure:  1. Patient ID Verified with 2 identifiers (Holly and  or MRN) : YES    2. Procedure  and site verified with patient/designee (when able) : YES    3. Accurate consent documentation in medical record : YES    4. H&P (or appropriate assessment) documented in medical record : N/A  H&P must be up to 30 days prior to procedure an updated within 24 hours of                 Procedure as applicable.     5. Relevant diagnostic and radiology test results appropriately labeled and displayed as applicable : YES    6. Blood products, implants, devices, and/or special equipment available for the procedure as applicable : YES    7. Procedure site(s) marked with provider initials [Exclusions: none] : NO    8. Marking not required. Reason : Yes  Procedure does not require site marking    Time Out:     Time-Out performed immediately prior to starting procedure, including verbal and active participation of all team members addressing: YES    1. Correct patient identity.  2. Confirmed that the correct side and site are marked.  3. An accurate procedure to be done.  4. Agreement on the procedure to be done.  5. Correct patient position.  6. Relevant images and results are properly labeled and appropriately displayed.  7. The need to administer antibiotics or fluids for irrigation purposes during the procedure as applicable.  8. Safety precautions based on patient history or medication use.    During Procedure: Verification of correct person, site, and procedure occurs any time the responsibility for care of the patient is transferred to another member of the care team.        Sarthak Jang  7/23/2025  1:48 PM
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
## (undated) DEVICE — ENDO SNARE EXACTO COLD 9MM LOOP 2.4MMX230CM 00711115

## (undated) DEVICE — ENDO FORCEP ENDOJAW BIOPSY 2.8MMX230CM FB-220U

## (undated) RX ORDER — GLYCOPYRROLATE 0.2 MG/ML
INJECTION, SOLUTION INTRAMUSCULAR; INTRAVENOUS
Status: DISPENSED
Start: 2022-11-07

## (undated) RX ORDER — PROPOFOL 10 MG/ML
INJECTION, EMULSION INTRAVENOUS
Status: DISPENSED
Start: 2022-11-07

## (undated) RX ORDER — REGADENOSON 0.08 MG/ML
INJECTION, SOLUTION INTRAVENOUS
Status: DISPENSED
Start: 2019-09-19

## (undated) RX ORDER — LIDOCAINE HYDROCHLORIDE 10 MG/ML
INJECTION, SOLUTION INFILTRATION; PERINEURAL
Status: DISPENSED
Start: 2022-11-07

## (undated) RX ORDER — METOPROLOL TARTRATE 50 MG
TABLET ORAL
Status: DISPENSED
Start: 2025-03-25